# Patient Record
Sex: MALE | Race: OTHER | Employment: OTHER | URBAN - METROPOLITAN AREA
[De-identification: names, ages, dates, MRNs, and addresses within clinical notes are randomized per-mention and may not be internally consistent; named-entity substitution may affect disease eponyms.]

---

## 2017-03-17 RX ORDER — METOPROLOL SUCCINATE 50 MG/1
TABLET, EXTENDED RELEASE ORAL
Qty: 90 TABLET | Refills: 3 | Status: SHIPPED | OUTPATIENT
Start: 2017-03-17 | End: 2017-11-13 | Stop reason: SDUPTHER

## 2017-04-11 RX ORDER — CAPTOPRIL AND HYDROCHLOROTHIAZIDE 50; 25 MG/1; MG/1
TABLET ORAL
Qty: 180 TABLET | Refills: 3 | Status: SHIPPED | OUTPATIENT
Start: 2017-04-11 | End: 2017-11-13 | Stop reason: SDUPTHER

## 2017-04-11 RX ORDER — POTASSIUM CHLORIDE 1500 MG/1
TABLET, EXTENDED RELEASE ORAL
Qty: 270 TABLET | Refills: 3 | Status: SHIPPED | OUTPATIENT
Start: 2017-04-11 | End: 2017-11-13 | Stop reason: SDUPTHER

## 2017-06-07 ENCOUNTER — OFFICE VISIT (OUTPATIENT)
Dept: INTERNAL MEDICINE | Age: 67
End: 2017-06-07

## 2017-06-07 VITALS
DIASTOLIC BLOOD PRESSURE: 72 MMHG | OXYGEN SATURATION: 98 % | HEART RATE: 73 BPM | TEMPERATURE: 98.2 F | WEIGHT: 231 LBS | HEIGHT: 67 IN | BODY MASS INDEX: 36.26 KG/M2 | SYSTOLIC BLOOD PRESSURE: 108 MMHG | RESPIRATION RATE: 16 BRPM

## 2017-06-07 DIAGNOSIS — E78.5 DYSLIPIDEMIA: ICD-10-CM

## 2017-06-07 DIAGNOSIS — E11.9 TYPE 2 DIABETES MELLITUS WITHOUT COMPLICATION, WITHOUT LONG-TERM CURRENT USE OF INSULIN (HCC): Primary | ICD-10-CM

## 2017-06-07 DIAGNOSIS — C85.93 LYMPHOMA OF GASTROINTESTINAL TRACT (HCC): ICD-10-CM

## 2017-06-07 LAB
ALBUMIN SERPL-MCNC: 4.3 G/DL (ref 3.9–4.9)
ALP BLD-CCNC: 78 U/L (ref 35–104)
ALT SERPL-CCNC: 19 U/L (ref 0–41)
ANION GAP SERPL CALCULATED.3IONS-SCNC: 17 MEQ/L (ref 7–13)
AST SERPL-CCNC: 17 U/L (ref 0–40)
BILIRUB SERPL-MCNC: 0.5 MG/DL (ref 0–1.2)
BUN BLDV-MCNC: 16 MG/DL (ref 8–23)
CALCIUM SERPL-MCNC: 9 MG/DL (ref 8.6–10.2)
CHLORIDE BLD-SCNC: 101 MEQ/L (ref 98–107)
CHOLESTEROL, TOTAL: 108 MG/DL (ref 0–199)
CO2: 23 MEQ/L (ref 22–29)
CREAT SERPL-MCNC: 1.09 MG/DL (ref 0.7–1.2)
GFR AFRICAN AMERICAN: >60
GFR NON-AFRICAN AMERICAN: >60
GLOBULIN: 3.4 G/DL (ref 2.3–3.5)
GLUCOSE BLD-MCNC: 104 MG/DL (ref 74–109)
HBA1C MFR BLD: 6.3 % (ref 4.8–5.9)
HDLC SERPL-MCNC: 28 MG/DL (ref 40–59)
LDL CHOLESTEROL CALCULATED: 44 MG/DL (ref 0–129)
POTASSIUM SERPL-SCNC: 4 MEQ/L (ref 3.5–5.1)
SODIUM BLD-SCNC: 141 MEQ/L (ref 132–144)
TOTAL PROTEIN: 7.7 G/DL (ref 6.4–8.1)
TRIGL SERPL-MCNC: 180 MG/DL (ref 0–200)

## 2017-06-07 PROCEDURE — G8417 CALC BMI ABV UP PARAM F/U: HCPCS | Performed by: FAMILY MEDICINE

## 2017-06-07 PROCEDURE — 1036F TOBACCO NON-USER: CPT | Performed by: FAMILY MEDICINE

## 2017-06-07 PROCEDURE — 3017F COLORECTAL CA SCREEN DOC REV: CPT | Performed by: FAMILY MEDICINE

## 2017-06-07 PROCEDURE — 3044F HG A1C LEVEL LT 7.0%: CPT | Performed by: FAMILY MEDICINE

## 2017-06-07 PROCEDURE — 36415 COLL VENOUS BLD VENIPUNCTURE: CPT | Performed by: FAMILY MEDICINE

## 2017-06-07 PROCEDURE — G8427 DOCREV CUR MEDS BY ELIG CLIN: HCPCS | Performed by: FAMILY MEDICINE

## 2017-06-07 PROCEDURE — 99214 OFFICE O/P EST MOD 30 MIN: CPT | Performed by: FAMILY MEDICINE

## 2017-10-12 RX ORDER — ATORVASTATIN CALCIUM 40 MG/1
TABLET, FILM COATED ORAL
Qty: 90 TABLET | Refills: 0 | Status: SHIPPED | OUTPATIENT
Start: 2017-10-12 | End: 2017-12-07 | Stop reason: SDUPTHER

## 2017-11-13 ENCOUNTER — NURSE ONLY (OUTPATIENT)
Dept: INTERNAL MEDICINE | Age: 67
End: 2017-11-13

## 2017-11-13 DIAGNOSIS — Z23 NEED FOR INFLUENZA VACCINATION: Primary | ICD-10-CM

## 2017-11-13 PROCEDURE — 90662 IIV NO PRSV INCREASED AG IM: CPT | Performed by: PHYSICIAN ASSISTANT

## 2017-11-13 PROCEDURE — G0008 ADMIN INFLUENZA VIRUS VAC: HCPCS | Performed by: PHYSICIAN ASSISTANT

## 2017-11-13 RX ORDER — CAPTOPRIL AND HYDROCHLOROTHIAZIDE 50; 25 MG/1; MG/1
TABLET ORAL
Qty: 180 TABLET | Refills: 0 | Status: SHIPPED | OUTPATIENT
Start: 2017-11-13 | End: 2018-03-02 | Stop reason: SDUPTHER

## 2017-11-13 RX ORDER — METOPROLOL SUCCINATE 50 MG/1
TABLET, EXTENDED RELEASE ORAL
Qty: 90 TABLET | Refills: 0 | Status: SHIPPED | OUTPATIENT
Start: 2017-11-13 | End: 2017-12-20 | Stop reason: SDUPTHER

## 2017-11-13 RX ORDER — POTASSIUM CHLORIDE 20 MEQ/1
TABLET, EXTENDED RELEASE ORAL
Qty: 270 TABLET | Refills: 0 | Status: SHIPPED | OUTPATIENT
Start: 2017-11-13 | End: 2017-12-07 | Stop reason: SDUPTHER

## 2017-11-13 NOTE — TELEPHONE ENCOUNTER
Patient is almost out of captopril,toprol,klor-con and metformin. He does have an appt with dr Knight Danger 12/7/17 and plans on keeping that and will ask her for a years supply of meds.   Thank you

## 2017-12-05 ENCOUNTER — HOSPITAL ENCOUNTER (OUTPATIENT)
Dept: ORTHOPEDIC SURGERY | Age: 67
Discharge: HOME | End: 2017-12-05
Payer: MEDICARE

## 2017-12-05 DIAGNOSIS — M25.561 ARTHRALGIA OF RIGHT LOWER LEG: ICD-10-CM

## 2017-12-05 DIAGNOSIS — M25.562 ARTHRALGIA OF LEFT LOWER LEG: ICD-10-CM

## 2017-12-05 PROCEDURE — 73564 X-RAY EXAM KNEE 4 OR MORE: CPT

## 2017-12-07 ENCOUNTER — OFFICE VISIT (OUTPATIENT)
Dept: INTERNAL MEDICINE | Age: 67
End: 2017-12-07

## 2017-12-07 VITALS
WEIGHT: 234.6 LBS | TEMPERATURE: 98.2 F | RESPIRATION RATE: 16 BRPM | DIASTOLIC BLOOD PRESSURE: 68 MMHG | HEART RATE: 78 BPM | BODY MASS INDEX: 36.82 KG/M2 | SYSTOLIC BLOOD PRESSURE: 124 MMHG | HEIGHT: 67 IN | OXYGEN SATURATION: 98 %

## 2017-12-07 DIAGNOSIS — E78.5 DYSLIPIDEMIA: ICD-10-CM

## 2017-12-07 DIAGNOSIS — Z91.81 AT HIGH RISK FOR FALLS: ICD-10-CM

## 2017-12-07 DIAGNOSIS — Z11.59 NEED FOR HEPATITIS C SCREENING TEST: ICD-10-CM

## 2017-12-07 DIAGNOSIS — E11.9 TYPE 2 DIABETES MELLITUS WITHOUT COMPLICATION, WITHOUT LONG-TERM CURRENT USE OF INSULIN (HCC): Primary | ICD-10-CM

## 2017-12-07 DIAGNOSIS — Z11.4 ENCOUNTER FOR SCREENING FOR HIV: ICD-10-CM

## 2017-12-07 DIAGNOSIS — Z01.810 ENCOUNTER FOR PRE-OPERATIVE CARDIOVASCULAR CLEARANCE: ICD-10-CM

## 2017-12-07 LAB
ALBUMIN SERPL-MCNC: 4.4 G/DL (ref 3.9–4.9)
ALP BLD-CCNC: 80 U/L (ref 35–104)
ALT SERPL-CCNC: 21 U/L (ref 0–41)
ANION GAP SERPL CALCULATED.3IONS-SCNC: 16 MEQ/L (ref 7–13)
AST SERPL-CCNC: 17 U/L (ref 0–40)
BILIRUB SERPL-MCNC: 0.8 MG/DL (ref 0–1.2)
BUN BLDV-MCNC: 24 MG/DL (ref 8–23)
CALCIUM SERPL-MCNC: 9.1 MG/DL (ref 8.6–10.2)
CHLORIDE BLD-SCNC: 98 MEQ/L (ref 98–107)
CHOLESTEROL, TOTAL: 113 MG/DL (ref 0–199)
CO2: 26 MEQ/L (ref 22–29)
CREAT SERPL-MCNC: 1.04 MG/DL (ref 0.7–1.2)
CREATININE URINE: 19.2 MG/DL
GFR AFRICAN AMERICAN: >60
GFR NON-AFRICAN AMERICAN: >60
GLOBULIN: 3.3 G/DL (ref 2.3–3.5)
GLUCOSE BLD-MCNC: 115 MG/DL (ref 74–109)
HBA1C MFR BLD: 6.2 % (ref 4.8–5.9)
HDLC SERPL-MCNC: 27 MG/DL (ref 40–59)
HEPATITIS C ANTIBODY INTERPRETATION: NORMAL
LDL CHOLESTEROL CALCULATED: 49 MG/DL (ref 0–129)
MICROALBUMIN UR-MCNC: <1.2 MG/DL
MICROALBUMIN/CREAT UR-RTO: NORMAL MG/G (ref 0–30)
POTASSIUM SERPL-SCNC: 3.9 MEQ/L (ref 3.5–5.1)
SODIUM BLD-SCNC: 140 MEQ/L (ref 132–144)
TOTAL PROTEIN: 7.7 G/DL (ref 6.4–8.1)
TRIGL SERPL-MCNC: 184 MG/DL (ref 0–200)

## 2017-12-07 PROCEDURE — 3017F COLORECTAL CA SCREEN DOC REV: CPT | Performed by: FAMILY MEDICINE

## 2017-12-07 PROCEDURE — 99214 OFFICE O/P EST MOD 30 MIN: CPT | Performed by: FAMILY MEDICINE

## 2017-12-07 PROCEDURE — 4040F PNEUMOC VAC/ADMIN/RCVD: CPT | Performed by: FAMILY MEDICINE

## 2017-12-07 PROCEDURE — 36415 COLL VENOUS BLD VENIPUNCTURE: CPT | Performed by: FAMILY MEDICINE

## 2017-12-07 PROCEDURE — G8484 FLU IMMUNIZE NO ADMIN: HCPCS | Performed by: FAMILY MEDICINE

## 2017-12-07 PROCEDURE — 3044F HG A1C LEVEL LT 7.0%: CPT | Performed by: FAMILY MEDICINE

## 2017-12-07 PROCEDURE — 1036F TOBACCO NON-USER: CPT | Performed by: FAMILY MEDICINE

## 2017-12-07 PROCEDURE — G8427 DOCREV CUR MEDS BY ELIG CLIN: HCPCS | Performed by: FAMILY MEDICINE

## 2017-12-07 PROCEDURE — G8417 CALC BMI ABV UP PARAM F/U: HCPCS | Performed by: FAMILY MEDICINE

## 2017-12-07 PROCEDURE — 1123F ACP DISCUSS/DSCN MKR DOCD: CPT | Performed by: FAMILY MEDICINE

## 2017-12-07 RX ORDER — ATORVASTATIN CALCIUM 40 MG/1
TABLET, FILM COATED ORAL
Qty: 90 TABLET | Refills: 3 | Status: SHIPPED | OUTPATIENT
Start: 2017-12-07 | End: 2018-12-28 | Stop reason: SDUPTHER

## 2017-12-07 RX ORDER — POTASSIUM CHLORIDE 20 MEQ/1
TABLET, EXTENDED RELEASE ORAL
Qty: 270 TABLET | Refills: 3 | Status: SHIPPED | OUTPATIENT
Start: 2017-12-07 | End: 2019-01-08 | Stop reason: SDUPTHER

## 2017-12-07 ASSESSMENT — PATIENT HEALTH QUESTIONNAIRE - PHQ9
SUM OF ALL RESPONSES TO PHQ QUESTIONS 1-9: 0
1. LITTLE INTEREST OR PLEASURE IN DOING THINGS: 0
2. FEELING DOWN, DEPRESSED OR HOPELESS: 0
SUM OF ALL RESPONSES TO PHQ9 QUESTIONS 1 & 2: 0

## 2017-12-07 NOTE — PROGRESS NOTES
Patient: Aries Savage    YOB: 1950    Date: 12/7/17    Chief Complaint   Patient presents with    Diabetes     6 month follow up. He is due for labs and Diabetic foot exam. He is doing well    Medication Refill     Pending       Patient Active Problem List    Diagnosis Date Noted    Type 2 diabetes mellitus without complication, without long-term current use of insulin ([facility] 06/07/2017    Dyslipidemia 06/07/2017    CLL (chronic lymphocytic leukemia) (HCC)     Lymphoma of gastrointestinal tract (HCC)     Arthritis, lumbar spine ([facility] 04/08/2015       Allergies   Allergen Reactions    Dye [Barium-Containing Compounds] Hives    Iodinated Diagnostic Agents Hives       Vitals:    12/07/17 0855   BP: 124/68   Site: Left Arm   Position: Sitting   Cuff Size: Large Adult   Pulse: 78   Resp: 16   Temp: 98.2 °F (36.8 °C)   TempSrc: Oral   SpO2: 98%   Weight: 234 lb 9.6 oz (106.4 kg)   Height: 5' 7\" (1.702 m)     Body mass index is 36.74 kg/m². Treatment Adherence:   Medication compliance:  compliant all of the time  Diet compliance:  noncompliant: does not follow diet  Weight trend: increasing  Current exercise: no regular exercise    Diabetes Mellitus Type 2: Current symptoms/problems include none. Home blood sugar records:  fasting range: 115  Any episodes of hypoglycemia? no  Eye exam current (within one year): yes  Tobacco history: He  reports that he has never smoked. He has never used smokeless tobacco.   Daily Aspirin? Yes  Known diabetic complications: none          HPI Actually feels very well today. He is here to follow-up on his diabetes his cholesterol and his blood pressure. He has no complaints no chest pressure or pain nausea vomiting numbness tingling or weakness. He should have his right knee replaced coming up he has never seen a cardiologist ordered a cardiac consult to help with clearance on this patient.     Review of Systems    Constitutional: Negative for fatigue, fever and sweats. HEENT: Negative for eye discharge and vision loss. Negative for ear drainage, hearing loss and nasal drainage. Respiratory: Negative for cough, dyspnea and wheezing. Cardiovascular:  Negative for chest pain, claudication and irregular heartbeat/palpitations. Gastrointestinal: Negative for abdominal pain, nausea, vomiting, constipation and diarrhea. Genitourinary: No dysuria or penile discharge. Metabolic/Endocrine: Negative for cold intolerance, heat intolerance, polydipsia and polyphagia. No unintended weight loss or gain. Neuro/Psychiatric: Negative for gait disturbance. Negative for psychiatric symptoms. Dermatologic: Negative for pruritus and rash. Musculoskeletal: positive for bone/joint symptoms. No numbness or tingling. No weakness. Hematology: Negative for bleeding and easy bruising. Immunology:  Negative for environmental allergies and food allergies. Physical Exam    Patient's medication, allergies, past medical, surgical, social and family histories were reviewed and updated as appropriate. PHYSICAL EXAM   General Appearance: Alert oriented pleasant cooperative with the exam in no acute distress. HEENT: Eyes clear nonicteric hearing excellent facial muscles symmetrical.  Neck: Soft nontender no adenopathy or masses no carotid bruits  Lungs: Clear to auscultation no wheezes rhonchi or rales  Heart: Regular rate and rhythm without murmurs rubs or gallops  Extremities: Diabetic foot exam: Normal feet with no edema no callus no ulcerations no deformities intact to sensation. Assessment:  1. Type 2 diabetes mellitus without complication, without long-term current use of insulin ([facility])  Microalbumin / Creatinine Urine Ratio     DIABETES FOOT EXAM    Hemoglobin A1C    Microalbumin / Creatinine Urine Ratio    Hemoglobin A1C   2. Dyslipidemia  Lipid Panel    Comprehensive Metabolic Panel    Lipid Panel    Comprehensive Metabolic Panel   3.  Encounter for screening for HIV Hiv-1,-2 W/Reflex To Hiv-1 Western Blot    Hiv-1,-2 W/Reflex To Hiv-1 Western Blot   4. Need for hepatitis C screening test  Hepatitis C Antibody    Hepatitis C Antibody   5. At high risk for falls     6. Encounter for pre-operative cardiovascular clearance  [address]) [facility]               Plan:  Current Outpatient Prescriptions   Medication Sig Dispense Refill    potassium chloride (KLOR-CON M20) 20 MEQ extended release tablet TAKE 1 TABLET THREE TIMES A  tablet 3    metFORMIN (GLUCOPHAGE) 500 MG tablet TAKE 2 TABLETS TWICE A DAY WITH MEALS 360 tablet 3    atorvastatin (LIPITOR) 40 MG tablet TAKE 1 TABLET NIGHTLY 90 tablet 3    metoprolol succinate (TOPROL XL) 50 MG extended release tablet TAKE 1 TABLET DAILY 90 tablet 0    captopril-hydrochlorothiazide (CAPOZIDE) 50-25 MG per tablet TAKE 1 TABLET 1 TO 2 TIMES DAILY 180 tablet 0    furosemide (LASIX) 40 MG tablet TAKE 1 TABLET DAILY 90 tablet 3    Alcohol Swabs PADS Use as directed with insulin injections 300 each 3    aspirin 81 MG tablet Take 81 mg by mouth daily.  SUPER B COMPLEX/C CAPS Take 1 capsule by mouth daily.  Multiple Vitamins-Minerals (MULTIVITAMIN PO) Take 1 tablet by mouth daily. No current facility-administered medications for this visit.       Orders Placed This Encounter   Procedures    Microalbumin / Creatinine Urine Ratio     Standing Status:   Future     Number of Occurrences:   1     Standing Expiration Date:   12/7/2018    Hiv-1,-2 W/Reflex To Hiv-1 Western Blot     Standing Status:   Future     Number of Occurrences:   1     Standing Expiration Date:   12/7/2018    Hepatitis C Antibody     Standing Status:   Future     Number of Occurrences:   1     Standing Expiration Date:   12/7/2018    Hemoglobin A1C     Standing Status:   Future     Number of Occurrences:   1     Standing Expiration Date:   12/7/2018    Lipid Panel     Standing Status:   Future     Number of

## 2017-12-09 LAB — HIV-1 AND HIV-2 ANTIBODIES: NEGATIVE

## 2017-12-20 ENCOUNTER — OFFICE VISIT (OUTPATIENT)
Dept: INTERNAL MEDICINE | Age: 67
End: 2017-12-20

## 2017-12-20 VITALS
BODY MASS INDEX: 37.48 KG/M2 | TEMPERATURE: 97.6 F | DIASTOLIC BLOOD PRESSURE: 70 MMHG | HEIGHT: 67 IN | SYSTOLIC BLOOD PRESSURE: 110 MMHG | WEIGHT: 238.8 LBS | HEART RATE: 73 BPM | OXYGEN SATURATION: 98 %

## 2017-12-20 DIAGNOSIS — M17.0 PRIMARY OSTEOARTHRITIS OF BOTH KNEES: ICD-10-CM

## 2017-12-20 DIAGNOSIS — I10 ESSENTIAL HYPERTENSION: ICD-10-CM

## 2017-12-20 DIAGNOSIS — E78.2 MIXED HYPERLIPIDEMIA: ICD-10-CM

## 2017-12-20 DIAGNOSIS — Z01.818 PRE-OP EXAM: Primary | ICD-10-CM

## 2017-12-20 LAB
BASOPHILS ABSOLUTE: 0 K/UL (ref 0–0.2)
BASOPHILS RELATIVE PERCENT: 0.3 %
BILIRUBIN URINE: NEGATIVE
BLOOD, URINE: NEGATIVE
CLARITY: CLEAR
COLOR: YELLOW
EOSINOPHILS ABSOLUTE: 0.1 K/UL (ref 0–0.7)
EOSINOPHILS RELATIVE PERCENT: 1.4 %
GLUCOSE URINE: NEGATIVE MG/DL
HCT VFR BLD CALC: 38.2 % (ref 42–52)
HEMOGLOBIN: 13.3 G/DL (ref 14–18)
KETONES, URINE: NEGATIVE MG/DL
LEUKOCYTE ESTERASE, URINE: NEGATIVE
LYMPHOCYTES ABSOLUTE: 1.8 K/UL (ref 1–4.8)
LYMPHOCYTES RELATIVE PERCENT: 29.2 %
MCH RBC QN AUTO: 31.8 PG (ref 27–31.3)
MCHC RBC AUTO-ENTMCNC: 34.9 % (ref 33–37)
MCV RBC AUTO: 91.1 FL (ref 80–100)
MONOCYTES ABSOLUTE: 0.6 K/UL (ref 0.2–0.8)
MONOCYTES RELATIVE PERCENT: 9.6 %
NEUTROPHILS ABSOLUTE: 3.6 K/UL (ref 1.4–6.5)
NEUTROPHILS RELATIVE PERCENT: 59.5 %
NITRITE, URINE: NEGATIVE
PDW BLD-RTO: 13.8 % (ref 11.5–14.5)
PH UA: 5 (ref 5–9)
PLATELET # BLD: 200 K/UL (ref 130–400)
PROTEIN UA: NEGATIVE MG/DL
RBC # BLD: 4.2 M/UL (ref 4.7–6.1)
SPECIFIC GRAVITY UA: 1 (ref 1–1.03)
UROBILINOGEN, URINE: 0.2 E.U./DL
WBC # BLD: 6.1 K/UL (ref 4.8–10.8)

## 2017-12-20 PROCEDURE — 99214 OFFICE O/P EST MOD 30 MIN: CPT | Performed by: PHYSICIAN ASSISTANT

## 2017-12-20 PROCEDURE — 3017F COLORECTAL CA SCREEN DOC REV: CPT | Performed by: PHYSICIAN ASSISTANT

## 2017-12-20 PROCEDURE — 4040F PNEUMOC VAC/ADMIN/RCVD: CPT | Performed by: PHYSICIAN ASSISTANT

## 2017-12-20 PROCEDURE — G8484 FLU IMMUNIZE NO ADMIN: HCPCS | Performed by: PHYSICIAN ASSISTANT

## 2017-12-20 PROCEDURE — G8427 DOCREV CUR MEDS BY ELIG CLIN: HCPCS | Performed by: PHYSICIAN ASSISTANT

## 2017-12-20 PROCEDURE — 1123F ACP DISCUSS/DSCN MKR DOCD: CPT | Performed by: PHYSICIAN ASSISTANT

## 2017-12-20 PROCEDURE — G8417 CALC BMI ABV UP PARAM F/U: HCPCS | Performed by: PHYSICIAN ASSISTANT

## 2017-12-20 PROCEDURE — 93000 ELECTROCARDIOGRAM COMPLETE: CPT | Performed by: PHYSICIAN ASSISTANT

## 2017-12-20 PROCEDURE — 36415 COLL VENOUS BLD VENIPUNCTURE: CPT | Performed by: PHYSICIAN ASSISTANT

## 2017-12-20 PROCEDURE — 1036F TOBACCO NON-USER: CPT | Performed by: PHYSICIAN ASSISTANT

## 2017-12-20 RX ORDER — FUROSEMIDE 40 MG/1
TABLET ORAL
Qty: 90 TABLET | Refills: 3 | Status: SHIPPED | OUTPATIENT
Start: 2017-12-20 | End: 2019-04-11 | Stop reason: SDUPTHER

## 2017-12-20 RX ORDER — METOPROLOL SUCCINATE 50 MG/1
TABLET, EXTENDED RELEASE ORAL
Qty: 90 TABLET | Refills: 3 | Status: SHIPPED | OUTPATIENT
Start: 2017-12-20 | End: 2019-01-08 | Stop reason: SDUPTHER

## 2017-12-20 NOTE — PROGRESS NOTES
06/08/16    I & D ABSCESS THIGH    SHOULDER SURGERY      UPPER GASTROINTESTINAL ENDOSCOPY  4/29/15    w/bx      Social History     Social History    Marital status:      Spouse name: N/A    Number of children: N/A    Years of education: N/A     Occupational History    Not on file. Social History Main Topics    Smoking status: Never Smoker    Smokeless tobacco: Never Used    Alcohol use No    Drug use: No    Sexual activity: Not on file     Other Topics Concern    Not on file     Social History Narrative    No narrative on file     Family History   Problem Relation Age of Onset    Heart Disease Mother 67     CHF    Cancer Father 68     liver    Cancer Brother      chronic lukemia     Allergies   Allergen Reactions    Dye [Barium-Containing Compounds] Hives    Iodinated Diagnostic Agents Hives     Current Outpatient Prescriptions   Medication Sig Dispense Refill    metoprolol succinate (TOPROL XL) 50 MG extended release tablet TAKE 1 TABLET DAILY 90 tablet 3    furosemide (LASIX) 40 MG tablet TAKE 1 TABLET DAILY 90 tablet 3    potassium chloride (KLOR-CON M20) 20 MEQ extended release tablet TAKE 1 TABLET THREE TIMES A  tablet 3    metFORMIN (GLUCOPHAGE) 500 MG tablet TAKE 2 TABLETS TWICE A DAY WITH MEALS 360 tablet 3    atorvastatin (LIPITOR) 40 MG tablet TAKE 1 TABLET NIGHTLY 90 tablet 3    captopril-hydrochlorothiazide (CAPOZIDE) 50-25 MG per tablet TAKE 1 TABLET 1 TO 2 TIMES DAILY 180 tablet 0    Alcohol Swabs PADS Use as directed with insulin injections 300 each 3    aspirin 81 MG tablet Take 81 mg by mouth daily.  SUPER B COMPLEX/C CAPS Take 1 capsule by mouth daily.  Multiple Vitamins-Minerals (MULTIVITAMIN PO) Take 1 tablet by mouth daily. No current facility-administered medications for this visit.         Objective    Vitals:    12/20/17 0842   BP: 110/70   Site: Left Arm   Position: Sitting   Cuff Size: Large Adult   Pulse: 73   Temp:

## 2017-12-21 LAB
ALBUMIN SERPL-MCNC: 4.3 G/DL (ref 3.9–4.9)
ALP BLD-CCNC: 78 U/L (ref 35–104)
ALT SERPL-CCNC: 20 U/L (ref 0–41)
ANION GAP SERPL CALCULATED.3IONS-SCNC: 15 MEQ/L (ref 7–13)
AST SERPL-CCNC: 17 U/L (ref 0–40)
BILIRUB SERPL-MCNC: 0.7 MG/DL (ref 0–1.2)
BUN BLDV-MCNC: 25 MG/DL (ref 8–23)
CALCIUM SERPL-MCNC: 9 MG/DL (ref 8.6–10.2)
CHLORIDE BLD-SCNC: 98 MEQ/L (ref 98–107)
CO2: 26 MEQ/L (ref 22–29)
CREAT SERPL-MCNC: 1.03 MG/DL (ref 0.7–1.2)
GFR AFRICAN AMERICAN: >60
GFR NON-AFRICAN AMERICAN: >60
GLOBULIN: 3.3 G/DL (ref 2.3–3.5)
GLUCOSE BLD-MCNC: 107 MG/DL (ref 74–109)
POTASSIUM SERPL-SCNC: 4.6 MEQ/L (ref 3.5–5.1)
SODIUM BLD-SCNC: 139 MEQ/L (ref 132–144)
TOTAL PROTEIN: 7.6 G/DL (ref 6.4–8.1)

## 2017-12-26 ENCOUNTER — OFFICE VISIT (OUTPATIENT)
Dept: CARDIOLOGY | Age: 67
End: 2017-12-26

## 2017-12-26 VITALS
DIASTOLIC BLOOD PRESSURE: 62 MMHG | HEART RATE: 76 BPM | RESPIRATION RATE: 16 BRPM | WEIGHT: 230 LBS | SYSTOLIC BLOOD PRESSURE: 104 MMHG | TEMPERATURE: 97.1 F | BODY MASS INDEX: 36.1 KG/M2 | OXYGEN SATURATION: 98 % | HEIGHT: 67 IN

## 2017-12-26 DIAGNOSIS — Z01.810 ENCOUNTER FOR PREPROCEDURAL CARDIOVASCULAR EXAMINATION: Primary | ICD-10-CM

## 2017-12-26 DIAGNOSIS — E78.5 DYSLIPIDEMIA: ICD-10-CM

## 2017-12-26 DIAGNOSIS — I10 ESSENTIAL HYPERTENSION: ICD-10-CM

## 2017-12-26 PROCEDURE — 3017F COLORECTAL CA SCREEN DOC REV: CPT | Performed by: INTERNAL MEDICINE

## 2017-12-26 PROCEDURE — 1123F ACP DISCUSS/DSCN MKR DOCD: CPT | Performed by: INTERNAL MEDICINE

## 2017-12-26 PROCEDURE — G8484 FLU IMMUNIZE NO ADMIN: HCPCS | Performed by: INTERNAL MEDICINE

## 2017-12-26 PROCEDURE — G8427 DOCREV CUR MEDS BY ELIG CLIN: HCPCS | Performed by: INTERNAL MEDICINE

## 2017-12-26 PROCEDURE — G8417 CALC BMI ABV UP PARAM F/U: HCPCS | Performed by: INTERNAL MEDICINE

## 2017-12-26 PROCEDURE — 99203 OFFICE O/P NEW LOW 30 MIN: CPT | Performed by: INTERNAL MEDICINE

## 2017-12-26 PROCEDURE — 4040F PNEUMOC VAC/ADMIN/RCVD: CPT | Performed by: INTERNAL MEDICINE

## 2017-12-26 PROCEDURE — 1036F TOBACCO NON-USER: CPT | Performed by: INTERNAL MEDICINE

## 2017-12-26 ASSESSMENT — ENCOUNTER SYMPTOMS
EYE PAIN: 0
APNEA: 0
COLOR CHANGE: 0
WHEEZING: 0
ABDOMINAL PAIN: 0
ABDOMINAL DISTENTION: 0
COUGH: 0
NAUSEA: 0
SHORTNESS OF BREATH: 0
STRIDOR: 0
TROUBLE SWALLOWING: 0
EYE DISCHARGE: 0
CHEST TIGHTNESS: 0
CHOKING: 0

## 2017-12-26 NOTE — PROGRESS NOTES
[facility] CARDIOLOGY OFFICE CONSULT      Patient: Parish Rose  YOB: 1950  MRN: [id number]    Chief Complaint:  Chief Complaint   Patient presents with    Hypertension     Cardiac Clearance request by Dr Noble Garrison. Pt having Right total knee replacement on 1/18/17 Dr Richard Membreno. Subjective/HPI    Patient needs knee surgery. Known CV risk factors of HTN, HPL, DM and FamHx. Patient not symptomatic however. No prior cardiac problem. Had general anesthesia last year without problem. Can sustain > 6 METS without chest pain. Patient denies chest pain or tightness, jaw or arm pain, shortness of breath, dyspnea on exertion, orthopnea, nocturnal dyspnea, lower extremity edema, weight gain, syncope, palpitations, lightheadedness, dizziness or claudication.     EKG: Normal sinus rhythm normal EKG    Past Medical History:   Diagnosis Date    CLL [facility]chronic lymphocytic leukemia) ([facility]     Hyperlipidemia     Hypertension     Lymphoma of gastrointestinal tract (HCC)     Type II or unspecified type diabetes mellitus without mention of complication, not stated as uncontrolled        Past Surgical History:   Procedure Laterality Date    APPENDECTOMY      CHOLECYSTECTOMY N/A 07/07/2016    COLONOSCOPY  4/1/16    w/polypectomy     OTHER SURGICAL HISTORY Right 06/08/16    I & D ABSCESS THIGH    SHOULDER SURGERY      UPPER GASTROINTESTINAL ENDOSCOPY  4/29/15    w/bx        Family History   Problem Relation Age of Onset    Heart Disease Mother 67     CHF    Cancer Father 68     liver    Cancer Brother      chronic lukemia       Social History     Social History    Marital status:      Spouse name: N/A    Number of children: N/A    Years of education: N/A     Social History Main Topics    Smoking status: Never Smoker    Smokeless tobacco: Never Used    Alcohol use No    Drug use: No    Sexual activity: Not Asked     Other Topics Concern    None     Social History Narrative    None       Allergies   Allergen Reactions    Dye [Barium-Containing Compounds] Hives    Iodinated Diagnostic Agents Hives       Current Outpatient Prescriptions   Medication Sig Dispense Refill    metoprolol succinate (TOPROL XL) 50 MG extended release tablet TAKE 1 TABLET DAILY 90 tablet 3    furosemide (LASIX) 40 MG tablet TAKE 1 TABLET DAILY 90 tablet 3    potassium chloride (KLOR-CON M20) 20 MEQ extended release tablet TAKE 1 TABLET THREE TIMES A  tablet 3    metFORMIN (GLUCOPHAGE) 500 MG tablet TAKE 2 TABLETS TWICE A DAY WITH MEALS 360 tablet 3    atorvastatin (LIPITOR) 40 MG tablet TAKE 1 TABLET NIGHTLY 90 tablet 3    captopril-hydrochlorothiazide (CAPOZIDE) 50-25 MG per tablet TAKE 1 TABLET 1 TO 2 TIMES DAILY 180 tablet 0    Alcohol Swabs PADS Use as directed with insulin injections 300 each 3    aspirin 81 MG tablet Take 81 mg by mouth daily.  SUPER B COMPLEX/C CAPS Take 1 capsule by mouth daily.  Multiple Vitamins-Minerals (MULTIVITAMIN PO) Take 1 tablet by mouth daily. No current facility-administered medications for this visit. Review of Systems:   Review of Systems   Constitutional: Negative. HENT: Negative for congestion and trouble swallowing. Eyes: Negative for pain, discharge and visual disturbance. Respiratory: Negative for apnea, cough, choking, chest tightness, shortness of breath, wheezing and stridor. Cardiovascular: Negative for chest pain, palpitations and leg swelling. Gastrointestinal: Negative for abdominal distention, abdominal pain and nausea. Endocrine: Negative for cold intolerance and heat intolerance. Genitourinary: Negative for difficulty urinating. Musculoskeletal: Negative for arthralgias and joint swelling. Skin: Negative for color change and pallor. Allergic/Immunologic: Negative for immunocompromised state.    Neurological: Negative for dizziness, seizures, syncope, facial asymmetry, speech difficulty, by Umesh Bangura MD on 12/26/2017 at 10:20 AM

## 2018-01-09 ENCOUNTER — HOSPITAL ENCOUNTER (OUTPATIENT)
Dept: PREADMISSION TESTING | Age: 68
Discharge: HOME | End: 2018-01-09
Payer: MEDICARE

## 2018-01-09 VITALS
DIASTOLIC BLOOD PRESSURE: 70 MMHG | TEMPERATURE: 97.9 F | WEIGHT: 235.6 LBS | RESPIRATION RATE: 16 BRPM | OXYGEN SATURATION: 97 % | BODY MASS INDEX: 36.98 KG/M2 | SYSTOLIC BLOOD PRESSURE: 122 MMHG | HEIGHT: 67 IN | HEART RATE: 75 BPM

## 2018-01-09 PROBLEM — M17.11 OSTEOARTHRITIS OF RIGHT KNEE: Status: ACTIVE | Noted: 2018-01-09

## 2018-01-09 LAB
ABO/RH: NORMAL
ANTIBODY SCREEN: NORMAL

## 2018-01-09 PROCEDURE — 86901 BLOOD TYPING SEROLOGIC RH(D): CPT

## 2018-01-09 PROCEDURE — 86900 BLOOD TYPING SEROLOGIC ABO: CPT

## 2018-01-09 PROCEDURE — 86850 RBC ANTIBODY SCREEN: CPT

## 2018-01-09 RX ORDER — LIDOCAINE HYDROCHLORIDE 10 MG/ML
1 INJECTION, SOLUTION EPIDURAL; INFILTRATION; INTRACAUDAL; PERINEURAL
Status: CANCELLED | OUTPATIENT
Start: 2018-01-09 | End: 2018-01-09

## 2018-01-09 RX ORDER — SODIUM CHLORIDE, SODIUM LACTATE, POTASSIUM CHLORIDE, CALCIUM CHLORIDE 600; 310; 30; 20 MG/100ML; MG/100ML; MG/100ML; MG/100ML
INJECTION, SOLUTION INTRAVENOUS CONTINUOUS
Status: CANCELLED | OUTPATIENT
Start: 2018-01-09

## 2018-01-09 RX ORDER — SODIUM CHLORIDE 0.9 % (FLUSH) 0.9 %
10 SYRINGE (ML) INJECTION EVERY 12 HOURS SCHEDULED
Status: CANCELLED | OUTPATIENT
Start: 2018-01-09

## 2018-01-09 RX ORDER — SODIUM CHLORIDE 0.9 % (FLUSH) 0.9 %
10 SYRINGE (ML) INJECTION PRN
Status: CANCELLED | OUTPATIENT
Start: 2018-01-09

## 2018-01-18 ENCOUNTER — ANESTHESIA EVENT (OUTPATIENT)
Dept: OPERATING ROOM | Age: 68
DRG: 470 | End: 2018-01-18
Payer: MEDICARE

## 2018-01-18 ENCOUNTER — ANESTHESIA (OUTPATIENT)
Dept: OPERATING ROOM | Age: 68
DRG: 470 | End: 2018-01-18
Payer: MEDICARE

## 2018-01-18 ENCOUNTER — HOSPITAL ENCOUNTER (INPATIENT)
Age: 68
LOS: 2 days | Discharge: HOME HEALTH | DRG: 470 | End: 2018-01-20
Attending: ORTHOPAEDIC SURGERY | Admitting: ORTHOPAEDIC SURGERY
Payer: MEDICARE

## 2018-01-18 ENCOUNTER — PREP FOR PROCEDURE (OUTPATIENT)
Dept: ORTHOPEDIC SURGERY | Age: 68
End: 2018-01-18

## 2018-01-18 ENCOUNTER — APPOINTMENT (OUTPATIENT)
Dept: GENERAL RADIOLOGY | Age: 68
DRG: 470 | End: 2018-01-18
Attending: ORTHOPAEDIC SURGERY
Payer: MEDICARE

## 2018-01-18 VITALS — TEMPERATURE: 98.6 F | OXYGEN SATURATION: 98 % | DIASTOLIC BLOOD PRESSURE: 58 MMHG | SYSTOLIC BLOOD PRESSURE: 103 MMHG

## 2018-01-18 DIAGNOSIS — Z96.651 STATUS POST TOTAL RIGHT KNEE REPLACEMENT: Primary | ICD-10-CM

## 2018-01-18 DIAGNOSIS — M17.11 PRIMARY OSTEOARTHRITIS OF RIGHT KNEE: ICD-10-CM

## 2018-01-18 LAB
GLUCOSE BLD-MCNC: 105 MG/DL (ref 60–115)
GLUCOSE BLD-MCNC: 115 MG/DL (ref 60–115)
GLUCOSE BLD-MCNC: 119 MG/DL (ref 60–115)
GLUCOSE BLD-MCNC: 168 MG/DL (ref 60–115)
PERFORMED ON: ABNORMAL
PERFORMED ON: ABNORMAL
PERFORMED ON: NORMAL
PERFORMED ON: NORMAL

## 2018-01-18 PROCEDURE — 6360000002 HC RX W HCPCS: Performed by: NURSE PRACTITIONER

## 2018-01-18 PROCEDURE — 87086 URINE CULTURE/COLONY COUNT: CPT

## 2018-01-18 PROCEDURE — G8979 MOBILITY GOAL STATUS: HCPCS

## 2018-01-18 PROCEDURE — 6370000000 HC RX 637 (ALT 250 FOR IP): Performed by: ORTHOPAEDIC SURGERY

## 2018-01-18 PROCEDURE — G8978 MOBILITY CURRENT STATUS: HCPCS

## 2018-01-18 PROCEDURE — 6360000002 HC RX W HCPCS: Performed by: NURSE ANESTHETIST, CERTIFIED REGISTERED

## 2018-01-18 PROCEDURE — 0SRC0J9 REPLACEMENT OF RIGHT KNEE JOINT WITH SYNTHETIC SUBSTITUTE, CEMENTED, OPEN APPROACH: ICD-10-PCS | Performed by: ORTHOPAEDIC SURGERY

## 2018-01-18 PROCEDURE — 97162 PT EVAL MOD COMPLEX 30 MIN: CPT

## 2018-01-18 PROCEDURE — 97167 OT EVAL HIGH COMPLEX 60 MIN: CPT

## 2018-01-18 PROCEDURE — G8988 SELF CARE GOAL STATUS: HCPCS

## 2018-01-18 PROCEDURE — 97116 GAIT TRAINING THERAPY: CPT

## 2018-01-18 PROCEDURE — 6360000002 HC RX W HCPCS: Performed by: STUDENT IN AN ORGANIZED HEALTH CARE EDUCATION/TRAINING PROGRAM

## 2018-01-18 PROCEDURE — 3600000004 HC SURGERY LEVEL 4 BASE: Performed by: ORTHOPAEDIC SURGERY

## 2018-01-18 PROCEDURE — 73560 X-RAY EXAM OF KNEE 1 OR 2: CPT

## 2018-01-18 PROCEDURE — 2580000003 HC RX 258: Performed by: STUDENT IN AN ORGANIZED HEALTH CARE EDUCATION/TRAINING PROGRAM

## 2018-01-18 PROCEDURE — 2580000003 HC RX 258: Performed by: ORTHOPAEDIC SURGERY

## 2018-01-18 PROCEDURE — 3700000001 HC ADD 15 MINUTES (ANESTHESIA): Performed by: ORTHOPAEDIC SURGERY

## 2018-01-18 PROCEDURE — 7100000001 HC PACU RECOVERY - ADDTL 15 MIN: Performed by: ORTHOPAEDIC SURGERY

## 2018-01-18 PROCEDURE — 2500000003 HC RX 250 WO HCPCS: Performed by: ORTHOPAEDIC SURGERY

## 2018-01-18 PROCEDURE — 2500000003 HC RX 250 WO HCPCS: Performed by: NURSE PRACTITIONER

## 2018-01-18 PROCEDURE — 2500000003 HC RX 250 WO HCPCS: Performed by: STUDENT IN AN ORGANIZED HEALTH CARE EDUCATION/TRAINING PROGRAM

## 2018-01-18 PROCEDURE — 6360000002 HC RX W HCPCS: Performed by: ORTHOPAEDIC SURGERY

## 2018-01-18 PROCEDURE — C1776 JOINT DEVICE (IMPLANTABLE): HCPCS | Performed by: ORTHOPAEDIC SURGERY

## 2018-01-18 PROCEDURE — 3700000000 HC ANESTHESIA ATTENDED CARE: Performed by: ORTHOPAEDIC SURGERY

## 2018-01-18 PROCEDURE — 3600000014 HC SURGERY LEVEL 4 ADDTL 15MIN: Performed by: ORTHOPAEDIC SURGERY

## 2018-01-18 PROCEDURE — 7100000000 HC PACU RECOVERY - FIRST 15 MIN: Performed by: ORTHOPAEDIC SURGERY

## 2018-01-18 PROCEDURE — 3E0T3BZ INTRODUCTION OF ANESTHETIC AGENT INTO PERIPHERAL NERVES AND PLEXI, PERCUTANEOUS APPROACH: ICD-10-PCS | Performed by: ORTHOPAEDIC SURGERY

## 2018-01-18 PROCEDURE — 2580000003 HC RX 258: Performed by: NURSE PRACTITIONER

## 2018-01-18 PROCEDURE — C1713 ANCHOR/SCREW BN/BN,TIS/BN: HCPCS | Performed by: ORTHOPAEDIC SURGERY

## 2018-01-18 PROCEDURE — 2720000010 HC SURG SUPPLY STERILE: Performed by: ORTHOPAEDIC SURGERY

## 2018-01-18 PROCEDURE — 6370000000 HC RX 637 (ALT 250 FOR IP): Performed by: NURSE PRACTITIONER

## 2018-01-18 PROCEDURE — 64447 NJX AA&/STRD FEMORAL NRV IMG: CPT | Performed by: STUDENT IN AN ORGANIZED HEALTH CARE EDUCATION/TRAINING PROGRAM

## 2018-01-18 PROCEDURE — 1210000000 HC MED SURG R&B

## 2018-01-18 PROCEDURE — G8987 SELF CARE CURRENT STATUS: HCPCS

## 2018-01-18 PROCEDURE — A6199 ALGINATE DRSG WOUND FILLER: HCPCS | Performed by: ORTHOPAEDIC SURGERY

## 2018-01-18 DEVICE — COMPONENT TOT KNEE CAPPED STD STRYKNEES] STRYKER CORP]: Type: IMPLANTABLE DEVICE | Status: FUNCTIONAL

## 2018-01-18 DEVICE — BASEPLATE TIB SZ 6 KNEE TRITANIUM CEM PRI LO PROF TRIATHLON: Type: IMPLANTABLE DEVICE | Status: FUNCTIONAL

## 2018-01-18 DEVICE — COMPONENT FEM SZ 6 R KNEE POST STBL CEM TRIATHLON: Type: IMPLANTABLE DEVICE | Status: FUNCTIONAL

## 2018-01-18 DEVICE — CEMENT BNE 20ML 40GM ACRYL RESIN HI VISC RADPQ FAST SET: Type: IMPLANTABLE DEVICE | Status: FUNCTIONAL

## 2018-01-18 DEVICE — INSERT TIB SZ 6 THK9MM UNIV KNEE CONVENTIONAL POLYETH POST: Type: IMPLANTABLE DEVICE | Status: FUNCTIONAL

## 2018-01-18 DEVICE — COMPONENT PAT DIA35MM THK10MM SUP INFERIOR ASYM TRIATHLON: Type: IMPLANTABLE DEVICE | Status: FUNCTIONAL

## 2018-01-18 RX ORDER — ATORVASTATIN CALCIUM 40 MG/1
40 TABLET, FILM COATED ORAL NIGHTLY
Status: DISCONTINUED | OUTPATIENT
Start: 2018-01-18 | End: 2018-01-20 | Stop reason: HOSPADM

## 2018-01-18 RX ORDER — HYDRALAZINE HYDROCHLORIDE 20 MG/ML
10 INJECTION INTRAMUSCULAR; INTRAVENOUS EVERY 6 HOURS PRN
Status: DISCONTINUED | OUTPATIENT
Start: 2018-01-18 | End: 2018-01-20 | Stop reason: HOSPADM

## 2018-01-18 RX ORDER — ASPIRIN 81 MG/1
81 TABLET ORAL 2 TIMES DAILY
Status: DISCONTINUED | OUTPATIENT
Start: 2018-01-19 | End: 2018-01-20 | Stop reason: HOSPADM

## 2018-01-18 RX ORDER — MORPHINE SULFATE 2 MG/ML
2 INJECTION, SOLUTION INTRAMUSCULAR; INTRAVENOUS
Status: CANCELLED | OUTPATIENT
Start: 2018-01-18

## 2018-01-18 RX ORDER — BISACODYL 10 MG
10 SUPPOSITORY, RECTAL RECTAL DAILY PRN
Status: DISCONTINUED | OUTPATIENT
Start: 2018-01-18 | End: 2018-01-20 | Stop reason: HOSPADM

## 2018-01-18 RX ORDER — OXYCODONE HYDROCHLORIDE AND ACETAMINOPHEN 5; 325 MG/1; MG/1
1 TABLET ORAL EVERY 4 HOURS PRN
Status: DISCONTINUED | OUTPATIENT
Start: 2018-01-18 | End: 2018-01-20 | Stop reason: HOSPADM

## 2018-01-18 RX ORDER — SENNA AND DOCUSATE SODIUM 50; 8.6 MG/1; MG/1
1 TABLET, FILM COATED ORAL DAILY
Status: CANCELLED | OUTPATIENT
Start: 2018-01-18

## 2018-01-18 RX ORDER — SODIUM CHLORIDE 0.9 % (FLUSH) 0.9 %
10 SYRINGE (ML) INJECTION PRN
Status: DISCONTINUED | OUTPATIENT
Start: 2018-01-18 | End: 2018-01-18 | Stop reason: HOSPADM

## 2018-01-18 RX ORDER — SODIUM CHLORIDE, SODIUM LACTATE, POTASSIUM CHLORIDE, CALCIUM CHLORIDE 600; 310; 30; 20 MG/100ML; MG/100ML; MG/100ML; MG/100ML
INJECTION, SOLUTION INTRAVENOUS CONTINUOUS PRN
Status: DISCONTINUED | OUTPATIENT
Start: 2018-01-18 | End: 2018-01-18 | Stop reason: SDUPTHER

## 2018-01-18 RX ORDER — METOPROLOL SUCCINATE 50 MG/1
50 TABLET, EXTENDED RELEASE ORAL DAILY
Status: DISCONTINUED | OUTPATIENT
Start: 2018-01-19 | End: 2018-01-20 | Stop reason: HOSPADM

## 2018-01-18 RX ORDER — MORPHINE SULFATE 2 MG/ML
4 INJECTION, SOLUTION INTRAMUSCULAR; INTRAVENOUS
Status: CANCELLED | OUTPATIENT
Start: 2018-01-18

## 2018-01-18 RX ORDER — DEXTROSE MONOHYDRATE 50 MG/ML
100 INJECTION, SOLUTION INTRAVENOUS PRN
Status: DISCONTINUED | OUTPATIENT
Start: 2018-01-18 | End: 2018-01-20 | Stop reason: HOSPADM

## 2018-01-18 RX ORDER — HYDROCODONE BITARTRATE AND ACETAMINOPHEN 5; 325 MG/1; MG/1
1 TABLET ORAL PRN
Status: DISCONTINUED | OUTPATIENT
Start: 2018-01-18 | End: 2018-01-18 | Stop reason: HOSPADM

## 2018-01-18 RX ORDER — SODIUM CHLORIDE 0.9 % (FLUSH) 0.9 %
10 SYRINGE (ML) INJECTION EVERY 12 HOURS SCHEDULED
Status: DISCONTINUED | OUTPATIENT
Start: 2018-01-18 | End: 2018-01-18 | Stop reason: HOSPADM

## 2018-01-18 RX ORDER — BISACODYL 10 MG
10 SUPPOSITORY, RECTAL RECTAL DAILY PRN
Status: CANCELLED | OUTPATIENT
Start: 2018-01-18

## 2018-01-18 RX ORDER — MORPHINE SULFATE 4 MG/ML
4 INJECTION, SOLUTION INTRAMUSCULAR; INTRAVENOUS
Status: DISCONTINUED | OUTPATIENT
Start: 2018-01-18 | End: 2018-01-20

## 2018-01-18 RX ORDER — NICOTINE POLACRILEX 4 MG
15 LOZENGE BUCCAL PRN
Status: DISCONTINUED | OUTPATIENT
Start: 2018-01-18 | End: 2018-01-20 | Stop reason: HOSPADM

## 2018-01-18 RX ORDER — SODIUM CHLORIDE 0.9 % (FLUSH) 0.9 %
10 SYRINGE (ML) INJECTION EVERY 12 HOURS SCHEDULED
Status: DISCONTINUED | OUTPATIENT
Start: 2018-01-18 | End: 2018-01-20 | Stop reason: HOSPADM

## 2018-01-18 RX ORDER — ROPIVACAINE HYDROCHLORIDE 5 MG/ML
INJECTION, SOLUTION EPIDURAL; INFILTRATION; PERINEURAL PRN
Status: DISCONTINUED | OUTPATIENT
Start: 2018-01-18 | End: 2018-01-18 | Stop reason: SDUPTHER

## 2018-01-18 RX ORDER — SODIUM CHLORIDE 0.9 % (FLUSH) 0.9 %
10 SYRINGE (ML) INJECTION PRN
Status: DISCONTINUED | OUTPATIENT
Start: 2018-01-18 | End: 2018-01-20 | Stop reason: HOSPADM

## 2018-01-18 RX ORDER — DEXTROSE MONOHYDRATE 25 G/50ML
12.5 INJECTION, SOLUTION INTRAVENOUS PRN
Status: DISCONTINUED | OUTPATIENT
Start: 2018-01-18 | End: 2018-01-20 | Stop reason: HOSPADM

## 2018-01-18 RX ORDER — MORPHINE SULFATE 2 MG/ML
2 INJECTION, SOLUTION INTRAMUSCULAR; INTRAVENOUS
Status: DISCONTINUED | OUTPATIENT
Start: 2018-01-18 | End: 2018-01-20

## 2018-01-18 RX ORDER — POTASSIUM CHLORIDE 20 MEQ/1
20 TABLET, EXTENDED RELEASE ORAL
Status: DISCONTINUED | OUTPATIENT
Start: 2018-01-18 | End: 2018-01-20 | Stop reason: HOSPADM

## 2018-01-18 RX ORDER — KETOROLAC TROMETHAMINE 30 MG/ML
30 INJECTION, SOLUTION INTRAMUSCULAR; INTRAVENOUS EVERY 6 HOURS
Status: COMPLETED | OUTPATIENT
Start: 2018-01-18 | End: 2018-01-18

## 2018-01-18 RX ORDER — METOCLOPRAMIDE HYDROCHLORIDE 5 MG/ML
10 INJECTION INTRAMUSCULAR; INTRAVENOUS
Status: DISCONTINUED | OUTPATIENT
Start: 2018-01-18 | End: 2018-01-18 | Stop reason: HOSPADM

## 2018-01-18 RX ORDER — ACETAMINOPHEN 325 MG/1
650 TABLET ORAL EVERY 4 HOURS PRN
Status: CANCELLED | OUTPATIENT
Start: 2018-01-18

## 2018-01-18 RX ORDER — SENNA AND DOCUSATE SODIUM 50; 8.6 MG/1; MG/1
1 TABLET, FILM COATED ORAL DAILY
Status: DISCONTINUED | OUTPATIENT
Start: 2018-01-18 | End: 2018-01-20 | Stop reason: HOSPADM

## 2018-01-18 RX ORDER — SODIUM CHLORIDE, SODIUM LACTATE, POTASSIUM CHLORIDE, CALCIUM CHLORIDE 600; 310; 30; 20 MG/100ML; MG/100ML; MG/100ML; MG/100ML
INJECTION, SOLUTION INTRAVENOUS CONTINUOUS
Status: DISCONTINUED | OUTPATIENT
Start: 2018-01-18 | End: 2018-01-19

## 2018-01-18 RX ORDER — ONDANSETRON 2 MG/ML
4 INJECTION INTRAMUSCULAR; INTRAVENOUS
Status: DISCONTINUED | OUTPATIENT
Start: 2018-01-18 | End: 2018-01-18 | Stop reason: HOSPADM

## 2018-01-18 RX ORDER — SCOLOPAMINE TRANSDERMAL SYSTEM 1 MG/1
1 PATCH, EXTENDED RELEASE TRANSDERMAL
Status: DISCONTINUED | OUTPATIENT
Start: 2018-01-18 | End: 2018-01-18 | Stop reason: HOSPADM

## 2018-01-18 RX ORDER — ONDANSETRON 2 MG/ML
4 INJECTION INTRAMUSCULAR; INTRAVENOUS EVERY 6 HOURS PRN
Status: CANCELLED | OUTPATIENT
Start: 2018-01-18

## 2018-01-18 RX ORDER — SODIUM CHLORIDE 0.9 % (FLUSH) 0.9 %
10 SYRINGE (ML) INJECTION PRN
Status: CANCELLED | OUTPATIENT
Start: 2018-01-18

## 2018-01-18 RX ORDER — OXYCODONE HYDROCHLORIDE AND ACETAMINOPHEN 5; 325 MG/1; MG/1
2 TABLET ORAL EVERY 4 HOURS PRN
Status: CANCELLED | OUTPATIENT
Start: 2018-01-18

## 2018-01-18 RX ORDER — MEPERIDINE HYDROCHLORIDE 25 MG/ML
12.5 INJECTION INTRAMUSCULAR; INTRAVENOUS; SUBCUTANEOUS EVERY 5 MIN PRN
Status: DISCONTINUED | OUTPATIENT
Start: 2018-01-18 | End: 2018-01-18 | Stop reason: HOSPADM

## 2018-01-18 RX ORDER — DOCUSATE SODIUM 100 MG/1
100 CAPSULE, LIQUID FILLED ORAL 2 TIMES DAILY
Status: DISCONTINUED | OUTPATIENT
Start: 2018-01-18 | End: 2018-01-20 | Stop reason: HOSPADM

## 2018-01-18 RX ORDER — SODIUM CHLORIDE 9 MG/ML
INJECTION, SOLUTION INTRAVENOUS CONTINUOUS
Status: CANCELLED | OUTPATIENT
Start: 2018-01-18

## 2018-01-18 RX ORDER — OXYCODONE HYDROCHLORIDE AND ACETAMINOPHEN 5; 325 MG/1; MG/1
1 TABLET ORAL EVERY 4 HOURS PRN
Status: CANCELLED | OUTPATIENT
Start: 2018-01-18

## 2018-01-18 RX ORDER — PROPOFOL 10 MG/ML
INJECTION, EMULSION INTRAVENOUS CONTINUOUS PRN
Status: DISCONTINUED | OUTPATIENT
Start: 2018-01-18 | End: 2018-01-18 | Stop reason: SDUPTHER

## 2018-01-18 RX ORDER — ONDANSETRON 2 MG/ML
INJECTION INTRAMUSCULAR; INTRAVENOUS PRN
Status: DISCONTINUED | OUTPATIENT
Start: 2018-01-18 | End: 2018-01-18 | Stop reason: SDUPTHER

## 2018-01-18 RX ORDER — HYDROCODONE BITARTRATE AND ACETAMINOPHEN 5; 325 MG/1; MG/1
2 TABLET ORAL PRN
Status: DISCONTINUED | OUTPATIENT
Start: 2018-01-18 | End: 2018-01-18 | Stop reason: HOSPADM

## 2018-01-18 RX ORDER — DIPHENHYDRAMINE HYDROCHLORIDE 50 MG/ML
12.5 INJECTION INTRAMUSCULAR; INTRAVENOUS
Status: DISCONTINUED | OUTPATIENT
Start: 2018-01-18 | End: 2018-01-18 | Stop reason: HOSPADM

## 2018-01-18 RX ORDER — DOCUSATE SODIUM 100 MG/1
100 CAPSULE, LIQUID FILLED ORAL 2 TIMES DAILY
Status: CANCELLED | OUTPATIENT
Start: 2018-01-18

## 2018-01-18 RX ORDER — ACETAMINOPHEN 325 MG/1
650 TABLET ORAL EVERY 4 HOURS PRN
Status: DISCONTINUED | OUTPATIENT
Start: 2018-01-18 | End: 2018-01-20 | Stop reason: HOSPADM

## 2018-01-18 RX ORDER — BUPIVACAINE HYDROCHLORIDE 5 MG/ML
INJECTION, SOLUTION EPIDURAL; INTRACAUDAL PRN
Status: DISCONTINUED | OUTPATIENT
Start: 2018-01-18 | End: 2018-01-18 | Stop reason: SDUPTHER

## 2018-01-18 RX ORDER — FENTANYL CITRATE 50 UG/ML
50 INJECTION, SOLUTION INTRAMUSCULAR; INTRAVENOUS EVERY 10 MIN PRN
Status: DISCONTINUED | OUTPATIENT
Start: 2018-01-18 | End: 2018-01-18 | Stop reason: HOSPADM

## 2018-01-18 RX ORDER — KETOROLAC TROMETHAMINE 30 MG/ML
INJECTION, SOLUTION INTRAMUSCULAR; INTRAVENOUS PRN
Status: DISCONTINUED | OUTPATIENT
Start: 2018-01-18 | End: 2018-01-18 | Stop reason: SDUPTHER

## 2018-01-18 RX ORDER — KETOROLAC TROMETHAMINE 30 MG/ML
30 INJECTION, SOLUTION INTRAMUSCULAR; INTRAVENOUS EVERY 6 HOURS
Status: CANCELLED | OUTPATIENT
Start: 2018-01-18 | End: 2018-01-18

## 2018-01-18 RX ORDER — SODIUM CHLORIDE 0.9 % (FLUSH) 0.9 %
10 SYRINGE (ML) INJECTION EVERY 12 HOURS SCHEDULED
Status: CANCELLED | OUTPATIENT
Start: 2018-01-18

## 2018-01-18 RX ORDER — MIDAZOLAM HYDROCHLORIDE 1 MG/ML
INJECTION INTRAMUSCULAR; INTRAVENOUS PRN
Status: DISCONTINUED | OUTPATIENT
Start: 2018-01-18 | End: 2018-01-18 | Stop reason: SDUPTHER

## 2018-01-18 RX ORDER — SODIUM CHLORIDE 9 MG/ML
INJECTION, SOLUTION INTRAVENOUS CONTINUOUS
Status: DISCONTINUED | OUTPATIENT
Start: 2018-01-18 | End: 2018-01-20 | Stop reason: HOSPADM

## 2018-01-18 RX ORDER — LIDOCAINE HYDROCHLORIDE 10 MG/ML
1 INJECTION, SOLUTION EPIDURAL; INFILTRATION; INTRACAUDAL; PERINEURAL
Status: COMPLETED | OUTPATIENT
Start: 2018-01-18 | End: 2018-01-18

## 2018-01-18 RX ORDER — OXYCODONE HYDROCHLORIDE AND ACETAMINOPHEN 5; 325 MG/1; MG/1
2 TABLET ORAL EVERY 4 HOURS PRN
Status: DISCONTINUED | OUTPATIENT
Start: 2018-01-18 | End: 2018-01-20 | Stop reason: HOSPADM

## 2018-01-18 RX ORDER — ONDANSETRON 2 MG/ML
4 INJECTION INTRAMUSCULAR; INTRAVENOUS EVERY 6 HOURS PRN
Status: DISCONTINUED | OUTPATIENT
Start: 2018-01-18 | End: 2018-01-20 | Stop reason: HOSPADM

## 2018-01-18 RX ADMIN — BUPIVACAINE HYDROCHLORIDE 10 MG: 5 INJECTION, SOLUTION EPIDURAL; INTRACAUDAL; PERINEURAL at 08:11

## 2018-01-18 RX ADMIN — FENTANYL CITRATE 50 MCG: 50 INJECTION, SOLUTION INTRAMUSCULAR; INTRAVENOUS at 08:23

## 2018-01-18 RX ADMIN — TRANEXAMIC ACID 1000 MG: 100 INJECTION, SOLUTION INTRAVENOUS at 10:32

## 2018-01-18 RX ADMIN — SODIUM CHLORIDE, POTASSIUM CHLORIDE, SODIUM LACTATE AND CALCIUM CHLORIDE: 600; 310; 30; 20 INJECTION, SOLUTION INTRAVENOUS at 08:01

## 2018-01-18 RX ADMIN — OXYCODONE HYDROCHLORIDE AND ACETAMINOPHEN 2 TABLET: 5; 325 TABLET ORAL at 22:02

## 2018-01-18 RX ADMIN — SODIUM CHLORIDE, POTASSIUM CHLORIDE, SODIUM LACTATE AND CALCIUM CHLORIDE: 600; 310; 30; 20 INJECTION, SOLUTION INTRAVENOUS at 08:55

## 2018-01-18 RX ADMIN — SODIUM CHLORIDE, POTASSIUM CHLORIDE, SODIUM LACTATE AND CALCIUM CHLORIDE: 600; 310; 30; 20 INJECTION, SOLUTION INTRAVENOUS at 11:07

## 2018-01-18 RX ADMIN — SENNOSIDES AND DOCUSATE SODIUM 1 TABLET: 8.6; 5 TABLET ORAL at 21:09

## 2018-01-18 RX ADMIN — POTASSIUM CHLORIDE 20 MEQ: 20 TABLET, EXTENDED RELEASE ORAL at 16:35

## 2018-01-18 RX ADMIN — KETOROLAC TROMETHAMINE 30 MG: 30 INJECTION, SOLUTION INTRAMUSCULAR; INTRAVENOUS at 09:02

## 2018-01-18 RX ADMIN — CEFAZOLIN SODIUM 2 G: 2 SOLUTION INTRAVENOUS at 08:23

## 2018-01-18 RX ADMIN — MIDAZOLAM HYDROCHLORIDE 2 MG: 1 INJECTION, SOLUTION INTRAMUSCULAR; INTRAVENOUS at 08:01

## 2018-01-18 RX ADMIN — SODIUM CHLORIDE: 9 INJECTION, SOLUTION INTRAVENOUS at 22:02

## 2018-01-18 RX ADMIN — FENTANYL CITRATE 25 MCG: 50 INJECTION, SOLUTION INTRAMUSCULAR; INTRAVENOUS at 08:51

## 2018-01-18 RX ADMIN — DOCUSATE SODIUM 100 MG: 100 CAPSULE, LIQUID FILLED ORAL at 21:09

## 2018-01-18 RX ADMIN — ONDANSETRON 4 MG: 2 INJECTION INTRAMUSCULAR; INTRAVENOUS at 09:02

## 2018-01-18 RX ADMIN — OXYCODONE HYDROCHLORIDE AND ACETAMINOPHEN 2 TABLET: 5; 325 TABLET ORAL at 17:30

## 2018-01-18 RX ADMIN — KETOROLAC TROMETHAMINE 30 MG: 30 INJECTION, SOLUTION INTRAMUSCULAR at 14:32

## 2018-01-18 RX ADMIN — ROPIVACAINE HYDROCHLORIDE 15 ML: 5 INJECTION, SOLUTION EPIDURAL; INFILTRATION; PERINEURAL at 08:04

## 2018-01-18 RX ADMIN — SODIUM CHLORIDE, POTASSIUM CHLORIDE, SODIUM LACTATE AND CALCIUM CHLORIDE: 600; 310; 30; 20 INJECTION, SOLUTION INTRAVENOUS at 07:39

## 2018-01-18 RX ADMIN — METFORMIN HYDROCHLORIDE 500 MG: 500 TABLET ORAL at 16:23

## 2018-01-18 RX ADMIN — CEFAZOLIN SODIUM 2 G: 2 SOLUTION INTRAVENOUS at 16:25

## 2018-01-18 RX ADMIN — TRANEXAMIC ACID 1 G: 100 INJECTION, SOLUTION INTRAVENOUS at 08:30

## 2018-01-18 RX ADMIN — FENTANYL CITRATE 25 MCG: 50 INJECTION, SOLUTION INTRAMUSCULAR; INTRAVENOUS at 08:38

## 2018-01-18 RX ADMIN — LIDOCAINE HYDROCHLORIDE 0.1 ML: 10 INJECTION, SOLUTION EPIDURAL; INFILTRATION; INTRACAUDAL; PERINEURAL at 07:39

## 2018-01-18 RX ADMIN — KETOROLAC TROMETHAMINE 30 MG: 30 INJECTION, SOLUTION INTRAMUSCULAR at 21:09

## 2018-01-18 RX ADMIN — PROPOFOL 120 MCG/KG/MIN: 10 INJECTION, EMULSION INTRAVENOUS at 08:30

## 2018-01-18 RX ADMIN — ATORVASTATIN CALCIUM 40 MG: 40 TABLET, FILM COATED ORAL at 21:09

## 2018-01-18 ASSESSMENT — PULMONARY FUNCTION TESTS
PIF_VALUE: 1

## 2018-01-18 ASSESSMENT — PAIN DESCRIPTION - ORIENTATION
ORIENTATION: RIGHT

## 2018-01-18 ASSESSMENT — PAIN SCALES - GENERAL
PAINLEVEL_OUTOF10: 8
PAINLEVEL_OUTOF10: 1
PAINLEVEL_OUTOF10: 3
PAINLEVEL_OUTOF10: 8
PAINLEVEL_OUTOF10: 6
PAINLEVEL_OUTOF10: 0
PAINLEVEL_OUTOF10: 4
PAINLEVEL_OUTOF10: 0

## 2018-01-18 ASSESSMENT — PAIN DESCRIPTION - PAIN TYPE
TYPE: SURGICAL PAIN

## 2018-01-18 ASSESSMENT — PAIN DESCRIPTION - ONSET: ONSET: ON-GOING

## 2018-01-18 ASSESSMENT — PAIN DESCRIPTION - LOCATION
LOCATION: KNEE

## 2018-01-18 ASSESSMENT — PAIN DESCRIPTION - DESCRIPTORS
DESCRIPTORS: BURNING;ACHING;CONSTANT
DESCRIPTORS: ACHING

## 2018-01-18 ASSESSMENT — PAIN DESCRIPTION - FREQUENCY: FREQUENCY: CONTINUOUS

## 2018-01-18 ASSESSMENT — PAIN DESCRIPTION - PROGRESSION: CLINICAL_PROGRESSION: GRADUALLY WORSENING

## 2018-01-18 NOTE — ANESTHESIA POSTPROCEDURE EVALUATION
Department of Anesthesiology  Postprocedure Note    Patient: Asad Jara  MRN: [id number]  YOB: 1950  Date of evaluation: 1/18/2018  Time:  9:57 AM     Procedure Summary     Date:  01/18/18 Room / Location:  [place] [address] / [facility] OR    Anesthesia Start:  0806 Anesthesia Stop:      Procedure:  RIGHT KNEE TOTAL KNEE ARTHROPLASTY ELÍAS Liuchele (Right ) Diagnosis:  (RIGHT KNEE OSTEOARTHRITIS)    Surgeon:  Silvestre Dodson MD Responsible Provider:  [address] [place] [address]    Anesthesia Type:  spinal, regional ASA Status:  3          Anesthesia Type: spinal, regional    Sigifredo Phase I:      Sigifredo Phase II:      Last vitals: Reviewed and per EMR flowsheets.        Anesthesia Post Evaluation    Patient location during evaluation: PACU  Patient participation: complete - patient participated  Level of consciousness: awake and alert  Pain score: 0  Airway patency: patent  Nausea & Vomiting: no nausea  Complications: no  Cardiovascular status: hemodynamically stable  Respiratory status: acceptable  Hydration status: stable

## 2018-01-18 NOTE — H&P
History and physical is reviewed, patient re evaluated, no changes. Procedure, risks, benefits, and postoperative course were discussed with the patient and consent is reviewed.   Curtis Henderson MD

## 2018-01-18 NOTE — ANESTHESIA PRE PROCEDURE
Used    Alcohol use Yes      Comment: rare social                                Counseling given: Not Answered      Vital Signs (Current):   Vitals:    01/18/18 0707   BP: 138/80   Pulse: 77   Resp: 16   Temp: 98.4 °F (36.9 °C)   TempSrc: Temporal   SpO2: 95%   Weight: 235 lb (106.6 kg)   Height: 5' 6.5\" (1.689 m)                                              BP Readings from Last 3 Encounters:   01/18/18 138/80   01/09/18 122/70   12/26/17 104/62       NPO Status: Time of last liquid consumption: 2100                        Time of last solid consumption: 1700                        Date of last liquid consumption: 01/17/18                        Date of last solid food consumption: 01/17/18    BMI:   Wt Readings from Last 3 Encounters:   01/18/18 235 lb (106.6 kg)   01/09/18 235 lb 9.6 oz (106.9 kg)   12/26/17 230 lb (104.3 kg)     Body mass index is 37.36 kg/m².     CBC:   Lab Results   Component Value Date    WBC 6.1 12/20/2017    RBC 4.20 12/20/2017    HGB 13.3 12/20/2017    HCT 38.2 12/20/2017    MCV 91.1 12/20/2017    RDW 13.8 12/20/2017     12/20/2017       CMP:   Lab Results   Component Value Date     12/20/2017    K 4.6 12/20/2017    CL 98 12/20/2017    CO2 26 12/20/2017    BUN 25 12/20/2017    CREATININE 1.03 12/20/2017    GFRAA >60.0 12/20/2017    LABGLOM >60.0 12/20/2017    GLUCOSE 107 12/20/2017    PROT 7.6 12/20/2017    CALCIUM 9.0 12/20/2017    BILITOT 0.7 12/20/2017    ALKPHOS 78 12/20/2017    AST 17 12/20/2017    ALT 20 12/20/2017       POC Tests:   Recent Labs      01/18/18   0729   POCGLU  119*       Coags: No results found for: PROTIME, INR, APTT    HCG (If Applicable): No results found for: PREGTESTUR, PREGSERUM, HCG, HCGQUANT     ABGs: No results found for: PHART, PO2ART, GTY4FLF, POH4OFJ, BEART, V9OSXXKV     Type & Screen (If Applicable):  No results found for: LABABO, [address]    Anesthesia Evaluation  Patient summary reviewed and Nursing notes reviewed no history of anesthetic

## 2018-01-18 NOTE — CONSULTS
Consult Note    Reason for Consult:  Post-surgical encounter for management of medical needs    Requesting Physician:  Sofía Cohen MD    HISTORY OF PRESENT ILLNESS:    The patient is a 74 y.o. male who presents for right total knee arthroplasty d/t right knee osteoarthritis. He has a history of CKD, CLL, HDL, HTN, Dm2. He is currently seated in chair and had been seen by PT/OT. He has a right knee immobilizer in place and denies pain at this time. He denies numbness/tingling to lower extremities. He denies CP/SOB/N/V/D/fever/chills/rash. Past Medical History:   Diagnosis Date    Arthritis     both knees    Chronic kidney disease     [facility]LL [facility]chronic [facility]ocytic [facility]) ([facility]     Disease of blood and blood forming organ     Hyperlipidemia     dx since 1970's    Hypertension     meds  since 1970's    Lymphoma of gastrointestinal tract ([facility]     Movement disorder     Type II or unspecified type diabetes mellitus without mention of complication, not stated as uncontrolled     hx > 15 yrs       Past Surgical History:   Procedure Laterality Date    APPENDECTOMY      CHOLECYSTECTOMY N/A 07/07/2016    COLONOSCOPY  4/1/16    w/polypectomy     ENDOSCOPY, COLON, DIAGNOSTIC      OTHER SURGICAL HISTORY Right 06/08/16    I & D ABSCESS THIGH    SHOULDER SURGERY Right     due to displacement    UPPER GASTROINTESTINAL ENDOSCOPY  4/29/15    w/bx        Prior to Admission medications    Medication Sig Start Date End Date Taking?  Authorizing Provider   aspirin 81 MG tablet Take 81 mg by mouth daily   Yes Historical Provider, MD   metoprolol succinate (TOPROL XL) 50 MG extended release tablet TAKE 1 TABLET DAILY 12/20/17  Yes Sugar Castellanos PA-C   furosemide (LASIX) 40 MG tablet TAKE 1 TABLET DAILY 12/20/17  Yes Sugar Castellanos PA-C   potassium chloride (KLOR-CON M20) 20 MEQ extended release tablet TAKE 1 TABLET THREE TIMES A DAY 12/7/17  Yes Johana Mcneill MD   metFORMIN (GLUCOPHAGE) 500 MG tablet TAKE 2 TABLETS TWICE A DAY WITH MEALS 12/7/17  Yes Marcia Phillips MD   atorvastatin (LIPITOR) 40 MG tablet TAKE 1 TABLET NIGHTLY 12/7/17  Yes Marcia Phillips MD   captopril-hydrochlorothiazide (CAPOZIDE) 50-25 MG per tablet TAKE 1 TABLET 1 TO 2 TIMES DAILY 11/13/17  Yes Sugar Castellanos PA-C   Alcohol Swabs PADS Use as directed with insulin injections 11/12/14  Yes Marcia Phillips MD   SUPER B COMPLEX/C CAPS Take 1 capsule by mouth daily. Historical Provider, MD   Multiple Vitamins-Minerals (MULTIVITAMIN PO) Take 1 tablet by mouth daily.     Historical Provider, MD       Scheduled Meds:   [START ON 1/19/2018] aspirin  81 mg Oral BID    ceFAZolin (ANCEF) IVPB  2 g Intravenous Q8H    docusate sodium  100 mg Oral BID    ketorolac  30 mg Intravenous Q6H    sennosides-docusate sodium  1 tablet Oral Daily    sodium chloride flush  10 mL Intravenous 2 times per day     Continuous Infusions:   lactated ringers 125 mL/hr at 01/18/18 1107    sodium chloride       PRN Meds:acetaminophen, bisacodyl, magnesium hydroxide, morphine **OR** morphine, ondansetron, oxyCODONE-acetaminophen **OR** oxyCODONE-acetaminophen, sodium chloride flush, topical skin adhesive    Allergies   Allergen Reactions    Dye [Barium-Containing Compounds] Hives    Iodinated Diagnostic Agents Hives       Social History     Social History    Marital status:      Spouse name: N/A    Number of children: N/A    Years of education: N/A     Occupational History    retired      Social History Main Topics    Smoking status: Never Smoker    Smokeless tobacco: Never Used    Alcohol use Yes      Comment: rare social    Drug use: No    Sexual activity: Not on file     Other Topics Concern    Not on file     Social History Narrative    No narrative on file       Family History   Problem Relation Age of Onset    Heart Disease Mother 67     CHF    Cancer Father 68     liver    No Known Problems Sister    Lizzy Romero Cancer Brother      chronic lukemia    Arthritis Brother        Review Of Systems:   CONSTITUTIONAL:  negative  EYES:  negative  HEENT:  negative  RESPIRATORY:  negative  CARDIOVASCULAR:  negative  GASTROINTESTINAL:  negative  GENITOURINARY:  negative  INTEGUMENT/BREAST:  negative  MUSCULOSKELETAL:  negative  NEUROLOGICAL:  negative  BEHAVIOR/PSYCH:  negative    Physical Exam:  Vitals:    01/18/18 1100 01/18/18 1101 01/18/18 1103 01/18/18 1139   BP: (!) 146/77   136/78   Pulse: 57 57 56 59   Resp: 14  8 14   Temp: 98.2 °F (36.8 °C)   97.3 °F (36.3 °C)   TempSrc: Temporal   Axillary   SpO2: 95%  95% 95%   Weight:    236 lb 12.4 oz (107.4 kg)   Height:           CONSTITUTIONAL:  awake, alert, cooperative, no apparent distress, and appears stated age  EYES:  Lids and lashes normal, pupils equal, round and reactive to light, extra ocular muscles intact, sclera clear, conjunctiva normal  ENT:  Normocephalic, without obvious abnormality, atraumatic, sinuses nontender on palpation, external ears without lesions, oral pharynx with moist mucus membranes, tonsils without erythema or exudates, gums normal and good dentition. NECK:  Supple, symmetrical, trachea midline, no adenopathy, thyroid symmetric, not enlarged and no tenderness, skin normal  LUNGS:  No increased work of breathing, good air exchange, clear to auscultation bilaterally, no crackles or wheezing  CARDIOVASCULAR:  Normal apical impulse, regular rate and rhythm, normal S1 and S2, no S3 or S4, and no murmur noted  ABDOMEN:  No scars, normal bowel sounds, soft, non-distended, non-tender, no masses palpated, no hepatosplenomegally  MUSCULOSKELETAL:  Right knee immobilizer in place. NEUROLOGIC:  Awake, alert, oriented to name, place and time. Cranial nerves II-XII are grossly intact. Motor is 5 out of 5 bilaterally. Cerebellar finger to nose, heel to shin intact. Sensory is intact.   Babinski down going, Romberg negative, and gait is normal.  SKIN:  no

## 2018-01-18 NOTE — PROGRESS NOTES
[x] BSC  [] Dressing  AE  [] Other:      Previous Functional Status:   Ind with ADL's< Iadls and amb, drives shares homemaking, wife works Wed-Fri    Pain:   Start of session: 1/10  Location: R knee  Description: ache  Action: [] No Action Necessary    [x] Patient reports pain at acceptable level for treatment  [] Nursing notified    [] Other      Objective:  Observation:  Alert, cooperative    Orientation: Oriented to  [x] Person   [x] Place  [x]Time    Vision:   [x]  WFL   [] Impaired  Comments:  glasses    Hearing:  [x] WFL   [] Impaired  Comments:    Sensation:   [x] WFL   []  Impaired   Comments:      Cognition:   [x] WFL   [] Impaired  Comments:    Communication:   [x] WFL   [] Impaired  Comments:    Hand Dominance: [x] Right  [] Left    Range of Motion:  R UE AROM/PROM: [x]  WFL [] Impaired  Comments:   L UE AROM/PROM:  [x]  WFL [] Impaired  Comments:     Strength:   R UE Strength: []1    [] 2   [] 2+   [] 3   [x] 3+   [] 4   [] 4+  [] 5  Comments:   L UE Strength:  []1    [] 2   [] 2+   [] 3   [x] 3+  [] 4   [] 4+   [] 5  Comments:     Quality of Movement:  [x] Good   [] Fair   [] Poor     Coordination:  Gross motor: [x] WFL   [] Impaired   Fine motor: [x] WFL   [] Impaired     Functional Mobility:  Toilet Transfers:  CGA/Min A anticipated by functional mobility performed  Bed Transfer:SBA  Sit to stand: CGA, mod verb cues for tech with knee immobilizer  Bed to Chair:  CGA with [address]    Seated Balance:      Static: [x] Good  [] Fair   [] Poor   Dynamic: []  Good  [x] Fair+   [] Poor     Standing Balance:     Static: [] Good   [x] Fair  [] Poor   Dynamic: [] Good   [x] Fair   [] Poor     Functional Endurance: [] Good  [x] Fair  [] Poor     ADLs  Feeding:  Ind   UE Dressing:  CGA with Gown  LB Dressing:  Max A with slipper socks  Bathing:  Min A anticipated by simulation  Toileting: Pt able to reach behind in simulation of BM hygiene  Grooming: Set up seated    Goals:   Patient will:   [x]  Improve functional 30      Electronically signed by:    JIMENEZ Mendoza  1/18/2018, 3:08 PM

## 2018-01-18 NOTE — PROGRESS NOTES
assistance  Stand to sit: Contact guard assistance  Comment: knee immobilizer donned. demonstration and vc for proper sequencing with 2ww with good follow through    Ambulation  Ambulation?: Yes  WB Status: WBAT   Ambulation 1  Surface: level tile  Device: Rolling Walker  Other Apparatus:  (SpO2 100%, /57, denies signs/symptoms)  Assistance: Contact guard assistance;Stand by assistance  Quality of Gait: step to , antalgic, decreased gait speed, decreased step length on L, increased Ue support and increased double limb support time  Distance: 8 ft forward, backwards, turns. Increased time for performance and vital monitoring after amb trial.   Comments: CGA progressing to SBA, VC for sequencing with 2ww    Activity Tolerance  Activity Tolerance: Patient Tolerated treatment well    Exercises  Comments: instructed in anti embolics     ASSESSMENT:   Body structures, Functions, Activity limitations: Decreased functional mobility ; Decreased balance;Decreased endurance;Decreased strength;Decreased ROM  Decision Making: Medium Complexity    Prognosis: Good  Patient Education: Pt educated in role of acute care PT, PT POC, benefits of mobility while in house, safety techniques, use of call light for assistance, d/c planning, d/c recommendation, WBAT, knee immob, anti embolics, benefits of sitting in chair  Barriers to Learning: none    DISCHARGE RECOMMENDATIONS:  Discharge Recommendations: Continue to assess pending progress    Assessment: Pt demonstrates the above deficits and decline in functional mobility s/p R TKA. Pt would benefit from skilled physical therapyto improve safety & independence with all functional mobility, improve strength, increase ROM, improve standing balance, improve endurance to allow for decrease risk for falls, return to previous level of function, and safe return to home with/out assistance.     REQUIRES PT FOLLOW UP: Yes      PLAN OF CARE:  Plan  Times per week: 7  Times per day: Twice a

## 2018-01-19 LAB
ANION GAP SERPL CALCULATED.3IONS-SCNC: 14 MEQ/L (ref 7–13)
BUN BLDV-MCNC: 32 MG/DL (ref 8–23)
CALCIUM SERPL-MCNC: 7.6 MG/DL (ref 8.6–10.2)
CHLORIDE BLD-SCNC: 101 MEQ/L (ref 98–107)
CO2: 24 MEQ/L (ref 22–29)
CREAT SERPL-MCNC: 1.33 MG/DL (ref 0.7–1.2)
GFR AFRICAN AMERICAN: >60
GFR NON-AFRICAN AMERICAN: 53.9
GLUCOSE BLD-MCNC: 103 MG/DL (ref 60–115)
GLUCOSE BLD-MCNC: 111 MG/DL (ref 60–115)
GLUCOSE BLD-MCNC: 111 MG/DL (ref 74–109)
GLUCOSE BLD-MCNC: 124 MG/DL (ref 60–115)
GLUCOSE BLD-MCNC: 132 MG/DL (ref 60–115)
HCT VFR BLD CALC: 30 % (ref 42–52)
HEMOGLOBIN: 10 G/DL (ref 14–18)
MCH RBC QN AUTO: 30.8 PG (ref 27–31.3)
MCHC RBC AUTO-ENTMCNC: 33.2 % (ref 33–37)
MCV RBC AUTO: 92.6 FL (ref 80–100)
PDW BLD-RTO: 13.8 % (ref 11.5–14.5)
PERFORMED ON: ABNORMAL
PERFORMED ON: ABNORMAL
PERFORMED ON: NORMAL
PERFORMED ON: NORMAL
PLATELET # BLD: 150 K/UL (ref 130–400)
POTASSIUM REFLEX MAGNESIUM: 4.3 MEQ/L (ref 3.5–5.1)
RBC # BLD: 3.24 M/UL (ref 4.7–6.1)
SODIUM BLD-SCNC: 139 MEQ/L (ref 132–144)
WBC # BLD: 7.7 K/UL (ref 4.8–10.8)

## 2018-01-19 PROCEDURE — 85027 COMPLETE CBC AUTOMATED: CPT

## 2018-01-19 PROCEDURE — 6370000000 HC RX 637 (ALT 250 FOR IP): Performed by: ORTHOPAEDIC SURGERY

## 2018-01-19 PROCEDURE — 36415 COLL VENOUS BLD VENIPUNCTURE: CPT

## 2018-01-19 PROCEDURE — 2580000003 HC RX 258: Performed by: INTERNAL MEDICINE

## 2018-01-19 PROCEDURE — 97110 THERAPEUTIC EXERCISES: CPT

## 2018-01-19 PROCEDURE — 6360000002 HC RX W HCPCS: Performed by: ORTHOPAEDIC SURGERY

## 2018-01-19 PROCEDURE — 97535 SELF CARE MNGMENT TRAINING: CPT

## 2018-01-19 PROCEDURE — 1210000000 HC MED SURG R&B

## 2018-01-19 PROCEDURE — 6370000000 HC RX 637 (ALT 250 FOR IP): Performed by: NURSE PRACTITIONER

## 2018-01-19 PROCEDURE — 97116 GAIT TRAINING THERAPY: CPT

## 2018-01-19 PROCEDURE — 80048 BASIC METABOLIC PNL TOTAL CA: CPT

## 2018-01-19 RX ORDER — OXYCODONE HYDROCHLORIDE AND ACETAMINOPHEN 5; 325 MG/1; MG/1
1 TABLET ORAL EVERY 4 HOURS PRN
Qty: 50 TABLET | Refills: 0 | Status: SHIPPED | OUTPATIENT
Start: 2018-01-19 | End: 2018-01-26

## 2018-01-19 RX ADMIN — POTASSIUM CHLORIDE 20 MEQ: 20 TABLET, EXTENDED RELEASE ORAL at 09:20

## 2018-01-19 RX ADMIN — METFORMIN HYDROCHLORIDE 500 MG: 500 TABLET ORAL at 09:19

## 2018-01-19 RX ADMIN — POTASSIUM CHLORIDE 20 MEQ: 20 TABLET, EXTENDED RELEASE ORAL at 21:02

## 2018-01-19 RX ADMIN — SODIUM CHLORIDE 100 ML/HR: 9 INJECTION, SOLUTION INTRAVENOUS at 17:38

## 2018-01-19 RX ADMIN — ATORVASTATIN CALCIUM 40 MG: 40 TABLET, FILM COATED ORAL at 21:03

## 2018-01-19 RX ADMIN — CEFAZOLIN SODIUM 2 G: 2 SOLUTION INTRAVENOUS at 00:39

## 2018-01-19 RX ADMIN — METFORMIN HYDROCHLORIDE 500 MG: 500 TABLET ORAL at 17:17

## 2018-01-19 RX ADMIN — METOPROLOL SUCCINATE 50 MG: 50 TABLET, EXTENDED RELEASE ORAL at 09:19

## 2018-01-19 RX ADMIN — ASPIRIN 81 MG: 81 TABLET, COATED ORAL at 09:20

## 2018-01-19 RX ADMIN — ASPIRIN 81 MG: 81 TABLET, COATED ORAL at 21:02

## 2018-01-19 RX ADMIN — OXYCODONE HYDROCHLORIDE AND ACETAMINOPHEN 2 TABLET: 5; 325 TABLET ORAL at 14:34

## 2018-01-19 RX ADMIN — OXYCODONE HYDROCHLORIDE AND ACETAMINOPHEN 1 TABLET: 5; 325 TABLET ORAL at 10:48

## 2018-01-19 RX ADMIN — DOCUSATE SODIUM 100 MG: 100 CAPSULE, LIQUID FILLED ORAL at 10:49

## 2018-01-19 RX ADMIN — SENNOSIDES AND DOCUSATE SODIUM 1 TABLET: 8.6; 5 TABLET ORAL at 09:20

## 2018-01-19 RX ADMIN — OXYCODONE HYDROCHLORIDE AND ACETAMINOPHEN 2 TABLET: 5; 325 TABLET ORAL at 18:38

## 2018-01-19 RX ADMIN — OXYCODONE HYDROCHLORIDE AND ACETAMINOPHEN 2 TABLET: 5; 325 TABLET ORAL at 02:06

## 2018-01-19 RX ADMIN — OXYCODONE HYDROCHLORIDE AND ACETAMINOPHEN 1 TABLET: 5; 325 TABLET ORAL at 06:43

## 2018-01-19 RX ADMIN — POTASSIUM CHLORIDE 20 MEQ: 20 TABLET, EXTENDED RELEASE ORAL at 14:35

## 2018-01-19 RX ADMIN — OXYCODONE HYDROCHLORIDE AND ACETAMINOPHEN 2 TABLET: 5; 325 TABLET ORAL at 22:40

## 2018-01-19 RX ADMIN — DOCUSATE SODIUM 100 MG: 100 CAPSULE, LIQUID FILLED ORAL at 21:03

## 2018-01-19 ASSESSMENT — ENCOUNTER SYMPTOMS
DIARRHEA: 0
VOMITING: 0
COUGH: 0
NAUSEA: 0
SHORTNESS OF BREATH: 0

## 2018-01-19 ASSESSMENT — PAIN SCALES - GENERAL
PAINLEVEL_OUTOF10: 7
PAINLEVEL_OUTOF10: 6
PAINLEVEL_OUTOF10: 2
PAINLEVEL_OUTOF10: 4
PAINLEVEL_OUTOF10: 0
PAINLEVEL_OUTOF10: 6
PAINLEVEL_OUTOF10: 5
PAINLEVEL_OUTOF10: 8
PAINLEVEL_OUTOF10: 3

## 2018-01-19 ASSESSMENT — PAIN DESCRIPTION - ORIENTATION
ORIENTATION: RIGHT

## 2018-01-19 ASSESSMENT — PAIN DESCRIPTION - FREQUENCY: FREQUENCY: CONTINUOUS

## 2018-01-19 ASSESSMENT — PAIN DESCRIPTION - PAIN TYPE
TYPE: SURGICAL PAIN

## 2018-01-19 ASSESSMENT — PAIN DESCRIPTION - LOCATION
LOCATION: KNEE

## 2018-01-19 ASSESSMENT — PAIN DESCRIPTION - DESCRIPTORS: DESCRIPTORS: ACHING;BURNING;TINGLING

## 2018-01-19 NOTE — PROGRESS NOTES
Albino Koehler is a 74 y.o. male patient.   PT was seen and evaluated, no overnight events, no new complaints  Current Facility-Administered Medications   Medication Dose Route Frequency Provider Last Rate Last Dose    lactated ringers infusion   Intravenous Continuous Richmond Sanchez DO   Stopped at 01/18/18 2202    0.9 % sodium chloride infusion   Intravenous Continuous Todd Kramer MD 50 mL/hr at 01/18/18 2202      acetaminophen (TYLENOL) tablet 650 mg  650 mg Oral Q4H PRN Todd Kramer MD        aspirin EC tablet 81 mg  81 mg Oral BID Todd Kramer MD   81 mg at 01/19/18 0920    bisacodyl (DULCOLAX) suppository 10 mg  10 mg Rectal Daily PRN Todd Kramer MD        docusate sodium (COLACE) capsule 100 mg  100 mg Oral BID Todd Kramer MD   100 mg at 01/19/18 1049    magnesium hydroxide (MILK OF MAGNESIA) 400 MG/5ML suspension 30 mL  30 mL Oral Daily PRN Todd Kramer MD        morphine injection 2 mg  2 mg Intravenous Q2H PRN Todd Kramer MD        Or    morphine injection 4 mg  4 mg Intravenous Q2H PRN Todd Kramer MD        ondansetron [facility]) injection 4 mg  4 mg Intravenous Q6H PRN Todd Kramer MD        oxyCODONE-acetaminophen (PERCOCET) 5-325 MG per tablet 1 tablet  1 tablet Oral Q4H PRN Todd Kramer MD   1 tablet at 01/19/18 1048    Or    oxyCODONE-acetaminophen (PERCOCET) 5-325 MG per tablet 2 tablet  2 tablet Oral Q4H PRN Todd Kramer MD   2 tablet at 01/19/18 0206    sennosides-docusate sodium (SENOKOT-S) 8.6-50 MG tablet 1 tablet  1 tablet Oral Daily Todd Kramer MD   1 tablet at 01/19/18 0920    sodium chloride flush 0.9 % injection 10 mL  10 mL Intravenous 2 times per day Todd Kramer MD        sodium chloride flush 0.9 % injection 10 mL  10 mL Intravenous PRN Todd Kramer MD        topical skin adhesive stick    PRN Todd Kramer MD   1 Stick at 01/18/18 1000    atorvastatin (LIPITOR) tablet 40 mg  40 mg Oral Nightly [place] Crow, APRN   40 mg at 01/18/18 2109    metFORMIN (GLUCOPHAGE) tablet 500 mg  500 mg Oral BID WC Dominick Crow, APRN   500 mg at 01/19/18 0919    metoprolol succinate (TOPROL XL) extended release tablet 50 mg  50 mg Oral Daily [place] Crow, APRN   50 mg at 01/19/18 0919    potassium chloride (KLOR-CON M) extended release tablet 20 mEq  20 mEq Oral TID WC [place] Crow, APRN   20 mEq at 01/19/18 0920    hydrALAZINE (APRESOLINE) injection 10 mg  10 mg Intravenous Q6H PRN Dominick Crow, APRN        insulin lispro (HUMALOG) injection vial 0-6 Units  0-6 Units Subcutaneous TID WC [place] Crow, APRN   1 Units at 01/18/18 1626    insulin lispro (HUMALOG) injection vial 0-3 Units  0-3 Units Subcutaneous Nightly [place] Crow, APRN        glucose (GLUTOSE) 40 % oral gel 15 g  15 g Oral PRN [place] Crow, APRN        dextrose 50 % solution 12.5 g  12.5 g Intravenous PRN Dominick Crow, APRN        glucagon (rDNA) injection 1 mg  1 mg Intramuscular PRN Dominick Crow, APRN        dextrose 5 % solution  100 mL/hr Intravenous PRN [place] Crow, APRN         Allergies   Allergen Reactions    Dye [Barium-Containing Compounds] Hives    Iodinated Diagnostic Agents Hives     Active Problems:    Status post total right knee replacement    Blood pressure (!) 110/58, pulse 62, temperature 98.1 °F (36.7 °C), resp. rate 18, height 5' 6.5\" (1.689 m), weight 236 lb 12.4 oz (107.4 kg), SpO2 99 %. Subjective:  Symptoms:  No shortness of breath, malaise, cough, chest pain, weakness, headache, chest pressure, anorexia, diarrhea or anxiety. Diet:  No nausea or vomiting. Objective:  General Appearance:  Comfortable, well-appearing and in no acute distress. Vital signs: (most recent): Blood pressure (!) 110/58, pulse 62, temperature 98.1 °F (36.7 °C), resp. rate 18, height 5' 6.5\" (1.689 m), weight 236 lb 12.4 oz (107.4 kg), SpO2 99 %. HEENT: Normal HEENT exam.    Lungs:  Normal effort and normal respiratory rate. Breath sounds clear to auscultation. Heart: Normal rate. Regular rhythm. S1 normal and S2 normal.    Abdomen: Abdomen is soft. Bowel sounds are normal.     Neurological: Patient is alert and oriented to person, place and time. Pupils:  Pupils are equal, round, and reactive to light. Skin:  Warm.       Lab Results   Component Value Date    WBC 7.7 01/19/2018    HGB 10.0 (L) 01/19/2018    HCT 30.0 (L) 01/19/2018    MCV 92.6 01/19/2018     01/19/2018     Lab Results   Component Value Date     01/19/2018    K 4.6 12/20/2017     01/19/2018    CO2 24 01/19/2018    BUN 32 01/19/2018    CREATININE 1.33 01/19/2018    GLUCOSE 111 01/19/2018    CALCIUM 7.6 01/19/2018        Assessment & Plan  1) righ knee osteoarthritis   2) HTN stable  3) DM stable  4) JANY  IVF  5) Encounter for post surgical care  Pain is controlled  6) DVT ppx  7) post operative anemia monitor  Carmell Osgood, MD  1/19/2018

## 2018-01-19 NOTE — PROGRESS NOTES
[facility] MED SURG TREATMENT NOTE     Date: 2018  Patient Name: Chen Atkins        MRN: [id number]  Account: [de-identified]   : 1950  (74 y.o.)  Room: [place] [id number]    Chart Review:  Diagnosis:  There were no encounter diagnoses. Restrictions:  Knee immobilizer right knee until SLR  Activity Orders: POD 1    Subjective:  Patient states:  \"I was hoping to get a shower. \"  Pain: 1/10  Description: \"not bad\"  Location:  Right knee     Objective:  ADL:  Feeding:  IND   Grooming:  Set up standing   UE Self -Care:  Set up for bathing and dressing   LE Self-Care:  Set up dressing with AE, CGA with use of grab bars for LB bathing for pt to wash buttocks   Toileting:  Anticipated SBA   Toilet transfers:  Anticipated SBA observed by shower transfer   Shower transfer: SBA with VC's for safe technique     Treatment consisted of:   [x] ADL Training  [x] Strengthening   [x] Transfer Training    [x] DME Education  [] HEP   [] Manual Therapy   [] Patient Education  [] Other:      Assessment: Pt seated in chair. Reported unable to complete SLR. Knee immobilizer donned on Right knee for all ambulation. CGA sit to stand with w/w. SBA ambulating to bathroom without signs of fatigue or LOB. Pt educated on AE and safety. Pt verbalized understanding. ADL's completed as stated above. Pt demo good dynamic sitting balance during ADL's. Pt stood at sink without LOB or signs of fatigue with SBA to complete grooming activities ~3 mins. Pt ambulated back to bed SBA stand to sit to supine. Pt with no questions. Plan:  [x] Continue OT per POC  [] D/C OT  [] Other:     Goals/Plan:   [x] Improve balance  [x] Improve Strength   [x] Improve [place] with functional transfers   [x]  Improve [place] with ADLs    Time In: 9:40  Time Out[de-identified]  10:25     Electronically signed by:     Cassidy Ramos OT  2018, 10:34 AM

## 2018-01-19 NOTE — HOME CARE
Met with patient during Quality rounds patient was admitted to [facility] prior to admission to hospital for home assessment, exercise regime and equipment needs. Patient please with visits and is agreeable for [facility] to follow.

## 2018-01-19 NOTE — PROGRESS NOTES
through    Neuromuscular Re-Education/Balance:  Standing x2 min w/ 0-1 UE support - decreased wt sue through RLE. Plan   Plan  Times per week: 7  Times per day: Twice a day  Current Treatment Recommendations: Strengthening, Functional Mobility Training, Neuromuscular Re-education, Home Exercise Program, Equipment Evaluation, Education, & procurement, Transfer Training, Gait Training, Balance Training, Endurance Training, Stair training, Safety Education & Training, Patient/Caregiver Education & Training, Pain Management, Positioning    Goals  Short term goals  Short term goal 1: indep with bed mobility   Short term goal 2: mod I with functional transfers  Short term goal 3: amb >75ft with 2ww and supervision  Short term goal 4: indep with HEP  Short term goal 5: functional reaching >6in OOBOS without LOB  Long term goals  Long term goal 1: 1+1 stairs with AD and SBA to enter home  Long term goal 2: knee AROM >0-60 degrees     Comments: Pt returned BTB post tx session per request. Alarm activated and call light in reach. Encouraged pt to get up for dinner as able.     Therapy Time   Individual   Time In 1311   Time Out 1335   Minutes 24     Gait: 14  Transfers: 10    Electronically signed by Karine Alanis PTA on 1/19/2018 at 1:37 PM

## 2018-01-19 NOTE — PROGRESS NOTES
Progress Note   TKA    Subjective:     Post-Operative Day: 1 Status Post right Total Knee Arthroplasty  Systemic or Specific Complaints:No Complaints    Objective:     CURRENT VITALS:  /73   Pulse 60   Temp 98.4 °F (36.9 °C) (Oral)   Resp 18   Ht 5' 6.5\" (1.689 m)   Wt 236 lb 12.4 oz (107.4 kg)   SpO2 97%   BMI 37.64 kg/m²     General: alert, appears stated age and cooperative   Wound: Wound clean and dry no evidence of infection. Motion: Painful range of Motion   DVT Exam: No evidence of DVT seen on physical exam.       Knee swollen but thigh soft to palpation. Moving foot and ankle. Good distal pulses. Data Review  Recent Labs      01/19/18   0540   WBC  7.7   RBC  3.24*   HGB  10.0*   HCT  30.0*   MCV  92.6   MCH  30.8   MCHC  33.2   RDW  13.8   PLT  150         Assessment:     Status Post right Total Knee Arthroplasty. Doing well postoperatively. Pain well controlled. Pt has acute kidney injury- most likely related to volume depletion- as he has some blood loss anemia evident by HGB drop to 10.0.  Will plan to keep patein on fluids though the day and continue to monitor    Plan:      1: Continues current post-op course :continue fluids- ok to discontinue during therapy- then reconnect  2:  Continue Deep venous thrombosis prophylaxis  3:  Continue physical therapy  4:  Continue Pain Control

## 2018-01-19 NOTE — PROGRESS NOTES
Comments: Pt. declined  Bed mobility  Supine to Sit: Minimal assistance  Sit to Supine: Moderate assistance      Pt. educated regarding donning her immobilizer. Pt. declined education regarding adaptive equipment d/t \"I have plenty of people to help me. I have my , daughters, and neighbors. \"      Assessment      Activity Tolerance  Activity Tolerance: Patient limited by pain  Safety Devices  Safety Devices in place: Yes  Type of devices: All fall risk precautions in place; Left in bed;Bed alarm in place        Plan   Plan  Times per week: 1-3x    Goals          Therapy Time   Individual Concurrent Group Co-treatment   Time In 0810         Time Out 0900         Minutes SAL Perez Electronically signed by SAL To on 1/19/2018 at 9:28 AM

## 2018-01-20 VITALS
HEART RATE: 70 BPM | WEIGHT: 236.77 LBS | TEMPERATURE: 98.2 F | RESPIRATION RATE: 20 BRPM | HEIGHT: 67 IN | DIASTOLIC BLOOD PRESSURE: 66 MMHG | BODY MASS INDEX: 37.16 KG/M2 | SYSTOLIC BLOOD PRESSURE: 123 MMHG | OXYGEN SATURATION: 99 %

## 2018-01-20 LAB
ANION GAP SERPL CALCULATED.3IONS-SCNC: 12 MEQ/L (ref 7–13)
BUN BLDV-MCNC: 25 MG/DL (ref 8–23)
CALCIUM SERPL-MCNC: 7.6 MG/DL (ref 8.6–10.2)
CHLORIDE BLD-SCNC: 104 MEQ/L (ref 98–107)
CO2: 23 MEQ/L (ref 22–29)
CREAT SERPL-MCNC: 1.12 MG/DL (ref 0.7–1.2)
GFR AFRICAN AMERICAN: >60
GFR NON-AFRICAN AMERICAN: >60
GLUCOSE BLD-MCNC: 103 MG/DL (ref 60–115)
GLUCOSE BLD-MCNC: 107 MG/DL (ref 74–109)
GLUCOSE BLD-MCNC: 115 MG/DL (ref 60–115)
GLUCOSE BLD-MCNC: 98 MG/DL (ref 60–115)
HCT VFR BLD CALC: 28.4 % (ref 42–52)
HEMOGLOBIN: 9.5 G/DL (ref 14–18)
MCH RBC QN AUTO: 31.1 PG (ref 27–31.3)
MCHC RBC AUTO-ENTMCNC: 33.4 % (ref 33–37)
MCV RBC AUTO: 93.1 FL (ref 80–100)
PDW BLD-RTO: 14.5 % (ref 11.5–14.5)
PERFORMED ON: NORMAL
PLATELET # BLD: 145 K/UL (ref 130–400)
POTASSIUM SERPL-SCNC: 3.9 MEQ/L (ref 3.5–5.1)
RBC # BLD: 3.05 M/UL (ref 4.7–6.1)
SODIUM BLD-SCNC: 139 MEQ/L (ref 132–144)
URINE CULTURE, ROUTINE: NORMAL
WBC # BLD: 5.6 K/UL (ref 4.8–10.8)

## 2018-01-20 PROCEDURE — 80048 BASIC METABOLIC PNL TOTAL CA: CPT

## 2018-01-20 PROCEDURE — 6370000000 HC RX 637 (ALT 250 FOR IP): Performed by: NURSE PRACTITIONER

## 2018-01-20 PROCEDURE — 97116 GAIT TRAINING THERAPY: CPT

## 2018-01-20 PROCEDURE — 85027 COMPLETE CBC AUTOMATED: CPT

## 2018-01-20 PROCEDURE — 6360000002 HC RX W HCPCS: Performed by: NURSE PRACTITIONER

## 2018-01-20 PROCEDURE — 97535 SELF CARE MNGMENT TRAINING: CPT

## 2018-01-20 PROCEDURE — 36415 COLL VENOUS BLD VENIPUNCTURE: CPT

## 2018-01-20 PROCEDURE — 2580000003 HC RX 258: Performed by: ORTHOPAEDIC SURGERY

## 2018-01-20 PROCEDURE — 6370000000 HC RX 637 (ALT 250 FOR IP): Performed by: ORTHOPAEDIC SURGERY

## 2018-01-20 PROCEDURE — 97110 THERAPEUTIC EXERCISES: CPT

## 2018-01-20 RX ORDER — KETOROLAC TROMETHAMINE 30 MG/ML
30 INJECTION, SOLUTION INTRAMUSCULAR; INTRAVENOUS ONCE
Status: COMPLETED | OUTPATIENT
Start: 2018-01-20 | End: 2018-01-20

## 2018-01-20 RX ORDER — CYCLOBENZAPRINE HCL 5 MG
5 TABLET ORAL 3 TIMES DAILY PRN
Status: DISCONTINUED | OUTPATIENT
Start: 2018-01-20 | End: 2018-01-20 | Stop reason: HOSPADM

## 2018-01-20 RX ORDER — LIDOCAINE 50 MG/G
2 PATCH TOPICAL DAILY
Qty: 30 PATCH | Refills: 0 | Status: SHIPPED | OUTPATIENT
Start: 2018-01-20 | End: 2018-03-14

## 2018-01-20 RX ORDER — ASPIRIN 81 MG/1
81 TABLET ORAL 2 TIMES DAILY
Qty: 60 TABLET | Refills: 0 | Status: SHIPPED | OUTPATIENT
Start: 2018-01-20 | End: 2022-01-17 | Stop reason: CLARIF

## 2018-01-20 RX ORDER — LIDOCAINE 50 MG/G
2 PATCH TOPICAL DAILY
Status: DISCONTINUED | OUTPATIENT
Start: 2018-01-20 | End: 2018-01-20 | Stop reason: HOSPADM

## 2018-01-20 RX ORDER — CYCLOBENZAPRINE HCL 5 MG
5 TABLET ORAL 3 TIMES DAILY PRN
Qty: 20 TABLET | Refills: 0 | Status: SHIPPED | OUTPATIENT
Start: 2018-01-20 | End: 2018-01-30

## 2018-01-20 RX ADMIN — POTASSIUM CHLORIDE 20 MEQ: 20 TABLET, EXTENDED RELEASE ORAL at 09:24

## 2018-01-20 RX ADMIN — OXYCODONE HYDROCHLORIDE AND ACETAMINOPHEN 2 TABLET: 5; 325 TABLET ORAL at 14:37

## 2018-01-20 RX ADMIN — METOPROLOL SUCCINATE 50 MG: 50 TABLET, EXTENDED RELEASE ORAL at 09:24

## 2018-01-20 RX ADMIN — SODIUM CHLORIDE, PRESERVATIVE FREE 10 ML: 5 INJECTION INTRAVENOUS at 09:25

## 2018-01-20 RX ADMIN — CYCLOBENZAPRINE HYDROCHLORIDE 5 MG: 5 TABLET, FILM COATED ORAL at 11:32

## 2018-01-20 RX ADMIN — OXYCODONE HYDROCHLORIDE AND ACETAMINOPHEN 2 TABLET: 5; 325 TABLET ORAL at 06:42

## 2018-01-20 RX ADMIN — METFORMIN HYDROCHLORIDE 500 MG: 500 TABLET ORAL at 18:38

## 2018-01-20 RX ADMIN — ASPIRIN 81 MG: 81 TABLET, COATED ORAL at 09:25

## 2018-01-20 RX ADMIN — KETOROLAC TROMETHAMINE 30 MG: 30 INJECTION, SOLUTION INTRAMUSCULAR at 11:31

## 2018-01-20 RX ADMIN — OXYCODONE HYDROCHLORIDE AND ACETAMINOPHEN 2 TABLET: 5; 325 TABLET ORAL at 18:37

## 2018-01-20 RX ADMIN — DOCUSATE SODIUM 100 MG: 100 CAPSULE, LIQUID FILLED ORAL at 09:24

## 2018-01-20 RX ADMIN — POTASSIUM CHLORIDE 20 MEQ: 20 TABLET, EXTENDED RELEASE ORAL at 14:38

## 2018-01-20 RX ADMIN — OXYCODONE HYDROCHLORIDE AND ACETAMINOPHEN 2 TABLET: 5; 325 TABLET ORAL at 02:17

## 2018-01-20 RX ADMIN — SENNOSIDES AND DOCUSATE SODIUM 1 TABLET: 8.6; 5 TABLET ORAL at 09:24

## 2018-01-20 RX ADMIN — METFORMIN HYDROCHLORIDE 500 MG: 500 TABLET ORAL at 09:24

## 2018-01-20 RX ADMIN — OXYCODONE HYDROCHLORIDE AND ACETAMINOPHEN 2 TABLET: 5; 325 TABLET ORAL at 10:32

## 2018-01-20 ASSESSMENT — PAIN DESCRIPTION - PAIN TYPE: TYPE: SURGICAL PAIN

## 2018-01-20 ASSESSMENT — PAIN SCALES - GENERAL
PAINLEVEL_OUTOF10: 8
PAINLEVEL_OUTOF10: 1
PAINLEVEL_OUTOF10: 7
PAINLEVEL_OUTOF10: 8
PAINLEVEL_OUTOF10: 2
PAINLEVEL_OUTOF10: 6
PAINLEVEL_OUTOF10: 7

## 2018-01-20 ASSESSMENT — PAIN DESCRIPTION - ORIENTATION: ORIENTATION: RIGHT

## 2018-01-20 ASSESSMENT — PAIN DESCRIPTION - DESCRIPTORS: DESCRIPTORS: SHARP

## 2018-01-20 ASSESSMENT — PAIN DESCRIPTION - LOCATION: LOCATION: KNEE

## 2018-01-20 ASSESSMENT — PAIN DESCRIPTION - FREQUENCY: FREQUENCY: CONTINUOUS

## 2018-01-20 NOTE — FLOWSHEET NOTE
Sent perfect-serve message to Dr. Gerber Miller to make him aware that patient can be discharged and home meds need to be addressed before he leaves, awaiting response.

## 2018-01-20 NOTE — PROGRESS NOTES
Winifred Agee is a 74 y.o. male patient.   PT was seen and evaluated, no overnight events, no new complaints  Current Facility-Administered Medications   Medication Dose Route Frequency Provider Last Rate Last Dose    ketorolac (TORADOL) injection 30 mg  30 mg Intravenous Once Ab Franco CNP        lidocaine (LIDODERM) 5 % 2 patch  2 patch Transdermal Daily Ab Franco CNP        cyclobenzaprine (FLEXERIL) tablet 5 mg  5 mg Oral TID PRN Ab Franco CNP        0.9 % sodium chloride infusion   Intravenous Continuous Vandana MD Kasey   Stopped at 01/20/18 1030    acetaminophen (TYLENOL) tablet 650 mg  650 mg Oral Q4H PRN Tres Snider MD        aspirin EC tablet 81 mg  81 mg Oral BID Tres Snider MD   81 mg at 01/20/18 2172    bisacodyl (DULCOLAX) suppository 10 mg  10 mg Rectal Daily PRN Tres Snider MD        docusate sodium (COLACE) capsule 100 mg  100 mg Oral BID Tres Snider MD   100 mg at 01/20/18 0924    magnesium hydroxide (MILK OF MAGNESIA) 400 MG/5ML suspension 30 mL  30 mL Oral Daily PRN Tres Snider MD        ondansetron [facility]) injection 4 mg  4 mg Intravenous Q6H PRN Tres Snider MD        oxyCODONE-acetaminophen (PERCOCET) 5-325 MG per tablet 1 tablet  1 tablet Oral Q4H PRN Tres Snider MD   1 tablet at 01/19/18 1048    Or    oxyCODONE-acetaminophen (PERCOCET) 5-325 MG per tablet 2 tablet  2 tablet Oral Q4H PRN Tres Snider MD   2 tablet at 01/20/18 1032    sennosides-docusate sodium (SENOKOT-S) 8.6-50 MG tablet 1 tablet  1 tablet Oral Daily Tres Snider MD   1 tablet at 01/20/18 8076    sodium chloride flush 0.9 % injection 10 mL  10 mL Intravenous 2 times per day Tres Snider MD   10 mL at 01/20/18 0925    sodium chloride flush 0.9 % injection 10 mL  10 mL Intravenous PRN Tres Snider MD        topical skin adhesive stick    PRN Tres Snider MD   1 Stick at 01/18/18 1000    (107.4 kg), SpO2 99 %. HEENT: Normal HEENT exam.    Lungs:  Normal effort and normal respiratory rate. Breath sounds clear to auscultation. Heart: Normal rate. Regular rhythm. S1 normal and S2 normal.    Abdomen: Abdomen is soft. Bowel sounds are normal.     Neurological: Patient is alert and oriented to person, place and time. Pupils:  Pupils are equal, round, and reactive to light. Skin:  Warm.       Lab Results   Component Value Date    WBC 5.6 01/20/2018    HGB 9.5 (L) 01/20/2018    HCT 28.4 (L) 01/20/2018    MCV 93.1 01/20/2018     01/20/2018     Lab Results   Component Value Date     01/20/2018    K 3.9 01/20/2018     01/20/2018    CO2 23 01/20/2018    BUN 25 01/20/2018    CREATININE 1.12 01/20/2018    GLUCOSE 107 01/20/2018    CALCIUM 7.6 01/20/2018        Assessment & Plan  1) righ knee osteoarthritis   2) HTN stable  3) DM stable  4) JANY  resolved  5) Encounter for post surgical care  Pain is controlled  6) DVT ppx  7) post operative anemia monitor  stable  Irina Casey MD  1/20/2018

## 2018-01-20 NOTE — PROGRESS NOTES
(degrees)  RLE AROM:  (15-50 degrees)                 Assessment   Body structures, Functions, Activity limitations: Decreased functional mobility ; Decreased balance;Decreased endurance;Decreased strength;Decreased ROM  Assessment: Patient completed transfer and stair training this pm demonstrating understanding of technique and safety. Patient Education: Patient encouraged to have help standing by when initially entering home via stair. Activity Tolerance  Activity Tolerance: Patient Tolerated treatment well     Discharge Recommendations:  Continue to assess pending progress    Goals  Short term goals  Short term goal 1: indep with bed mobility   Short term goal 2: mod I with functional transfers  Short term goal 3: amb >75ft with 2ww and supervision  Short term goal 4: indep with HEP  Short term goal 5: functional reaching >6in OOBOS without LOB  Long term goals  Long term goal 1: 1+1 stairs with AD and SBA to enter home  Long term goal 2: knee AROM >0-60 degrees  Patient Goals   Patient goals : \"go home with [facility]. \"    Plan    Plan  Times per week: 7  Times per day: Twice a day  Current Treatment Recommendations: Strengthening, Functional Mobility Training, Neuromuscular Re-education, Home Exercise Program, Equipment Evaluation, Education, & procurement, Transfer Training, Gait Training, Balance Training, Endurance Training, Stair training, Safety Education & Training, Patient/Caregiver Education & Training, Pain Management, Positioning  Safety Devices  Type of devices:  All fall risk precautions in place     Therapy Time   Individual   Time In 1305   Time Out 1328   Minutes 23   Gait/stairs 15 min  Transfer 8 min          Colleen Landers PTA, 01/20/18 at 1:36 PM

## 2018-01-22 NOTE — PROGRESS NOTES
Physical Therapy  Facility/Department: [facility] MED SURG [id number]  Physical Therapy Discharge      NAME: Kimberli Duncan    : 1950 (74 y.o.)  MRN: [id number]    Account: [de-identified]  Gender: male      Patient has been discharged from acute care hospital. DC patient from current PT program.      Electronically signed by Geronimo Coon PT on 18 at 5:12 PM

## 2018-01-30 ENCOUNTER — HOSPITAL ENCOUNTER (OUTPATIENT)
Dept: ORTHOPEDIC SURGERY | Age: 68
Discharge: HOME | End: 2018-02-01
Payer: MEDICARE

## 2018-01-30 DIAGNOSIS — M17.11 PRIMARY LOCALIZED OSTEOARTHROSIS OF RIGHT LOWER LEG: ICD-10-CM

## 2018-01-30 PROCEDURE — 73562 X-RAY EXAM OF KNEE 3: CPT

## 2018-01-30 NOTE — OP NOTE
[address]                       [address], [address] [facility]                                 OPERATIVE REPORT    PATIENT NAME: Beryl Burleson                  :        1950  MED REC NO:   [id number]                            ROOM:       N226  ACCOUNT NO:   [de-identified]                           ADMIT DATE: 2018  PROVIDER:     Martha Prince MD            DATE OF PROCEDURE:  2018    PREOPERATIVE DIAGNOSIS:  DJD, right knee. POSTOPERATIVE DIAGNOSIS:  DJD, right knee. OPERATION PERFORMED:  Right total knee replacement using Srini Triathlon  knee system, size 6 PS femur, size 6 tibia, size 35 patella, and 9-mm PS  articular surface. ANESTHESIA:  Spinal with nerve block. BLOOD LOSS:  Less than 50 mL. PRIMARY SURGEON:  Martha Prince M.D.    ASSISTANT:  Marko Hazel PA-C was present throughout the entire case. Given  the nature of the disease process and the procedure, a skilled surgical  first assistant was necessary during the case. The assistant was necessary  to hold retractors and manipulate the extremity during the procedure. A  certified scrub tech was at the back table managing the instruments and  supplies for the surgical case. CLINICAL NOTE:  The patient is a 51-year-old gentleman with knee pain  refractory to conservative treatment, who presents for elective total knee  replacement. Procedure, its risks, benefits, treatment alternatives, and  postoperative course were discussed with him. He understood and wished to  proceed. OPERATIVE PROCEDURE:  Prior to taking the patient to the operating room,  surgical site was marked. His H and P was reviewed, updated and signed. He received appropriate preoperative antibiotics. The patient was brought  to the operating room and a surgical time-out was performed. The patient  received preoperative antibiotics.   After satisfactory anesthetic, the  appropriate knee was prepped and draped in the usual sterile fashion. A medial prepatellar incision was made and dissection carried sharply  through the skin and underlying fat to the fascial tissue. A median  capsular incision was made and the joint entered. There was a modest  amount of synovial fluid, the synovium was hypertrophic. A drill hole was made in the center of the distal femur and the  intramedullary guide inserted. The cutting jig was then inserted with 3  degrees of external rotation and the distal femoral cut was made. The jigs  and osito were removed and the appropriate sized template inserted. The  anterior and posterior condyles were cut with an oscillating saw and  chamfer cuts were made. The patella was then identified and a reamer was  used to make the appropriate cut on the patella. The proximal tibia was  sized with a heart-sizing plate and a punch used to make a hole for the  prosthesis in the tibia. A drill hole was placed in the center of the  tibia. Tibial resection was performed with a 7-degree posterior slope. The tibial  trial was applied and reaming and broaching performed. Extramedullary  alignment was checked. Trials were inserted, approximate sizes were determined and the ligament  balancing was performed. A batch of cement was mixed and after lavage and  careful bone drying the prostheses were inserted and held in full extension  until the cement hardened with a patella clamp in place. The ligaments  were checked and were stable. The wound was then closed in layers using #0 Vicryl for the fascia, #000  Monocryl for the subcutaneous tissues and _____ staples for the skin. A dry  sterile bulky dressing applied. The patient tolerated the procedure well  and returned to the PACU in satisfactory condition. A postoperative x-ray was reviewed and the findings at surgery were  discussed with the family.         Mario Snell MD    D: 01/30/2018 6:26:29       T:

## 2018-02-05 NOTE — DISCHARGE SUMMARY
Discharge summary  This patient Skylar Trujillo was admitted to the hospital on 1/18/2018  after undergoing Procedure(s) (LRB):  RIGHT KNEE TOTAL KNEE ARTHROPLASTY ELÍAS SPINAL,NERVE BLOCK (Right) without complications that morning. During the postoperative period,while in hospital, patient was medically managed by the hospitalist. Please see medial notes and H&P for patients additional diagnoses. Ortho agrees with all medical diagnoses and treatments while patient in hospital.    Hospital course  Patient tolerated surgical procedure well and there was no complications. Patient progressed adequtly through their recovery during hospital stay including PT and rehabilitation. DVT prophylaxis was implemented POD#1  Patient was then D/C on 1/20/2018 to Home  in stable condition. Patient was instructed on the use of pain medications, the signs and symptoms of infection, and was given our number to call should they have any questions or concerns following discharge.

## 2018-02-23 ENCOUNTER — HOSPITAL ENCOUNTER (OUTPATIENT)
Dept: PHYSICAL THERAPY | Age: 68
Setting detail: THERAPIES SERIES
Discharge: HOME | End: 2018-02-23
Payer: MEDICARE

## 2018-02-23 PROCEDURE — 97162 PT EVAL MOD COMPLEX 30 MIN: CPT

## 2018-02-23 PROCEDURE — G8979 MOBILITY GOAL STATUS: HCPCS

## 2018-02-23 PROCEDURE — 97110 THERAPEUTIC EXERCISES: CPT

## 2018-02-23 PROCEDURE — 97016 VASOPNEUMATIC DEVICE THERAPY: CPT

## 2018-02-23 PROCEDURE — G8978 MOBILITY CURRENT STATUS: HCPCS

## 2018-02-23 ASSESSMENT — PAIN DESCRIPTION - ORIENTATION: ORIENTATION: RIGHT

## 2018-02-23 ASSESSMENT — PAIN DESCRIPTION - FREQUENCY: FREQUENCY: INTERMITTENT

## 2018-02-23 ASSESSMENT — PAIN DESCRIPTION - PROGRESSION: CLINICAL_PROGRESSION: GRADUALLY IMPROVING

## 2018-02-23 ASSESSMENT — PAIN DESCRIPTION - PAIN TYPE: TYPE: SURGICAL PAIN

## 2018-02-23 ASSESSMENT — PAIN DESCRIPTION - LOCATION: LOCATION: KNEE

## 2018-02-23 ASSESSMENT — PAIN DESCRIPTION - DESCRIPTORS: DESCRIPTORS: ACHING

## 2018-02-23 NOTE — PROGRESS NOTES
[address]  PHYSICAL THERAPY EVALUATION    Date: 2018  Patient Name: Rut Perez       MRN: [id number]   Account: [de-identified]   : 1950  (74 y.o.)   Gender: male   Referring Practitioner: Tanya Baird                  Diagnosis: R TKA   Treatment Diagnosis: decreased R knee arom, LE strength, functional mobility, and p/o edema/pain. Additional Pertinent Hx: L knee OA plans to have surgery in May        Past Medical History:  has a past medical history of Arthritis; Chronic kidney disease; CLL (chronic lymphocytic leukemia) ([facility] [address] Disease of blood and blood forming organ; Hyperlipidemia; Hypertension; Lymphoma of gastrointestinal tract ([facility] [address] Movement disorder; Thyroid cancer ([facility] and Type II or unspecified type diabetes mellitus without mention of complication, not stated as uncontrolled. Past Surgical History:   has a past surgical history that includes Appendectomy; shoulder surgery (Right); Upper gastrointestinal endoscopy (4/29/15); Colonoscopy (16); other surgical history (Right, 16); Cholecystectomy (N/A, 2016); Endoscopy, colon, diagnostic; and total knee arthroplasty (Right, 2018).     Vital Signs  Patient Currently in Pain: Denies   Pain Screening  Patient Currently in Pain: Denies  Pain Assessment  Pain Assessment: 0-10 (pain ranges from 0-8/10 )  Pain Type: Surgical pain  Pain Location: Knee  Pain Orientation: Right  Pain Descriptors: Aching  Pain Frequency: Intermittent  Clinical Progression: Gradually improving  Effect of Pain on Daily Activities: increased pain/difficulty with sleeping, walking prolonged periods, stairs (non-reciprocal), transfers including car and sit to stand   Pain Intervention(s):  (denies taking pain medications anymore )      Lives With: Spouse  Type of Home: House  Home Layout: Multi-level (11 stairs to basement )  Home Access: Stairs to enter with rails  Entrance Stairs - Number of Steps: 2  ADL Strengthening, ROM, Neuromuscular Re-education, Balance Training, Manual Therapy - Soft Tissue Mobilization, Home Exercise Program, Safety Education & Training, Patient/Caregiver Education & Training, Equipment Evaluation, Education, & procurement, Manual Therapy - Joint Manipulation, Modalities, Gait Training, Transfer Training, Stair training, Functional Mobility Training     G-Codes  PT G-Codes  Functional Assessment Tool Used: LEFS and clinical judgement   Score: 47/80=41% impaired   Functional Limitation: Mobility: Walking and moving around  Mobility: Walking and Moving Around Current Status (): At least 40 percent but less than 60 percent impaired, limited or restricted  Mobility: Walking and Moving Around Goal Status (): At least 1 percent but less than 20 percent impaired, limited or restricted    Patient Education  New Education Provided: Pt eductaed on POC and provided written handout of HEP    POST-PAIN     Pain Rating (0-10 pain scale): 1/10  Location and pain description same as pre-treatment unless indicated. Action: [x] NA  [] Call Physician  [] Perform HEP  [] Meds as prescribed    Evaluation and patient rights have been reviewed and patient agrees with plan of care. Yes  [x]  No  []   Explain:     Jaime Fall Risk Assessment  Risk Factor Scale  Score   History of Falls [] Yes  [x] No 25  0    Secondary Diagnosis [] Yes  [x] No 15  0    Ambulatory Aid [] Furniture  [] Crutches/cane/walker  [x] None/bedrest/wheelchair/nurse 30  15  0    IV/Heparin Lock [] Yes  [x] No 20  0    Gait/Transferring [] Impaired  [x] Weak  [] Normal/bedrest/immobile 20  10  0 10   Mental Status [] Forgets limitations  [x] Oriented to own ability 15  0       Total: 10     Based on the Assessment score: check the appropriate box.   [x]  No intervention needed   Low =   Score of 0-24  []  Use standard prevention interventions Moderate =  Score of 24-44   [] Discuss fall prevention strategies   [] Indicate moderate falls risk on eval  []  Use high risk prevention interventions High = Score of 45 and higher   [] Discuss fall prevention strategies   [] Provide supervision during treatment time    Goals  Long term goals  Time Frame for Long term goals : 5 weeks   Long term goal 1: Pt will be indep/compliant with HEP in order to self manage symptoms upon D/C  Long term goal 2: The patient will ambulate unlimited distances all surfaces independently without AD in order to safely ambulate in the community   Long term goal 3: The pt will demo improved R LE strength >/= 4+/5 in order to perform 11-6\" stairs with 1 HR indep reciprocally in order to get to basement in home   Long term goal 4: The pt will demo improved R knee arom lacking 5* extension to 100* of flexion in order to increase ease with ADL's  Long term goal 5:  The pt will report decreased pain </=3-4/10 at worst  in order to increase ease with sleeping    Treatment Initiated : ther ex, modalities, hep     PT Individual Minutes  Time In: 1503  Time Out: 1547  Minutes: 44  Timed Code Treatment Minutes: 8 Minutes  Procedure Minutes: 36 minutes   Electronically signed by Dexter Boyd PT on 2/23/18 at 3:48 PM

## 2018-02-23 NOTE — PROGRESS NOTES
[address]     Ph: [phone]  Fax: [phone]    [x] Certification  [] Recertification [x]  Plan of Care  [] Progress Note [] Discharge      To:  Referring Practitioner: Navi Sanchez       From:  Stephenie Souza, PT, DPT  Patient: Jaja Knutson     : 1950  Diagnosis: R TKA      Date: 2018  Treatment Diagnosis: decreased R knee arom, LE strength, functional mobility, and p/o edema/pain. Plan of Care/Certification Expiration Date: 18  Progress Report Period from:  2018  to 2018    Total # of Visits to Date: 1   No Show: 0    Canceled Appointment: 0     OBJECTIVE:   Long Term Goals - Time Frame for Long term goals : 5 weeks   Goals Current/ Discharge status Met   Long term goal 1: Pt will be indep/compliant with HEP in order to self manage symptoms upon D/C ongoing [] yes  [] no   Long term goal 2: The patient will ambulate unlimited distances all surfaces independently without AD in order to safely ambulate in the community  Ambulates with SC mod indep with deviations [] yes  [] no   Long term goal 3: The pt will demo improved R LE strength >/= 4+/5 in order to perform 11-6\" stairs with 1 HR indep reciprocally in order to get to basement in home  Strength RLE  Comment: 4/5 hip flex, IR, ER 4-/5 knee flex (within available rom), 4/5 knee ext (within available rom), hip extension/abduction 4-/5 [] yes  [] no   Long term goal 4: The pt will demo improved R knee arom lacking 5* extension to 100* of flexion in order to increase ease with ADL's AROM RLE (degrees)  RLE General AROM: knee lacking 12* extension, 68* flexion [] yes  [] no   Long term goal 5:  The pt will report decreased pain </=3-4/10 at worst  in order to increase ease with sleeping 8/10 at worst  [] yes  [] no      Body structures, Functions, Activity limitations: Decreased functional mobility , Decreased ADL status, Decreased ROM, Decreased strength  Assessment: Pt presents s/p R TKR 1/18/18 with decreased R knee arom, LE strength, functional mobility, and p/o edema/pain. These impiarments currently limit his functional abilities by >/=41% including his abilities to ambulate prolonged distances without AD, sleep, perform stairs, household, and self care ADL's at [facility] without increased pain or limitations. Prognosis: Good  Discharge Recommendations: Continue to assess pending progress    New Education Provided: Pt eductaed on POC and provided written handout of HEP  G-Codes  PT G-Codes  Functional Assessment Tool Used: LEFS and clinical judgement   Score: 47/80=41% impaired   Functional Limitation: Mobility: Walking and moving around  Mobility: Walking and Moving Around Current Status (): At least 40 percent but less than 60 percent impaired, limited or restricted  Mobility: Walking and Moving Around Goal Status (): At least 1 percent but less than 20 percent impaired, limited or restricted    PLAN: [x] Evaluate and Treat  Frequency/Duration:  Plan  Times per week: 2-3  Plan weeks: 5  Current Treatment Recommendations: Strengthening, ROM, Neuromuscular Re-education, Balance Training, Manual Therapy - Soft Tissue Mobilization, Home Exercise Program, Safety Education & Training, Patient/Caregiver Education & Training, Equipment Evaluation, Education, & procurement, Manual Therapy - Joint Manipulation, Modalities, Gait Training, Transfer Training, Stair training, Functional Mobility Training             ? Patient Status:[x] Continue/ Initiate plan of Care    [] Discharge PT. Recommend pt continue with HEP. [] Additional visits requested, Please re-certify for additional visits:        Signature: Electronically signed by Hal Fernandez PT on 2/23/18 at 3:49 PM    If you have any questions or concerns, please don't hesitate to call.   Thank you for your referral.    I have reviewed this plan of care and

## 2018-02-27 ENCOUNTER — HOSPITAL ENCOUNTER (OUTPATIENT)
Dept: PHYSICAL THERAPY | Age: 68
Setting detail: THERAPIES SERIES
Discharge: HOME | End: 2018-02-27
Payer: MEDICARE

## 2018-02-27 PROCEDURE — 97016 VASOPNEUMATIC DEVICE THERAPY: CPT

## 2018-02-27 PROCEDURE — 97110 THERAPEUTIC EXERCISES: CPT

## 2018-02-27 PROCEDURE — 97140 MANUAL THERAPY 1/> REGIONS: CPT

## 2018-02-27 ASSESSMENT — PAIN DESCRIPTION - LOCATION: LOCATION: KNEE

## 2018-02-27 ASSESSMENT — PAIN DESCRIPTION - ORIENTATION: ORIENTATION: RIGHT

## 2018-02-27 ASSESSMENT — PAIN DESCRIPTION - DESCRIPTORS: DESCRIPTORS: BURNING

## 2018-02-27 ASSESSMENT — PAIN DESCRIPTION - PROGRESSION: CLINICAL_PROGRESSION: GRADUALLY IMPROVING

## 2018-02-27 ASSESSMENT — PAIN SCALES - GENERAL: PAINLEVEL_OUTOF10: 2

## 2018-02-27 NOTE — PROGRESS NOTES
[id number] [address]  Outpatient Physical Therapy    Treatment Note        Date: 2018  Patient: Nenita Oliver  : 1950  ACCT #: [de-identified]  Referring Practitioner: Phu Cabezas   Diagnosis: R TKA     Visit Information:  PT Visit Information  PT Insurance Information: Medicare   Total # of Visits to Date: 2  Plan of Care/Certification Expiration Date: 18  No Show: 0  Canceled Appointment: 0  Progress Note Counter: 2/10    Subjective: Pt reports pain is primarily with seated position, \" that's why I walk all the time\"Pt reports going for EGD tomorrow, not having a good appetite, losing weight, making sure CA is not back. Comments: RTD 3/13/18  HEP Compliance:  [x] Good [] Fair [] Poor [] Reports not doing due to:    Vital Signs  Patient Currently in Pain: Yes   Pain Screening  Patient Currently in Pain: Yes  Pain Assessment  Pain Level: 2  Pain Location: Knee  Pain Orientation: Right  Pain Descriptors: Burning  Clinical Progression: Gradually improving    OBJECTIVE:   Exercises  Exercise 1: supine heel slides 10\"x10 , min A to hold  Exercise 2: SF L1 x 5 min  Exercise 3: SLR with QS x 10  Exercise 4: bridges x10 with  Exercise 5: S/L hip abd x 10  Exercise 9: gastroc/soleus stretch 30s x3 with strap  Exercise 10: HS stretch 30 s x 3, longsit  Exercise 11: C/R HS stretch x 5  Exercise 20: HEP: SLR/ abd, HS/Gastroc stretch       Strength: [x] NT  [] MMT completed:     ROM: [] NT  [x] ROM measurements:     AROM RLE (degrees)  RLE General AROM: knee lacking 8* extension, 72* flexion      Manual:   Manual therapy  PROM: R knee flex/ext  Soft Tissue Mobalization: R knee posterior x 3 min, CR HS str. x 5    Modalities:  Modalities  Other: Int cold/compression to R knee x10 min to decrease edema , Pre comp=44cm , Post comp=     *Indicates exercise, modality, or manual techniques to be initiated when appropriate    Assessment:         Body structures, Functions, Activity limitations: Decreased current HEP. See objective section for any therapeutic exercise changes, additions or modifications this date.          PT Individual Minutes  Time In: 1500  Time Out: 1550  Minutes: 50  Timed Code Treatment Minutes: 40 Minutes     Procedure Minutes:10  Manual x 10  TE x 30      Signature:  Electronically signed by Xochilt Gongora PTA on 2/27/18 at 8:37 AM

## 2018-03-01 ENCOUNTER — HOSPITAL ENCOUNTER (OUTPATIENT)
Dept: PHYSICAL THERAPY | Age: 68
Setting detail: THERAPIES SERIES
Discharge: HOME | End: 2018-03-01
Payer: MEDICARE

## 2018-03-01 PROCEDURE — 97110 THERAPEUTIC EXERCISES: CPT

## 2018-03-01 PROCEDURE — 97140 MANUAL THERAPY 1/> REGIONS: CPT

## 2018-03-01 PROCEDURE — 97016 VASOPNEUMATIC DEVICE THERAPY: CPT

## 2018-03-01 ASSESSMENT — PAIN DESCRIPTION - PROGRESSION: CLINICAL_PROGRESSION: GRADUALLY IMPROVING

## 2018-03-01 NOTE — PROGRESS NOTES
[address] [address]  Outpatient Physical Therapy    Treatment Note        Date: 3/1/2018  Patient: Gera Raymundo  : 1950  ACCT #: [de-identified]  Referring Practitioner: Dodie Lima   Diagnosis: R TKA     Visit Information:  PT Visit Information  PT Insurance Information: Medicare   Total # of Visits to Date: 3  Plan of Care/Certification Expiration Date: 18  No Show: 0  Canceled Appointment: 0  Progress Note Counter: 3/10    Subjective: \"I'm sleeping a lot better the last two nights\"  Comments: RTD 3/13/18  HEP Compliance:  [x] Good [] Fair [] Poor [] Reports not doing due to:    Vital Signs  Patient Currently in Pain: No   Pain Screening  Patient Currently in Pain: No  Pain Assessment  Clinical Progression: Gradually improving    OBJECTIVE:   Exercises  Exercise 1: supine heel slides 10\"x10  with strap  Exercise 2: SF L1.2 x 5 min  Exercise 3: SLR with QS x 10  Exercise 4: bridges 5s x10   Exercise 5: S/L hip abd x 10  Exercise 8: step ups F/L 4\" x10  Exercise 9: gastroc/soleus stretch 30s x3 with strap  Exercise 10: HS stretch 30 s x 3, longsit  Exercise 12: QS 5s x10  Exercise 13: H/L Add with ball 5s x10  Exercise 14: Pball HS curls 5s x10  Exercise 15: glut dom squats x10         Strength: [x] NT  [] MMT completed:          ROM: [] NT  [x] ROM measurements:     AROM RLE (degrees)  RLE General AROM: knee -8-79 degrees             Manual:   Manual therapy  PROM: R knee flex/ext with C/R for flexion x 10 min total  Soft Tissue Mobalization: post R knee    Modalities:  Modalities  Other: Int cold/compression to R knee x10 min with LE elevated to decrease edema , Pre comp=46cm, Post=46 cm          Assessment:   Activity Tolerance  Activity Tolerance: Patient Tolerated treatment well    Body structures, Functions, Activity limitations: Decreased functional mobility , Decreased ADL status, Decreased ROM, Decreased strength  Assessment: Good tolerance to progressions and new additions.  No c/o pain during tx. Exercises added to increase mobility and strength. Good sue to PROM; tight end feel flex/ext. Conclude with int compression to decrease post ex pain and inflammation  Treatment Diagnosis: decreased R knee arom, LE strength, functional mobility, and p/o edema/pain. Goals:       Long term goals  Time Frame for Long term goals : 5 weeks   Long term goal 1: Pt will be indep/compliant with HEP in order to self manage symptoms upon D/C  Long term goal 2: The patient will ambulate unlimited distances all surfaces independently without AD in order to safely ambulate in the community   Long term goal 3: The pt will demo improved R LE strength >/= 4+/5 in order to perform 11-6\" stairs with 1 HR indep reciprocally in order to get to basement in home   Long term goal 4: The pt will demo improved R knee arom lacking 5* extension to 100* of flexion in order to increase ease with ADL's  Long term goal 5: The pt will report decreased pain </=3-4/10 at worst  in order to increase ease with sleeping  Progress toward goals: ongoing; good toward pain goal    POST-PAIN       Pain Rating (0-10 pain scale):  010   Location and pain description same as pre-treatment unless indicated. Action: [x] NA   [] Perform HEP  [] Meds as prescribed  [] Modalities as prescribed   [] Call Physician     Frequency/Duration:  Plan  Times per week: 2-3  Plan weeks: 5  Specific instructions for Next Treatment: D/C int compression due to no change in circumference. Trial CP only  Current Treatment Recommendations: Strengthening, ROM, Neuromuscular Re-education, Balance Training, Manual Therapy - Soft Tissue Mobilization, Home Exercise Program, Safety Education & Training, Patient/Caregiver Education & Training, Equipment Evaluation, Education, & procurement, Manual Therapy - Joint Manipulation, Modalities, Gait Training, Transfer Training, Stair training, Functional Mobility Training     Pt to continue current HEP.   See objective section for any therapeutic exercise changes, additions or modifications this date.          PT Individual Minutes  Time In: 7661  Time Out: 1552  Minutes: 55  Timed Code Treatment Minutes: 45 Minutes  Procedure Minutes: 10    Signature:  Electronically signed by George Kirby PTA on 3/1/18 at 3:44 PM

## 2018-03-02 ENCOUNTER — HOSPITAL ENCOUNTER (OUTPATIENT)
Dept: PHYSICAL THERAPY | Age: 68
Setting detail: THERAPIES SERIES
Discharge: HOME | End: 2018-03-02
Payer: MEDICARE

## 2018-03-02 PROCEDURE — 97110 THERAPEUTIC EXERCISES: CPT

## 2018-03-02 PROCEDURE — 97140 MANUAL THERAPY 1/> REGIONS: CPT

## 2018-03-02 RX ORDER — CAPTOPRIL AND HYDROCHLOROTHIAZIDE 50; 25 MG/1; MG/1
TABLET ORAL
Qty: 180 TABLET | Refills: 0 | Status: SHIPPED | OUTPATIENT
Start: 2018-03-02 | End: 2018-06-07 | Stop reason: SDUPTHER

## 2018-03-02 ASSESSMENT — PAIN DESCRIPTION - PROGRESSION: CLINICAL_PROGRESSION: GRADUALLY IMPROVING

## 2018-03-02 NOTE — PROGRESS NOTES
[id number] [address]  Outpatient Physical Therapy    Treatment Note        Date: 3/2/2018  Patient: Krystyna Castorena  : 1950  ACCT #: [de-identified]  Referring Practitioner: Wilfrido Petersen   Diagnosis: R TKA     Visit Information:  PT Visit Information  PT Insurance Information: Medicare   Total # of Visits to Date: 4  Plan of Care/Certification Expiration Date: 18  No Show: 0  Canceled Appointment: 0  Progress Note Counter: 4/10    Subjective: pt reports compliance with HEP. without new reports since yesterday. Comments: RTD 3/13/18  HEP Compliance:  [x] Good [] Fair [] Poor [] Reports not doing due to:    Vital Signs  Patient Currently in Pain: No   Pain Screening  Patient Currently in Pain: No  Pain Assessment  Clinical Progression: Gradually improving    OBJECTIVE:   Exercises  Exercise 1: supine heel slides 10\"x10  with strap  Exercise 2: SF L1.2 x 5 min  Exercise 3: SLR with QS x 10  Exercise 4: bridges 5s x10   Exercise 5: S/L hip abd (held due to time)   Exercise 8: step ups F/L 4\" x10  Exercise 9: gastroc/soleus stretch 30s x3 with strap  Exercise 10: HS stretch 30 s x 3, longsit  Exercise 12: QS 5s x10  Exercise 13: H/L Add with ball (held due to time)  Exercise 14: Pball HS curls 5s x10  Exercise 15: glut dom squats (held due to time)   ROM: [] NT  [x] ROM measurements:     AROM RLE (degrees)  RLE General AROM: knee 6-77 degrees supine   Manual:   Manual therapy  PROM: R knee flex/ext  x 15 min total  Soft Tissue Mobalization: post R knee    Modalities:  Modalities  Cryotherapy (Minutes\Location): to right knee 10 minutes      *Indicates exercise, modality, or manual techniques to be initiated when appropriate    Assessment: Body structures, Functions, Activity limitations: Decreased functional mobility , Decreased ADL status, Decreased ROM, Decreased strength  Assessment: continues to be mildly guarded with PROM, pt able to decrease with cuing.  improved right knee extension on this PTA on 3/2/18 at 3:24 PM

## 2018-03-02 NOTE — TELEPHONE ENCOUNTER
From: Christa Martinez  Sent: 3/2/2018 9:26 AM EST  Subject: Medication Renewal Request    Christa Martinez would like a refill of the following medications:     captopril-hydrochlorothiazide (CAPOZIDE) 50-25 MG per tablet José Luis Dickey PA-C]    Preferred pharmacy: [address] [place] [address], [address] [place] [address]    Comment:  Please order from [facility], [address], [facility].

## 2018-03-05 ENCOUNTER — HOSPITAL ENCOUNTER (OUTPATIENT)
Dept: PHYSICAL THERAPY | Age: 68
Setting detail: THERAPIES SERIES
Discharge: HOME | End: 2018-03-05
Payer: MEDICARE

## 2018-03-05 PROCEDURE — 97140 MANUAL THERAPY 1/> REGIONS: CPT

## 2018-03-05 PROCEDURE — 97110 THERAPEUTIC EXERCISES: CPT

## 2018-03-05 ASSESSMENT — PAIN DESCRIPTION - PROGRESSION: CLINICAL_PROGRESSION: GRADUALLY IMPROVING

## 2018-03-09 ENCOUNTER — HOSPITAL ENCOUNTER (OUTPATIENT)
Dept: PHYSICAL THERAPY | Age: 68
Setting detail: THERAPIES SERIES
Discharge: HOME | End: 2018-03-09
Payer: MEDICARE

## 2018-03-09 PROCEDURE — 97140 MANUAL THERAPY 1/> REGIONS: CPT

## 2018-03-09 PROCEDURE — 97110 THERAPEUTIC EXERCISES: CPT

## 2018-03-09 ASSESSMENT — PAIN DESCRIPTION - DESCRIPTORS: DESCRIPTORS: TIGHTNESS

## 2018-03-09 ASSESSMENT — PAIN DESCRIPTION - PAIN TYPE: TYPE: SURGICAL PAIN

## 2018-03-09 ASSESSMENT — PAIN SCALES - GENERAL: PAINLEVEL_OUTOF10: 1

## 2018-03-09 ASSESSMENT — PAIN DESCRIPTION - FREQUENCY: FREQUENCY: INTERMITTENT

## 2018-03-09 ASSESSMENT — PAIN DESCRIPTION - LOCATION: LOCATION: KNEE

## 2018-03-09 ASSESSMENT — PAIN DESCRIPTION - ORIENTATION: ORIENTATION: RIGHT

## 2018-03-12 ENCOUNTER — HOSPITAL ENCOUNTER (OUTPATIENT)
Dept: PHYSICAL THERAPY | Age: 68
Setting detail: THERAPIES SERIES
Discharge: HOME | End: 2018-03-12
Payer: MEDICARE

## 2018-03-12 PROCEDURE — 97110 THERAPEUTIC EXERCISES: CPT

## 2018-03-12 PROCEDURE — G8978 MOBILITY CURRENT STATUS: HCPCS

## 2018-03-12 PROCEDURE — G8979 MOBILITY GOAL STATUS: HCPCS

## 2018-03-12 ASSESSMENT — PAIN SCALES - GENERAL: PAINLEVEL_OUTOF10: 4

## 2018-03-12 ASSESSMENT — PAIN DESCRIPTION - ORIENTATION: ORIENTATION: RIGHT

## 2018-03-12 ASSESSMENT — PAIN DESCRIPTION - LOCATION: LOCATION: KNEE

## 2018-03-12 ASSESSMENT — PAIN DESCRIPTION - PAIN TYPE: TYPE: SURGICAL PAIN

## 2018-03-12 ASSESSMENT — PAIN DESCRIPTION - DESCRIPTORS: DESCRIPTORS: ACHING;TIGHTNESS

## 2018-03-12 NOTE — PROGRESS NOTES
continue with HEP. [] Additional visits requested, Please re-certify for additional visits:        Signature: Electronically signed by Lynda Michaels PT on 3/12/18 at 2:06 PM    If you have any questions or concerns, please don't hesitate to call. Thank you for your referral.    I have reviewed this plan of care and certify a need for medically necessary rehabilitation services.     Physician Signature:__________________________________________________________  Date:  Please sign and return

## 2018-03-12 NOTE — PROGRESS NOTES
Treatment Recommendations: Strengthening, ROM, Neuromuscular Re-education, Balance Training, Manual Therapy - Soft Tissue Mobilization, Home Exercise Program, Safety Education & Training, Patient/Caregiver Education & Training, Equipment Evaluation, Education, & procurement, Manual Therapy - Joint Manipulation, Modalities, Gait Training, Transfer Training, Stair training, Functional Mobility Training  Plan Comment: continue current      Pt to continue current HEP. See objective section for any therapeutic exercise changes, additions or modifications this date.     PT Individual Minutes  Time In: 1400  Time Out: 1450  Minutes: 50  Timed Code Treatment Minutes: 38 Minutes  Procedure Minutes: 10 minutes    Signature:  Electronically signed by Rodriguez Rojas PT on 3/12/18 at 2:42 PM

## 2018-03-14 ENCOUNTER — HOSPITAL ENCOUNTER (OUTPATIENT)
Dept: PREADMISSION TESTING | Age: 68
Discharge: HOME | End: 2018-03-18
Payer: MEDICARE

## 2018-03-14 ENCOUNTER — HOSPITAL ENCOUNTER (OUTPATIENT)
Dept: PHYSICAL THERAPY | Age: 68
Setting detail: THERAPIES SERIES
Discharge: HOME | End: 2018-03-14
Payer: MEDICARE

## 2018-03-14 VITALS
DIASTOLIC BLOOD PRESSURE: 89 MMHG | WEIGHT: 221.8 LBS | SYSTOLIC BLOOD PRESSURE: 158 MMHG | RESPIRATION RATE: 16 BRPM | BODY MASS INDEX: 35.65 KG/M2 | OXYGEN SATURATION: 97 % | HEART RATE: 101 BPM | TEMPERATURE: 98.2 F | HEIGHT: 66 IN

## 2018-03-14 LAB
ANION GAP SERPL CALCULATED.3IONS-SCNC: 16 MEQ/L (ref 7–13)
APTT: 24.3 SEC (ref 21.6–35.4)
BUN BLDV-MCNC: 21 MG/DL (ref 8–23)
CALCIUM SERPL-MCNC: 8.3 MG/DL (ref 8.6–10.2)
CHLORIDE BLD-SCNC: 100 MEQ/L (ref 98–107)
CO2: 24 MEQ/L (ref 22–29)
CREAT SERPL-MCNC: 1.11 MG/DL (ref 0.7–1.2)
GFR AFRICAN AMERICAN: >60
GFR NON-AFRICAN AMERICAN: >60
GLUCOSE BLD-MCNC: 81 MG/DL (ref 74–109)
HCT VFR BLD CALC: 35.1 % (ref 42–52)
HEMOGLOBIN: 11.8 G/DL (ref 14–18)
INR BLD: 1
MCH RBC QN AUTO: 30.5 PG (ref 27–31.3)
MCHC RBC AUTO-ENTMCNC: 33.7 % (ref 33–37)
MCV RBC AUTO: 90.6 FL (ref 80–100)
PDW BLD-RTO: 16.3 % (ref 11.5–14.5)
PLATELET # BLD: 211 K/UL (ref 130–400)
POTASSIUM SERPL-SCNC: 4 MEQ/L (ref 3.5–5.1)
PROTHROMBIN TIME: 10.7 SEC (ref 8.1–13.7)
RBC # BLD: 3.87 M/UL (ref 4.7–6.1)
SODIUM BLD-SCNC: 140 MEQ/L (ref 132–144)
WBC # BLD: 5.9 K/UL (ref 4.8–10.8)

## 2018-03-14 PROCEDURE — 85027 COMPLETE CBC AUTOMATED: CPT

## 2018-03-14 PROCEDURE — 85610 PROTHROMBIN TIME: CPT

## 2018-03-14 PROCEDURE — 85730 THROMBOPLASTIN TIME PARTIAL: CPT

## 2018-03-14 PROCEDURE — 97110 THERAPEUTIC EXERCISES: CPT

## 2018-03-14 PROCEDURE — 80048 BASIC METABOLIC PNL TOTAL CA: CPT

## 2018-03-14 PROCEDURE — 97140 MANUAL THERAPY 1/> REGIONS: CPT

## 2018-03-14 RX ORDER — SODIUM CHLORIDE, SODIUM LACTATE, POTASSIUM CHLORIDE, CALCIUM CHLORIDE 600; 310; 30; 20 MG/100ML; MG/100ML; MG/100ML; MG/100ML
INJECTION, SOLUTION INTRAVENOUS CONTINUOUS
Status: CANCELLED | OUTPATIENT
Start: 2018-03-14

## 2018-03-14 RX ORDER — SODIUM CHLORIDE 0.9 % (FLUSH) 0.9 %
10 SYRINGE (ML) INJECTION PRN
Status: CANCELLED | OUTPATIENT
Start: 2018-03-14

## 2018-03-14 RX ORDER — LIDOCAINE HYDROCHLORIDE 10 MG/ML
1 INJECTION, SOLUTION EPIDURAL; INFILTRATION; INTRACAUDAL; PERINEURAL
Status: CANCELLED | OUTPATIENT
Start: 2018-03-14 | End: 2018-03-14

## 2018-03-14 RX ORDER — SODIUM CHLORIDE 0.9 % (FLUSH) 0.9 %
10 SYRINGE (ML) INJECTION EVERY 12 HOURS SCHEDULED
Status: CANCELLED | OUTPATIENT
Start: 2018-03-14

## 2018-03-14 ASSESSMENT — ENCOUNTER SYMPTOMS
BLURRED VISION: 0
VOMITING: 0
SORE THROAT: 0
SHORTNESS OF BREATH: 0
EYE PAIN: 0
DIARRHEA: 0
WHEEZING: 0
BACK PAIN: 0
NAUSEA: 1
RESPIRATORY NEGATIVE: 1
DOUBLE VISION: 0
HEARTBURN: 0
COUGH: 0
ABDOMINAL PAIN: 0
CONSTIPATION: 0

## 2018-03-14 NOTE — PROGRESS NOTES
[address] [address]  Outpatient Physical Therapy    Treatment Note        Date: 3/14/2018  Patient: Ovi Galvan  : 1950  ACCT #: [de-identified]  Referring Practitioner: Ilda Salazar   Diagnosis: R TKA     Visit Information:  PT Visit Information  PT Insurance Information: Medicare   Total # of Visits to Date: 8  Plan of Care/Certification Expiration Date: 18  No Show: 0  Canceled Appointment: 0  Progress Note Counter: 8/15    Subjective: Pt reports scheduled for manipulation tomorrow and to return to [place] and have PT on friday  Comments: RTD 3/13/18  HEP Compliance:  [x] Good [] Lux Meraz [] Poor [] Reports not doing due to:    Vital Signs  Patient Currently in Pain: Denies   Pain Screening  Patient Currently in Pain: Denies    OBJECTIVE:   Exercises  Exercise 2: SF L2.0 5 min, bike NV*  Exercise 5: S/L hip abd x15  Exercise 6: prone hang w/ 1# wt 3 min   Exercise 7: Mod reilly stretch w/ strap 3x30\" Rt   Exercise 8: step ups F/L 6\"x15   Exercise 9: gastroc/soleus stretch 30s x3 1/2 foam roll  Exercise 10: HS stretch 30 s x 3 standing at step   Exercise 11: standing knee flexion stretch on step 10\"x10   Exercise 12: step downs 4\"x15  Exercise 14: Pball HS curls 10s x10    Strength: [x] NT  [] MMT completed:    ROM: [x] NT  [] ROM measurements:     Manual:   Manual therapy  PROM: R knee flex/ext (prone)  x 8 min total    Modalities:  Modalities  Cryotherapy (Minutes\Location): declined   *Indicates exercise, modality, or manual techniques to be initiated when appropriate    Assessment: Body structures, Functions, Activity limitations: Decreased functional mobility , Decreased ADL status, Decreased ROM, Decreased strength  Assessment: Pt required VC's to decrease circumduction with step ups with good carryover. Good sue to tx and progressions this date, most challenged by step downs. Treatment Diagnosis: decreased R knee arom, LE strength, functional mobility, and p/o edema/pain.   Prognosis:

## 2018-03-14 NOTE — H&P
heard.  Pulmonary/Chest: Effort normal and breath sounds normal. No respiratory distress. He has no wheezes. He has no rales. Abdominal: Soft. Bowel sounds are normal. He exhibits no distension. There is no tenderness. There is no rebound. Genitourinary:   Genitourinary Comments: deferred    Musculoskeletal: He exhibits no tenderness or deformity. Right knee: He exhibits decreased range of motion and swelling. He exhibits no ecchymosis. Legs:  Neurological: He is alert and oriented to person, place, and time. No cranial nerve deficit. Gait normal. Coordination normal. GCS score is 15. Skin: Skin is warm and dry. No rash noted. He is not diaphoretic. No erythema. No pallor.    Psychiatric: Affect and judgment normal.       Assessment:  Patient Active Problem List   Diagnosis    Arthritis, lumbar spine ([facility] [address]    CLL (chronic lymphocytic leukemia) ([facility] [address]    Lymphoma of gastrointestinal tract (HCC)    Type 2 diabetes mellitus without complication, without long-term current use of insulin (HCC)    Dyslipidemia    Osteoarthritis of right knee    Status post total right knee replacement         Plan:  Scheduled for RIGHT KNEE MANIPULATION KNEE UNDER ANESTHESIA    Raza Tyler CNP  3/14/2018  1:07 PM

## 2018-03-15 ENCOUNTER — ANESTHESIA EVENT (OUTPATIENT)
Dept: OPERATING ROOM | Age: 68
End: 2018-03-15
Payer: MEDICARE

## 2018-03-15 ENCOUNTER — HOSPITAL ENCOUNTER (OUTPATIENT)
Age: 68
Setting detail: OUTPATIENT SURGERY
Discharge: HOME | End: 2018-03-15
Attending: ORTHOPAEDIC SURGERY | Admitting: ORTHOPAEDIC SURGERY
Payer: MEDICARE

## 2018-03-15 ENCOUNTER — ANESTHESIA (OUTPATIENT)
Dept: OPERATING ROOM | Age: 68
End: 2018-03-15
Payer: MEDICARE

## 2018-03-15 VITALS
DIASTOLIC BLOOD PRESSURE: 67 MMHG | BODY MASS INDEX: 35.64 KG/M2 | WEIGHT: 221.75 LBS | HEART RATE: 69 BPM | SYSTOLIC BLOOD PRESSURE: 136 MMHG | HEIGHT: 66 IN | RESPIRATION RATE: 16 BRPM | TEMPERATURE: 98.3 F | OXYGEN SATURATION: 97 %

## 2018-03-15 VITALS — OXYGEN SATURATION: 99 % | SYSTOLIC BLOOD PRESSURE: 129 MMHG | DIASTOLIC BLOOD PRESSURE: 66 MMHG

## 2018-03-15 LAB
GLUCOSE BLD-MCNC: 109 MG/DL
GLUCOSE BLD-MCNC: 109 MG/DL (ref 60–115)
GLUCOSE BLD-MCNC: 130 MG/DL (ref 60–115)
PERFORMED ON: ABNORMAL
PERFORMED ON: NORMAL

## 2018-03-15 PROCEDURE — 7100000010 HC PHASE II RECOVERY - FIRST 15 MIN: Performed by: ORTHOPAEDIC SURGERY

## 2018-03-15 PROCEDURE — 64447 NJX AA&/STRD FEMORAL NRV IMG: CPT | Performed by: ANESTHESIOLOGY

## 2018-03-15 PROCEDURE — 2580000003 HC RX 258

## 2018-03-15 PROCEDURE — 2500000003 HC RX 250 WO HCPCS

## 2018-03-15 PROCEDURE — 7100000001 HC PACU RECOVERY - ADDTL 15 MIN: Performed by: ORTHOPAEDIC SURGERY

## 2018-03-15 PROCEDURE — 6360000002 HC RX W HCPCS: Performed by: ANESTHESIOLOGY

## 2018-03-15 PROCEDURE — 6360000002 HC RX W HCPCS: Performed by: ORTHOPAEDIC SURGERY

## 2018-03-15 PROCEDURE — 6360000002 HC RX W HCPCS: Performed by: NURSE ANESTHETIST, CERTIFIED REGISTERED

## 2018-03-15 PROCEDURE — 7100000000 HC PACU RECOVERY - FIRST 15 MIN: Performed by: ORTHOPAEDIC SURGERY

## 2018-03-15 PROCEDURE — 3700000000 HC ANESTHESIA ATTENDED CARE: Performed by: ORTHOPAEDIC SURGERY

## 2018-03-15 PROCEDURE — 7100000011 HC PHASE II RECOVERY - ADDTL 15 MIN: Performed by: ORTHOPAEDIC SURGERY

## 2018-03-15 PROCEDURE — 3600000002 HC SURGERY LEVEL 2 BASE: Performed by: ORTHOPAEDIC SURGERY

## 2018-03-15 RX ORDER — ROPIVACAINE HYDROCHLORIDE 5 MG/ML
INJECTION, SOLUTION EPIDURAL; INFILTRATION; PERINEURAL PRN
Status: DISCONTINUED | OUTPATIENT
Start: 2018-03-15 | End: 2018-03-15 | Stop reason: SDUPTHER

## 2018-03-15 RX ORDER — SODIUM CHLORIDE 0.9 % (FLUSH) 0.9 %
10 SYRINGE (ML) INJECTION PRN
Status: DISCONTINUED | OUTPATIENT
Start: 2018-03-15 | End: 2018-03-15 | Stop reason: HOSPADM

## 2018-03-15 RX ORDER — OXYCODONE HYDROCHLORIDE AND ACETAMINOPHEN 5; 325 MG/1; MG/1
2 TABLET ORAL EVERY 4 HOURS PRN
Status: DISCONTINUED | OUTPATIENT
Start: 2018-03-15 | End: 2018-03-15 | Stop reason: HOSPADM

## 2018-03-15 RX ORDER — EPINEPHRINE 1 MG/ML
INJECTION, SOLUTION, CONCENTRATE INTRAVENOUS PRN
Status: DISCONTINUED | OUTPATIENT
Start: 2018-03-15 | End: 2018-03-15 | Stop reason: SDUPTHER

## 2018-03-15 RX ORDER — SODIUM CHLORIDE 0.9 % (FLUSH) 0.9 %
10 SYRINGE (ML) INJECTION EVERY 12 HOURS SCHEDULED
Status: DISCONTINUED | OUTPATIENT
Start: 2018-03-15 | End: 2018-03-15 | Stop reason: HOSPADM

## 2018-03-15 RX ORDER — HYDROCODONE BITARTRATE AND ACETAMINOPHEN 5; 325 MG/1; MG/1
2 TABLET ORAL PRN
Status: DISCONTINUED | OUTPATIENT
Start: 2018-03-15 | End: 2018-03-15 | Stop reason: HOSPADM

## 2018-03-15 RX ORDER — HYDROCODONE BITARTRATE AND ACETAMINOPHEN 5; 325 MG/1; MG/1
1 TABLET ORAL PRN
Status: DISCONTINUED | OUTPATIENT
Start: 2018-03-15 | End: 2018-03-15 | Stop reason: HOSPADM

## 2018-03-15 RX ORDER — FENTANYL CITRATE 50 UG/ML
50 INJECTION, SOLUTION INTRAMUSCULAR; INTRAVENOUS EVERY 10 MIN PRN
Status: DISCONTINUED | OUTPATIENT
Start: 2018-03-15 | End: 2018-03-15 | Stop reason: HOSPADM

## 2018-03-15 RX ORDER — MEPERIDINE HYDROCHLORIDE 25 MG/ML
12.5 INJECTION INTRAMUSCULAR; INTRAVENOUS; SUBCUTANEOUS EVERY 5 MIN PRN
Status: DISCONTINUED | OUTPATIENT
Start: 2018-03-15 | End: 2018-03-15 | Stop reason: HOSPADM

## 2018-03-15 RX ORDER — METOCLOPRAMIDE HYDROCHLORIDE 5 MG/ML
10 INJECTION INTRAMUSCULAR; INTRAVENOUS
Status: DISCONTINUED | OUTPATIENT
Start: 2018-03-15 | End: 2018-03-15 | Stop reason: HOSPADM

## 2018-03-15 RX ORDER — PROPOFOL 10 MG/ML
INJECTION, EMULSION INTRAVENOUS PRN
Status: DISCONTINUED | OUTPATIENT
Start: 2018-03-15 | End: 2018-03-15 | Stop reason: SDUPTHER

## 2018-03-15 RX ORDER — LIDOCAINE HYDROCHLORIDE 10 MG/ML
INJECTION, SOLUTION EPIDURAL; INFILTRATION; INTRACAUDAL; PERINEURAL
Status: COMPLETED
Start: 2018-03-15 | End: 2018-03-15

## 2018-03-15 RX ORDER — DEXAMETHASONE SODIUM PHOSPHATE 10 MG/ML
INJECTION INTRAMUSCULAR; INTRAVENOUS PRN
Status: DISCONTINUED | OUTPATIENT
Start: 2018-03-15 | End: 2018-03-15 | Stop reason: SDUPTHER

## 2018-03-15 RX ORDER — LIDOCAINE HYDROCHLORIDE 10 MG/ML
1 INJECTION, SOLUTION EPIDURAL; INFILTRATION; INTRACAUDAL; PERINEURAL
Status: COMPLETED | OUTPATIENT
Start: 2018-03-15 | End: 2018-03-15

## 2018-03-15 RX ORDER — ACETAMINOPHEN 325 MG/1
650 TABLET ORAL EVERY 4 HOURS PRN
Status: DISCONTINUED | OUTPATIENT
Start: 2018-03-15 | End: 2018-03-15 | Stop reason: HOSPADM

## 2018-03-15 RX ORDER — SODIUM CHLORIDE, SODIUM LACTATE, POTASSIUM CHLORIDE, CALCIUM CHLORIDE 600; 310; 30; 20 MG/100ML; MG/100ML; MG/100ML; MG/100ML
INJECTION, SOLUTION INTRAVENOUS
Status: COMPLETED
Start: 2018-03-15 | End: 2018-03-15

## 2018-03-15 RX ORDER — MORPHINE SULFATE 4 MG/ML
4 INJECTION, SOLUTION INTRAMUSCULAR; INTRAVENOUS
Status: DISCONTINUED | OUTPATIENT
Start: 2018-03-15 | End: 2018-03-15 | Stop reason: HOSPADM

## 2018-03-15 RX ORDER — DIPHENHYDRAMINE HYDROCHLORIDE 50 MG/ML
12.5 INJECTION INTRAMUSCULAR; INTRAVENOUS
Status: DISCONTINUED | OUTPATIENT
Start: 2018-03-15 | End: 2018-03-15 | Stop reason: HOSPADM

## 2018-03-15 RX ORDER — ONDANSETRON 2 MG/ML
4 INJECTION INTRAMUSCULAR; INTRAVENOUS EVERY 6 HOURS PRN
Status: DISCONTINUED | OUTPATIENT
Start: 2018-03-15 | End: 2018-03-15 | Stop reason: HOSPADM

## 2018-03-15 RX ORDER — ONDANSETRON 2 MG/ML
4 INJECTION INTRAMUSCULAR; INTRAVENOUS
Status: DISCONTINUED | OUTPATIENT
Start: 2018-03-15 | End: 2018-03-15 | Stop reason: HOSPADM

## 2018-03-15 RX ORDER — SODIUM CHLORIDE, SODIUM LACTATE, POTASSIUM CHLORIDE, CALCIUM CHLORIDE 600; 310; 30; 20 MG/100ML; MG/100ML; MG/100ML; MG/100ML
INJECTION, SOLUTION INTRAVENOUS CONTINUOUS
Status: DISCONTINUED | OUTPATIENT
Start: 2018-03-15 | End: 2018-03-15 | Stop reason: HOSPADM

## 2018-03-15 RX ORDER — OXYCODONE HYDROCHLORIDE AND ACETAMINOPHEN 5; 325 MG/1; MG/1
1 TABLET ORAL EVERY 4 HOURS PRN
Status: DISCONTINUED | OUTPATIENT
Start: 2018-03-15 | End: 2018-03-15 | Stop reason: HOSPADM

## 2018-03-15 RX ORDER — MIDAZOLAM HYDROCHLORIDE 1 MG/ML
INJECTION INTRAMUSCULAR; INTRAVENOUS PRN
Status: DISCONTINUED | OUTPATIENT
Start: 2018-03-15 | End: 2018-03-15 | Stop reason: SDUPTHER

## 2018-03-15 RX ORDER — MORPHINE SULFATE 2 MG/ML
2 INJECTION, SOLUTION INTRAMUSCULAR; INTRAVENOUS
Status: DISCONTINUED | OUTPATIENT
Start: 2018-03-15 | End: 2018-03-15 | Stop reason: HOSPADM

## 2018-03-15 RX ADMIN — LIDOCAINE HYDROCHLORIDE 1 ML: 10 INJECTION, SOLUTION EPIDURAL; INFILTRATION; INTRACAUDAL; PERINEURAL at 06:28

## 2018-03-15 RX ADMIN — SODIUM CHLORIDE, SODIUM LACTATE, POTASSIUM CHLORIDE, CALCIUM CHLORIDE 1000 ML: 600; 310; 30; 20 INJECTION, SOLUTION INTRAVENOUS at 06:28

## 2018-03-15 RX ADMIN — PROPOFOL 100 MG: 10 INJECTION, EMULSION INTRAVENOUS at 07:35

## 2018-03-15 RX ADMIN — ROPIVACAINE HYDROCHLORIDE 30 ML: 5 INJECTION, SOLUTION EPIDURAL; INFILTRATION; PERINEURAL at 07:27

## 2018-03-15 RX ADMIN — PROPOFOL 20 MG: 10 INJECTION, EMULSION INTRAVENOUS at 07:37

## 2018-03-15 RX ADMIN — MIDAZOLAM HYDROCHLORIDE 4 MG: 1 INJECTION, SOLUTION INTRAMUSCULAR; INTRAVENOUS at 07:22

## 2018-03-15 RX ADMIN — EPINEPHRINE 0.15 MG: 1 INJECTION, SOLUTION INTRAMUSCULAR; SUBCUTANEOUS at 07:27

## 2018-03-15 RX ADMIN — SODIUM CHLORIDE, POTASSIUM CHLORIDE, SODIUM LACTATE AND CALCIUM CHLORIDE 1000 ML: 600; 310; 30; 20 INJECTION, SOLUTION INTRAVENOUS at 06:28

## 2018-03-15 RX ADMIN — DEXAMETHASONE SODIUM PHOSPHATE 10 MG: 10 INJECTION INTRAMUSCULAR; INTRAVENOUS at 07:27

## 2018-03-15 RX ADMIN — CEFAZOLIN SODIUM 2 G: 2 SOLUTION INTRAVENOUS at 07:30

## 2018-03-15 ASSESSMENT — PAIN SCALES - GENERAL: PAINLEVEL_OUTOF10: 0

## 2018-03-15 ASSESSMENT — PULMONARY FUNCTION TESTS
PIF_VALUE: 0

## 2018-03-15 ASSESSMENT — PAIN - FUNCTIONAL ASSESSMENT: PAIN_FUNCTIONAL_ASSESSMENT: 0-10

## 2018-03-15 NOTE — ANESTHESIA POSTPROCEDURE EVALUATION
Department of Anesthesiology  Postprocedure Note    Patient: Светлана Greenberg  MRN: [id number]  YOB: 1950  Date of evaluation: 3/15/2018  Time:  7:47 AM     Procedure Summary     Date:  03/15/18 [facility] / [facility]  [address] [facility]    Anesthesia Start:  0730 Anesthesia Stop:      Procedure:  RIGHT KNEE MANIPULATION (Right Knee) Diagnosis:  (RIGHT KNEE STIFFNESS)    Surgeon:  Ephraim Pham MD Responsible Provider:  Mary Best MD    Anesthesia Type:  general ASA Status:  3          Anesthesia Type: general    Sigifredo Phase I: Sigifredo Score: 10    Sigifredo Phase II:      Last vitals: Reviewed and per EMR flowsheets.        Anesthesia Post Evaluation    Patient location during evaluation: PACU  Patient participation: complete - patient participated  Level of consciousness: awake and alert  Pain score: 0  Airway patency: patent  Nausea & Vomiting: no nausea and no vomiting  Complications: no  Cardiovascular status: blood pressure returned to baseline and hemodynamically stable  Respiratory status: acceptable  Hydration status: euvolemic

## 2018-03-15 NOTE — BRIEF OP NOTE
Brief Postoperative Note  ______________________________________________________________    Patient: Kimberli Duncan  YOB: 1950  MRN: [id number]  Date of Procedure: 3/15/2018    Pre-Op Diagnosis: RIGHT KNEE STIFFNESS    Post-Op Diagnosis: Same       Procedure(s):  RIGHT KNEE MANIPULATION    Anesthesia: General    Surgeon(s):   Vianca Monreal MD    Staff:  Sc[employer] Person [employer]: April Spring Luci Monroe  Physician Assistant: NICOLAS Haynes     Estimated Blood Loss: * No values recorded between 3/15/2018  7:30 AM and 3/15/2018  2:09 AM * mL    Complications: None    Specimens:   * No specimens in log *    Implants:  * No implants in log *      Drains:      Findings: rom 5-110    Vianca Monreal MD  Date: 3/15/2018  Time: 7:41 AM

## 2018-03-15 NOTE — OP NOTE
[address]                       [address] [place], [address] [facility]                                 OPERATIVE REPORT    PATIENT NAME: Sary Olmos                  :        1950  MED REC NO:   [id number]                            ROOM:  ACCOUNT NO:   [de-identified]                           ADMIT DATE: 03/15/2018  PROVIDER:     So Vallejo MD    DATE OF PROCEDURE:  03/15/2018    PREOPERATIVE DIAGNOSIS:  Arthrofibrosis of right knee status post right  total knee replacement. POSTOPERATIVE DIAGNOSIS:  Arthrofibrosis of right knee status post right  total knee replacement. OPERATION PERFORMED:  Manipulation of right knee. ANESTHESIA:  General.    BLOOD LOSS:  Zero. CLINICAL NOTE:  The patient is a 31-year-old gentleman with stiffness in  his knee status post total knee replacement that has not been improving  with physical therapy. He is plateaued with 70 degrees of knee flexion,  presents for manipulation. Procedures, its risks, benefits, treatment  alternatives and postoperative course were discussed with him. He  understood and wished to proceed. OPERATIVE PROCEDURE:  Prior to taking the patient to the operating room,  his surgical site was marked. His H and P was reviewed, updated and  signed. He placed on the table in supine position whereupon adequate  anesthesia was then obtained. Examination under anesthesia showed his  range of motion to be 5 to 70 pre-manipulation. Post-manipulation, we were  able to get him from 0 to 110 degrees. He tolerated the procedure well and  was taken to the postanesthesia care unit in good condition without  complications.       Bubba Stanton MD    D: 03/15/2018 14:05:20       T: 03/15/2018 15:40:55     [email or web address]  Job#: [id number]     Doc#: [id number]    CC:

## 2018-03-15 NOTE — ANESTHESIA PRE PROCEDURE
Department of Anesthesiology  Preprocedure Note       Name:  Gera Raymundo   Age:  74 y.o.  :  1950                                          MRN:  [id number]         Date:  3/15/2018      Surgeon: Ronit Stubbs): So Vallejo MD    Procedure: Procedure(s):  RIGHT KNEE MANIPULATION KNEE UNDER ANESTHESIA, CPM IN PACU, NERVE BLOCK    Medications prior to admission:   Prior to Admission medications    Medication Sig Start Date End Date Taking? Authorizing Provider   captopril-hydrochlorothiazide (CAPOZIDE) 50-25 MG per tablet TAKE 1 TABLET 1 TO 2 TIMES DAILY 3/2/18   Sugar Castellanos PA-C   aspirin 81 MG EC tablet Take 1 tablet by mouth 2 times daily 18   Hal Stokes CNP   metoprolol succinate (TOPROL XL) 50 MG extended release tablet TAKE 1 TABLET DAILY 17   Sugar Castellanos PA-C   furosemide (LASIX) 40 MG tablet TAKE 1 TABLET DAILY 17   Sugar Castellanos PA-C   potassium chloride (KLOR-CON M20) 20 MEQ extended release tablet TAKE 1 TABLET THREE TIMES A DAY 17   Marcia Phillips MD   metFORMIN (GLUCOPHAGE) 500 MG tablet TAKE 2 TABLETS TWICE A DAY WITH MEALS 17   Marcia Phillips MD   atorvastatin (LIPITOR) 40 MG tablet TAKE 1 TABLET NIGHTLY 17   Marcia Phillips MD   Alcohol Swabs PADS Use as directed with insulin injections 14   Marcia Phillips MD   SUPER B COMPLEX/C CAPS Take 1 capsule by mouth daily. Historical Provider, MD   Multiple Vitamins-Minerals (MULTIVITAMIN PO) Take 1 tablet by mouth daily.     Historical Provider, MD       Current medications:    Current Facility-Administered Medications   Medication Dose Route Frequency Provider Last Rate Last Dose    lactated ringers infusion   Intravenous Continuous Concepcion Olson CNP        lidocaine PF 1 % injection 1 mL  1 mL Intradermal Once PRN Ed Newton CNP        sodium chloride flush 0.9 % injection 10 mL  10 mL Intravenous 2 times per day Ed Newton CNP        sodium chloride flush 0.9 4/29/15    w/bx        Social History:    Social History   Substance Use Topics    Smoking status: Never Smoker    Smokeless tobacco: Never Used    Alcohol use Yes      Comment: rare social                                Counseling given: Not Answered      Vital Signs (Current): There were no vitals filed for this visit. BP Readings from Last 3 Encounters:   03/14/18 (!) 158/89   01/20/18 123/66   01/18/18 (!) 103/58       NPO Status:                                                                                 BMI:   Wt Readings from Last 3 Encounters:   03/14/18 221 lb 12.8 oz (100.6 kg)   01/18/18 236 lb 12.4 oz (107.4 kg)   01/09/18 235 lb 9.6 oz (106.9 kg)     There is no height or weight on file to calculate BMI.    CBC:   Lab Results   Component Value Date    WBC 5.9 03/14/2018    RBC 3.87 03/14/2018    HGB 11.8 03/14/2018    HCT 35.1 03/14/2018    MCV 90.6 03/14/2018    RDW 16.3 03/14/2018     03/14/2018       CMP:   Lab Results   Component Value Date     03/14/2018    K 4.0 03/14/2018    K 4.3 01/19/2018     03/14/2018    CO2 24 03/14/2018    BUN 21 03/14/2018    CREATININE 1.11 03/14/2018    GFRAA >60.0 03/14/2018    LABGLOM >60.0 03/14/2018    GLUCOSE 81 03/14/2018    PROT 7.6 12/20/2017    CALCIUM 8.3 03/14/2018    BILITOT 0.7 12/20/2017    ALKPHOS 78 12/20/2017    AST 17 12/20/2017    ALT 20 12/20/2017       POC Tests: No results for input(s): POCGLU, POCNA, POCK, POCCL, POCBUN, POCHEMO, POCHCT in the last 72 hours.     Coags:   Lab Results   Component Value Date    PROTIME 10.7 03/14/2018    INR 1.0 03/14/2018    APTT 24.3 03/14/2018       HCG (If Applicable): No results found for: PREGTESTUR, PREGSERUM, HCG, HCGQUANT     ABGs: No results found for: PHART, PO2ART, ORD3QVC, OKY3MOC, BEART, O6HCOCXY     Type & Screen (If Applicable):  No results found for: LABABO, [address]    Anesthesia Evaluation  Patient summary reviewed and

## 2018-03-16 ENCOUNTER — HOSPITAL ENCOUNTER (OUTPATIENT)
Dept: PHYSICAL THERAPY | Age: 68
Setting detail: THERAPIES SERIES
Discharge: HOME | End: 2018-03-16
Payer: MEDICARE

## 2018-03-16 PROCEDURE — 97110 THERAPEUTIC EXERCISES: CPT

## 2018-03-16 PROCEDURE — 97140 MANUAL THERAPY 1/> REGIONS: CPT

## 2018-03-16 NOTE — PROGRESS NOTES
[address] [address]  Outpatient Physical Therapy    Treatment Note        Date: 3/16/2018  Patient: Mateusz Calhoun  : 1950  ACCT #: [de-identified]  Referring Practitioner: Bonifacio Macario   Diagnosis: R TKA     Visit Information:  PT Visit Information  PT Insurance Information: Medicare   Total # of Visits to Date: 5  Plan of Care/Certification Expiration Date: 18  No Show: 0  Canceled Appointment: 0  Progress Note Counter: 9/15    Subjective: no pain today, had manipulation yesterday, I fell down last night saw the PA today, who said is is probably from the block, and is ok for therapy, AAROM 110 deg flex right knee during manipulation     HEP Compliance:  [x] Good [] Fair [] Poor [] Reports not doing due to:    Vital Signs  Patient Currently in Pain: Denies   Pain Screening  Patient Currently in Pain: Denies    OBJECTIVE:   Exercises  Exercise 2: Sci-Fit, L-1, 6 min  Exercise 3: seated marching 2 x 10  Exercise 4: bridges 5s x15  Exercise 7: Mod reilly stretch w/ strap 3x30\" Rt   Exercise 9: gastroc/soleus stretch 30s   Exercise 10: HS stretch 30 s x 3 seated with step           Strength: [x] NT  [] MMT completed:   ROM: [] NT  [x] ROM measurements:  PROM RLE (degrees)  RLE General PROM: knee flexion 106 deg, seated         Manual:   Manual therapy  PROM: R knee flex/ext, 10 min total    Modalities:  Modalities  Cryotherapy (Minutes\Location): CP 10 min right knee     *Indicates exercise, modality, or manual techniques to be initiated when appropriate    Assessment: Body structures, Functions, Activity limitations: Decreased functional mobility , Decreased ADL status, Decreased ROM, Decreased strength  Assessment: focused today on stretching right knee, attained 106 deg PROM, seated, good sue to session, conclude with CP 10 min right knee  Treatment Diagnosis: decreased R knee arom, LE strength, functional mobility, and p/o edema/pain.   Prognosis: Good       Goals:       Long term

## 2018-03-19 ENCOUNTER — HOSPITAL ENCOUNTER (OUTPATIENT)
Dept: PHYSICAL THERAPY | Age: 68
Setting detail: THERAPIES SERIES
Discharge: HOME | End: 2018-03-19
Payer: MEDICARE

## 2018-03-19 PROCEDURE — 97110 THERAPEUTIC EXERCISES: CPT

## 2018-03-19 PROCEDURE — 97140 MANUAL THERAPY 1/> REGIONS: CPT

## 2018-03-19 ASSESSMENT — PAIN SCALES - GENERAL: PAINLEVEL_OUTOF10: 2

## 2018-03-19 ASSESSMENT — PAIN DESCRIPTION - DESCRIPTORS: DESCRIPTORS: SORE

## 2018-03-19 ASSESSMENT — PAIN DESCRIPTION - LOCATION: LOCATION: KNEE

## 2018-03-19 ASSESSMENT — PAIN DESCRIPTION - ORIENTATION: ORIENTATION: RIGHT

## 2018-03-19 ASSESSMENT — PAIN DESCRIPTION - PAIN TYPE: TYPE: SURGICAL PAIN

## 2018-03-21 ENCOUNTER — HOSPITAL ENCOUNTER (OUTPATIENT)
Dept: PHYSICAL THERAPY | Age: 68
Setting detail: THERAPIES SERIES
Discharge: HOME | End: 2018-03-21
Payer: MEDICARE

## 2018-03-21 PROCEDURE — 97110 THERAPEUTIC EXERCISES: CPT

## 2018-03-21 PROCEDURE — 97140 MANUAL THERAPY 1/> REGIONS: CPT

## 2018-03-21 ASSESSMENT — PAIN DESCRIPTION - ORIENTATION: ORIENTATION: RIGHT

## 2018-03-21 ASSESSMENT — PAIN SCALES - GENERAL: PAINLEVEL_OUTOF10: 3

## 2018-03-21 ASSESSMENT — PAIN DESCRIPTION - DESCRIPTORS: DESCRIPTORS: TIGHTNESS

## 2018-03-21 ASSESSMENT — PAIN DESCRIPTION - LOCATION: LOCATION: KNEE

## 2018-03-21 ASSESSMENT — PAIN DESCRIPTION - PAIN TYPE: TYPE: SURGICAL PAIN

## 2018-03-21 NOTE — PROGRESS NOTES
50  Timed Code Treatment Minutes: 38 Minutes  Procedure Minutes: 10 minutes     Signature:  Electronically signed by Concepcion Sprague PT on 3/21/18 at 1:41 PM

## 2018-03-23 ENCOUNTER — HOSPITAL ENCOUNTER (OUTPATIENT)
Dept: PHYSICAL THERAPY | Age: 68
Setting detail: THERAPIES SERIES
Discharge: HOME | End: 2018-03-23
Payer: MEDICARE

## 2018-03-23 PROCEDURE — 97140 MANUAL THERAPY 1/> REGIONS: CPT

## 2018-03-23 PROCEDURE — 97110 THERAPEUTIC EXERCISES: CPT

## 2018-03-23 NOTE — PROGRESS NOTES
functional mobility , Decreased ADL status, Decreased ROM, Decreased strength  Assessment: Pt with good sue to tx and progressions without c/o increased pain, contd firm end feel noted with prom. Contd gait deviations noted however pt able to improve with VC's. Treatment Diagnosis: decreased R knee arom, LE strength, functional mobility, and p/o edema/pain. Prognosis: Good     Goals:  Long term goals  Time Frame for Long term goals : 5 weeks   Long term goal 1: Pt will be indep/compliant with HEP in order to self manage symptoms upon D/C  Long term goal 2: The patient will ambulate unlimited distances all surfaces independently without AD in order to safely ambulate in the community   Long term goal 3: Pt will demo improved R LE strength 5/5  In order to perform 11-6\" stairs with 1 HR indep reciprocally in order to get to basement in home   (updated 3/12/18)  Long term goal 4: The pt will demo improved R knee arom lacking 5* extension to 100* of flexion in order to increase ease with ADL's  Long term goal 5: The pt will report decreased pain </=3-4/10 at worst  in order to increase ease with sleeping  Progress toward goals: good    POST-PAIN       Pain Rating (0-10 pain scale):  0 /10   Location and pain description same as pre-treatment unless indicated. Action: [x] NA   [] Perform HEP  [] Meds as prescribed  [] Modalities as prescribed   [] Call Physician     Frequency/Duration:  Plan  Times per week: 2-3  Plan weeks: 5  Current Treatment Recommendations: Strengthening, ROM, Neuromuscular Re-education, Balance Training, Manual Therapy - Soft Tissue Mobilization, Home Exercise Program, Safety Education & Training, Patient/Caregiver Education & Training, Equipment Evaluation, Education, & procurement, Manual Therapy - Joint Manipulation, Modalities, Gait Training, Transfer Training, Stair training, Functional Mobility Training     Pt to continue current HEP.   See objective section for any therapeutic exercise

## 2018-03-26 ENCOUNTER — HOSPITAL ENCOUNTER (OUTPATIENT)
Dept: PHYSICAL THERAPY | Age: 68
Setting detail: THERAPIES SERIES
Discharge: HOME | End: 2018-03-26
Payer: MEDICARE

## 2018-03-26 PROCEDURE — 97140 MANUAL THERAPY 1/> REGIONS: CPT

## 2018-03-26 PROCEDURE — 97110 THERAPEUTIC EXERCISES: CPT

## 2018-03-26 NOTE — PROGRESS NOTES
[address] [address]  Outpatient Physical Therapy    Treatment Note        Date: 3/26/2018  Patient: Skylar Trujillo  : 1950  ACCT #: [de-identified]  Referring Practitioner: Marcie Aponte   Diagnosis: R TKA     Visit Information:  PT Visit Information  PT Insurance Information: Medicare   Total # of Visits to Date: 15  No Show: 0  Canceled Appointment: 0  Progress Note Counter: 13/15    Subjective: no pain today, just stiff, car transfers are easier now, I no longer need to move seat back to get my legs in  Comments: RTD 4/10/18   HEP Compliance:  [x] Good [] Fair [] Poor [] Reports not doing due to:    Vital Signs  Patient Currently in Pain: Denies   Pain Screening  Patient Currently in Pain: Denies    OBJECTIVE:   Exercises  Exercise 2: bike 5 min, seat-4  Exercise 4: bridges 5s x15  Exercise 5: rockerboard 3 way x 20  Exercise 6: TKE with RTB 5\"x15   Exercise 8: step ups F/L 8\"x15  Exercise 15: quad stretch, 30 sec x 3, supine, with strap       Strength: [] NT  [x] MMT completed:  Strength RLE  Comment: HS curl 4+/5, seated     ROM: [x] NT  [] ROM measurements:            Manual:   Manual therapy  PROM: seated and supine, knee flex/ext with contract/rleax stretch to quads and HS x 10 min total    Modalities:  Modalities  Cryotherapy (Minutes\Location): CP 10 min right knee     *Indicates exercise, modality, or manual techniques to be initiated when appropriate    Assessment: Body structures, Functions, Activity limitations: Decreased functional mobility , Decreased ADL status, Decreased ROM, Decreased strength  Assessment: good sue to all ex, patient demo's improved gait without AD, no complaint of pain, conclude with CP, 10 min, right knee  Treatment Diagnosis: decreased R knee arom, LE strength, functional mobility, and p/o edema/pain.   Prognosis: Good       Goals:       Long term goals  Time Frame for Long term goals : 5 weeks   Long term goal 1: Pt will be indep/compliant with HEP in

## 2018-03-28 ENCOUNTER — HOSPITAL ENCOUNTER (OUTPATIENT)
Dept: PHYSICAL THERAPY | Age: 68
Setting detail: THERAPIES SERIES
Discharge: HOME | End: 2018-03-28
Payer: MEDICARE

## 2018-03-28 PROCEDURE — 97140 MANUAL THERAPY 1/> REGIONS: CPT

## 2018-03-28 PROCEDURE — 97110 THERAPEUTIC EXERCISES: CPT

## 2018-03-28 NOTE — PROGRESS NOTES
limitations: Decreased functional mobility , Decreased ADL status, Decreased ROM, Decreased strength  Assessment: Pt making good progress towards all goals, one additional visit to ensure completion of hep indep as pt conts to require minimal cues for improving knee flexion with gait as well as with step ups with good carryover. Treatment Diagnosis: decreased R knee arom, LE strength, functional mobility, and p/o edema/pain. Prognosis: Good     Goals:  Long term goals  Time Frame for Long term goals : 5 weeks   Long term goal 1: Pt will be indep/compliant with HEP in order to self manage symptoms upon D/C  Long term goal 2: The patient will ambulate unlimited distances all surfaces independently without AD in order to safely ambulate in the community   Long term goal 3: Pt will demo improved R LE strength 5/5  In order to perform 11-6\" stairs with 1 HR indep reciprocally in order to get to basement in home   (updated 3/12/18)  Long term goal 4: The pt will demo improved R knee arom lacking 5* extension to 100* of flexion in order to increase ease with ADL's  Long term goal 5: The pt will report decreased pain </=3-4/10 at worst  in order to increase ease with sleeping  Progress toward goals: good    POST-PAIN       Pain Rating (0-10 pain scale):  0 /10   Location and pain description same as pre-treatment unless indicated.    Action: [] NA   [] Perform HEP  [] Meds as prescribed  [] Modalities as prescribed   [] Call Physician     Frequency/Duration:  Plan  Times per week: 2-3  Plan weeks: 5  Specific instructions for Next Treatment: D/C NV  Current Treatment Recommendations: Strengthening, ROM, Neuromuscular Re-education, Balance Training, Manual Therapy - Soft Tissue Mobilization, Home Exercise Program, Safety Education & Training, Patient/Caregiver Education & Training, Equipment Evaluation, Education, & procurement, Manual Therapy - Joint Manipulation, Modalities, Gait Training, Transfer Training, Stair

## 2018-03-30 ENCOUNTER — HOSPITAL ENCOUNTER (OUTPATIENT)
Dept: PHYSICAL THERAPY | Age: 68
Setting detail: THERAPIES SERIES
Discharge: HOME | End: 2018-03-30
Payer: MEDICARE

## 2018-03-30 PROCEDURE — G8979 MOBILITY GOAL STATUS: HCPCS

## 2018-03-30 PROCEDURE — G8980 MOBILITY D/C STATUS: HCPCS

## 2018-03-30 PROCEDURE — 97110 THERAPEUTIC EXERCISES: CPT

## 2018-03-30 ASSESSMENT — PAIN DESCRIPTION - PROGRESSION: CLINICAL_PROGRESSION: GRADUALLY IMPROVING

## 2018-03-30 NOTE — PROGRESS NOTES
[address] [address]  Outpatient Physical Therapy    Treatment Note        Date: 3/30/2018  Patient: Светлана Greenberg  : 1950  ACCT #: [de-identified]  Referring Practitioner: Jovan George   Diagnosis: R TKA     Visit Information:  PT Visit Information  PT Insurance Information: Medicare   Total # of Visits to Date: 13  Plan of Care/Certification Expiration Date: 18  No Show: 0  Canceled Appointment: 0  Progress Note Counter: 15/15    Subjective: I feel about 85% functional, everyday, things gets better, all ADL's are easier  Comments: RTD 4/10/18   HEP Compliance:  [x] Good [] Fair [] Poor [] Reports not doing due to:        Pain Assessment  Clinical Progression: Gradually improving    OBJECTIVE:   Exercises  Exercise 1: hip series x 10, b/l  Exercise 2: bike 5 min, seat-4  Exercise 3: SLR x 10 b/l  Exercise 4: bridges 5s x15  Exercise 5: rockerboard 3 way x 30, large  Exercise 9: vectors x 10  Exercise 12: step downs 4\"x20  Exercise 18: 1 flight of stairs, up/down, reciprocally, using 1 hand rail, slight deviation going down         Strength: [] NT  [x] MMT completed:     Strength LLE  Comment: 4+ to 5/5 grossly except hip extension and IR, 4/5   ROM: [] NT  [x] ROM measurements:     AROM RLE (degrees)  RLE General AROM: lacking 6 deg ext, flexion 105 deg           Modalities:  Modalities  Cryotherapy (Minutes\Location): CP 10 min right knee     *Indicates exercise, modality, or manual techniques to be initiated when appropriate    Assessment:         Body structures, Functions, Activity limitations: Decreased functional mobility , Decreased ADL status, Decreased ROM, Decreased strength  Assessment: added hip series, vectors and SLR, to improve LE strength, good sue and technique, able to perform stairs, reciprocally, with slight deviations descending, good sue, conclude with CP right knee 10 min  Treatment Diagnosis: decreased R knee arom, LE strength, functional mobility, and p/o
strength. However pt reports feeling >/=85% functional and being able to independently self manage symptoms with HEP at this time. G-Codes  PT G-Codes  Functional Assessment Tool Used: LEFS and clinical judgement   Score: 70/80=12% imp   Functional Limitation: Mobility: Walking and moving around  Mobility: Walking and Moving Around Goal Status (): At least 1 percent but less than 20 percent impaired, limited or restricted  Mobility: Walking and Moving Around Discharge Status (): At least 1 percent but less than 20 percent impaired, limited or restricted    Plan  Plan Comment: D/C             ? Patient Status:[] Continue/ Initiate plan of Care    [x] Discharge PT. Recommend pt continue with HEP. [] Additional visits requested, Please re-certify for additional visits:       Signature: Electronically signed by Stacia Paz PT on 3/30/2018 at 2:39 PM  objective info byElectronically signed by Honorio Leon PTA on 3/30/18 at 1:36 PM    If you have any questions or concerns, please don't hesitate to call. Thank you for your referral.    I have reviewed this plan of care and certify a need for medically necessary rehabilitation services.     Physician Signature:__________________________________________________________  Date:  Please sign and return

## 2018-05-09 ENCOUNTER — OFFICE VISIT (OUTPATIENT)
Dept: INTERNAL MEDICINE CLINIC | Age: 68
End: 2018-05-09
Payer: MEDICARE

## 2018-05-09 VITALS
OXYGEN SATURATION: 98 % | BODY MASS INDEX: 35.97 KG/M2 | HEART RATE: 71 BPM | HEIGHT: 66 IN | WEIGHT: 223.8 LBS | RESPIRATION RATE: 16 BRPM | TEMPERATURE: 98.3 F | SYSTOLIC BLOOD PRESSURE: 106 MMHG | DIASTOLIC BLOOD PRESSURE: 62 MMHG

## 2018-05-09 DIAGNOSIS — M17.12 PRIMARY OSTEOARTHRITIS OF LEFT KNEE: Primary | ICD-10-CM

## 2018-05-09 PROCEDURE — G8427 DOCREV CUR MEDS BY ELIG CLIN: HCPCS | Performed by: FAMILY MEDICINE

## 2018-05-09 PROCEDURE — 99213 OFFICE O/P EST LOW 20 MIN: CPT | Performed by: FAMILY MEDICINE

## 2018-05-09 PROCEDURE — 4040F PNEUMOC VAC/ADMIN/RCVD: CPT | Performed by: FAMILY MEDICINE

## 2018-05-09 PROCEDURE — 1036F TOBACCO NON-USER: CPT | Performed by: FAMILY MEDICINE

## 2018-05-09 PROCEDURE — 3017F COLORECTAL CA SCREEN DOC REV: CPT | Performed by: FAMILY MEDICINE

## 2018-05-09 PROCEDURE — G8417 CALC BMI ABV UP PARAM F/U: HCPCS | Performed by: FAMILY MEDICINE

## 2018-05-09 PROCEDURE — 1123F ACP DISCUSS/DSCN MKR DOCD: CPT | Performed by: FAMILY MEDICINE

## 2018-05-09 ASSESSMENT — PATIENT HEALTH QUESTIONNAIRE - PHQ9
SUM OF ALL RESPONSES TO PHQ9 QUESTIONS 1 & 2: 0
SUM OF ALL RESPONSES TO PHQ QUESTIONS 1-9: 0
2. FEELING DOWN, DEPRESSED OR HOPELESS: 0
1. LITTLE INTEREST OR PLEASURE IN DOING THINGS: 0

## 2018-05-10 ENCOUNTER — HOSPITAL ENCOUNTER (OUTPATIENT)
Dept: PREADMISSION TESTING | Age: 68
Discharge: HOME | DRG: 470 | End: 2018-05-14
Payer: MEDICARE

## 2018-05-10 VITALS
OXYGEN SATURATION: 97 % | TEMPERATURE: 97.4 F | BODY MASS INDEX: 36.26 KG/M2 | SYSTOLIC BLOOD PRESSURE: 107 MMHG | DIASTOLIC BLOOD PRESSURE: 69 MMHG | WEIGHT: 225.6 LBS | HEART RATE: 65 BPM | HEIGHT: 66 IN | RESPIRATION RATE: 16 BRPM

## 2018-05-10 DIAGNOSIS — Z96.651 STATUS POST TOTAL RIGHT KNEE REPLACEMENT: ICD-10-CM

## 2018-05-10 PROBLEM — M17.12 LEFT KNEE DJD: Status: ACTIVE | Noted: 2018-05-10

## 2018-05-10 LAB
ABO/RH: NORMAL
ANION GAP SERPL CALCULATED.3IONS-SCNC: 14 MEQ/L (ref 7–13)
ANTIBODY SCREEN: NORMAL
BILIRUBIN URINE: NEGATIVE
BLOOD, URINE: NEGATIVE
BUN BLDV-MCNC: 25 MG/DL (ref 8–23)
CALCIUM SERPL-MCNC: 8.9 MG/DL (ref 8.6–10.2)
CHLORIDE BLD-SCNC: 101 MEQ/L (ref 98–107)
CLARITY: CLEAR
CO2: 24 MEQ/L (ref 22–29)
COLOR: YELLOW
CREAT SERPL-MCNC: 1.14 MG/DL (ref 0.7–1.2)
GFR AFRICAN AMERICAN: >60
GFR NON-AFRICAN AMERICAN: >60
GLUCOSE BLD-MCNC: 98 MG/DL (ref 74–109)
GLUCOSE URINE: NEGATIVE MG/DL
HCT VFR BLD CALC: 34.8 % (ref 42–52)
HEMOGLOBIN: 11.9 G/DL (ref 14–18)
INR BLD: 1
KETONES, URINE: ABNORMAL MG/DL
LEUKOCYTE ESTERASE, URINE: NEGATIVE
MCH RBC QN AUTO: 31.3 PG (ref 27–31.3)
MCHC RBC AUTO-ENTMCNC: 34.2 % (ref 33–37)
MCV RBC AUTO: 91.7 FL (ref 80–100)
NITRITE, URINE: NEGATIVE
PDW BLD-RTO: 15 % (ref 11.5–14.5)
PH UA: 5.5 (ref 5–9)
PLATELET # BLD: 214 K/UL (ref 130–400)
POTASSIUM SERPL-SCNC: 4.2 MEQ/L (ref 3.5–5.1)
PROTEIN UA: NEGATIVE MG/DL
PROTHROMBIN TIME: 10.7 SEC (ref 9.6–12.3)
RBC # BLD: 3.8 M/UL (ref 4.7–6.1)
SODIUM BLD-SCNC: 139 MEQ/L (ref 132–144)
SPECIFIC GRAVITY UA: 1.01 (ref 1–1.03)
URINE REFLEX TO CULTURE: ABNORMAL
UROBILINOGEN, URINE: 0.2 E.U./DL
WBC # BLD: 5.4 K/UL (ref 4.8–10.8)

## 2018-05-10 PROCEDURE — 85027 COMPLETE CBC AUTOMATED: CPT

## 2018-05-10 PROCEDURE — 86901 BLOOD TYPING SEROLOGIC RH(D): CPT

## 2018-05-10 PROCEDURE — 80048 BASIC METABOLIC PNL TOTAL CA: CPT

## 2018-05-10 PROCEDURE — 86850 RBC ANTIBODY SCREEN: CPT

## 2018-05-10 PROCEDURE — 86900 BLOOD TYPING SEROLOGIC ABO: CPT

## 2018-05-10 PROCEDURE — 81003 URINALYSIS AUTO W/O SCOPE: CPT

## 2018-05-10 PROCEDURE — 85610 PROTHROMBIN TIME: CPT

## 2018-05-10 RX ORDER — LIDOCAINE HYDROCHLORIDE 10 MG/ML
1 INJECTION, SOLUTION EPIDURAL; INFILTRATION; INTRACAUDAL; PERINEURAL
Status: CANCELLED | OUTPATIENT
Start: 2018-05-10 | End: 2018-05-10

## 2018-05-10 RX ORDER — SODIUM CHLORIDE 0.9 % (FLUSH) 0.9 %
10 SYRINGE (ML) INJECTION PRN
Status: CANCELLED | OUTPATIENT
Start: 2018-05-10

## 2018-05-10 RX ORDER — SODIUM CHLORIDE, SODIUM LACTATE, POTASSIUM CHLORIDE, CALCIUM CHLORIDE 600; 310; 30; 20 MG/100ML; MG/100ML; MG/100ML; MG/100ML
INJECTION, SOLUTION INTRAVENOUS CONTINUOUS
Status: CANCELLED | OUTPATIENT
Start: 2018-05-10

## 2018-05-10 RX ORDER — SODIUM CHLORIDE 0.9 % (FLUSH) 0.9 %
10 SYRINGE (ML) INJECTION EVERY 12 HOURS SCHEDULED
Status: CANCELLED | OUTPATIENT
Start: 2018-05-10

## 2018-05-14 ENCOUNTER — TELEPHONE (OUTPATIENT)
Dept: INTERNAL MEDICINE CLINIC | Age: 68
End: 2018-05-14

## 2018-05-17 ENCOUNTER — ANESTHESIA EVENT (OUTPATIENT)
Dept: OPERATING ROOM | Age: 68
DRG: 470 | End: 2018-05-17
Payer: MEDICARE

## 2018-05-17 ENCOUNTER — APPOINTMENT (OUTPATIENT)
Dept: GENERAL RADIOLOGY | Age: 68
DRG: 470 | End: 2018-05-17
Attending: ORTHOPAEDIC SURGERY
Payer: MEDICARE

## 2018-05-17 ENCOUNTER — HOSPITAL ENCOUNTER (INPATIENT)
Age: 68
LOS: 1 days | Discharge: HOME HEALTH | DRG: 470 | End: 2018-05-18
Attending: ORTHOPAEDIC SURGERY | Admitting: ORTHOPAEDIC SURGERY
Payer: MEDICARE

## 2018-05-17 ENCOUNTER — ANESTHESIA (OUTPATIENT)
Dept: OPERATING ROOM | Age: 68
DRG: 470 | End: 2018-05-17
Payer: MEDICARE

## 2018-05-17 VITALS — OXYGEN SATURATION: 99 % | TEMPERATURE: 95 F | SYSTOLIC BLOOD PRESSURE: 98 MMHG | DIASTOLIC BLOOD PRESSURE: 53 MMHG

## 2018-05-17 DIAGNOSIS — Z96.652 STATUS POST TOTAL LEFT KNEE REPLACEMENT: Primary | ICD-10-CM

## 2018-05-17 LAB
GLUCOSE BLD-MCNC: 114 MG/DL (ref 60–115)
GLUCOSE BLD-MCNC: 119 MG/DL (ref 60–115)
GLUCOSE BLD-MCNC: 143 MG/DL (ref 60–115)
GLUCOSE BLD-MCNC: 212 MG/DL (ref 60–115)
GLUCOSE BLD-MCNC: NORMAL MG/DL
PERFORMED ON: ABNORMAL
PERFORMED ON: NORMAL

## 2018-05-17 PROCEDURE — 6370000000 HC RX 637 (ALT 250 FOR IP): Performed by: NURSE PRACTITIONER

## 2018-05-17 PROCEDURE — 6360000002 HC RX W HCPCS

## 2018-05-17 PROCEDURE — 2580000003 HC RX 258: Performed by: ORTHOPAEDIC SURGERY

## 2018-05-17 PROCEDURE — 97167 OT EVAL HIGH COMPLEX 60 MIN: CPT

## 2018-05-17 PROCEDURE — 2500000003 HC RX 250 WO HCPCS: Performed by: NURSE ANESTHETIST, CERTIFIED REGISTERED

## 2018-05-17 PROCEDURE — 2580000003 HC RX 258: Performed by: NURSE PRACTITIONER

## 2018-05-17 PROCEDURE — 6360000002 HC RX W HCPCS: Performed by: ORTHOPAEDIC SURGERY

## 2018-05-17 PROCEDURE — 2500000003 HC RX 250 WO HCPCS: Performed by: ORTHOPAEDIC SURGERY

## 2018-05-17 PROCEDURE — 1210000000 HC MED SURG R&B

## 2018-05-17 PROCEDURE — 6370000000 HC RX 637 (ALT 250 FOR IP): Performed by: ORTHOPAEDIC SURGERY

## 2018-05-17 PROCEDURE — 7100000001 HC PACU RECOVERY - ADDTL 15 MIN: Performed by: ORTHOPAEDIC SURGERY

## 2018-05-17 PROCEDURE — 2500000003 HC RX 250 WO HCPCS: Performed by: NURSE PRACTITIONER

## 2018-05-17 PROCEDURE — G8988 SELF CARE GOAL STATUS: HCPCS

## 2018-05-17 PROCEDURE — 73560 X-RAY EXAM OF KNEE 1 OR 2: CPT

## 2018-05-17 PROCEDURE — 2720000010 HC SURG SUPPLY STERILE: Performed by: ORTHOPAEDIC SURGERY

## 2018-05-17 PROCEDURE — 3E0T3BZ INTRODUCTION OF ANESTHETIC AGENT INTO PERIPHERAL NERVES AND PLEXI, PERCUTANEOUS APPROACH: ICD-10-PCS | Performed by: ANESTHESIOLOGY

## 2018-05-17 PROCEDURE — G8978 MOBILITY CURRENT STATUS: HCPCS

## 2018-05-17 PROCEDURE — 0SRD0J9 REPLACEMENT OF LEFT KNEE JOINT WITH SYNTHETIC SUBSTITUTE, CEMENTED, OPEN APPROACH: ICD-10-PCS | Performed by: ORTHOPAEDIC SURGERY

## 2018-05-17 PROCEDURE — 6370000000 HC RX 637 (ALT 250 FOR IP): Performed by: PHYSICIAN ASSISTANT

## 2018-05-17 PROCEDURE — 3700000001 HC ADD 15 MINUTES (ANESTHESIA): Performed by: ORTHOPAEDIC SURGERY

## 2018-05-17 PROCEDURE — 3700000000 HC ANESTHESIA ATTENDED CARE: Performed by: ORTHOPAEDIC SURGERY

## 2018-05-17 PROCEDURE — 3600000014 HC SURGERY LEVEL 4 ADDTL 15MIN: Performed by: ORTHOPAEDIC SURGERY

## 2018-05-17 PROCEDURE — 3600000004 HC SURGERY LEVEL 4 BASE: Performed by: ORTHOPAEDIC SURGERY

## 2018-05-17 PROCEDURE — C1713 ANCHOR/SCREW BN/BN,TIS/BN: HCPCS | Performed by: ORTHOPAEDIC SURGERY

## 2018-05-17 PROCEDURE — G8979 MOBILITY GOAL STATUS: HCPCS

## 2018-05-17 PROCEDURE — 7100000000 HC PACU RECOVERY - FIRST 15 MIN: Performed by: ORTHOPAEDIC SURGERY

## 2018-05-17 PROCEDURE — C1776 JOINT DEVICE (IMPLANTABLE): HCPCS | Performed by: ORTHOPAEDIC SURGERY

## 2018-05-17 PROCEDURE — G8987 SELF CARE CURRENT STATUS: HCPCS

## 2018-05-17 PROCEDURE — 97162 PT EVAL MOD COMPLEX 30 MIN: CPT

## 2018-05-17 PROCEDURE — 6360000002 HC RX W HCPCS: Performed by: NURSE ANESTHETIST, CERTIFIED REGISTERED

## 2018-05-17 DEVICE — COMPONENT TOT KNEE CAPPED STD STRYKNEES] STRYKER CORP]: Type: IMPLANTABLE DEVICE | Site: KNEE | Status: FUNCTIONAL

## 2018-05-17 DEVICE — COMPONENT PAT DIA35MM THK10MM SUP INFERIOR ASYM TRIATHLON: Type: IMPLANTABLE DEVICE | Site: KNEE | Status: FUNCTIONAL

## 2018-05-17 DEVICE — COMPONENT FEM SZ 6 L KNEE POST STBL CEM TRIATHLON: Type: IMPLANTABLE DEVICE | Site: KNEE | Status: FUNCTIONAL

## 2018-05-17 DEVICE — INSERT TIB SZ 6 THK9MM UNIV KNEE CONVENTIONAL POLYETH POST: Type: IMPLANTABLE DEVICE | Site: KNEE | Status: FUNCTIONAL

## 2018-05-17 DEVICE — CEMENT BNE 20ML 40GM ACRYL RESIN HI VISC RADPQ FAST SET: Type: IMPLANTABLE DEVICE | Site: KNEE | Status: FUNCTIONAL

## 2018-05-17 DEVICE — BASEPLATE TIB SZ 6 KNEE TRITANIUM CEM PRI LO PROF TRIATHLON: Type: IMPLANTABLE DEVICE | Site: KNEE | Status: FUNCTIONAL

## 2018-05-17 RX ORDER — CEFAZOLIN SODIUM 1 G/50ML
INJECTION, SOLUTION INTRAVENOUS PRN
Status: DISCONTINUED | OUTPATIENT
Start: 2018-05-17 | End: 2018-05-17 | Stop reason: SDUPTHER

## 2018-05-17 RX ORDER — FENTANYL CITRATE 50 UG/ML
50 INJECTION, SOLUTION INTRAMUSCULAR; INTRAVENOUS EVERY 10 MIN PRN
Status: DISCONTINUED | OUTPATIENT
Start: 2018-05-17 | End: 2018-05-17 | Stop reason: HOSPADM

## 2018-05-17 RX ORDER — BUPIVACAINE HYDROCHLORIDE 5 MG/ML
INJECTION, SOLUTION EPIDURAL; INTRACAUDAL PRN
Status: DISCONTINUED | OUTPATIENT
Start: 2018-05-17 | End: 2018-05-17 | Stop reason: SDUPTHER

## 2018-05-17 RX ORDER — SODIUM CHLORIDE 0.9 % (FLUSH) 0.9 %
10 SYRINGE (ML) INJECTION PRN
Status: DISCONTINUED | OUTPATIENT
Start: 2018-05-17 | End: 2018-05-17 | Stop reason: HOSPADM

## 2018-05-17 RX ORDER — SODIUM CHLORIDE 0.9 % (FLUSH) 0.9 %
10 SYRINGE (ML) INJECTION EVERY 12 HOURS SCHEDULED
Status: DISCONTINUED | OUTPATIENT
Start: 2018-05-17 | End: 2018-05-17 | Stop reason: HOSPADM

## 2018-05-17 RX ORDER — ONDANSETRON 2 MG/ML
4 INJECTION INTRAMUSCULAR; INTRAVENOUS
Status: DISCONTINUED | OUTPATIENT
Start: 2018-05-17 | End: 2018-05-17 | Stop reason: HOSPADM

## 2018-05-17 RX ORDER — MAGNESIUM HYDROXIDE 1200 MG/15ML
LIQUID ORAL CONTINUOUS PRN
Status: COMPLETED | OUTPATIENT
Start: 2018-05-17 | End: 2018-05-17

## 2018-05-17 RX ORDER — BISACODYL 10 MG
10 SUPPOSITORY, RECTAL RECTAL DAILY PRN
Status: DISCONTINUED | OUTPATIENT
Start: 2018-05-17 | End: 2018-05-18 | Stop reason: HOSPADM

## 2018-05-17 RX ORDER — DIPHENHYDRAMINE HYDROCHLORIDE 50 MG/ML
12.5 INJECTION INTRAMUSCULAR; INTRAVENOUS
Status: DISCONTINUED | OUTPATIENT
Start: 2018-05-17 | End: 2018-05-17 | Stop reason: HOSPADM

## 2018-05-17 RX ORDER — HYDROCODONE BITARTRATE AND ACETAMINOPHEN 5; 325 MG/1; MG/1
2 TABLET ORAL PRN
Status: DISCONTINUED | OUTPATIENT
Start: 2018-05-17 | End: 2018-05-17 | Stop reason: HOSPADM

## 2018-05-17 RX ORDER — HYDROCODONE BITARTRATE AND ACETAMINOPHEN 5; 325 MG/1; MG/1
1 TABLET ORAL PRN
Status: DISCONTINUED | OUTPATIENT
Start: 2018-05-17 | End: 2018-05-17 | Stop reason: HOSPADM

## 2018-05-17 RX ORDER — CELECOXIB 200 MG/1
400 CAPSULE ORAL ONCE
Status: COMPLETED | OUTPATIENT
Start: 2018-05-17 | End: 2018-05-17

## 2018-05-17 RX ORDER — METOCLOPRAMIDE HYDROCHLORIDE 5 MG/ML
10 INJECTION INTRAMUSCULAR; INTRAVENOUS
Status: DISCONTINUED | OUTPATIENT
Start: 2018-05-17 | End: 2018-05-17 | Stop reason: HOSPADM

## 2018-05-17 RX ORDER — DEXTROSE MONOHYDRATE 50 MG/ML
100 INJECTION, SOLUTION INTRAVENOUS PRN
Status: DISCONTINUED | OUTPATIENT
Start: 2018-05-17 | End: 2018-05-18 | Stop reason: HOSPADM

## 2018-05-17 RX ORDER — METOPROLOL SUCCINATE 50 MG/1
50 TABLET, EXTENDED RELEASE ORAL DAILY
Status: DISCONTINUED | OUTPATIENT
Start: 2018-05-17 | End: 2018-05-18 | Stop reason: HOSPADM

## 2018-05-17 RX ORDER — ASPIRIN 81 MG/1
81 TABLET ORAL 2 TIMES DAILY
Status: DISCONTINUED | OUTPATIENT
Start: 2018-05-18 | End: 2018-05-18 | Stop reason: HOSPADM

## 2018-05-17 RX ORDER — MORPHINE SULFATE 4 MG/ML
4 INJECTION, SOLUTION INTRAMUSCULAR; INTRAVENOUS
Status: DISCONTINUED | OUTPATIENT
Start: 2018-05-17 | End: 2018-05-17 | Stop reason: CLARIF

## 2018-05-17 RX ORDER — VITAMIN B COMPLEX
1 CAPSULE ORAL DAILY
Status: DISCONTINUED | OUTPATIENT
Start: 2018-05-17 | End: 2018-05-18 | Stop reason: HOSPADM

## 2018-05-17 RX ORDER — DOCUSATE SODIUM 100 MG/1
100 CAPSULE, LIQUID FILLED ORAL 2 TIMES DAILY
Status: DISCONTINUED | OUTPATIENT
Start: 2018-05-17 | End: 2018-05-18 | Stop reason: HOSPADM

## 2018-05-17 RX ORDER — OXYCODONE HYDROCHLORIDE 5 MG/1
5 TABLET ORAL EVERY 4 HOURS PRN
Status: DISCONTINUED | OUTPATIENT
Start: 2018-05-17 | End: 2018-05-18 | Stop reason: HOSPADM

## 2018-05-17 RX ORDER — TRAMADOL HYDROCHLORIDE 50 MG/1
50 TABLET ORAL EVERY 6 HOURS PRN
Status: DISCONTINUED | OUTPATIENT
Start: 2018-05-17 | End: 2018-05-18 | Stop reason: HOSPADM

## 2018-05-17 RX ORDER — NICOTINE POLACRILEX 4 MG
15 LOZENGE BUCCAL PRN
Status: DISCONTINUED | OUTPATIENT
Start: 2018-05-17 | End: 2018-05-18 | Stop reason: HOSPADM

## 2018-05-17 RX ORDER — LIDOCAINE HYDROCHLORIDE 10 MG/ML
1 INJECTION, SOLUTION EPIDURAL; INFILTRATION; INTRACAUDAL; PERINEURAL
Status: COMPLETED | OUTPATIENT
Start: 2018-05-17 | End: 2018-05-17

## 2018-05-17 RX ORDER — SODIUM CHLORIDE 9 MG/ML
INJECTION, SOLUTION INTRAVENOUS CONTINUOUS
Status: DISCONTINUED | OUTPATIENT
Start: 2018-05-17 | End: 2018-05-18 | Stop reason: HOSPADM

## 2018-05-17 RX ORDER — MEPERIDINE HYDROCHLORIDE 25 MG/ML
12.5 INJECTION INTRAMUSCULAR; INTRAVENOUS; SUBCUTANEOUS EVERY 5 MIN PRN
Status: DISCONTINUED | OUTPATIENT
Start: 2018-05-17 | End: 2018-05-17 | Stop reason: HOSPADM

## 2018-05-17 RX ORDER — ATORVASTATIN CALCIUM 40 MG/1
40 TABLET, FILM COATED ORAL NIGHTLY
Status: DISCONTINUED | OUTPATIENT
Start: 2018-05-17 | End: 2018-05-18 | Stop reason: HOSPADM

## 2018-05-17 RX ORDER — MIDAZOLAM HYDROCHLORIDE 1 MG/ML
INJECTION INTRAMUSCULAR; INTRAVENOUS PRN
Status: DISCONTINUED | OUTPATIENT
Start: 2018-05-17 | End: 2018-05-17 | Stop reason: SDUPTHER

## 2018-05-17 RX ORDER — ONDANSETRON 2 MG/ML
INJECTION INTRAMUSCULAR; INTRAVENOUS PRN
Status: DISCONTINUED | OUTPATIENT
Start: 2018-05-17 | End: 2018-05-17 | Stop reason: SDUPTHER

## 2018-05-17 RX ORDER — ACETAMINOPHEN 500 MG
1000 TABLET ORAL ONCE
Status: COMPLETED | OUTPATIENT
Start: 2018-05-17 | End: 2018-05-17

## 2018-05-17 RX ORDER — LIDOCAINE HYDROCHLORIDE 10 MG/ML
INJECTION, SOLUTION INFILTRATION; PERINEURAL PRN
Status: DISCONTINUED | OUTPATIENT
Start: 2018-05-17 | End: 2018-05-17 | Stop reason: SDUPTHER

## 2018-05-17 RX ORDER — CAPTOPRIL 25 MG/1
50 TABLET ORAL 2 TIMES DAILY
Status: DISCONTINUED | OUTPATIENT
Start: 2018-05-17 | End: 2018-05-18 | Stop reason: HOSPADM

## 2018-05-17 RX ORDER — ONDANSETRON 2 MG/ML
4 INJECTION INTRAMUSCULAR; INTRAVENOUS EVERY 6 HOURS PRN
Status: DISCONTINUED | OUTPATIENT
Start: 2018-05-17 | End: 2018-05-18 | Stop reason: HOSPADM

## 2018-05-17 RX ORDER — MORPHINE SULFATE 2 MG/ML
2 INJECTION, SOLUTION INTRAMUSCULAR; INTRAVENOUS
Status: DISCONTINUED | OUTPATIENT
Start: 2018-05-17 | End: 2018-05-17 | Stop reason: CLARIF

## 2018-05-17 RX ORDER — SENNA AND DOCUSATE SODIUM 50; 8.6 MG/1; MG/1
1 TABLET, FILM COATED ORAL DAILY
Status: DISCONTINUED | OUTPATIENT
Start: 2018-05-17 | End: 2018-05-18 | Stop reason: HOSPADM

## 2018-05-17 RX ORDER — DEXTROSE MONOHYDRATE 25 G/50ML
12.5 INJECTION, SOLUTION INTRAVENOUS PRN
Status: DISCONTINUED | OUTPATIENT
Start: 2018-05-17 | End: 2018-05-18 | Stop reason: HOSPADM

## 2018-05-17 RX ORDER — ACETAMINOPHEN 325 MG/1
650 TABLET ORAL EVERY 6 HOURS
Status: DISCONTINUED | OUTPATIENT
Start: 2018-05-17 | End: 2018-05-18 | Stop reason: HOSPADM

## 2018-05-17 RX ORDER — KETOROLAC TROMETHAMINE 30 MG/ML
30 INJECTION, SOLUTION INTRAMUSCULAR; INTRAVENOUS EVERY 6 HOURS
Status: COMPLETED | OUTPATIENT
Start: 2018-05-17 | End: 2018-05-17

## 2018-05-17 RX ORDER — DEXAMETHASONE SODIUM PHOSPHATE 4 MG/ML
INJECTION, SOLUTION INTRA-ARTICULAR; INTRALESIONAL; INTRAMUSCULAR; INTRAVENOUS; SOFT TISSUE PRN
Status: DISCONTINUED | OUTPATIENT
Start: 2018-05-17 | End: 2018-05-17 | Stop reason: SDUPTHER

## 2018-05-17 RX ORDER — PROPOFOL 10 MG/ML
INJECTION, EMULSION INTRAVENOUS CONTINUOUS PRN
Status: DISCONTINUED | OUTPATIENT
Start: 2018-05-17 | End: 2018-05-17 | Stop reason: SDUPTHER

## 2018-05-17 RX ORDER — SODIUM CHLORIDE 0.9 % (FLUSH) 0.9 %
10 SYRINGE (ML) INJECTION PRN
Status: DISCONTINUED | OUTPATIENT
Start: 2018-05-17 | End: 2018-05-18 | Stop reason: HOSPADM

## 2018-05-17 RX ORDER — SODIUM CHLORIDE 0.9 % (FLUSH) 0.9 %
10 SYRINGE (ML) INJECTION EVERY 12 HOURS SCHEDULED
Status: DISCONTINUED | OUTPATIENT
Start: 2018-05-17 | End: 2018-05-18 | Stop reason: HOSPADM

## 2018-05-17 RX ORDER — SODIUM CHLORIDE, SODIUM LACTATE, POTASSIUM CHLORIDE, CALCIUM CHLORIDE 600; 310; 30; 20 MG/100ML; MG/100ML; MG/100ML; MG/100ML
INJECTION, SOLUTION INTRAVENOUS CONTINUOUS
Status: DISCONTINUED | OUTPATIENT
Start: 2018-05-17 | End: 2018-05-17

## 2018-05-17 RX ADMIN — CAPTOPRIL 50 MG: 25 TABLET ORAL at 23:49

## 2018-05-17 RX ADMIN — SODIUM CHLORIDE, POTASSIUM CHLORIDE, SODIUM LACTATE AND CALCIUM CHLORIDE: 600; 310; 30; 20 INJECTION, SOLUTION INTRAVENOUS at 10:00

## 2018-05-17 RX ADMIN — PROPOFOL 100 MCG/KG/MIN: 10 INJECTION, EMULSION INTRAVENOUS at 09:38

## 2018-05-17 RX ADMIN — CEFAZOLIN SODIUM 2 G: 1 INJECTION, SOLUTION INTRAVENOUS at 09:25

## 2018-05-17 RX ADMIN — CELECOXIB 400 MG: 200 CAPSULE ORAL at 08:37

## 2018-05-17 RX ADMIN — SODIUM CHLORIDE: 9 INJECTION, SOLUTION INTRAVENOUS at 14:22

## 2018-05-17 RX ADMIN — SENNOSIDES AND DOCUSATE SODIUM 1 TABLET: 8.6; 5 TABLET ORAL at 20:17

## 2018-05-17 RX ADMIN — Medication 2 G: at 19:36

## 2018-05-17 RX ADMIN — MIDAZOLAM HYDROCHLORIDE 2 MG: 1 INJECTION, SOLUTION INTRAMUSCULAR; INTRAVENOUS at 09:23

## 2018-05-17 RX ADMIN — ACETAMINOPHEN 1000 MG: 500 TABLET ORAL at 08:36

## 2018-05-17 RX ADMIN — ACETAMINOPHEN 650 MG: 325 TABLET, FILM COATED ORAL at 14:22

## 2018-05-17 RX ADMIN — DEXAMETHASONE SODIUM PHOSPHATE 4 MG: 4 INJECTION INTRA-ARTICULAR; INTRALESIONAL; INTRAMUSCULAR; INTRAVENOUS; SOFT TISSUE at 09:40

## 2018-05-17 RX ADMIN — KETOROLAC TROMETHAMINE 30 MG: 30 INJECTION, SOLUTION INTRAMUSCULAR at 20:17

## 2018-05-17 RX ADMIN — BUPIVACAINE HYDROCHLORIDE 2 ML: 5 INJECTION, SOLUTION EPIDURAL; INTRACAUDAL; PERINEURAL at 09:35

## 2018-05-17 RX ADMIN — DOCUSATE SODIUM 100 MG: 100 CAPSULE, LIQUID FILLED ORAL at 20:17

## 2018-05-17 RX ADMIN — LIDOCAINE HYDROCHLORIDE 50 MG: 10 INJECTION, SOLUTION INFILTRATION; PERINEURAL at 09:38

## 2018-05-17 RX ADMIN — TRANEXAMIC ACID 1000 MG: 100 INJECTION, SOLUTION INTRAVENOUS at 11:37

## 2018-05-17 RX ADMIN — SODIUM CHLORIDE, POTASSIUM CHLORIDE, SODIUM LACTATE AND CALCIUM CHLORIDE: 600; 310; 30; 20 INJECTION, SOLUTION INTRAVENOUS at 08:37

## 2018-05-17 RX ADMIN — ONDANSETRON 4 MG: 2 INJECTION INTRAMUSCULAR; INTRAVENOUS at 09:40

## 2018-05-17 RX ADMIN — LIDOCAINE HYDROCHLORIDE 1 ML: 10 INJECTION, SOLUTION EPIDURAL; INFILTRATION; INTRACAUDAL; PERINEURAL at 08:37

## 2018-05-17 RX ADMIN — DOCUSATE SODIUM 100 MG: 100 CAPSULE, LIQUID FILLED ORAL at 14:22

## 2018-05-17 RX ADMIN — ATORVASTATIN CALCIUM 40 MG: 40 TABLET, FILM COATED ORAL at 20:17

## 2018-05-17 RX ADMIN — TRANEXAMIC ACID 1 G: 100 INJECTION, SOLUTION INTRAVENOUS at 09:36

## 2018-05-17 RX ADMIN — ACETAMINOPHEN 650 MG: 325 TABLET, FILM COATED ORAL at 20:16

## 2018-05-17 RX ADMIN — KETOROLAC TROMETHAMINE 30 MG: 30 INJECTION, SOLUTION INTRAMUSCULAR at 14:22

## 2018-05-17 ASSESSMENT — PULMONARY FUNCTION TESTS
PIF_VALUE: 19
PIF_VALUE: 21
PIF_VALUE: 18
PIF_VALUE: 0
PIF_VALUE: 19
PIF_VALUE: 0
PIF_VALUE: 18
PIF_VALUE: 19
PIF_VALUE: 18
PIF_VALUE: 19
PIF_VALUE: 18
PIF_VALUE: 18
PIF_VALUE: 19
PIF_VALUE: 0
PIF_VALUE: 19
PIF_VALUE: 18
PIF_VALUE: 19
PIF_VALUE: 18
PIF_VALUE: 19
PIF_VALUE: 18
PIF_VALUE: 18
PIF_VALUE: 17
PIF_VALUE: 18
PIF_VALUE: 18
PIF_VALUE: 19
PIF_VALUE: 18
PIF_VALUE: 19
PIF_VALUE: 19
PIF_VALUE: 18
PIF_VALUE: 19
PIF_VALUE: 18
PIF_VALUE: 19
PIF_VALUE: 18
PIF_VALUE: 0
PIF_VALUE: 0
PIF_VALUE: 18
PIF_VALUE: 19
PIF_VALUE: 19
PIF_VALUE: 18
PIF_VALUE: 19
PIF_VALUE: 18
PIF_VALUE: 19
PIF_VALUE: 19
PIF_VALUE: 18
PIF_VALUE: 19
PIF_VALUE: 18
PIF_VALUE: 19
PIF_VALUE: 19
PIF_VALUE: 0
PIF_VALUE: 18
PIF_VALUE: 0
PIF_VALUE: 0
PIF_VALUE: 18
PIF_VALUE: 19
PIF_VALUE: 19
PIF_VALUE: 18
PIF_VALUE: 0
PIF_VALUE: 19
PIF_VALUE: 19
PIF_VALUE: 18
PIF_VALUE: 0
PIF_VALUE: 0
PIF_VALUE: 18
PIF_VALUE: 18
PIF_VALUE: 0
PIF_VALUE: 19
PIF_VALUE: 18
PIF_VALUE: 2
PIF_VALUE: 19
PIF_VALUE: 18
PIF_VALUE: 19
PIF_VALUE: 1
PIF_VALUE: 18
PIF_VALUE: 19

## 2018-05-17 ASSESSMENT — PAIN SCALES - GENERAL
PAINLEVEL_OUTOF10: 0
PAINLEVEL_OUTOF10: 1
PAINLEVEL_OUTOF10: 0
PAINLEVEL_OUTOF10: 0

## 2018-05-17 ASSESSMENT — PAIN - FUNCTIONAL ASSESSMENT: PAIN_FUNCTIONAL_ASSESSMENT: 0-10

## 2018-05-18 VITALS
RESPIRATION RATE: 18 BRPM | BODY MASS INDEX: 36.6 KG/M2 | WEIGHT: 227.74 LBS | HEART RATE: 61 BPM | SYSTOLIC BLOOD PRESSURE: 110 MMHG | TEMPERATURE: 98.4 F | HEIGHT: 66 IN | OXYGEN SATURATION: 96 % | DIASTOLIC BLOOD PRESSURE: 66 MMHG

## 2018-05-18 LAB
ANION GAP SERPL CALCULATED.3IONS-SCNC: 16 MEQ/L (ref 7–13)
BUN BLDV-MCNC: 30 MG/DL (ref 8–23)
CALCIUM SERPL-MCNC: 8 MG/DL (ref 8.6–10.2)
CHLORIDE BLD-SCNC: 101 MEQ/L (ref 98–107)
CO2: 22 MEQ/L (ref 22–29)
CREAT SERPL-MCNC: 1.28 MG/DL (ref 0.7–1.2)
GFR AFRICAN AMERICAN: >60
GFR NON-AFRICAN AMERICAN: 56.3
GLUCOSE BLD-MCNC: 109 MG/DL (ref 60–115)
GLUCOSE BLD-MCNC: 110 MG/DL (ref 74–109)
GLUCOSE BLD-MCNC: 115 MG/DL (ref 60–115)
GLUCOSE BLD-MCNC: 116 MG/DL (ref 60–115)
HCT VFR BLD CALC: 30.7 % (ref 42–52)
HEMOGLOBIN: 10.3 G/DL (ref 14–18)
MCH RBC QN AUTO: 31 PG (ref 27–31.3)
MCHC RBC AUTO-ENTMCNC: 33.7 % (ref 33–37)
MCV RBC AUTO: 92.1 FL (ref 80–100)
PDW BLD-RTO: 14.9 % (ref 11.5–14.5)
PERFORMED ON: ABNORMAL
PERFORMED ON: NORMAL
PERFORMED ON: NORMAL
PLATELET # BLD: 179 K/UL (ref 130–400)
POTASSIUM REFLEX MAGNESIUM: 4.3 MEQ/L (ref 3.5–5.1)
RBC # BLD: 3.33 M/UL (ref 4.7–6.1)
SODIUM BLD-SCNC: 139 MEQ/L (ref 132–144)
WBC # BLD: 10.8 K/UL (ref 4.8–10.8)

## 2018-05-18 PROCEDURE — 6370000000 HC RX 637 (ALT 250 FOR IP): Performed by: ORTHOPAEDIC SURGERY

## 2018-05-18 PROCEDURE — 97535 SELF CARE MNGMENT TRAINING: CPT

## 2018-05-18 PROCEDURE — 97110 THERAPEUTIC EXERCISES: CPT

## 2018-05-18 PROCEDURE — 80048 BASIC METABOLIC PNL TOTAL CA: CPT

## 2018-05-18 PROCEDURE — 6370000000 HC RX 637 (ALT 250 FOR IP): Performed by: NURSE PRACTITIONER

## 2018-05-18 PROCEDURE — 6370000000 HC RX 637 (ALT 250 FOR IP): Performed by: PHYSICIAN ASSISTANT

## 2018-05-18 PROCEDURE — 6360000002 HC RX W HCPCS: Performed by: ORTHOPAEDIC SURGERY

## 2018-05-18 PROCEDURE — 97116 GAIT TRAINING THERAPY: CPT

## 2018-05-18 PROCEDURE — 85027 COMPLETE CBC AUTOMATED: CPT

## 2018-05-18 PROCEDURE — 36415 COLL VENOUS BLD VENIPUNCTURE: CPT

## 2018-05-18 PROCEDURE — 2580000003 HC RX 258: Performed by: ORTHOPAEDIC SURGERY

## 2018-05-18 RX ORDER — PSEUDOEPHEDRINE HCL 30 MG
100 TABLET ORAL 2 TIMES DAILY
Qty: 60 CAPSULE | Refills: 0 | Status: SHIPPED | OUTPATIENT
Start: 2018-05-18 | End: 2018-06-07 | Stop reason: ALTCHOICE

## 2018-05-18 RX ORDER — ACETAMINOPHEN 325 MG/1
650 TABLET ORAL EVERY 6 HOURS
Qty: 120 TABLET | Refills: 0 | Status: SHIPPED | OUTPATIENT
Start: 2018-05-18 | End: 2019-11-06

## 2018-05-18 RX ORDER — OXYCODONE HYDROCHLORIDE 5 MG/1
5 TABLET ORAL EVERY 4 HOURS PRN
Qty: 40 TABLET | Refills: 0 | Status: SHIPPED | OUTPATIENT
Start: 2018-05-18 | End: 2018-05-25

## 2018-05-18 RX ORDER — TRAMADOL HYDROCHLORIDE 50 MG/1
50 TABLET ORAL EVERY 6 HOURS PRN
Qty: 28 TABLET | Refills: 0 | Status: SHIPPED | OUTPATIENT
Start: 2018-05-18 | End: 2018-05-25

## 2018-05-18 RX ADMIN — OXYCODONE HYDROCHLORIDE 5 MG: 5 TABLET ORAL at 18:46

## 2018-05-18 RX ADMIN — TRAMADOL HYDROCHLORIDE 50 MG: 50 TABLET, FILM COATED ORAL at 16:14

## 2018-05-18 RX ADMIN — METOPROLOL SUCCINATE 50 MG: 50 TABLET, EXTENDED RELEASE ORAL at 09:07

## 2018-05-18 RX ADMIN — Medication 10 ML: at 09:09

## 2018-05-18 RX ADMIN — Medication 2 G: at 02:56

## 2018-05-18 RX ADMIN — ASPIRIN 81 MG: 81 TABLET, COATED ORAL at 09:06

## 2018-05-18 RX ADMIN — ACETAMINOPHEN 650 MG: 325 TABLET, FILM COATED ORAL at 06:35

## 2018-05-18 RX ADMIN — OXYCODONE HYDROCHLORIDE 5 MG: 5 TABLET ORAL at 14:29

## 2018-05-18 RX ADMIN — METFORMIN HYDROCHLORIDE 500 MG: 500 TABLET, FILM COATED ORAL at 17:03

## 2018-05-18 RX ADMIN — ACETAMINOPHEN 650 MG: 325 TABLET, FILM COATED ORAL at 14:29

## 2018-05-18 RX ADMIN — METFORMIN HYDROCHLORIDE 500 MG: 500 TABLET, FILM COATED ORAL at 09:05

## 2018-05-18 RX ADMIN — Medication 1 CAPSULE: at 09:05

## 2018-05-18 RX ADMIN — ACETAMINOPHEN 650 MG: 325 TABLET, FILM COATED ORAL at 18:46

## 2018-05-18 RX ADMIN — ACETAMINOPHEN 650 MG: 325 TABLET, FILM COATED ORAL at 01:47

## 2018-05-18 RX ADMIN — DOCUSATE SODIUM 100 MG: 100 CAPSULE, LIQUID FILLED ORAL at 09:06

## 2018-05-18 RX ADMIN — TRAMADOL HYDROCHLORIDE 50 MG: 50 TABLET, FILM COATED ORAL at 02:56

## 2018-05-18 RX ADMIN — TRAMADOL HYDROCHLORIDE 50 MG: 50 TABLET, FILM COATED ORAL at 09:06

## 2018-05-18 ASSESSMENT — ENCOUNTER SYMPTOMS
NAUSEA: 0
WHEEZING: 0
CONSTIPATION: 0
SHORTNESS OF BREATH: 0
DIARRHEA: 0
VOMITING: 0

## 2018-05-18 ASSESSMENT — PAIN SCALES - GENERAL
PAINLEVEL_OUTOF10: 4
PAINLEVEL_OUTOF10: 2
PAINLEVEL_OUTOF10: 0
PAINLEVEL_OUTOF10: 3
PAINLEVEL_OUTOF10: 7
PAINLEVEL_OUTOF10: 0
PAINLEVEL_OUTOF10: 2
PAINLEVEL_OUTOF10: 1
PAINLEVEL_OUTOF10: 4
PAINLEVEL_OUTOF10: 2

## 2018-05-18 ASSESSMENT — PAIN DESCRIPTION - FREQUENCY: FREQUENCY: INTERMITTENT

## 2018-05-18 ASSESSMENT — PAIN DESCRIPTION - ONSET: ONSET: GRADUAL

## 2018-05-18 ASSESSMENT — PAIN DESCRIPTION - LOCATION
LOCATION: KNEE

## 2018-05-18 ASSESSMENT — PAIN DESCRIPTION - PAIN TYPE
TYPE: SURGICAL PAIN
TYPE: ACUTE PAIN;SURGICAL PAIN
TYPE: ACUTE PAIN;SURGICAL PAIN

## 2018-05-18 ASSESSMENT — PAIN DESCRIPTION - DESCRIPTORS
DESCRIPTORS: ACHING

## 2018-05-18 ASSESSMENT — PAIN DESCRIPTION - ORIENTATION
ORIENTATION: LEFT

## 2018-05-18 ASSESSMENT — PAIN DESCRIPTION - PROGRESSION: CLINICAL_PROGRESSION: GRADUALLY IMPROVING

## 2018-05-23 RX ORDER — DIAZEPAM 2 MG/1
2 TABLET ORAL EVERY 6 HOURS PRN
Qty: 24 TABLET | Refills: 0 | Status: SHIPPED | OUTPATIENT
Start: 2018-05-23 | End: 2018-05-30

## 2018-05-29 ENCOUNTER — HOSPITAL ENCOUNTER (OUTPATIENT)
Dept: ORTHOPEDIC SURGERY | Age: 68
Discharge: HOME | End: 2018-05-31
Payer: MEDICARE

## 2018-05-29 DIAGNOSIS — M25.562 ARTHRALGIA OF LEFT LOWER LEG: ICD-10-CM

## 2018-05-29 PROCEDURE — 73562 X-RAY EXAM OF KNEE 3: CPT

## 2018-06-07 ENCOUNTER — OFFICE VISIT (OUTPATIENT)
Dept: INTERNAL MEDICINE CLINIC | Age: 68
End: 2018-06-07
Payer: MEDICARE

## 2018-06-07 VITALS
BODY MASS INDEX: 35.68 KG/M2 | SYSTOLIC BLOOD PRESSURE: 100 MMHG | RESPIRATION RATE: 16 BRPM | HEIGHT: 66 IN | DIASTOLIC BLOOD PRESSURE: 60 MMHG | WEIGHT: 222 LBS | OXYGEN SATURATION: 98 % | HEART RATE: 80 BPM | TEMPERATURE: 98.1 F

## 2018-06-07 DIAGNOSIS — C85.93 LYMPHOMA OF GASTROINTESTINAL TRACT (HCC): ICD-10-CM

## 2018-06-07 DIAGNOSIS — M47.816 ARTHRITIS, LUMBAR SPINE: ICD-10-CM

## 2018-06-07 DIAGNOSIS — I10 ESSENTIAL HYPERTENSION: Primary | ICD-10-CM

## 2018-06-07 DIAGNOSIS — E11.9 TYPE 2 DIABETES MELLITUS WITHOUT COMPLICATION, WITHOUT LONG-TERM CURRENT USE OF INSULIN (HCC): ICD-10-CM

## 2018-06-07 DIAGNOSIS — C91.10 CLL (CHRONIC LYMPHOCYTIC LEUKEMIA) (HCC): ICD-10-CM

## 2018-06-07 DIAGNOSIS — E78.5 DYSLIPIDEMIA: ICD-10-CM

## 2018-06-07 PROBLEM — Z96.651 STATUS POST TOTAL RIGHT KNEE REPLACEMENT: Status: RESOLVED | Noted: 2018-01-18 | Resolved: 2018-06-07

## 2018-06-07 PROBLEM — M17.11 OSTEOARTHRITIS OF RIGHT KNEE: Status: RESOLVED | Noted: 2018-01-09 | Resolved: 2018-06-07

## 2018-06-07 PROBLEM — M17.12 LEFT KNEE DJD: Status: RESOLVED | Noted: 2018-05-10 | Resolved: 2018-06-07

## 2018-06-07 PROBLEM — Z96.652 STATUS POST TOTAL LEFT KNEE REPLACEMENT: Status: RESOLVED | Noted: 2018-05-17 | Resolved: 2018-06-07

## 2018-06-07 PROCEDURE — 1123F ACP DISCUSS/DSCN MKR DOCD: CPT | Performed by: FAMILY MEDICINE

## 2018-06-07 PROCEDURE — 1036F TOBACCO NON-USER: CPT | Performed by: FAMILY MEDICINE

## 2018-06-07 PROCEDURE — 3017F COLORECTAL CA SCREEN DOC REV: CPT | Performed by: FAMILY MEDICINE

## 2018-06-07 PROCEDURE — 2022F DILAT RTA XM EVC RTNOPTHY: CPT | Performed by: FAMILY MEDICINE

## 2018-06-07 PROCEDURE — G8417 CALC BMI ABV UP PARAM F/U: HCPCS | Performed by: FAMILY MEDICINE

## 2018-06-07 PROCEDURE — 99213 OFFICE O/P EST LOW 20 MIN: CPT | Performed by: FAMILY MEDICINE

## 2018-06-07 PROCEDURE — 4040F PNEUMOC VAC/ADMIN/RCVD: CPT | Performed by: FAMILY MEDICINE

## 2018-06-07 PROCEDURE — 1111F DSCHRG MED/CURRENT MED MERGE: CPT | Performed by: FAMILY MEDICINE

## 2018-06-07 PROCEDURE — G8427 DOCREV CUR MEDS BY ELIG CLIN: HCPCS | Performed by: FAMILY MEDICINE

## 2018-06-07 PROCEDURE — 3046F HEMOGLOBIN A1C LEVEL >9.0%: CPT | Performed by: FAMILY MEDICINE

## 2018-06-07 RX ORDER — CAPTOPRIL AND HYDROCHLOROTHIAZIDE 50; 25 MG/1; MG/1
TABLET ORAL
Qty: 180 TABLET | Refills: 2 | Status: SHIPPED | OUTPATIENT
Start: 2018-06-07 | End: 2019-04-11 | Stop reason: SDUPTHER

## 2018-06-13 ENCOUNTER — HOSPITAL ENCOUNTER (OUTPATIENT)
Dept: PHYSICAL THERAPY | Age: 68
Setting detail: THERAPIES SERIES
Discharge: HOME | End: 2018-06-13
Payer: MEDICARE

## 2018-06-13 PROCEDURE — G8979 MOBILITY GOAL STATUS: HCPCS

## 2018-06-13 PROCEDURE — 97016 VASOPNEUMATIC DEVICE THERAPY: CPT

## 2018-06-13 PROCEDURE — 97161 PT EVAL LOW COMPLEX 20 MIN: CPT

## 2018-06-13 PROCEDURE — G8978 MOBILITY CURRENT STATUS: HCPCS

## 2018-06-13 ASSESSMENT — PAIN DESCRIPTION - PAIN TYPE: TYPE: SURGICAL PAIN

## 2018-06-13 ASSESSMENT — PAIN DESCRIPTION - FREQUENCY: FREQUENCY: INTERMITTENT

## 2018-06-13 ASSESSMENT — PAIN DESCRIPTION - ORIENTATION: ORIENTATION: LEFT;ANTERIOR

## 2018-06-13 ASSESSMENT — PAIN DESCRIPTION - PROGRESSION: CLINICAL_PROGRESSION: GRADUALLY IMPROVING

## 2018-06-13 ASSESSMENT — PAIN DESCRIPTION - LOCATION: LOCATION: KNEE

## 2018-06-13 ASSESSMENT — PAIN DESCRIPTION - DESCRIPTORS: DESCRIPTORS: BURNING

## 2018-06-15 ENCOUNTER — HOSPITAL ENCOUNTER (OUTPATIENT)
Dept: PHYSICAL THERAPY | Age: 68
Setting detail: THERAPIES SERIES
Discharge: HOME | End: 2018-06-15
Payer: MEDICARE

## 2018-06-15 PROCEDURE — 97140 MANUAL THERAPY 1/> REGIONS: CPT

## 2018-06-15 PROCEDURE — 97110 THERAPEUTIC EXERCISES: CPT

## 2018-06-15 PROCEDURE — 97016 VASOPNEUMATIC DEVICE THERAPY: CPT

## 2018-06-15 ASSESSMENT — PAIN DESCRIPTION - PROGRESSION: CLINICAL_PROGRESSION: GRADUALLY IMPROVING

## 2018-06-19 ENCOUNTER — APPOINTMENT (OUTPATIENT)
Dept: PHYSICAL THERAPY | Age: 68
End: 2018-06-19
Payer: MEDICARE

## 2018-06-19 ENCOUNTER — HOSPITAL ENCOUNTER (OUTPATIENT)
Dept: PHYSICAL THERAPY | Age: 68
Setting detail: THERAPIES SERIES
Discharge: HOME | End: 2018-06-19
Payer: MEDICARE

## 2018-06-19 ENCOUNTER — OFFICE VISIT (OUTPATIENT)
Dept: CARDIOLOGY CLINIC | Age: 68
End: 2018-06-19
Payer: MEDICARE

## 2018-06-19 VITALS
OXYGEN SATURATION: 97 % | BODY MASS INDEX: 35.84 KG/M2 | HEART RATE: 80 BPM | HEIGHT: 66 IN | WEIGHT: 223 LBS | RESPIRATION RATE: 16 BRPM | DIASTOLIC BLOOD PRESSURE: 70 MMHG | SYSTOLIC BLOOD PRESSURE: 112 MMHG

## 2018-06-19 DIAGNOSIS — E78.5 DYSLIPIDEMIA: ICD-10-CM

## 2018-06-19 DIAGNOSIS — I10 ESSENTIAL HYPERTENSION: ICD-10-CM

## 2018-06-19 PROCEDURE — 97110 THERAPEUTIC EXERCISES: CPT

## 2018-06-19 PROCEDURE — G8427 DOCREV CUR MEDS BY ELIG CLIN: HCPCS | Performed by: INTERNAL MEDICINE

## 2018-06-19 PROCEDURE — 3017F COLORECTAL CA SCREEN DOC REV: CPT | Performed by: INTERNAL MEDICINE

## 2018-06-19 PROCEDURE — 97140 MANUAL THERAPY 1/> REGIONS: CPT

## 2018-06-19 PROCEDURE — 99212 OFFICE O/P EST SF 10 MIN: CPT | Performed by: INTERNAL MEDICINE

## 2018-06-19 PROCEDURE — G8417 CALC BMI ABV UP PARAM F/U: HCPCS | Performed by: INTERNAL MEDICINE

## 2018-06-19 PROCEDURE — 1123F ACP DISCUSS/DSCN MKR DOCD: CPT | Performed by: INTERNAL MEDICINE

## 2018-06-19 PROCEDURE — 97016 VASOPNEUMATIC DEVICE THERAPY: CPT

## 2018-06-19 PROCEDURE — 4040F PNEUMOC VAC/ADMIN/RCVD: CPT | Performed by: INTERNAL MEDICINE

## 2018-06-19 PROCEDURE — 1036F TOBACCO NON-USER: CPT | Performed by: INTERNAL MEDICINE

## 2018-06-22 ENCOUNTER — HOSPITAL ENCOUNTER (OUTPATIENT)
Dept: PHYSICAL THERAPY | Age: 68
Setting detail: THERAPIES SERIES
Discharge: HOME | End: 2018-06-22
Payer: MEDICARE

## 2018-06-22 PROCEDURE — 97140 MANUAL THERAPY 1/> REGIONS: CPT

## 2018-06-22 PROCEDURE — 97016 VASOPNEUMATIC DEVICE THERAPY: CPT

## 2018-06-22 PROCEDURE — 97110 THERAPEUTIC EXERCISES: CPT

## 2018-06-26 ENCOUNTER — HOSPITAL ENCOUNTER (OUTPATIENT)
Dept: PHYSICAL THERAPY | Age: 68
Setting detail: THERAPIES SERIES
Discharge: HOME | End: 2018-06-26
Payer: MEDICARE

## 2018-06-26 PROCEDURE — 97016 VASOPNEUMATIC DEVICE THERAPY: CPT

## 2018-06-26 PROCEDURE — 97110 THERAPEUTIC EXERCISES: CPT

## 2018-06-26 PROCEDURE — 97140 MANUAL THERAPY 1/> REGIONS: CPT

## 2018-06-29 ENCOUNTER — HOSPITAL ENCOUNTER (OUTPATIENT)
Dept: PHYSICAL THERAPY | Age: 68
Setting detail: THERAPIES SERIES
Discharge: HOME | End: 2018-06-29
Payer: MEDICARE

## 2018-06-29 PROCEDURE — 97110 THERAPEUTIC EXERCISES: CPT

## 2018-06-29 PROCEDURE — 97016 VASOPNEUMATIC DEVICE THERAPY: CPT

## 2018-06-29 PROCEDURE — 97140 MANUAL THERAPY 1/> REGIONS: CPT

## 2018-07-03 ENCOUNTER — HOSPITAL ENCOUNTER (OUTPATIENT)
Dept: PHYSICAL THERAPY | Age: 68
Setting detail: THERAPIES SERIES
Discharge: HOME | End: 2018-07-03
Payer: MEDICARE

## 2018-07-03 PROCEDURE — 97110 THERAPEUTIC EXERCISES: CPT

## 2018-07-03 PROCEDURE — 97116 GAIT TRAINING THERAPY: CPT

## 2018-07-03 NOTE — PROGRESS NOTES
this date.     PT Individual Minutes  Time In: 3571  Time Out: 3477  Minutes: 48  Timed Code Treatment Minutes: 38 Minutes  Procedure Minutes:10    Signature:  Electronically signed by Valery Gamez PTA on 7/3/18 at 1:41 PM

## 2018-07-06 ENCOUNTER — APPOINTMENT (OUTPATIENT)
Dept: PHYSICAL THERAPY | Age: 68
End: 2018-07-06
Payer: MEDICARE

## 2018-07-09 ENCOUNTER — HOSPITAL ENCOUNTER (OUTPATIENT)
Dept: PHYSICAL THERAPY | Age: 68
Setting detail: THERAPIES SERIES
Discharge: HOME | End: 2018-07-09
Payer: MEDICARE

## 2018-07-13 ENCOUNTER — HOSPITAL ENCOUNTER (OUTPATIENT)
Dept: PHYSICAL THERAPY | Age: 68
Setting detail: THERAPIES SERIES
Discharge: HOME | End: 2018-07-13
Payer: MEDICARE

## 2018-07-13 PROCEDURE — 97140 MANUAL THERAPY 1/> REGIONS: CPT

## 2018-07-13 PROCEDURE — 97110 THERAPEUTIC EXERCISES: CPT

## 2018-07-17 ENCOUNTER — HOSPITAL ENCOUNTER (OUTPATIENT)
Dept: PHYSICAL THERAPY | Age: 68
Setting detail: THERAPIES SERIES
Discharge: HOME | End: 2018-07-17
Payer: MEDICARE

## 2018-07-17 PROCEDURE — 97110 THERAPEUTIC EXERCISES: CPT

## 2018-07-17 NOTE — PROGRESS NOTES
[address] [address]  Outpatient Physical Therapy    Treatment Note        Date: 2018  Patient: Cain De La Fuente  : 1950  ACCT #: [de-identified]  Referring Practitioner: Dillon Parker   Diagnosis: Knee OA, knee pain    Visit Information:  PT Visit Information  PT Insurance Information: Medicare   Total # of Visits to Date: 9  Plan of Care/Certification Expiration Date: 18  No Show: 0  Canceled Appointment: 0  Progress Note Counter: 9/8-10    Subjective: Pt reports a little soreness occasionally though pain very minimal. Pt states will be able to manage with HEP at end of current POC. Pt states was able to go to nursing home yesterday and perform [employer] duties without inc pain with amb. Comments: RTD 8/3/18  HEP Compliance:  [x] Good [] Fair [] Poor [] Reports not doing due to:    Vital Signs  Patient Currently in Pain: Denies   Pain Screening  Patient Currently in Pain: Denies    OBJECTIVE:   Exercises  Exercise 1:  bike S4 x5 minutes  Exercise 2: step ups 8\" F/L x20  Exercise 3: bosu lunges x15 fwd/lateral b/l  Exercise 5: TG squats, HR Level 10 x25  Exercise 8: rockerboard 4 way x25 large   Exercise 9: knee flexion stretch on step 3x30\"   Exercise 10: Prostretch 30\"x3   Exercise 12: HS stretch step 30\"x3  Exercise 13: step downs 6''step x15 b/l  Exercise 14: Prone quad stretch with belt 3x30\"  Exercise 15: 4 way hip YTB x10 b/l    ROM: [] NT  [x] ROM measurements:  AROM LLE (degrees)  LLE General AROM: 2-115 supine    *Indicates exercise, modality, or manual techniques to be initiated when appropriate    Assessment: Body structures, Functions, Activity limitations: Decreased functional mobility , Decreased ROM, Decreased strength  Assessment: Pt with good tolerance to tx this date, though with fatigue. L knee AROM to 115 deg flexion, continues to lack 2 deg extension.    Treatment Diagnosis: decreased L LE strength, knee rom, and p/o pain and edema  Prognosis: Good

## 2018-07-20 ENCOUNTER — HOSPITAL ENCOUNTER (OUTPATIENT)
Dept: PHYSICAL THERAPY | Age: 68
Setting detail: THERAPIES SERIES
Discharge: HOME | End: 2018-07-20
Payer: MEDICARE

## 2018-07-20 PROCEDURE — G8980 MOBILITY D/C STATUS: HCPCS

## 2018-07-20 PROCEDURE — G8979 MOBILITY GOAL STATUS: HCPCS

## 2018-07-20 PROCEDURE — 97110 THERAPEUTIC EXERCISES: CPT

## 2018-07-20 NOTE — PROGRESS NOTES
hip ext strength and knee ext arom. However, pt is currently indep/compliant with hep and able to self manage symptoms on his own at this time. Prognosis: Good    New Education Provided: cont current hep      G-Codes  PT G-Codes  Functional Assessment Tool Used: LEFS and clinical judgement   Score: 71/80  Functional Limitation: Mobility: Walking and moving around  Mobility: Walking and Moving Around Goal Status (): At least 1 percent but less than 20 percent impaired, limited or restricted  Mobility: Walking and Moving Around Discharge Status (): At least 1 percent but less than 20 percent impaired, limited or restricted    PLAN: D/C             ? Patient Status:[] Continue/ Initiate plan of Care    [x] Discharge PT. Recommend pt continue with HEP. [] Additional visits requested, Please re-certify for additional visits:        Signature: Electronically signed by Nina Salinas PT on 7/20/18 at 9:12 AM    If you have any questions or concerns, please don't hesitate to call. Thank you for your referral.    I have reviewed this plan of care and certify a need for medically necessary rehabilitation services.     Physician Signature:__________________________________________________________  Date:  Please sign and return

## 2018-07-20 NOTE — PROGRESS NOTES
this time with minimal contd deficits in fucntional hip ext strength and knee ext arom. However, pt is currently indep/compliant with hep and able to self manage symptoms on his own at this time. Treatment Diagnosis: decreased L LE strength, knee rom, and p/o pain and edema  Prognosis: Good  Patient Education: cont current hep     Goals:  Long term goals  Time Frame for Long term goals : 4-5 weeks   Long term goal 1: Pt will be indep/compliant with hep in order to self manage symptoms upon D/C  Long term goal 2: The patient will ambulate unlimited distances all surfaces independently without AD in order to safely ambulate in the community   Long term goal 3: The pt will demo improved L LE strength >/= 4+/5 in order to perform 12-6\"stairs reciprocally with 1 HR indep to go to basement indep in home   Long term goal 4: The pt will demo improved L knee AROM >/=0-115 in order to increase ease with ADL's  Progress toward goals: see d/c summary     POST-PAIN       Pain Rating (0-10 pain scale):   0/10   Location and pain description same as pre-treatment unless indicated. Action: [x] NA   [] Perform HEP  [] Meds as prescribed  [] Modalities as prescribed   [] Call Physician     Frequency/Duration:  Plan  Plan Comment: D/C     Pt to continue current HEP. See objective section for any therapeutic exercise changes, additions or modifications this date.     PT Individual Minutes  Time In: 5506  Time Out: 1426  Minutes: 48  Timed Code Treatment Minutes: 38 Minutes  Procedure Minutes: 10     Signature:  Electronically signed by Nivia Bangura PT on 7/20/18 at 2:18 PM

## 2018-08-03 ENCOUNTER — HOSPITAL ENCOUNTER (OUTPATIENT)
Dept: ORTHOPEDIC SURGERY | Age: 68
Discharge: HOME | End: 2018-08-05
Payer: MEDICARE

## 2018-08-03 DIAGNOSIS — M25.562 ARTHRALGIA OF BOTH LOWER LEGS: ICD-10-CM

## 2018-08-03 DIAGNOSIS — M25.561 ARTHRALGIA OF BOTH LOWER LEGS: ICD-10-CM

## 2018-08-03 PROCEDURE — 73562 X-RAY EXAM OF KNEE 3: CPT

## 2018-10-17 ENCOUNTER — OFFICE VISIT (OUTPATIENT)
Dept: INTERNAL MEDICINE CLINIC | Age: 68
End: 2018-10-17
Payer: MEDICARE

## 2018-10-17 VITALS
DIASTOLIC BLOOD PRESSURE: 70 MMHG | HEART RATE: 69 BPM | TEMPERATURE: 98.4 F | RESPIRATION RATE: 16 BRPM | WEIGHT: 238.8 LBS | OXYGEN SATURATION: 98 % | HEIGHT: 66 IN | BODY MASS INDEX: 38.38 KG/M2 | SYSTOLIC BLOOD PRESSURE: 104 MMHG

## 2018-10-17 DIAGNOSIS — E78.5 DYSLIPIDEMIA: ICD-10-CM

## 2018-10-17 DIAGNOSIS — I10 ESSENTIAL HYPERTENSION: ICD-10-CM

## 2018-10-17 DIAGNOSIS — Z23 NEED FOR SHINGLES VACCINE: ICD-10-CM

## 2018-10-17 DIAGNOSIS — Z23 NEED FOR INFLUENZA VACCINATION: ICD-10-CM

## 2018-10-17 DIAGNOSIS — E11.9 TYPE 2 DIABETES MELLITUS WITHOUT COMPLICATION, WITHOUT LONG-TERM CURRENT USE OF INSULIN (HCC): Primary | ICD-10-CM

## 2018-10-17 LAB — HBA1C MFR BLD: 6.6 %

## 2018-10-17 PROCEDURE — 4040F PNEUMOC VAC/ADMIN/RCVD: CPT | Performed by: FAMILY MEDICINE

## 2018-10-17 PROCEDURE — 90662 IIV NO PRSV INCREASED AG IM: CPT | Performed by: FAMILY MEDICINE

## 2018-10-17 PROCEDURE — 2022F DILAT RTA XM EVC RTNOPTHY: CPT | Performed by: FAMILY MEDICINE

## 2018-10-17 PROCEDURE — G8427 DOCREV CUR MEDS BY ELIG CLIN: HCPCS | Performed by: FAMILY MEDICINE

## 2018-10-17 PROCEDURE — G8482 FLU IMMUNIZE ORDER/ADMIN: HCPCS | Performed by: FAMILY MEDICINE

## 2018-10-17 PROCEDURE — 99214 OFFICE O/P EST MOD 30 MIN: CPT | Performed by: FAMILY MEDICINE

## 2018-10-17 PROCEDURE — 3017F COLORECTAL CA SCREEN DOC REV: CPT | Performed by: FAMILY MEDICINE

## 2018-10-17 PROCEDURE — 3044F HG A1C LEVEL LT 7.0%: CPT | Performed by: FAMILY MEDICINE

## 2018-10-17 PROCEDURE — 1123F ACP DISCUSS/DSCN MKR DOCD: CPT | Performed by: FAMILY MEDICINE

## 2018-10-17 PROCEDURE — 1101F PT FALLS ASSESS-DOCD LE1/YR: CPT | Performed by: FAMILY MEDICINE

## 2018-10-17 PROCEDURE — G0008 ADMIN INFLUENZA VIRUS VAC: HCPCS | Performed by: FAMILY MEDICINE

## 2018-10-17 PROCEDURE — 1036F TOBACCO NON-USER: CPT | Performed by: FAMILY MEDICINE

## 2018-10-17 PROCEDURE — 83036 HEMOGLOBIN GLYCOSYLATED A1C: CPT | Performed by: FAMILY MEDICINE

## 2018-10-17 PROCEDURE — G8417 CALC BMI ABV UP PARAM F/U: HCPCS | Performed by: FAMILY MEDICINE

## 2018-10-17 NOTE — PROGRESS NOTES
Vaccine Information Sheet, \"Influenza - Inactivated\"  given to Kwesi Walker, or parent/legal guardian of  Kwesi Walker and verbalized understanding. Patient responses:    Have you ever had a reaction to a flu vaccine? No  Are you able to eat eggs without adverse effects? Yes  Do you have any current illness? No  Have you ever had Guillian Eldridge Syndrome? No    Flu vaccine given per order. Please see immunization tab.

## 2018-10-17 NOTE — PROGRESS NOTES
fatigue, fever and sweats. HEENT: Negative for eye discharge and vision loss. Negative for ear drainage, hearing loss and nasal drainage. Respiratory: Negative for cough, dyspnea and wheezing. Cardiovascular:  Negative for chest pain, claudication and irregular heartbeat/palpitations. Gastrointestinal: Negative for abdominal pain, nausea, vomiting, constipation and diarrhea. Genitourinary: No dysuria or penile discharge. Metabolic/Endocrine: Negative for cold intolerance, heat intolerance, polydipsia and polyphagia. No unintended weight loss or gain. Neuro/Psychiatric: Negative for gait disturbance. Negative for psychiatric symptoms. Dermatologic: Negative for pruritus and rash. Musculoskeletal: Negative for bone/joint symptoms. No numbness or tingling. No weakness. Hematology: Negative for bleeding and easy bruising. Immunology:  Negative for environmental allergies and food allergies. Physical Exam    Patient's medication, allergies, past medical, surgical, social and family histories were reviewed and updated as appropriate. PHYSICAL EXAM   General appearance: Alert oriented pleasant cooperative in no acute distress. HEENT: Eyes clear nonicteric facial muscles symmetrical.  Color good Tropic neck: Soft nontender no adenopathy or masses no carotid bruits  Lungs: Clear to auscultation no wheezes rhonchi or rales  Heart: Regular rate and rhythm without murmurs rubs or gallops  Extremities: No rashes or edema no tenderness to palpation of either knee, normal gait. Assessment:   Diagnosis Orders   1. Type 2 diabetes mellitus without complication, without long-term current use of insulin ([facility])  POCT glycosylated hemoglobin (Hb A1C)  6.7 in the office today showing that he's under good control    2. Essential hypertension  Comprehensive Metabolic Panel  Controlled    3. Need for influenza vaccination  INFLUENZA, HIGH DOSE, 65 YRS +, IM, PF, PREFILL SYR, 0.5ML (FLUZONE HD)   4.  Need for shingles vaccine  zoster recombinant adjuvanted vaccine (SHINGRIX) 50 MCG SUSR injection   5. Dyslipidemia  Comprehensive Metabolic Panel    Lipid, Fasting               Plan:  Current Outpatient Prescriptions   Medication Sig Dispense Refill    zoster recombinant adjuvanted vaccine (SHINGRIX) 50 MCG SUSR injection Inject 0.5 mLs into the muscle once for 1 dose 0.5 mL 1    captopril-hydrochlorothiazide (CAPOZIDE) 50-25 MG per tablet TAKE 1 TABLET 1 TO 2 TIMES DAILY (Patient taking differently: Take 1 tablet by mouth 2 times daily TAKE 1 TABLET 1 TO 2 TIMES DAILY) 180 tablet 2    acetaminophen (TYLENOL) 325 MG tablet Take 2 tablets by mouth every 6 hours 120 tablet 0    aspirin 81 MG EC tablet Take 1 tablet by mouth 2 times daily (Patient taking differently: Take 81 mg by mouth daily ) 60 tablet 0    metoprolol succinate (TOPROL XL) 50 MG extended release tablet TAKE 1 TABLET DAILY 90 tablet 3    furosemide (LASIX) 40 MG tablet TAKE 1 TABLET DAILY 90 tablet 3    potassium chloride (KLOR-CON M20) 20 MEQ extended release tablet TAKE 1 TABLET THREE TIMES A  tablet 3    metFORMIN (GLUCOPHAGE) 500 MG tablet TAKE 2 TABLETS TWICE A DAY WITH MEALS 360 tablet 3    atorvastatin (LIPITOR) 40 MG tablet TAKE 1 TABLET NIGHTLY 90 tablet 3    Alcohol Swabs PADS Use as directed with insulin injections 300 each 3    Multiple Vitamins-Minerals (MULTIVITAMIN PO) Take 1 tablet by mouth daily. No current facility-administered medications for this visit.       Orders Placed This Encounter   Procedures    INFLUENZA, HIGH DOSE, 65 YRS +, IM, PF, PREFILL SYR, 0.5ML (FLUZONE HD)    Comprehensive Metabolic Panel     Standing Status:   Future     Standing Expiration Date:   10/17/2019    Lipid, Fasting     Standing Status:   Future     Standing Expiration Date:   10/17/2019    POCT glycosylated hemoglobin (Hb A1C)       Orders Placed This Encounter   Medications    zoster recombinant adjuvanted vaccine [facility]) 50 MCG SUSR injection     Sig: Inject 0.5 mLs into the muscle once for 1 dose     Dispense:  0.5 mL     Refill:  1              Return in about 6 months (around 4/17/2019).     Dr. Raza Frias      10/17/18  11:33 AM

## 2018-10-30 DIAGNOSIS — I10 ESSENTIAL HYPERTENSION: ICD-10-CM

## 2018-10-30 DIAGNOSIS — E78.5 DYSLIPIDEMIA: ICD-10-CM

## 2018-10-30 LAB
ALBUMIN SERPL-MCNC: 4.2 G/DL (ref 3.9–4.9)
ALP BLD-CCNC: 81 U/L (ref 35–104)
ALT SERPL-CCNC: 19 U/L (ref 0–41)
ANION GAP SERPL CALCULATED.3IONS-SCNC: 15 MEQ/L (ref 7–13)
AST SERPL-CCNC: 17 U/L (ref 0–40)
BILIRUB SERPL-MCNC: 0.4 MG/DL (ref 0–1.2)
BUN BLDV-MCNC: 23 MG/DL (ref 8–23)
CALCIUM SERPL-MCNC: 8.6 MG/DL (ref 8.6–10.2)
CHLORIDE BLD-SCNC: 101 MEQ/L (ref 98–107)
CHOLESTEROL, FASTING: 103 MG/DL (ref 0–199)
CO2: 24 MEQ/L (ref 22–29)
CREAT SERPL-MCNC: 1.18 MG/DL (ref 0.7–1.2)
GFR AFRICAN AMERICAN: >60
GFR NON-AFRICAN AMERICAN: >60
GLOBULIN: 3.4 G/DL (ref 2.3–3.5)
GLUCOSE BLD-MCNC: 136 MG/DL (ref 74–109)
HDLC SERPL-MCNC: 26 MG/DL (ref 40–59)
LDL CHOLESTEROL CALCULATED: 36 MG/DL (ref 0–129)
POTASSIUM SERPL-SCNC: 4.1 MEQ/L (ref 3.5–5.1)
SODIUM BLD-SCNC: 140 MEQ/L (ref 132–144)
TOTAL PROTEIN: 7.6 G/DL (ref 6.4–8.1)
TRIGLYCERIDE, FASTING: 206 MG/DL (ref 0–200)

## 2018-10-31 ENCOUNTER — TELEPHONE (OUTPATIENT)
Dept: INTERNAL MEDICINE CLINIC | Age: 68
End: 2018-10-31

## 2018-11-02 ENCOUNTER — HOSPITAL ENCOUNTER (OUTPATIENT)
Dept: ORTHOPEDIC SURGERY | Age: 68
Discharge: HOME | End: 2018-11-04
Payer: MEDICARE

## 2018-11-02 DIAGNOSIS — M25.562 ARTHRALGIA OF LEFT LOWER LEG: ICD-10-CM

## 2018-11-02 PROCEDURE — 73562 X-RAY EXAM OF KNEE 3: CPT

## 2018-12-31 RX ORDER — ATORVASTATIN CALCIUM 40 MG/1
TABLET, FILM COATED ORAL
Qty: 90 TABLET | Refills: 3 | Status: SHIPPED | OUTPATIENT
Start: 2018-12-31 | End: 2020-01-05

## 2019-01-09 RX ORDER — METOPROLOL SUCCINATE 50 MG/1
TABLET, EXTENDED RELEASE ORAL
Qty: 90 TABLET | Refills: 3 | Status: SHIPPED | OUTPATIENT
Start: 2019-01-09 | End: 2020-01-04

## 2019-01-09 RX ORDER — POTASSIUM CHLORIDE 20 MEQ/1
TABLET, EXTENDED RELEASE ORAL
Qty: 270 TABLET | Refills: 3 | Status: SHIPPED | OUTPATIENT
Start: 2019-01-09 | End: 2020-01-03 | Stop reason: SDUPTHER

## 2019-03-25 ENCOUNTER — OFFICE VISIT (OUTPATIENT)
Dept: INTERNAL MEDICINE CLINIC | Age: 69
End: 2019-03-25
Payer: MEDICARE

## 2019-03-25 VITALS
HEART RATE: 70 BPM | HEIGHT: 66 IN | TEMPERATURE: 97.6 F | SYSTOLIC BLOOD PRESSURE: 118 MMHG | WEIGHT: 238 LBS | BODY MASS INDEX: 38.25 KG/M2 | DIASTOLIC BLOOD PRESSURE: 74 MMHG | RESPIRATION RATE: 16 BRPM | OXYGEN SATURATION: 97 %

## 2019-03-25 DIAGNOSIS — E11.9 TYPE 2 DIABETES MELLITUS WITHOUT COMPLICATION, WITHOUT LONG-TERM CURRENT USE OF INSULIN (HCC): ICD-10-CM

## 2019-03-25 DIAGNOSIS — Z23 NEED FOR ZOSTER VACCINATION: ICD-10-CM

## 2019-03-25 DIAGNOSIS — F41.9 ANXIETY: Primary | ICD-10-CM

## 2019-03-25 LAB
CREATININE URINE: 112.6 MG/DL
HBA1C MFR BLD: 6.6 %
MICROALBUMIN UR-MCNC: 1.4 MG/DL
MICROALBUMIN/CREAT UR-RTO: 12.4 MG/G (ref 0–30)

## 2019-03-25 PROCEDURE — 2022F DILAT RTA XM EVC RTNOPTHY: CPT | Performed by: PHYSICIAN ASSISTANT

## 2019-03-25 PROCEDURE — 1036F TOBACCO NON-USER: CPT | Performed by: PHYSICIAN ASSISTANT

## 2019-03-25 PROCEDURE — 3017F COLORECTAL CA SCREEN DOC REV: CPT | Performed by: PHYSICIAN ASSISTANT

## 2019-03-25 PROCEDURE — 4040F PNEUMOC VAC/ADMIN/RCVD: CPT | Performed by: PHYSICIAN ASSISTANT

## 2019-03-25 PROCEDURE — 1101F PT FALLS ASSESS-DOCD LE1/YR: CPT | Performed by: PHYSICIAN ASSISTANT

## 2019-03-25 PROCEDURE — G8482 FLU IMMUNIZE ORDER/ADMIN: HCPCS | Performed by: PHYSICIAN ASSISTANT

## 2019-03-25 PROCEDURE — 3044F HG A1C LEVEL LT 7.0%: CPT | Performed by: PHYSICIAN ASSISTANT

## 2019-03-25 PROCEDURE — 1123F ACP DISCUSS/DSCN MKR DOCD: CPT | Performed by: PHYSICIAN ASSISTANT

## 2019-03-25 PROCEDURE — G8417 CALC BMI ABV UP PARAM F/U: HCPCS | Performed by: PHYSICIAN ASSISTANT

## 2019-03-25 PROCEDURE — 83036 HEMOGLOBIN GLYCOSYLATED A1C: CPT | Performed by: PHYSICIAN ASSISTANT

## 2019-03-25 PROCEDURE — G8510 SCR DEP NEG, NO PLAN REQD: HCPCS | Performed by: PHYSICIAN ASSISTANT

## 2019-03-25 PROCEDURE — G8427 DOCREV CUR MEDS BY ELIG CLIN: HCPCS | Performed by: PHYSICIAN ASSISTANT

## 2019-03-25 PROCEDURE — 99213 OFFICE O/P EST LOW 20 MIN: CPT | Performed by: PHYSICIAN ASSISTANT

## 2019-03-25 RX ORDER — LORAZEPAM 0.5 MG/1
0.5 TABLET ORAL NIGHTLY PRN
Qty: 15 TABLET | Refills: 0 | Status: SHIPPED | OUTPATIENT
Start: 2019-03-25 | End: 2019-04-24

## 2019-03-25 ASSESSMENT — ENCOUNTER SYMPTOMS
CHOKING: 0
CHEST TIGHTNESS: 0
STRIDOR: 0
EYE DISCHARGE: 0
ABDOMINAL PAIN: 0
COLOR CHANGE: 0
WHEEZING: 0
PHOTOPHOBIA: 0
APNEA: 0
EYE PAIN: 0
DIARRHEA: 0
FACIAL SWELLING: 0
NAUSEA: 0
CONSTIPATION: 0
EYE REDNESS: 0

## 2019-03-25 ASSESSMENT — PATIENT HEALTH QUESTIONNAIRE - PHQ9
SUM OF ALL RESPONSES TO PHQ9 QUESTIONS 1 & 2: 0
SUM OF ALL RESPONSES TO PHQ QUESTIONS 1-9: 0
1. LITTLE INTEREST OR PLEASURE IN DOING THINGS: 0
SUM OF ALL RESPONSES TO PHQ QUESTIONS 1-9: 0
2. FEELING DOWN, DEPRESSED OR HOPELESS: 0

## 2019-04-11 ENCOUNTER — OFFICE VISIT (OUTPATIENT)
Dept: INTERNAL MEDICINE CLINIC | Age: 69
End: 2019-04-11
Payer: MEDICARE

## 2019-04-11 ENCOUNTER — HOSPITAL ENCOUNTER (OUTPATIENT)
Dept: GENERAL RADIOLOGY | Age: 69
Discharge: HOME | End: 2019-04-13
Payer: MEDICARE

## 2019-04-11 VITALS
DIASTOLIC BLOOD PRESSURE: 70 MMHG | WEIGHT: 232.8 LBS | TEMPERATURE: 98.3 F | BODY MASS INDEX: 37.41 KG/M2 | OXYGEN SATURATION: 98 % | HEART RATE: 70 BPM | HEIGHT: 66 IN | SYSTOLIC BLOOD PRESSURE: 116 MMHG

## 2019-04-11 DIAGNOSIS — I10 ESSENTIAL HYPERTENSION: Primary | ICD-10-CM

## 2019-04-11 DIAGNOSIS — M19.042 ARTHRITIS OF BOTH HANDS: ICD-10-CM

## 2019-04-11 DIAGNOSIS — I10 ESSENTIAL HYPERTENSION: ICD-10-CM

## 2019-04-11 DIAGNOSIS — M19.041 ARTHRITIS OF BOTH HANDS: ICD-10-CM

## 2019-04-11 DIAGNOSIS — G47.00 INSOMNIA, UNSPECIFIED TYPE: ICD-10-CM

## 2019-04-11 DIAGNOSIS — E78.2 MIXED HYPERLIPIDEMIA: ICD-10-CM

## 2019-04-11 DIAGNOSIS — Z91.81 AT HIGH RISK FOR FALLS: ICD-10-CM

## 2019-04-11 DIAGNOSIS — E11.9 TYPE 2 DIABETES MELLITUS WITHOUT COMPLICATION, WITHOUT LONG-TERM CURRENT USE OF INSULIN (HCC): ICD-10-CM

## 2019-04-11 LAB
ALBUMIN SERPL-MCNC: 4.6 G/DL (ref 3.5–4.6)
ALP BLD-CCNC: 74 U/L (ref 35–104)
ALT SERPL-CCNC: 17 U/L (ref 0–41)
ANION GAP SERPL CALCULATED.3IONS-SCNC: 18 MEQ/L (ref 9–15)
AST SERPL-CCNC: 22 U/L (ref 0–40)
BILIRUB SERPL-MCNC: 0.7 MG/DL (ref 0.2–0.7)
BUN BLDV-MCNC: 28 MG/DL (ref 8–23)
CALCIUM SERPL-MCNC: 9.4 MG/DL (ref 8.5–9.9)
CHLORIDE BLD-SCNC: 99 MEQ/L (ref 95–107)
CHOLESTEROL, FASTING: 114 MG/DL (ref 0–199)
CO2: 26 MEQ/L (ref 20–31)
CREAT SERPL-MCNC: 1.18 MG/DL (ref 0.7–1.2)
GFR AFRICAN AMERICAN: >60
GFR NON-AFRICAN AMERICAN: >60
GLOBULIN: 3.6 G/DL (ref 2.3–3.5)
GLUCOSE BLD-MCNC: 117 MG/DL (ref 70–99)
HDLC SERPL-MCNC: 26 MG/DL (ref 40–59)
LDL CHOLESTEROL CALCULATED: 46 MG/DL (ref 0–129)
POTASSIUM SERPL-SCNC: 4.7 MEQ/L (ref 3.4–4.9)
SODIUM BLD-SCNC: 143 MEQ/L (ref 135–144)
TOTAL PROTEIN: 8.2 G/DL (ref 6.3–8)
TRIGLYCERIDE, FASTING: 212 MG/DL (ref 0–150)

## 2019-04-11 PROCEDURE — 1123F ACP DISCUSS/DSCN MKR DOCD: CPT | Performed by: FAMILY MEDICINE

## 2019-04-11 PROCEDURE — 73140 X-RAY EXAM OF FINGER(S): CPT

## 2019-04-11 PROCEDURE — G8427 DOCREV CUR MEDS BY ELIG CLIN: HCPCS | Performed by: FAMILY MEDICINE

## 2019-04-11 PROCEDURE — G8417 CALC BMI ABV UP PARAM F/U: HCPCS | Performed by: FAMILY MEDICINE

## 2019-04-11 PROCEDURE — 3044F HG A1C LEVEL LT 7.0%: CPT | Performed by: FAMILY MEDICINE

## 2019-04-11 PROCEDURE — 73130 X-RAY EXAM OF HAND: CPT

## 2019-04-11 PROCEDURE — 1036F TOBACCO NON-USER: CPT | Performed by: FAMILY MEDICINE

## 2019-04-11 PROCEDURE — 4040F PNEUMOC VAC/ADMIN/RCVD: CPT | Performed by: FAMILY MEDICINE

## 2019-04-11 PROCEDURE — 3288F FALL RISK ASSESSMENT DOCD: CPT | Performed by: FAMILY MEDICINE

## 2019-04-11 PROCEDURE — 99214 OFFICE O/P EST MOD 30 MIN: CPT | Performed by: FAMILY MEDICINE

## 2019-04-11 PROCEDURE — 3017F COLORECTAL CA SCREEN DOC REV: CPT | Performed by: FAMILY MEDICINE

## 2019-04-11 PROCEDURE — 2022F DILAT RTA XM EVC RTNOPTHY: CPT | Performed by: FAMILY MEDICINE

## 2019-04-11 RX ORDER — DOXEPIN HYDROCHLORIDE 25 MG/1
25 CAPSULE ORAL NIGHTLY
Qty: 30 CAPSULE | Refills: 3 | Status: SHIPPED | OUTPATIENT
Start: 2019-04-11 | End: 2019-05-09

## 2019-04-11 NOTE — TELEPHONE ENCOUNTER
requesting medication refill.      Rx requested:  Requested Prescriptions     Pending Prescriptions Disp Refills    captopril-hydrochlorothiazide (CAPOZIDE) 50-25 MG per tablet 180 tablet 2     Sig: TAKE 1 TABLET 1 TO 2 TIMES DAILY       Last Office Visit:   4/11/2019    Next Visit Date:  Future Appointments   Date Time Provider Vincenzo Contreras   5/9/2019  9:15 AM Gissell Leon MD [id number] [facility]

## 2019-04-11 NOTE — TELEPHONE ENCOUNTER
requesting medication refill.      Rx requested:  Requested Prescriptions     Pending Prescriptions Disp Refills    furosemide (LASIX) 40 MG tablet 90 tablet 3     Sig: TAKE 1 TABLET DAILY       Last Office Visit:   3/25/2019      Next Visit Date:  Future Appointments   Date Time Provider Vincenzo Contreras   5/9/2019  9:15 AM Gloria White  [place], [place] [address]

## 2019-04-11 NOTE — PROGRESS NOTES
On the basis of positive falls risk screening, assessment and plan is as follows: [place] [employer] Intervention resources provided. On the basis of positive falls risk screening, assessment and plan is as follows: [place] Exercise Intervention resources provided.

## 2019-04-11 NOTE — PROGRESS NOTES
Patient: Joaquina Martin    YOB: 1950    Date: 4/11/19    Chief Complaint   Patient presents with    Diabetes     sugars have been good no complaints or concerns sugars have been runnning about 112     Anxiety     anxiety has been causing insomnia, not get any sleep at all.  Hypertension     has not been checking at home but at all appointment his B/P has been good. Patient Active Problem List    Diagnosis Date Noted    Essential hypertension 06/07/2018    Type 2 diabetes mellitus without complication, without long-term current use of insulin ([facility] 06/07/2017    Dyslipidemia 06/07/2017    [facility]     Lymphoma of gastrointestinal tract (HCC)     Arthritis, lumbar spine 04/08/2015    Cyst, epididymis 04/13/2011    Hydrocele 04/13/2011    Varicocele 04/13/2011    Stiffness of joint, not elsewhere classified, forearm 02/02/2009    Stiffness of joint, not elsewhere classified, hand 02/02/2009    Pain in joint, hand 02/02/2009    Follow-up examination following surgery 12/29/2008    Other closed fractures of distal end of radius (alone) 12/24/2008       Allergies   Allergen Reactions    Dye [Barium-Containing Compounds] Hives    Iodides     Iodinated Diagnostic Agents Hives       Vitals:    04/11/19 0917   BP: 116/70   Site: Left Upper Arm   Position: Sitting   Cuff Size: Large Adult   Pulse: 70   Temp: 98.3 °F (36.8 °C)   TempSrc: Tympanic   SpO2: 98%   Weight: 232 lb 12.8 oz (105.6 kg)   Height: 5' 6\" (1.676 m)     Body mass index is 37.57 kg/m². Treatment Adherence:   Medication compliance:  compliant all of the time  Diet compliance:  compliant all of the time  Weight trend: stable  Current exercise: walks 7 time(s) per week    Diabetes Mellitus Type 2: Current symptoms/problems include NONE.   Home blood sugar records:  fasting range: 112  Any episodes of hypoglycemia? no  Eye exam current (within one year): no  Tobacco history: He physical therapy pending how that does EMG might be the next step. XR FINGER LEFT (MIN 2 VIEWS)   5. Insomnia, unspecified type  Restarted on a low dose of doxepin and if that helps I let me know. He's having any trouble with it he should also call we'll see him back in 4 weeks on this. 6. At high risk for falls                 Plan:  Current Outpatient Medications   Medication Sig Dispense Refill    doxepin (SINEQUAN) 25 MG capsule Take 1 capsule by mouth nightly 30 capsule 3    LORazepam (ATIVAN) 0.5 MG tablet Take 1 tablet by mouth nightly as needed for Anxiety for up to 30 days. 15 tablet 0    metFORMIN (GLUCOPHAGE) 500 MG tablet TAKE 2 TABLETS BY MOUTH TWICE DAILY, WITH MEALS 360 tablet 3    potassium chloride (KLOR-CON M) 20 MEQ extended release tablet TAKE 1 TABLET BY MOUTH THREE TIMES DAILY 270 tablet 3    metoprolol succinate (TOPROL XL) 50 MG extended release tablet TAKE 1 TABLET BY MOUTH DAILY 90 tablet 3    atorvastatin (LIPITOR) 40 MG tablet TAKE 1 TABLET BY MOUTH EVERY NIGHT 90 tablet 3    captopril-hydrochlorothiazide (CAPOZIDE) 50-25 MG per tablet TAKE 1 TABLET 1 TO 2 TIMES DAILY (Patient taking differently: Take 1 tablet by mouth 2 times daily TAKE 1 TABLET 1 TO 2 TIMES DAILY) 180 tablet 2    acetaminophen (TYLENOL) 325 MG tablet Take 2 tablets by mouth every 6 hours 120 tablet 0    aspirin 81 MG EC tablet Take 1 tablet by mouth 2 times daily (Patient taking differently: Take 81 mg by mouth daily ) 60 tablet 0    furosemide (LASIX) 40 MG tablet TAKE 1 TABLET DAILY 90 tablet 3    Alcohol Swabs PADS Use as directed with insulin injections 300 each 3    Multiple Vitamins-Minerals (MULTIVITAMIN PO) Take 1 tablet by mouth daily. No current facility-administered medications for this visit.       Orders Placed This Encounter   Procedures    XR HAND RIGHT (2 VIEWS)     Standing Status:   Future     Standing Expiration Date:   4/10/2020    XR FINGER LEFT (MIN 2 VIEWS) Standing Status:   Future     Standing Expiration Date:   4/11/2020    Lipid, Fasting     Standing Status:   Future     Standing Expiration Date:   4/11/2020    Comprehensive Metabolic Panel     Standing Status:   Future     Standing Expiration Date:   4/11/2020     DIABETES FOOT EXAM       Orders Placed This Encounter   Medications    doxepin (SINEQUAN) 25 MG capsule     Sig: Take 1 capsule by mouth nightly     Dispense:  30 capsule     Refill:  3              Return in about 1 month (around 5/9/2019) for fu new meds.     Dr. Carson Winstonm      4/11/19  9:56 AM

## 2019-04-12 RX ORDER — CAPTOPRIL AND HYDROCHLOROTHIAZIDE 50; 25 MG/1; MG/1
TABLET ORAL
Qty: 180 TABLET | Refills: 2 | Status: SHIPPED | OUTPATIENT
Start: 2019-04-12 | End: 2019-12-18 | Stop reason: ALTCHOICE

## 2019-04-12 RX ORDER — FUROSEMIDE 40 MG/1
TABLET ORAL
Qty: 90 TABLET | Refills: 3 | Status: SHIPPED | OUTPATIENT
Start: 2019-04-12 | End: 2020-08-26 | Stop reason: SDUPTHER

## 2019-05-03 ENCOUNTER — HOSPITAL ENCOUNTER (OUTPATIENT)
Dept: ORTHOPEDIC SURGERY | Age: 69
Discharge: HOME | End: 2019-05-05
Payer: MEDICARE

## 2019-05-03 DIAGNOSIS — M25.562 ARTHRALGIA OF BOTH LOWER LEGS: ICD-10-CM

## 2019-05-03 DIAGNOSIS — M25.561 ARTHRALGIA OF BOTH LOWER LEGS: ICD-10-CM

## 2019-05-03 PROCEDURE — 73562 X-RAY EXAM OF KNEE 3: CPT

## 2019-05-09 ENCOUNTER — OFFICE VISIT (OUTPATIENT)
Dept: FAMILY MEDICINE CLINIC | Age: 69
End: 2019-05-09
Payer: MEDICARE

## 2019-05-09 VITALS
HEART RATE: 108 BPM | DIASTOLIC BLOOD PRESSURE: 64 MMHG | BODY MASS INDEX: 38.28 KG/M2 | OXYGEN SATURATION: 100 % | TEMPERATURE: 98.4 F | WEIGHT: 238.2 LBS | HEIGHT: 66 IN | RESPIRATION RATE: 16 BRPM | SYSTOLIC BLOOD PRESSURE: 118 MMHG

## 2019-05-09 DIAGNOSIS — G56.02 LEFT CARPAL TUNNEL SYNDROME: Primary | ICD-10-CM

## 2019-05-09 DIAGNOSIS — E11.9 TYPE 2 DIABETES MELLITUS WITHOUT COMPLICATION, WITHOUT LONG-TERM CURRENT USE OF INSULIN (HCC): ICD-10-CM

## 2019-05-09 DIAGNOSIS — Z86.2 HISTORY OF ANEMIA: ICD-10-CM

## 2019-05-09 DIAGNOSIS — F51.04 PSYCHOPHYSIOLOGICAL INSOMNIA: ICD-10-CM

## 2019-05-09 DIAGNOSIS — I10 ESSENTIAL HYPERTENSION: ICD-10-CM

## 2019-05-09 PROCEDURE — 1123F ACP DISCUSS/DSCN MKR DOCD: CPT | Performed by: FAMILY MEDICINE

## 2019-05-09 PROCEDURE — 3044F HG A1C LEVEL LT 7.0%: CPT | Performed by: FAMILY MEDICINE

## 2019-05-09 PROCEDURE — 4040F PNEUMOC VAC/ADMIN/RCVD: CPT | Performed by: FAMILY MEDICINE

## 2019-05-09 PROCEDURE — 99214 OFFICE O/P EST MOD 30 MIN: CPT | Performed by: FAMILY MEDICINE

## 2019-05-09 PROCEDURE — 3017F COLORECTAL CA SCREEN DOC REV: CPT | Performed by: FAMILY MEDICINE

## 2019-05-09 PROCEDURE — G8417 CALC BMI ABV UP PARAM F/U: HCPCS | Performed by: FAMILY MEDICINE

## 2019-05-09 PROCEDURE — 2022F DILAT RTA XM EVC RTNOPTHY: CPT | Performed by: FAMILY MEDICINE

## 2019-05-09 PROCEDURE — 1036F TOBACCO NON-USER: CPT | Performed by: FAMILY MEDICINE

## 2019-05-09 PROCEDURE — G8427 DOCREV CUR MEDS BY ELIG CLIN: HCPCS | Performed by: FAMILY MEDICINE

## 2019-05-09 RX ORDER — DOXEPIN HYDROCHLORIDE 10 MG/1
10 CAPSULE ORAL NIGHTLY
Qty: 30 CAPSULE | Refills: 5 | Status: SHIPPED | OUTPATIENT
Start: 2019-05-09 | End: 2019-09-01 | Stop reason: SDUPTHER

## 2019-05-09 NOTE — PROGRESS NOTES
Patient: Bo Dang    YOB: 1950    Date: 5/9/19    Chief Complaint   Patient presents with    Anxiety     1 month follow up. He states he is doing better, he is sleeping better. Patient Active Problem List    Diagnosis Date Noted    Essential hypertension 06/07/2018    Type 2 diabetes mellitus without complication, without long-term current use of insulin ([facility] 06/07/2017    Dyslipidemia 06/07/2017    CLL (chronic lymphocytic leukemia) ([facility]     Lymphoma of gastrointestinal tract (HCC)     Arthritis, lumbar spine 04/08/2015    Cyst, epididymis 04/13/2011    Hydrocele 04/13/2011    Varicocele 04/13/2011    Stiffness of joint, not elsewhere classified, forearm 02/02/2009    Stiffness of joint, not elsewhere classified, hand 02/02/2009    Pain in joint, hand 02/02/2009    Follow-up examination following surgery 12/29/2008    Other closed fractures of distal end of radius (alone) 12/24/2008       Allergies   Allergen Reactions    Dye [Barium-Containing Compounds] Hives    Iodides     Iodinated Diagnostic Agents Hives       Vitals:    05/09/19 0912   BP: 118/64   Site: Left Upper Arm   Position: Sitting   Cuff Size: Large Adult   Pulse: 108   Resp: 16   Temp: 98.4 °F (36.9 °C)   TempSrc: Oral   SpO2: 100%   Weight: 238 lb 3.2 oz (108 kg)   Height: 5' 6\" (1.676 m)     Body mass index is 38.45 kg/m². Treatment Adherence:   Medication compliance:  compliant all of the time  Diet compliance:  compliant most of the time  Weight trend: stable  Current exercise: walks 7 time(s) per week    Diabetes Mellitus Type 2: Current symptoms/problems include none. Home blood sugar records:  patient tests 2 time(s) per week  Any episodes of hypoglycemia? no  Eye exam current (within one year): yes  Tobacco history: He  reports that he has never smoked. He has never used smokeless tobacco.   Daily Aspirin?  Yes  Known diabetic complications: none          HPI    He comes in to follow-up on his sleep disorder. He says he is doing very well on the current medication but he finds that he still too sleepy in the morning and has trouble waking up. He like to try a lower dose. Also he noticed that much of his other aches and pains have improved he is followed up with his orthopedic surgeons, but he's having trouble with numbness in his left hand. 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 If he lets his hands drop and shakes them out the circulation and feeling comes back and then he can resume his duties. No swelling no redness no injury and he's otherwise without complaints. Spent 30 in a face to face discussion on medical issues, lifestyle education and medication and therapeutic review. Review of Systems    Constitutional: positive for fatigue, negative for fever and sweats. HEENT: Negative for eye discharge and vision loss. Negative for ear drainage, hearing loss and nasal drainage. Respiratory: Negative for cough, dyspnea and wheezing. Cardiovascular:  Negative for chest pain, claudication and irregular heartbeat/palpitations. Gastrointestinal: Negative for abdominal pain, nausea, vomiting, constipation and diarrhea. Genitourinary: No dysuria or penile discharge. Metabolic/Endocrine: Negative for cold intolerance, heat intolerance, polydipsia and polyphagia. No unintended weight loss or gain. Neuro/Psychiatric: Negative for gait disturbance. Negative for psychiatric symptoms. Dermatologic: Negative for pruritus and rash. Musculoskeletal: Negative for bone/joint symptoms. No numbness or tingling. No weakness. Hematology: Negative for bleeding and easy bruising. Immunology:  Negative for environmental allergies and food allergies. Physical Exam    Patient's medication, allergies, past medical, surgical, social and family histories were reviewed and updated as appropriate.     PHYSICAL EXAM   General appearance: Alert oriented pleasant cooperative in no acute distress. His color is good he's nonicteric and is not pale  Neck: Soft nontender no adenopathy no carotid bruits  Lungs: Clear to auscultation no wheezes rhonchi or rales  Heart: Regular rate and rhythm no murmurs  Extremities: No rashes or edema, left hand he has no swelling erythema or synovitis of the joints. He does have a positive Tinel's sign on the left wrist.          Assessment:   Diagnosis Orders   1. Left carpal tunnel syndrome  EMG   2. Essential hypertension  Controlled    3. Psychophysiological insomnia  doxepin (SINEQUAN) 10 MG capsule decrease the dose and see if this is effective without make him too drowsy in the morning    4. Type 2 diabetes mellitus without complication, without long-term current use of insulin (HCC)  Stable    5. History of anemia  his cancer doctors will be checking his anemia at follow-up next month so we'll hold off on that and his hemoglobin A1c was great last time but again that could be reflective of his anemia. We'll put that together more information.                  Plan:  Current Outpatient Medications   Medication Sig Dispense Refill    doxepin (SINEQUAN) 10 MG capsule Take 1 capsule by mouth nightly 30 capsule 5    captopril-hydrochlorothiazide (CAPOZIDE) 50-25 MG per tablet TAKE 1 TABLET 1 TO 2 TIMES DAILY 180 tablet 2    furosemide (LASIX) 40 MG tablet TAKE 1 TABLET DAILY 90 tablet 3    metFORMIN (GLUCOPHAGE) 500 MG tablet TAKE 2 TABLETS BY MOUTH TWICE DAILY, WITH MEALS 360 tablet 3    potassium chloride (KLOR-CON M) 20 MEQ extended release tablet TAKE 1 TABLET BY MOUTH THREE TIMES DAILY 270 tablet 3    metoprolol succinate (TOPROL XL) 50 MG extended release tablet TAKE 1 TABLET BY MOUTH DAILY 90 tablet 3    atorvastatin (LIPITOR) 40 MG tablet TAKE 1 TABLET BY MOUTH EVERY NIGHT 90 tablet 3    acetaminophen (TYLENOL) 325 MG tablet Take 2 tablets by mouth every 6 hours 120 tablet 0    aspirin 81 MG EC tablet Take 1 tablet by mouth 2 times daily (Patient taking differently: Take 81 mg by mouth daily ) 60 tablet 0    Alcohol Swabs PADS Use as directed with insulin injections 300 each 3    Multiple Vitamins-Minerals (MULTIVITAMIN PO) Take 1 tablet by mouth daily. No current facility-administered medications for this visit. Orders Placed This Encounter   Procedures    EMG     Standing Status:   Future     Standing Expiration Date:   7/8/2019     Order Specific Question:   Which body part? Answer:   arms       Orders Placed This Encounter   Medications    doxepin (SINEQUAN) 10 MG capsule     Sig: Take 1 capsule by mouth nightly     Dispense:  30 capsule     Refill:  5              Return in about 6 months (around 11/9/2019).     Dr. Shira Rangel      5/9/19  11:23 AM

## 2019-05-22 ENCOUNTER — OFFICE VISIT (OUTPATIENT)
Dept: FAMILY MEDICINE CLINIC | Age: 69
End: 2019-05-22
Payer: MEDICARE

## 2019-05-22 VITALS
HEIGHT: 66 IN | SYSTOLIC BLOOD PRESSURE: 110 MMHG | WEIGHT: 238.4 LBS | TEMPERATURE: 98.1 F | DIASTOLIC BLOOD PRESSURE: 66 MMHG | HEART RATE: 74 BPM | BODY MASS INDEX: 38.31 KG/M2 | OXYGEN SATURATION: 98 % | RESPIRATION RATE: 16 BRPM

## 2019-05-22 DIAGNOSIS — M54.50 ACUTE LEFT-SIDED LOW BACK PAIN WITHOUT SCIATICA: ICD-10-CM

## 2019-05-22 DIAGNOSIS — M54.50 ACUTE LEFT-SIDED LOW BACK PAIN WITHOUT SCIATICA: Primary | ICD-10-CM

## 2019-05-22 LAB
BILIRUBIN URINE: NEGATIVE
BLOOD, URINE: NEGATIVE
CLARITY: CLEAR
COLOR: YELLOW
GLUCOSE URINE: NEGATIVE MG/DL
KETONES, URINE: NEGATIVE MG/DL
LEUKOCYTE ESTERASE, URINE: NEGATIVE
NITRITE, URINE: NEGATIVE
PH UA: 5 (ref 5–9)
PROTEIN UA: NEGATIVE MG/DL
SPECIFIC GRAVITY UA: 1.01 (ref 1–1.03)
UROBILINOGEN, URINE: 0.2 E.U./DL

## 2019-05-22 PROCEDURE — 1036F TOBACCO NON-USER: CPT | Performed by: PHYSICIAN ASSISTANT

## 2019-05-22 PROCEDURE — G8427 DOCREV CUR MEDS BY ELIG CLIN: HCPCS | Performed by: PHYSICIAN ASSISTANT

## 2019-05-22 PROCEDURE — G8417 CALC BMI ABV UP PARAM F/U: HCPCS | Performed by: PHYSICIAN ASSISTANT

## 2019-05-22 PROCEDURE — 99213 OFFICE O/P EST LOW 20 MIN: CPT | Performed by: PHYSICIAN ASSISTANT

## 2019-05-22 PROCEDURE — 1123F ACP DISCUSS/DSCN MKR DOCD: CPT | Performed by: PHYSICIAN ASSISTANT

## 2019-05-22 PROCEDURE — 3017F COLORECTAL CA SCREEN DOC REV: CPT | Performed by: PHYSICIAN ASSISTANT

## 2019-05-22 PROCEDURE — 81002 URINALYSIS NONAUTO W/O SCOPE: CPT | Performed by: PHYSICIAN ASSISTANT

## 2019-05-22 PROCEDURE — 4040F PNEUMOC VAC/ADMIN/RCVD: CPT | Performed by: PHYSICIAN ASSISTANT

## 2019-05-22 ASSESSMENT — ENCOUNTER SYMPTOMS
DIARRHEA: 0
VOMITING: 0
EYE PAIN: 0
NAUSEA: 0
BACK PAIN: 1
CONSTIPATION: 0
COUGH: 0
SINUS PRESSURE: 0
WHEEZING: 0
SHORTNESS OF BREATH: 0

## 2019-05-22 NOTE — PROGRESS NOTES
Subjective  Colin Collins, 79 y.o. male presents today with:  Chief Complaint   Patient presents with    Back Pain     Patient c/o low back pain x 1 week. Patient states he is thinking a kidney infection. Patient states its a shooting pain. Treatment Adherence:   Medication compliance:  compliant all of the time  Diet compliance:  compliant most of the time  Weight trend: stable  Current exercise: walks 7 time(s) per week      Diabetes Mellitus Type 2: Current symptoms/problems include none. Home blood sugar records:  patient tests 2 time(s) per week  Any episodes of hypoglycemia? no  Eye exam current (within one year): yes  Tobacco history: He  reports that he has never smoked. He has never used smokeless tobacco.   Daily Aspirin? Yes  Known diabetic complications: none        Lab Results   Component Value Date    LABA1C 6.6 03/25/2019    LABA1C 6.6 10/17/2018    LABA1C 6.2 (H) 12/07/2017     Lab Results   Component Value Date    LABMICR 1.40 03/25/2019    CREATININE 1.18 04/11/2019     Lab Results   Component Value Date    ALT 17 04/11/2019    AST 22 04/11/2019     Lab Results   Component Value Date    CHOL 113 12/07/2017    TRIG 184 12/07/2017    HDL 26 (L) 04/11/2019    LDLCALC 46 04/11/2019          HPI  Pt of samir MURRAY is here complaining of pain in his left lower back, worsens with movement, particularly with lying down and turning to his side. No radiation to his legs, dysuria gross hematuria, urgency, frequency  - has concern for kidney stone  Denies injury although admits to doing some gardening  Review of Systems   Constitutional: Negative for fatigue. HENT: Negative for congestion and sinus pressure. Eyes: Negative for pain. Respiratory: Negative for cough, shortness of breath and wheezing. Gastrointestinal: Negative for constipation, diarrhea, nausea and vomiting. Genitourinary: Negative for dysuria. Musculoskeletal: Positive for back pain. Negative for neck pain. Financial resource strain: Not on file    Food insecurity:     Worry: Not on file     Inability: Not on file    Transportation needs:     Medical: Not on file     Non-medical: Not on file   Tobacco Use    Smoking status: Never Smoker    Smokeless tobacco: Never Used   Substance and Sexual Activity    Alcohol use: Yes     Comment: rare social    Drug use: No    Sexual activity: Not on file   Lifestyle    Physical activity:     Days per week: Not on file     Minutes per session: Not on file    Stress: Not on file   Relationships    Social connections:     Talks on phone: Not on file     Gets together: Not on file     Attends Mandaen service: Not on file     Active member of club or organization: Not on file     Attends meetings of clubs or organizations: Not on file     Relationship status: Not on file    Intimate partner violence:     Fear of current or ex partner: Not on file     Emotionally abused: Not on file     Physically abused: Not on file     Forced sexual activity: Not on file   Other Topics Concern    Not on file   Social History Narrative    Lives w wife     Family History   Problem Relation Age of Onset    Heart Disease Mother 67        CHF    Cancer Father 68        liver/ pancreatic    No Known Problems Sister     Cancer Brother         chronic lukemia    Arthritis Brother     No Known Problems Daughter      Allergies   Allergen Reactions    Dye [Barium-Containing Compounds] Hives    Iodides     Iodinated Diagnostic Agents Hives        Current Outpatient Medications   Medication Sig Dispense Refill    diclofenac sodium 1 % GEL Apply 4 g topically 4 times daily 1 Tube 3    doxepin (SINEQUAN) 10 MG capsule Take 1 capsule by mouth nightly 30 capsule 5    captopril-hydrochlorothiazide (CAPOZIDE) 50-25 MG per tablet TAKE 1 TABLET 1 TO 2 TIMES DAILY 180 tablet 2    furosemide (LASIX) 40 MG tablet TAKE 1 TABLET DAILY 90 tablet 3    metFORMIN (GLUCOPHAGE) 500 MG tablet TAKE 2 TABLETS BY MOUTH TWICE DAILY, WITH MEALS 360 tablet 3    potassium chloride (KLOR-CON M) 20 MEQ extended release tablet TAKE 1 TABLET BY MOUTH THREE TIMES DAILY 270 tablet 3    metoprolol succinate (TOPROL XL) 50 MG extended release tablet TAKE 1 TABLET BY MOUTH DAILY 90 tablet 3    atorvastatin (LIPITOR) 40 MG tablet TAKE 1 TABLET BY MOUTH EVERY NIGHT 90 tablet 3    acetaminophen (TYLENOL) 325 MG tablet Take 2 tablets by mouth every 6 hours 120 tablet 0    aspirin 81 MG EC tablet Take 1 tablet by mouth 2 times daily (Patient taking differently: Take 81 mg by mouth daily ) 60 tablet 0    Alcohol Swabs PADS Use as directed with insulin injections 300 each 3    Multiple Vitamins-Minerals (MULTIVITAMIN PO) Take 1 tablet by mouth daily. No current facility-administered medications for this visit. Objective    Vitals:    05/22/19 1033   BP: 110/66   Site: Right Upper Arm   Position: Sitting   Cuff Size: Large Adult   Pulse: 74   Resp: 16   Temp: 98.1 °F (36.7 °C)   TempSrc: Oral   SpO2: 98%   Weight: 238 lb 6.4 oz (108.1 kg)   Height: 5' 6\" (1.676 m)     Physical Exam   Constitutional: He is oriented to person, place, and time. He appears well-developed and well-nourished. HENT:   Head: Normocephalic and atraumatic. Eyes: Pupils are equal, round, and reactive to light. Conjunctivae and EOM are normal.   Abdominal: Soft. Bowel sounds are normal. He exhibits no mass. There is no tenderness. There is no guarding and no CVA tenderness. No hernia. Musculoskeletal: He exhibits tenderness. Thoracic back: He exhibits no bony tenderness, no deformity and no spasm. Lumbar back: He exhibits spasm. He exhibits no tenderness, no bony tenderness and no deformity. Left para lumbar region   Neurological: He is alert and oriented to person, place, and time. He exhibits normal muscle tone. Coordination normal.   Skin: Skin is warm and dry. Assessment & Plan    Diagnosis Orders   1.  Acute left-sided low back pain without sciatica           Orders Placed This Encounter   Procedures    XR LUMBAR SPINE (MIN 4 VIEWS)     Standing Status:   Future     Number of Occurrences:   1     Standing Expiration Date:   5/22/2020     Order Specific Question:   Reason for exam:     Answer:   pain    Urinalysis     Standing Status:   Future     Standing Expiration Date:   5/22/2020    POCT Urinalysis no Micro     Orders Placed This Encounter   Medications    diclofenac sodium 1 % GEL     Sig: Apply 4 g topically 4 times daily     Dispense:  1 Tube     Refill:  3     There are no discontinued medications. Return if symptoms worsen or fail to improve.   Would like to avoid sedating med such as narcotic pain meds or muscle relaxer, concerned about renal function though levels were good 4/2019 so will avoid oral  nsaid  Sugar Castellanos PA-C

## 2019-07-17 ENCOUNTER — HOSPITAL ENCOUNTER (OUTPATIENT)
Dept: NEUROLOGY | Age: 69
Discharge: HOME | End: 2019-07-17
Payer: MEDICARE

## 2019-07-17 PROCEDURE — 95886 MUSC TEST DONE W/N TEST COMP: CPT

## 2019-07-17 PROCEDURE — 95910 NRV CNDJ TEST 7-8 STUDIES: CPT

## 2019-07-17 NOTE — PROCEDURES
requested.       Claudia Alfonso MD    D: 07/17/2019 13:36:59       T: 07/17/2019 14:56:16     [email or web address]  Job#: [id number]     Doc#: [id number]    CC:

## 2019-08-30 ENCOUNTER — HOSPITAL ENCOUNTER (OUTPATIENT)
Dept: ORTHOPEDIC SURGERY | Age: 69
Discharge: HOME | End: 2019-09-01
Payer: MEDICARE

## 2019-08-30 DIAGNOSIS — M25.561 RIGHT KNEE PAIN, UNSPECIFIED CHRONICITY: ICD-10-CM

## 2019-08-30 PROCEDURE — 73562 X-RAY EXAM OF KNEE 3: CPT

## 2019-09-01 DIAGNOSIS — F51.04 PSYCHOPHYSIOLOGICAL INSOMNIA: ICD-10-CM

## 2019-09-03 RX ORDER — DOXEPIN HYDROCHLORIDE 10 MG/1
CAPSULE ORAL
Qty: 90 CAPSULE | Refills: 3 | Status: SHIPPED | OUTPATIENT
Start: 2019-09-03 | End: 2020-09-17 | Stop reason: SDUPTHER

## 2019-11-06 ENCOUNTER — OFFICE VISIT (OUTPATIENT)
Dept: FAMILY MEDICINE CLINIC | Age: 69
End: 2019-11-06
Payer: MEDICARE

## 2019-11-06 VITALS
SYSTOLIC BLOOD PRESSURE: 120 MMHG | DIASTOLIC BLOOD PRESSURE: 62 MMHG | RESPIRATION RATE: 12 BRPM | HEIGHT: 66 IN | TEMPERATURE: 98.1 F | HEART RATE: 75 BPM | OXYGEN SATURATION: 95 % | WEIGHT: 236 LBS | BODY MASS INDEX: 37.93 KG/M2

## 2019-11-06 VITALS
HEIGHT: 66 IN | SYSTOLIC BLOOD PRESSURE: 120 MMHG | WEIGHT: 236 LBS | BODY MASS INDEX: 37.93 KG/M2 | HEART RATE: 75 BPM | DIASTOLIC BLOOD PRESSURE: 62 MMHG | TEMPERATURE: 98.1 F | OXYGEN SATURATION: 95 % | RESPIRATION RATE: 12 BRPM

## 2019-11-06 DIAGNOSIS — C91.10 CLL (CHRONIC LYMPHOCYTIC LEUKEMIA) (HCC): ICD-10-CM

## 2019-11-06 DIAGNOSIS — M25.511 CHRONIC RIGHT SHOULDER PAIN: ICD-10-CM

## 2019-11-06 DIAGNOSIS — M79.672 LEFT FOOT PAIN: ICD-10-CM

## 2019-11-06 DIAGNOSIS — I10 ESSENTIAL HYPERTENSION: ICD-10-CM

## 2019-11-06 DIAGNOSIS — Z00.00 ROUTINE GENERAL MEDICAL EXAMINATION AT A HEALTH CARE FACILITY: Primary | ICD-10-CM

## 2019-11-06 DIAGNOSIS — G89.29 CHRONIC RIGHT SHOULDER PAIN: ICD-10-CM

## 2019-11-06 DIAGNOSIS — Z23 NEED FOR INFLUENZA VACCINATION: ICD-10-CM

## 2019-11-06 DIAGNOSIS — E78.5 DYSLIPIDEMIA: ICD-10-CM

## 2019-11-06 DIAGNOSIS — C85.93 LYMPHOMA OF GASTROINTESTINAL TRACT (HCC): ICD-10-CM

## 2019-11-06 DIAGNOSIS — Z12.5 SCREENING FOR PROSTATE CANCER: ICD-10-CM

## 2019-11-06 DIAGNOSIS — E11.9 TYPE 2 DIABETES MELLITUS WITHOUT COMPLICATION, WITHOUT LONG-TERM CURRENT USE OF INSULIN (HCC): Primary | ICD-10-CM

## 2019-11-06 LAB
ALBUMIN SERPL-MCNC: 4.5 G/DL (ref 3.5–4.6)
ALP BLD-CCNC: 80 U/L (ref 35–104)
ALT SERPL-CCNC: 26 U/L (ref 0–41)
ANION GAP SERPL CALCULATED.3IONS-SCNC: 13 MEQ/L (ref 9–15)
AST SERPL-CCNC: 26 U/L (ref 0–40)
BILIRUB SERPL-MCNC: 0.7 MG/DL (ref 0.2–0.7)
BUN BLDV-MCNC: 20 MG/DL (ref 8–23)
CALCIUM SERPL-MCNC: 9.2 MG/DL (ref 8.5–9.9)
CHLORIDE BLD-SCNC: 102 MEQ/L (ref 95–107)
CHOLESTEROL, FASTING: 113 MG/DL (ref 0–199)
CO2: 28 MEQ/L (ref 20–31)
CREAT SERPL-MCNC: 1.24 MG/DL (ref 0.7–1.2)
GFR AFRICAN AMERICAN: >60
GFR NON-AFRICAN AMERICAN: 58.1
GLOBULIN: 3.8 G/DL (ref 2.3–3.5)
GLUCOSE BLD-MCNC: 106 MG/DL (ref 70–99)
HBA1C MFR BLD: 6.8 %
HDLC SERPL-MCNC: 27 MG/DL (ref 40–59)
LDL CHOLESTEROL CALCULATED: 50 MG/DL (ref 0–129)
POTASSIUM SERPL-SCNC: 4.4 MEQ/L (ref 3.4–4.9)
PROSTATE SPECIFIC ANTIGEN: 3.59 NG/ML (ref 0–5.4)
SODIUM BLD-SCNC: 143 MEQ/L (ref 135–144)
TOTAL PROTEIN: 8.3 G/DL (ref 6.3–8)
TRIGLYCERIDE, FASTING: 181 MG/DL (ref 0–150)

## 2019-11-06 PROCEDURE — 3044F HG A1C LEVEL LT 7.0%: CPT | Performed by: FAMILY MEDICINE

## 2019-11-06 PROCEDURE — 99214 OFFICE O/P EST MOD 30 MIN: CPT | Performed by: FAMILY MEDICINE

## 2019-11-06 PROCEDURE — 3017F COLORECTAL CA SCREEN DOC REV: CPT | Performed by: FAMILY MEDICINE

## 2019-11-06 PROCEDURE — G0438 PPPS, INITIAL VISIT: HCPCS | Performed by: FAMILY MEDICINE

## 2019-11-06 PROCEDURE — G8427 DOCREV CUR MEDS BY ELIG CLIN: HCPCS | Performed by: FAMILY MEDICINE

## 2019-11-06 PROCEDURE — G8482 FLU IMMUNIZE ORDER/ADMIN: HCPCS | Performed by: FAMILY MEDICINE

## 2019-11-06 PROCEDURE — 1036F TOBACCO NON-USER: CPT | Performed by: FAMILY MEDICINE

## 2019-11-06 PROCEDURE — 1123F ACP DISCUSS/DSCN MKR DOCD: CPT | Performed by: FAMILY MEDICINE

## 2019-11-06 PROCEDURE — 4040F PNEUMOC VAC/ADMIN/RCVD: CPT | Performed by: FAMILY MEDICINE

## 2019-11-06 PROCEDURE — 90653 IIV ADJUVANT VACCINE IM: CPT | Performed by: FAMILY MEDICINE

## 2019-11-06 PROCEDURE — 83036 HEMOGLOBIN GLYCOSYLATED A1C: CPT | Performed by: FAMILY MEDICINE

## 2019-11-06 PROCEDURE — 2022F DILAT RTA XM EVC RTNOPTHY: CPT | Performed by: FAMILY MEDICINE

## 2019-11-06 PROCEDURE — G8417 CALC BMI ABV UP PARAM F/U: HCPCS | Performed by: FAMILY MEDICINE

## 2019-11-06 PROCEDURE — G0008 ADMIN INFLUENZA VIRUS VAC: HCPCS | Performed by: FAMILY MEDICINE

## 2019-11-06 ASSESSMENT — LIFESTYLE VARIABLES
HOW OFTEN DURING THE LAST YEAR HAVE YOU FOUND THAT YOU WERE NOT ABLE TO STOP DRINKING ONCE YOU HAD STARTED: 0
HOW OFTEN DURING THE LAST YEAR HAVE YOU HAD A FEELING OF GUILT OR REMORSE AFTER DRINKING: 0
HOW MANY STANDARD DRINKS CONTAINING ALCOHOL DO YOU HAVE ON A TYPICAL DAY: 0
HAVE YOU OR SOMEONE ELSE BEEN INJURED AS A RESULT OF YOUR DRINKING: 0
HOW OFTEN DO YOU HAVE SIX OR MORE DRINKS ON ONE OCCASION: 0
AUDIT-C TOTAL SCORE: 1
AUDIT TOTAL SCORE: 1
HOW OFTEN DURING THE LAST YEAR HAVE YOU NEEDED AN ALCOHOLIC DRINK FIRST THING IN THE MORNING TO GET YOURSELF GOING AFTER A NIGHT OF HEAVY DRINKING: 0
HOW OFTEN DO YOU HAVE A DRINK CONTAINING ALCOHOL: 1
HOW OFTEN DURING THE LAST YEAR HAVE YOU FAILED TO DO WHAT WAS NORMALLY EXPECTED FROM YOU BECAUSE OF DRINKING: 0
HOW OFTEN DURING THE LAST YEAR HAVE YOU BEEN UNABLE TO REMEMBER WHAT HAPPENED THE NIGHT BEFORE BECAUSE YOU HAD BEEN DRINKING: 0
HAS A RELATIVE, FRIEND, DOCTOR, OR ANOTHER HEALTH PROFESSIONAL EXPRESSED CONCERN ABOUT YOUR DRINKING OR SUGGESTED YOU CUT DOWN: 0

## 2019-11-06 ASSESSMENT — PATIENT HEALTH QUESTIONNAIRE - PHQ9
SUM OF ALL RESPONSES TO PHQ QUESTIONS 1-9: 0
SUM OF ALL RESPONSES TO PHQ QUESTIONS 1-9: 0

## 2019-12-18 RX ORDER — BENAZEPRIL HYDROCHLORIDE 20 MG/1
20 TABLET ORAL DAILY
Qty: 90 TABLET | Refills: 0 | Status: SHIPPED | OUTPATIENT
Start: 2019-12-18 | End: 2020-03-11 | Stop reason: SDUPTHER

## 2020-01-03 RX ORDER — POTASSIUM CHLORIDE 20 MEQ/1
10 TABLET, EXTENDED RELEASE ORAL 3 TIMES DAILY
Qty: 270 TABLET | Refills: 3 | Status: SHIPPED | OUTPATIENT
Start: 2020-01-03 | End: 2020-02-17 | Stop reason: SDUPTHER

## 2020-01-05 RX ORDER — METOPROLOL SUCCINATE 50 MG/1
TABLET, EXTENDED RELEASE ORAL
Qty: 90 TABLET | Refills: 3 | Status: SHIPPED | OUTPATIENT
Start: 2020-01-05 | End: 2021-04-07 | Stop reason: SDUPTHER

## 2020-01-05 RX ORDER — ATORVASTATIN CALCIUM 40 MG/1
TABLET, FILM COATED ORAL
Qty: 90 TABLET | Refills: 3 | Status: SHIPPED | OUTPATIENT
Start: 2020-01-05 | End: 2020-10-02 | Stop reason: SDUPTHER

## 2020-02-17 RX ORDER — POTASSIUM CHLORIDE 20 MEQ/1
20 TABLET, EXTENDED RELEASE ORAL 3 TIMES DAILY
Qty: 90 TABLET | Refills: 0 | Status: SHIPPED | OUTPATIENT
Start: 2020-02-17 | End: 2020-03-15

## 2020-02-20 ENCOUNTER — OFFICE VISIT (OUTPATIENT)
Dept: FAMILY MEDICINE CLINIC | Age: 70
End: 2020-02-20
Payer: MEDICARE

## 2020-02-20 VITALS
SYSTOLIC BLOOD PRESSURE: 112 MMHG | OXYGEN SATURATION: 98 % | TEMPERATURE: 97.8 F | WEIGHT: 239 LBS | HEART RATE: 72 BPM | RESPIRATION RATE: 16 BRPM | BODY MASS INDEX: 38.41 KG/M2 | DIASTOLIC BLOOD PRESSURE: 60 MMHG | HEIGHT: 66 IN

## 2020-02-20 DIAGNOSIS — I10 ESSENTIAL HYPERTENSION: ICD-10-CM

## 2020-02-20 PROBLEM — E66.01 MORBIDLY OBESE (HCC): Status: ACTIVE | Noted: 2020-02-20

## 2020-02-20 PROBLEM — M46.96 UNSPECIFIED INFLAMMATORY SPONDYLOPATHY, LUMBAR REGION (HCC): Status: ACTIVE | Noted: 2020-02-20

## 2020-02-20 LAB
ANION GAP SERPL CALCULATED.3IONS-SCNC: 18 MEQ/L (ref 9–15)
BUN BLDV-MCNC: 33 MG/DL (ref 8–23)
CALCIUM SERPL-MCNC: 9.6 MG/DL (ref 8.5–9.9)
CHLORIDE BLD-SCNC: 98 MEQ/L (ref 95–107)
CO2: 24 MEQ/L (ref 20–31)
CREAT SERPL-MCNC: 1.43 MG/DL (ref 0.7–1.2)
GFR AFRICAN AMERICAN: 59.6
GFR NON-AFRICAN AMERICAN: 49.3
GLUCOSE BLD-MCNC: 122 MG/DL (ref 70–99)
HBA1C MFR BLD: 6.6 %
POTASSIUM SERPL-SCNC: 5 MEQ/L (ref 3.4–4.9)
SODIUM BLD-SCNC: 140 MEQ/L (ref 135–144)

## 2020-02-20 PROCEDURE — 83036 HEMOGLOBIN GLYCOSYLATED A1C: CPT | Performed by: FAMILY MEDICINE

## 2020-02-20 PROCEDURE — G8417 CALC BMI ABV UP PARAM F/U: HCPCS | Performed by: FAMILY MEDICINE

## 2020-02-20 PROCEDURE — G8482 FLU IMMUNIZE ORDER/ADMIN: HCPCS | Performed by: FAMILY MEDICINE

## 2020-02-20 PROCEDURE — 3017F COLORECTAL CA SCREEN DOC REV: CPT | Performed by: FAMILY MEDICINE

## 2020-02-20 PROCEDURE — 1123F ACP DISCUSS/DSCN MKR DOCD: CPT | Performed by: FAMILY MEDICINE

## 2020-02-20 PROCEDURE — G8427 DOCREV CUR MEDS BY ELIG CLIN: HCPCS | Performed by: FAMILY MEDICINE

## 2020-02-20 PROCEDURE — 4040F PNEUMOC VAC/ADMIN/RCVD: CPT | Performed by: FAMILY MEDICINE

## 2020-02-20 PROCEDURE — 99214 OFFICE O/P EST MOD 30 MIN: CPT | Performed by: FAMILY MEDICINE

## 2020-02-20 PROCEDURE — 1036F TOBACCO NON-USER: CPT | Performed by: FAMILY MEDICINE

## 2020-02-20 PROCEDURE — 3044F HG A1C LEVEL LT 7.0%: CPT | Performed by: FAMILY MEDICINE

## 2020-02-20 PROCEDURE — 2022F DILAT RTA XM EVC RTNOPTHY: CPT | Performed by: FAMILY MEDICINE

## 2020-02-20 ASSESSMENT — PATIENT HEALTH QUESTIONNAIRE - PHQ9
SUM OF ALL RESPONSES TO PHQ QUESTIONS 1-9: 0
1. LITTLE INTEREST OR PLEASURE IN DOING THINGS: 0
SUM OF ALL RESPONSES TO PHQ QUESTIONS 1-9: 0
2. FEELING DOWN, DEPRESSED OR HOPELESS: 0
SUM OF ALL RESPONSES TO PHQ9 QUESTIONS 1 & 2: 0

## 2020-02-20 NOTE — PROGRESS NOTES
environmental allergies and food allergies. Physical Exam    Patient's medication, allergies, past medical, surgical, social and family histories were reviewed and updated as appropriate. PHYSICAL EXAM   General appearance: Alert oriented pleasant cooperative no acute distress. HEENT: Eyes clear nonicteric facial muscles symmetrical.  On the left side of his forehead he has a large minimally hyperpigmented spot that is flat but there are no borders no edges no ulcerations. Neck: Soft nontender no adenopathy, no bruits  Lungs: Clear to auscultation without wheezes  Heart: Regular rate and rhythm without murmur  Chest wall: Between his breasts on his sternum he has a slight minimally hyperpigmented region with virtually no scale noted otherwise no borders no excoriations and no ulcerations  Extremities: Right arm: On the dorsum of his right hand he has a 1 cm perfectly round papule/macule that has crusting it appears to have some hemorrhage in it do not believe is particularly pigmented and is not excoriated it is dry          Assessment:   Diagnosis Orders   1. Type 2 diabetes mellitus without complication, without long-term current use of insulin ([facility])  POCT glycosylated hemoglobin (Hb A1C) under 7 today under good control   2. Essential hypertension  Basic Metabolic Panel stable reviewed medications with patient today   3. CLL (chronic lymphocytic leukemia) ([facility]     4. Unspecified inflammatory spondylopathy, lumbar region ([facility]     5. Morbidly obese ([facility]     6. Dyslipidemia     7. Skin neoplasm  LENORA - Selina Jimenez MD, Dermatoloty, [facility]   8. Tinea versicolor     9.  Lentigines                 Plan:  Current Outpatient Medications   Medication Sig Dispense Refill    potassium chloride (KLOR-CON M) 20 MEQ extended release tablet Take 1 tablet by mouth 3 times daily 90 tablet 0    metFORMIN (GLUCOPHAGE) 500 MG tablet TAKE 2 TABLETS BY MOUTH TWICE A DAY WITH MEALAS 360 tablet 3    metoprolol succinate (TOPROL XL) 50 MG extended release tablet TAKE 1 TABLET BY MOUTH EVERY DAY 90 tablet 3    atorvastatin (LIPITOR) 40 MG tablet TAKE 1 TABLET BY MOUTH EVERY EVENING 90 tablet 3    benazepril (LOTENSIN) 20 MG tablet Take 1 tablet by mouth daily 90 tablet 0    doxepin (SINEQUAN) 10 MG capsule TAKE 1 CAPSULE BY MOUTH EVERY DAY AT NIGHT 90 capsule 3    diclofenac sodium 1 % GEL Apply 4 g topically 4 times daily 1 Tube 3    furosemide (LASIX) 40 MG tablet TAKE 1 TABLET DAILY 90 tablet 3    aspirin 81 MG EC tablet Take 1 tablet by mouth 2 times daily (Patient taking differently: Take 81 mg by mouth daily ) 60 tablet 0    Alcohol Swabs PADS Use as directed with insulin injections 300 each 3    Multiple Vitamins-Minerals (MULTIVITAMIN PO) Take 1 tablet by mouth daily. No current facility-administered medications for this visit. Orders Placed This Encounter   Procedures    Basic Metabolic Panel     Standing Status:   Future     Standing Expiration Date:   2/20/2021   [facility] Payne [facility]     Referral Priority:   Routine     Referral Type:   Eval and Treat     Referral Reason:   Specialty Services Required     Referred to Provider:   Sesar Villarreal MD     Requested Specialty:   Dermatology     Number of Visits Requested:   1    POCT glycosylated hemoglobin (Hb A1C)       No orders of the defined types were placed in this encounter. Return in about 6 months (around 8/20/2020).     Dr. Macario Aabrca      2/20/20  8:51 AM

## 2020-03-16 RX ORDER — POTASSIUM CHLORIDE 1500 MG/1
TABLET, EXTENDED RELEASE ORAL
Qty: 90 TABLET | Refills: 3 | Status: SHIPPED | OUTPATIENT
Start: 2020-03-16 | End: 2020-06-20 | Stop reason: SDUPTHER

## 2020-06-20 RX ORDER — POTASSIUM CHLORIDE 1500 MG/1
TABLET, EXTENDED RELEASE ORAL
Qty: 270 TABLET | Refills: 1 | Status: SHIPPED | OUTPATIENT
Start: 2020-06-20 | Stop reason: SDUPTHER

## 2020-08-24 ENCOUNTER — TELEPHONE (OUTPATIENT)
Dept: FAMILY MEDICINE CLINIC | Age: 70
End: 2020-08-24

## 2020-08-24 ENCOUNTER — VIRTUAL VISIT (OUTPATIENT)
Dept: FAMILY MEDICINE CLINIC | Age: 70
End: 2020-08-24
Payer: MEDICARE

## 2020-08-24 PROCEDURE — G8427 DOCREV CUR MEDS BY ELIG CLIN: HCPCS | Performed by: FAMILY MEDICINE

## 2020-08-24 PROCEDURE — 99214 OFFICE O/P EST MOD 30 MIN: CPT | Performed by: FAMILY MEDICINE

## 2020-08-24 PROCEDURE — 4040F PNEUMOC VAC/ADMIN/RCVD: CPT | Performed by: FAMILY MEDICINE

## 2020-08-24 PROCEDURE — 1123F ACP DISCUSS/DSCN MKR DOCD: CPT | Performed by: FAMILY MEDICINE

## 2020-08-24 PROCEDURE — 2022F DILAT RTA XM EVC RTNOPTHY: CPT | Performed by: FAMILY MEDICINE

## 2020-08-24 PROCEDURE — 3017F COLORECTAL CA SCREEN DOC REV: CPT | Performed by: FAMILY MEDICINE

## 2020-08-24 PROCEDURE — 3044F HG A1C LEVEL LT 7.0%: CPT | Performed by: FAMILY MEDICINE

## 2020-08-24 ASSESSMENT — PATIENT HEALTH QUESTIONNAIRE - PHQ9
SUM OF ALL RESPONSES TO PHQ9 QUESTIONS 1 & 2: 0
SUM OF ALL RESPONSES TO PHQ QUESTIONS 1-9: 0
2. FEELING DOWN, DEPRESSED OR HOPELESS: 0
SUM OF ALL RESPONSES TO PHQ QUESTIONS 1-9: 0
1. LITTLE INTEREST OR PLEASURE IN DOING THINGS: 0

## 2020-08-24 NOTE — PROGRESS NOTES
Patient: Max Fitch    YOB: 1950    Date: 8/24/20    Chief Complaint   Patient presents with    6 Month Follow-Up       Patient Active Problem List    Diagnosis Date Noted    Unspecified inflammatory spondylopathy, lumbar region ([facility] 02/20/2020    Morbidly obese ([facility] 02/20/2020    Essential hypertension 06/07/2018    Type 2 diabetes mellitus without complication, without long-term current use of insulin ([facility] 06/07/2017    Dyslipidemia 06/07/2017    CLL (chronic lymphocytic leukemia) ([facility]     Lymphoma of gastrointestinal tract (HCC)     Arthritis, lumbar spine 04/08/2015    Cyst, epididymis 04/13/2011    Hydrocele 04/13/2011    Varicocele 04/13/2011    Stiffness of joint, not elsewhere classified, forearm 02/02/2009    Stiffness of joint, not elsewhere classified, hand 02/02/2009    Pain in joint, hand 02/02/2009    Other closed fractures of distal end of radius (alone) 12/24/2008       Allergies   Allergen Reactions    Dye [Barium-Containing Compounds] Hives    Iodides     Iodinated Diagnostic Agents Hives       There were no vitals filed for this visit. There is no height or weight on file to calculate BMI. HPI    TELEHEALTH EVALUATION -- Audio/Visual (During [facility] public health emergency        Due to COVID 19 outbreak, patient's office visit was converted to a virtual visit. The patient was identified at the start of the visit. Patient was contacted and agreed to proceed with a virtual visit via Doxy. me Time spent in visit with management in direct patient care 26 minutes. The risks and benefits of converting to a virtual visit were discussed in light of the current infectious disease epidemic. Patient also understood that insurance coverage and co-pays are up to their individual insurance plans and this is a billable visit.     Pursuant to the emergency declaration under the [facility] waiver authority and the Coronavirus Preparedness and Response Supplemental Appropriations Act, this Virtual  Visit was conducted, with patient's consent, to reduce the patient's risk of exposure to COVID-19 and provide continuity of care for an established patient. Services were provided through a video discussion virtually to substitute for in-person clinic visit. The patient was located home and myself, the provider is located home. Patient isauro Rich (:  [birth year] ) has requested an audio/video evaluation for the following concern(s):     HPI:    He is feeling well. Seeing all of his doctors and cancers are in abeyance. Due for labs and psa. Reports sugars good at home and see vitials. Review of Systems    Constitutional: Negative for fatigue, fever and sweats. HEENT: Negative for eye discharge and vision loss. Negative for ear drainage, hearing loss and nasal drainage. Respiratory: Negative for cough, dyspnea and wheezing. Cardiovascular:  Negative for chest pain, claudication and irregular heartbeat/palpitations. Gastrointestinal: Negative for abdominal pain, nausea, vomiting, constipation and diarrhea. Genitourinary: No dysuria or penile discharge. Metabolic/Endocrine: Negative for cold intolerance, heat intolerance, polydipsia and polyphagia. No unintended weight loss or gain. Neuro/Psychiatric: Negative for gait disturbance. Negative for psychiatric symptoms. Dermatologic: Negative for pruritus and rash. Musculoskeletal: Negative for bone/joint symptoms. No numbness or tingling. No weakness. Hematology: Negative for bleeding and easy bruising. Immunology:  Negative for environmental allergies and food allergies. Physical Exam    Patient seen on video and is alert, comfortable in no acute distress. The patient is comfortable and appropriate and oriented. Able to speak without difficulty. Assessment:   Diagnosis Orders   1.  Type 2 diabetes mellitus without complication, without long-term current use of insulin (HCC)  Hemoglobin A1C   2. CLL (chronic lymphocytic leukemia) ([facility]  controlled   3. Lymphoma of gastrointestinal tract ([facility]  stable   4. Unspecified inflammatory spondylopathy, lumbar region ([facility]  stable   5. Essential hypertension  Comprehensive Metabolic Panel   6. Screening for prostate cancer  PSA Screening   7. Dyslipidemia  Lipid, Fasting       Right shoulder pain per dr. Kay Fraction:  Current Outpatient Medications   Medication Sig Dispense Refill    KLOR-CON M20 20 MEQ extended release tablet TAKE 1 TABLET BY MOUTH THREE TIMES A  tablet 1    benazepril (LOTENSIN) 20 MG tablet Take 1 tablet by mouth daily 90 tablet 3    metFORMIN (GLUCOPHAGE) 500 MG tablet TAKE 2 TABLETS BY MOUTH TWICE A DAY WITH MEALAS 360 tablet 3    metoprolol succinate (TOPROL XL) 50 MG extended release tablet TAKE 1 TABLET BY MOUTH EVERY DAY 90 tablet 3    atorvastatin (LIPITOR) 40 MG tablet TAKE 1 TABLET BY MOUTH EVERY EVENING 90 tablet 3    doxepin (SINEQUAN) 10 MG capsule TAKE 1 CAPSULE BY MOUTH EVERY DAY AT NIGHT 90 capsule 3    diclofenac sodium 1 % GEL Apply 4 g topically 4 times daily 1 Tube 3    furosemide (LASIX) 40 MG tablet TAKE 1 TABLET DAILY 90 tablet 3    aspirin 81 MG EC tablet Take 1 tablet by mouth 2 times daily (Patient taking differently: Take 81 mg by mouth daily ) 60 tablet 0    Alcohol Swabs PADS Use as directed with insulin injections 300 each 3    Multiple Vitamins-Minerals (MULTIVITAMIN PO) Take 1 tablet by mouth daily. No current facility-administered medications for this visit.       Orders Placed This Encounter   Procedures    Hemoglobin A1C     Standing Status:   Future     Standing Expiration Date:   8/24/2021    PSA Screening     Standing Status:   Future     Standing Expiration Date:   8/24/2021    Lipid, Fasting     Standing Status:   Future     Standing Expiration Date:   8/24/2021    Comprehensive Metabolic Panel     Standing Status: Future     Standing Expiration Date:   8/24/2021       No orders of the defined types were placed in this encounter. No follow-ups on file.     Dr. Darby Clemens      8/24/20  9:55 AM

## 2020-08-26 RX ORDER — FUROSEMIDE 40 MG/1
TABLET ORAL
Qty: 90 TABLET | Refills: 3 | Status: SHIPPED | OUTPATIENT
Start: 2020-08-26 | End: 2021-08-17 | Stop reason: SDUPTHER

## 2020-09-02 DIAGNOSIS — I10 ESSENTIAL HYPERTENSION: ICD-10-CM

## 2020-09-02 DIAGNOSIS — Z12.5 SCREENING FOR PROSTATE CANCER: ICD-10-CM

## 2020-09-02 DIAGNOSIS — E11.9 TYPE 2 DIABETES MELLITUS WITHOUT COMPLICATION, WITHOUT LONG-TERM CURRENT USE OF INSULIN (HCC): ICD-10-CM

## 2020-09-02 DIAGNOSIS — E78.5 DYSLIPIDEMIA: ICD-10-CM

## 2020-09-02 LAB
ALBUMIN SERPL-MCNC: 4.2 G/DL (ref 3.5–4.6)
ALP BLD-CCNC: 89 U/L (ref 35–104)
ALT SERPL-CCNC: 20 U/L (ref 0–41)
ANION GAP SERPL CALCULATED.3IONS-SCNC: 16 MEQ/L (ref 9–15)
AST SERPL-CCNC: 20 U/L (ref 0–40)
BILIRUB SERPL-MCNC: 0.6 MG/DL (ref 0.2–0.7)
BUN BLDV-MCNC: 29 MG/DL (ref 8–23)
CALCIUM SERPL-MCNC: 9.2 MG/DL (ref 8.5–9.9)
CHLORIDE BLD-SCNC: 104 MEQ/L (ref 95–107)
CHOLESTEROL, FASTING: 107 MG/DL (ref 0–199)
CO2: 20 MEQ/L (ref 20–31)
CREAT SERPL-MCNC: 1.39 MG/DL (ref 0.7–1.2)
GFR AFRICAN AMERICAN: >60
GFR NON-AFRICAN AMERICAN: 50.8
GLOBULIN: 3.7 G/DL (ref 2.3–3.5)
GLUCOSE BLD-MCNC: 116 MG/DL (ref 70–99)
HBA1C MFR BLD: 6.3 % (ref 4.8–5.9)
HDLC SERPL-MCNC: 24 MG/DL (ref 40–59)
LDL CHOLESTEROL CALCULATED: 36 MG/DL (ref 0–129)
POTASSIUM SERPL-SCNC: 4.6 MEQ/L (ref 3.4–4.9)
PROSTATE SPECIFIC ANTIGEN: 1.79 NG/ML (ref 0–5.4)
SODIUM BLD-SCNC: 140 MEQ/L (ref 135–144)
TOTAL PROTEIN: 7.9 G/DL (ref 6.3–8)
TRIGLYCERIDE, FASTING: 234 MG/DL (ref 0–150)

## 2020-09-17 RX ORDER — DOXEPIN HYDROCHLORIDE 10 MG/1
10 CAPSULE ORAL NIGHTLY
Qty: 90 CAPSULE | Refills: 3 | Status: SHIPPED | OUTPATIENT
Start: 2020-09-17 | End: 2021-09-09 | Stop reason: SDUPTHER

## 2020-10-02 RX ORDER — ATORVASTATIN CALCIUM 40 MG/1
TABLET, FILM COATED ORAL
Qty: 90 TABLET | Refills: 3 | Status: SHIPPED | OUTPATIENT
Start: 2020-10-02 | End: 2021-12-06 | Stop reason: SDUPTHER

## 2020-10-20 ENCOUNTER — OFFICE VISIT (OUTPATIENT)
Dept: FAMILY MEDICINE CLINIC | Age: 70
End: 2020-10-20
Payer: MEDICARE

## 2020-10-20 VITALS
BODY MASS INDEX: 37.28 KG/M2 | WEIGHT: 232 LBS | HEIGHT: 66 IN | HEART RATE: 76 BPM | TEMPERATURE: 98.6 F | RESPIRATION RATE: 16 BRPM | DIASTOLIC BLOOD PRESSURE: 73 MMHG | OXYGEN SATURATION: 97 % | SYSTOLIC BLOOD PRESSURE: 127 MMHG

## 2020-10-20 PROCEDURE — G8417 CALC BMI ABV UP PARAM F/U: HCPCS | Performed by: FAMILY MEDICINE

## 2020-10-20 PROCEDURE — 90694 VACC AIIV4 NO PRSRV 0.5ML IM: CPT | Performed by: FAMILY MEDICINE

## 2020-10-20 PROCEDURE — 4040F PNEUMOC VAC/ADMIN/RCVD: CPT | Performed by: FAMILY MEDICINE

## 2020-10-20 PROCEDURE — 1123F ACP DISCUSS/DSCN MKR DOCD: CPT | Performed by: FAMILY MEDICINE

## 2020-10-20 PROCEDURE — G0008 ADMIN INFLUENZA VIRUS VAC: HCPCS | Performed by: FAMILY MEDICINE

## 2020-10-20 PROCEDURE — 2022F DILAT RTA XM EVC RTNOPTHY: CPT | Performed by: FAMILY MEDICINE

## 2020-10-20 PROCEDURE — 99214 OFFICE O/P EST MOD 30 MIN: CPT | Performed by: FAMILY MEDICINE

## 2020-10-20 PROCEDURE — G8427 DOCREV CUR MEDS BY ELIG CLIN: HCPCS | Performed by: FAMILY MEDICINE

## 2020-10-20 PROCEDURE — G8484 FLU IMMUNIZE NO ADMIN: HCPCS | Performed by: FAMILY MEDICINE

## 2020-10-20 PROCEDURE — 3017F COLORECTAL CA SCREEN DOC REV: CPT | Performed by: FAMILY MEDICINE

## 2020-10-20 PROCEDURE — 1036F TOBACCO NON-USER: CPT | Performed by: FAMILY MEDICINE

## 2020-10-20 PROCEDURE — 3044F HG A1C LEVEL LT 7.0%: CPT | Performed by: FAMILY MEDICINE

## 2020-10-20 RX ORDER — LORATADINE 10 MG/1
10 TABLET ORAL DAILY
Qty: 30 TABLET | Refills: 0 | Status: SHIPPED | OUTPATIENT
Start: 2020-10-20 | End: 2020-11-15

## 2020-10-20 NOTE — PROGRESS NOTES
Patient: Ab Plaza    YOB: 1950    Date: 10/20/20    Chief Complaint   Patient presents with    Foot Pain     He complains of left heel pain.  Dysphagia     He complains of trouble swallowing, feels like something stuck in throat. Patient Active Problem List    Diagnosis Date Noted    [facility] 02/20/2020    Morbidly obese ([facility] 02/20/2020    Essential hypertension 06/07/2018    Type 2 diabetes mellitus without complication, without long-term current use of insulin ([facility] 06/07/2017    Dyslipidemia 06/07/2017    CLL (chronic lymphocytic leukemia) ([facility]     Lymphoma of gastrointestinal tract (HCC)     Arthritis, lumbar spine 04/08/2015    Cyst, epididymis 04/13/2011    Hydrocele 04/13/2011    Varicocele 04/13/2011    Stiffness of joint, not elsewhere classified, forearm 02/02/2009    Stiffness of joint, not elsewhere classified, hand 02/02/2009    Pain in joint, hand 02/02/2009    Other closed fractures of distal end of radius (alone) 12/24/2008       Allergies   Allergen Reactions    Dye [Barium-Containing Compounds] Hives    Iodides     Iodinated Diagnostic Agents Hives       Vitals:    10/20/20 0912   BP: 127/73   Site: Left Upper Arm   Position: Sitting   Cuff Size: Large Adult   Pulse: 76   Resp: 16   Temp: 98.6 °F (37 °C)   TempSrc: Oral   SpO2: 97%   Weight: 232 lb (105.2 kg)   Height: 5' 6\" (1.676 m)     Body mass index is 37.45 kg/m².   PHQ Scores 8/24/2020 2/20/2020 11/6/2019 3/25/2019 5/9/2018 12/7/2017 11/29/2016   PHQ2 Score 0 0 0 0 0 0 0   PHQ9 Score 0 0 0 0 0 0 0     Interpretation of Total Score Depression Severity: 1-4 = Minimal depression, 5-9 = Mild depression, 10-14 = Moderate depression, 15-19 = Moderately severe depression, 20-27 = Severe depression    Treatment Adherence:   Medication compliance:  compliant all of the time  Diet compliance:  compliant most of the time  Weight trend: decreasing  Current exercise: walks 3 time(s) per week    Diabetes Mellitus Type 2: Current symptoms/problems include none. Home blood sugar records:  patient does not test  Any episodes of hypoglycemia? no  Eye exam current (within one year): no  Tobacco history: He  reports that he has never smoked. He has never used smokeless tobacco.   Daily Aspirin? Yes  Known diabetic complications: none          HPI    He is following up on 2 main issues today. First he has noticed that he feels like there is something stuck in his throat or his kind of scratchy over the last 2 months. He has a history of allergies made worse by his dogs and the lawn in the leaves. No sneezing no itchy eyes no itchy ears and is not taking medication. No fever chills cough wheezing or swelling or rash. He noticed that he has had some left foot pain that is hurting more on his heel occasionally feels like he is walking on something he noticed that he is a big lump on his heel but he does not have on the other side. No history of injuries. Review of Systems    Constitutional: Negative for fatigue, fever and sweats. HEENT: Negative for eye discharge and vision loss. Negative for ear drainage, hearing loss and nasal drainage. Respiratory: Negative for cough, dyspnea and wheezing. Cardiovascular:  Negative for chest pain, claudication and irregular heartbeat/palpitations. Gastrointestinal: Negative for abdominal pain, nausea, vomiting, constipation and diarrhea. Genitourinary: No dysuria or penile discharge. Metabolic/Endocrine: Negative for cold intolerance, heat intolerance, polydipsia and polyphagia. No unintended weight loss or gain. Neuro/Psychiatric: Negative for gait disturbance. Negative for psychiatric symptoms. Dermatologic: Negative for pruritus and rash. Musculoskeletal:positive for bone/joint symptoms. No numbness or tingling. No weakness. Hematology: Negative for bleeding and easy bruising.   Immunology: Negative for environmental allergies and food allergies. Vaccine Information Sheet, \"Influenza - Inactivated\"  given to Yoselin Grant, or parent/legal guardian of  Yoselin Grant and verbalized understanding. Patient responses:    Have you ever had a reaction to a flu vaccine? No  Are you able to eat eggs without adverse effects? Yes  Do you have any current illness? No  Have you ever had Guillian Fenton Syndrome? No    Flu vaccine given per order. Please see immunization tab. Physical Exam    Patient's medication, allergies, past medical, surgical, social and family histories were reviewed and updated as appropriate. PHYSICAL EXAM   General appearance: Alert oriented pleasant cooperative no acute distress. HEENT: Eyes clear nonicteric oropharynx hyperemic bilaterally tonsils not enlarged no exudates induration or ulceration, ears TMs intact no erythema canals normal  Neck: Soft nontender no adenopathy  Lungs: Clear to auscultation without wheeze  Heart: Regular rate and rhythm  Extremities: Diabetic foot exam: Right foot unremarkable intact to sensation no deformity ulceration erythema or edema nails normal left foot patient has hammertoe deformity occurring he has some pronation occurring and he has a thick what almost feels like a lobular fat pad in the medial aspect of that heel as it is under the foot. No erythema edema and no tenderness. Assessment:   Diagnosis Orders   1. Neoplasm of uncertain behavior of foot  MRI FOOT LEFT WO CONTRAST   2. Type 2 diabetes mellitus without complication, without long-term current use of insulin ([facility])   DIABETES FOOT EXAM    Microalbumin / Creatinine Urine Ratio   3. Pharyngeal dysphagia  LENORA - Prashant Fischer MD, Otolaryngology, [facility]   4. Non-seasonal allergic rhinitis, unspecified trigger  loratadine (CLARITIN) 10 MG tablet   5.  Need for influenza vaccination  INFLUENZA, QUADV, ADJUVANTED, 65 YRS =, IM, PF, PREFILL SYR, 0.5ML (FLUAD)               Plan:  Current Outpatient Medications   Medication Sig Dispense Refill    loratadine (CLARITIN) 10 MG tablet Take 1 tablet by mouth daily 30 tablet 0    atorvastatin (LIPITOR) 40 MG tablet TAKE 1 TABLET BY MOUTH EVERY EVENING 90 tablet 3    doxepin (SINEQUAN) 10 MG capsule Take 1 capsule by mouth nightly 90 capsule 3    furosemide (LASIX) 40 MG tablet TAKE 1 TABLET DAILY 90 tablet 3    KLOR-CON M20 20 MEQ extended release tablet TAKE 1 TABLET BY MOUTH THREE TIMES A  tablet 1    benazepril (LOTENSIN) 20 MG tablet Take 1 tablet by mouth daily 90 tablet 3    metFORMIN (GLUCOPHAGE) 500 MG tablet TAKE 2 TABLETS BY MOUTH TWICE A DAY WITH MEALAS 360 tablet 3    metoprolol succinate (TOPROL XL) 50 MG extended release tablet TAKE 1 TABLET BY MOUTH EVERY DAY 90 tablet 3    diclofenac sodium 1 % GEL Apply 4 g topically 4 times daily 1 Tube 3    aspirin 81 MG EC tablet Take 1 tablet by mouth 2 times daily (Patient taking differently: Take 81 mg by mouth daily ) 60 tablet 0    Alcohol Swabs PADS Use as directed with insulin injections 300 each 3    Multiple Vitamins-Minerals (MULTIVITAMIN PO) Take 1 tablet by mouth daily. No current facility-administered medications for this visit.       Orders Placed This Encounter   Procedures    MRI FOOT LEFT WO CONTRAST     Standing Status:   Future     Standing Expiration Date:   10/20/2021    INFLUENZA, QUADV, ADJUVANTED, 72 YRS =, IM, PF, PREFILL SYR, 0.5ML (FLUAD)    Microalbumin / Creatinine Urine Ratio     Standing Status:   Future     Standing Expiration Date:   10/20/2021    AFL - Harley Vásquez MD, Otolaryngology, [facility]     Referral Priority:   Routine     Referral Type:   Eval and Treat     Referral Reason:   Specialty Services Required     Referred to Provider:   Blayne Naranjo MD     Requested Specialty:   Otolaryngology     Number of Visits Requested:   1     DIABETES FOOT EXAM       Orders Placed This Encounter Medications    loratadine (CLARITIN) 10 MG tablet     Sig: Take 1 tablet by mouth daily     Dispense:  30 tablet     Refill:  0              Return in about 6 weeks (around 12/1/2020).     Dr. Rachel Domingo      10/20/20  10:09 AM

## 2020-10-30 ENCOUNTER — HOSPITAL ENCOUNTER (OUTPATIENT)
Dept: MRI IMAGING | Age: 70
Discharge: HOME | End: 2020-11-01
Payer: MEDICARE

## 2020-10-30 PROCEDURE — 73718 MRI LOWER EXTREMITY W/O DYE: CPT

## 2020-12-03 ENCOUNTER — OFFICE VISIT (OUTPATIENT)
Dept: FAMILY MEDICINE CLINIC | Age: 70
End: 2020-12-03
Payer: MEDICARE

## 2020-12-03 VITALS
RESPIRATION RATE: 16 BRPM | DIASTOLIC BLOOD PRESSURE: 60 MMHG | OXYGEN SATURATION: 96 % | HEART RATE: 71 BPM | WEIGHT: 230 LBS | TEMPERATURE: 98.3 F | HEIGHT: 66 IN | BODY MASS INDEX: 36.96 KG/M2 | SYSTOLIC BLOOD PRESSURE: 100 MMHG

## 2020-12-03 DIAGNOSIS — E11.9 TYPE 2 DIABETES MELLITUS WITHOUT COMPLICATION, WITHOUT LONG-TERM CURRENT USE OF INSULIN (HCC): ICD-10-CM

## 2020-12-03 LAB
CREATININE URINE: 31.6 MG/DL
HBA1C MFR BLD: 5.9 %
MICROALBUMIN UR-MCNC: <1.2 MG/DL
MICROALBUMIN/CREAT UR-RTO: NORMAL MG/G (ref 0–30)

## 2020-12-03 PROCEDURE — 83036 HEMOGLOBIN GLYCOSYLATED A1C: CPT | Performed by: FAMILY MEDICINE

## 2020-12-03 PROCEDURE — 3288F FALL RISK ASSESSMENT DOCD: CPT | Performed by: FAMILY MEDICINE

## 2020-12-03 PROCEDURE — G0009 ADMIN PNEUMOCOCCAL VACCINE: HCPCS | Performed by: FAMILY MEDICINE

## 2020-12-03 PROCEDURE — 90732 PPSV23 VACC 2 YRS+ SUBQ/IM: CPT | Performed by: FAMILY MEDICINE

## 2020-12-03 PROCEDURE — 3017F COLORECTAL CA SCREEN DOC REV: CPT | Performed by: FAMILY MEDICINE

## 2020-12-03 PROCEDURE — 4040F PNEUMOC VAC/ADMIN/RCVD: CPT | Performed by: FAMILY MEDICINE

## 2020-12-03 PROCEDURE — 1123F ACP DISCUSS/DSCN MKR DOCD: CPT | Performed by: FAMILY MEDICINE

## 2020-12-03 PROCEDURE — 1036F TOBACCO NON-USER: CPT | Performed by: FAMILY MEDICINE

## 2020-12-03 PROCEDURE — 3044F HG A1C LEVEL LT 7.0%: CPT | Performed by: FAMILY MEDICINE

## 2020-12-03 PROCEDURE — 2022F DILAT RTA XM EVC RTNOPTHY: CPT | Performed by: FAMILY MEDICINE

## 2020-12-03 PROCEDURE — G8484 FLU IMMUNIZE NO ADMIN: HCPCS | Performed by: FAMILY MEDICINE

## 2020-12-03 PROCEDURE — G8417 CALC BMI ABV UP PARAM F/U: HCPCS | Performed by: FAMILY MEDICINE

## 2020-12-03 PROCEDURE — 99213 OFFICE O/P EST LOW 20 MIN: CPT | Performed by: FAMILY MEDICINE

## 2020-12-03 PROCEDURE — G8427 DOCREV CUR MEDS BY ELIG CLIN: HCPCS | Performed by: FAMILY MEDICINE

## 2020-12-03 NOTE — PROGRESS NOTES
Patient: Maurice Reeves    YOB: 1950    Date: 12/3/20    Chief Complaint   Patient presents with    Foot Pain     6 week follow up left foot pain. pain has resolved.  Dysphagia     6 week follow up. He had an appointment with Dr. Can Barton on 10/29/2020 , he has follow up appointment on 12/14/2020 with Dr. Can Barton       Patient Active Problem List    Diagnosis Date Noted    Unspecified inflammatory spondylopathy, lumbar region [facility]) 02/20/2020    Morbidly obese ([facility] 02/20/2020    Essential hypertension 06/07/2018    Type 2 diabetes mellitus without complication, without long-term current use of insulin ([facility] 06/07/2017    Dyslipidemia 06/07/2017    CLL (chronic lymphocytic leukemia) ([facility]     Lymphoma of gastrointestinal tract (HCC)     Arthritis, lumbar spine 04/08/2015    Cyst, epididymis 04/13/2011    Hydrocele 04/13/2011    Varicocele 04/13/2011    Stiffness of joint, not elsewhere classified, forearm 02/02/2009    Stiffness of joint, not elsewhere classified, hand 02/02/2009    Pain in joint, hand 02/02/2009    Other closed fractures of distal end of radius (alone) 12/24/2008       Allergies   Allergen Reactions    Dye [Barium-Containing Compounds] Hives    Iodides     Iodinated Diagnostic Agents Hives       Vitals:    12/03/20 0837   BP: 100/60   Site: Right Upper Arm   Position: Sitting   Cuff Size: Large Adult   Pulse: 71   Resp: 16   Temp: 98.3 °F (36.8 °C)   TempSrc: Oral   SpO2: 96%   Weight: 230 lb (104.3 kg)   Height: 5' 6\" (1.676 m)     Body mass index is 37.12 kg/m².   PHQ Scores 8/24/2020 2/20/2020 11/6/2019 3/25/2019 5/9/2018 12/7/2017 11/29/2016   PHQ2 Score 0 0 0 0 0 0 0   PHQ9 Score 0 0 0 0 0 0 0     Interpretation of Total Score Depression Severity: 1-4 = Minimal depression, 5-9 = Mild depression, 10-14 = Moderate depression, 15-19 = Moderately severe depression, 20-27 = Severe depression    Treatment Adherence:   Medication compliance:  compliant all of the time  Diet compliance:  compliant most of the time  Weight trend: decreasing  Current exercise: walks 3 time(s) per week    Diabetes Mellitus Type 2: Current symptoms/problems include none. Home blood sugar records:  patient does not test  Any episodes of hypoglycemia? no  Eye exam current (within one year): yes  Tobacco history: He  reports that he has never smoked. He has never used smokeless tobacco.   Daily Aspirin? Yes  Known diabetic complications: none          HPI    He is following up today on his foot pain his diabetes and his dysphagia. His illnesses have all resolved and his sugars are under excellent control. Review of Systems    Constitutional: Negative for fatigue, fever and sweats. HEENT: Negative for eye discharge and vision loss. Negative for ear drainage, hearing loss and nasal drainage. Respiratory: Negative for cough, dyspnea and wheezing. Cardiovascular:  Negative for chest pain, claudication and irregular heartbeat/palpitations. Gastrointestinal: Negative for abdominal pain, nausea, vomiting, constipation and diarrhea. Genitourinary: No dysuria or penile discharge. Metabolic/Endocrine: Negative for cold intolerance, heat intolerance, polydipsia and polyphagia. No unintended weight loss or gain. Neuro/Psychiatric: Negative for gait disturbance. Negative for psychiatric symptoms. Dermatologic: Negative for pruritus and rash. Musculoskeletal: positive for bone/joint symptoms. No numbness or tingling. No weakness. Hematology: Negative for bleeding and easy bruising. Immunology:  Negative for environmental allergies and food allergies. Physical Exam    Patient's medication, allergies, past medical, surgical, social and family histories were reviewed and updated as appropriate. PHYSICAL EXAM   General appearance: Alert oriented pleasant cooperative no acute distress.   Neck: Soft nontender no adenopathy no masses no bruits  Lungs: Clear to auscultation without wheezes rhonchi or rales  Heart: Regular rate and rhythm without murmurs rubs or gallops          Assessment:   Diagnosis Orders   1. Type 2 diabetes mellitus without complication, without long-term current use of insulin (HCC)  POCT glycosylated hemoglobin (Hb A1C) 5.9    Microalbumin / Creatinine Urine Ratio   2. CLL (chronic lymphocytic leukemia) (HCC)                 Plan:  Current Outpatient Medications   Medication Sig Dispense Refill    Pseudoephedrine-guaiFENesin (MUCINEX D PO) Take 1 tablet by mouth 2 times daily      loratadine (CLARITIN) 10 MG tablet Take 1 tablet by mouth daily as needed (allergies) 30 tablet 0    atorvastatin (LIPITOR) 40 MG tablet TAKE 1 TABLET BY MOUTH EVERY EVENING 90 tablet 3    doxepin (SINEQUAN) 10 MG capsule Take 1 capsule by mouth nightly 90 capsule 3    furosemide (LASIX) 40 MG tablet TAKE 1 TABLET DAILY 90 tablet 3    KLOR-CON M20 20 MEQ extended release tablet TAKE 1 TABLET BY MOUTH THREE TIMES A  tablet 1    benazepril (LOTENSIN) 20 MG tablet Take 1 tablet by mouth daily 90 tablet 3    metFORMIN (GLUCOPHAGE) 500 MG tablet TAKE 2 TABLETS BY MOUTH TWICE A DAY WITH MEALAS 360 tablet 3    metoprolol succinate (TOPROL XL) 50 MG extended release tablet TAKE 1 TABLET BY MOUTH EVERY DAY 90 tablet 3    diclofenac sodium 1 % GEL Apply 4 g topically 4 times daily 1 Tube 3    aspirin 81 MG EC tablet Take 1 tablet by mouth 2 times daily (Patient taking differently: Take 81 mg by mouth daily ) 60 tablet 0    Alcohol Swabs PADS Use as directed with insulin injections 300 each 3    Multiple Vitamins-Minerals (MULTIVITAMIN PO) Take 1 tablet by mouth daily. No current facility-administered medications for this visit.       Orders Placed This Encounter   Procedures    Microalbumin / Creatinine Urine Ratio     Standing Status:   Future     Standing Expiration Date:   12/3/2021    POCT glycosylated hemoglobin (Hb A1C)       No orders of the defined types were placed in this

## 2021-02-06 DIAGNOSIS — I10 ESSENTIAL HYPERTENSION: ICD-10-CM

## 2021-02-07 RX ORDER — POTASSIUM CHLORIDE 1500 MG/1
TABLET, EXTENDED RELEASE ORAL
Qty: 270 TABLET | Refills: 1 | Status: SHIPPED | OUTPATIENT
Start: 2021-02-07 | End: 2021-08-02

## 2021-03-11 RX ORDER — BENAZEPRIL HYDROCHLORIDE 20 MG/1
TABLET ORAL
Qty: 90 TABLET | Refills: 3 | Status: SHIPPED | OUTPATIENT
Start: 2021-03-11 | End: 2022-02-28 | Stop reason: SDUPTHER

## 2021-04-03 RX ORDER — METOPROLOL SUCCINATE 50 MG/1
TABLET, EXTENDED RELEASE ORAL
Qty: 90 TABLET | Refills: 3 | OUTPATIENT
Start: 2021-04-03

## 2021-04-07 RX ORDER — METOPROLOL SUCCINATE 50 MG/1
50 TABLET, EXTENDED RELEASE ORAL DAILY
Qty: 90 TABLET | Refills: 3 | Status: SHIPPED | OUTPATIENT
Start: 2021-04-07 | End: 2022-03-24 | Stop reason: SDUPTHER

## 2021-04-07 NOTE — TELEPHONE ENCOUNTER
Patient is calling in asking about the refill on his medication. I saw there were duplicate requests but a script was never sent to the pharmacy. Could you please fill his medication.

## 2021-05-10 ENCOUNTER — OFFICE VISIT (OUTPATIENT)
Dept: FAMILY MEDICINE CLINIC | Age: 71
End: 2021-05-10
Payer: MEDICARE

## 2021-05-10 VITALS
SYSTOLIC BLOOD PRESSURE: 112 MMHG | HEART RATE: 76 BPM | OXYGEN SATURATION: 97 % | TEMPERATURE: 97.8 F | HEIGHT: 66 IN | RESPIRATION RATE: 12 BRPM | BODY MASS INDEX: 37.12 KG/M2 | DIASTOLIC BLOOD PRESSURE: 66 MMHG

## 2021-05-10 DIAGNOSIS — M79.671 RIGHT FOOT PAIN: ICD-10-CM

## 2021-05-10 DIAGNOSIS — C91.10 CLL (CHRONIC LYMPHOCYTIC LEUKEMIA) (HCC): ICD-10-CM

## 2021-05-10 DIAGNOSIS — M46.96 UNSPECIFIED INFLAMMATORY SPONDYLOPATHY, LUMBAR REGION (HCC): ICD-10-CM

## 2021-05-10 DIAGNOSIS — E66.01 MORBIDLY OBESE (HCC): ICD-10-CM

## 2021-05-10 DIAGNOSIS — E11.9 TYPE 2 DIABETES MELLITUS WITHOUT COMPLICATION, WITHOUT LONG-TERM CURRENT USE OF INSULIN (HCC): ICD-10-CM

## 2021-05-10 DIAGNOSIS — M79.671 RIGHT FOOT PAIN: Primary | ICD-10-CM

## 2021-05-10 LAB
BASOPHILS ABSOLUTE: 0 K/UL (ref 0–0.2)
BASOPHILS RELATIVE PERCENT: 0.4 %
EOSINOPHILS ABSOLUTE: 0.1 K/UL (ref 0–0.7)
EOSINOPHILS RELATIVE PERCENT: 1.9 %
HCT VFR BLD CALC: 37.1 % (ref 42–52)
HEMOGLOBIN: 12.4 G/DL (ref 14–18)
LYMPHOCYTES ABSOLUTE: 2.4 K/UL (ref 1–4.8)
LYMPHOCYTES RELATIVE PERCENT: 39.3 %
MCH RBC QN AUTO: 30.4 PG (ref 27–31.3)
MCHC RBC AUTO-ENTMCNC: 33.4 % (ref 33–37)
MCV RBC AUTO: 91.1 FL (ref 80–100)
MONOCYTES ABSOLUTE: 0.6 K/UL (ref 0.2–0.8)
MONOCYTES RELATIVE PERCENT: 9.5 %
NEUTROPHILS ABSOLUTE: 2.9 K/UL (ref 1.4–6.5)
NEUTROPHILS RELATIVE PERCENT: 48.9 %
PDW BLD-RTO: 13.9 % (ref 11.5–14.5)
PLATELET # BLD: 290 K/UL (ref 130–400)
RBC # BLD: 4.07 M/UL (ref 4.7–6.1)
SEDIMENTATION RATE, ERYTHROCYTE: 84 MM (ref 0–20)
URIC ACID, SERUM: 7.9 MG/DL (ref 3.4–7)
WBC # BLD: 6 K/UL (ref 4.8–10.8)

## 2021-05-10 PROCEDURE — 3017F COLORECTAL CA SCREEN DOC REV: CPT | Performed by: FAMILY MEDICINE

## 2021-05-10 PROCEDURE — 99213 OFFICE O/P EST LOW 20 MIN: CPT | Performed by: FAMILY MEDICINE

## 2021-05-10 PROCEDURE — 1036F TOBACCO NON-USER: CPT | Performed by: FAMILY MEDICINE

## 2021-05-10 PROCEDURE — 3046F HEMOGLOBIN A1C LEVEL >9.0%: CPT | Performed by: FAMILY MEDICINE

## 2021-05-10 PROCEDURE — G8417 CALC BMI ABV UP PARAM F/U: HCPCS | Performed by: FAMILY MEDICINE

## 2021-05-10 PROCEDURE — G8427 DOCREV CUR MEDS BY ELIG CLIN: HCPCS | Performed by: FAMILY MEDICINE

## 2021-05-10 PROCEDURE — 2022F DILAT RTA XM EVC RTNOPTHY: CPT | Performed by: FAMILY MEDICINE

## 2021-05-10 PROCEDURE — 4040F PNEUMOC VAC/ADMIN/RCVD: CPT | Performed by: FAMILY MEDICINE

## 2021-05-10 PROCEDURE — 1123F ACP DISCUSS/DSCN MKR DOCD: CPT | Performed by: FAMILY MEDICINE

## 2021-05-10 RX ORDER — PREDNISONE 20 MG/1
20 TABLET ORAL 2 TIMES DAILY
Qty: 10 TABLET | Refills: 0 | Status: SHIPPED | OUTPATIENT
Start: 2021-05-10 | End: 2021-05-15

## 2021-05-10 ASSESSMENT — PATIENT HEALTH QUESTIONNAIRE - PHQ9
SUM OF ALL RESPONSES TO PHQ QUESTIONS 1-9: 0

## 2021-05-10 NOTE — PROGRESS NOTES
Patient: Jimbo Walter (: 1950) is a 78 y.o. male,Established patient, here for evaluation of the following chief complaint(s):  Chief Complaint   Patient presents with    Foot Pain     right been going on for 9 days does not knoww what is going on but is getting worse       Date: 5/10/21    Allergies   Allergen Reactions    Dye [Barium-Containing Compounds] Hives    Iodides     Iodinated Diagnostic Agents Hives       Vitals:    05/10/21 1129   BP: 112/66   Pulse: 76   Resp: 12   Temp: 97.8 °F (36.6 °C)   SpO2: 97%   Height: 5' 6\" (1.676 m)     Body mass index is 37.12 kg/m². PHQ Scores 5/10/2021 2020 2020 2019 3/25/2019 2018 2017   PHQ2 Score 0 0 0 0 0 0 0   PHQ9 Score 0 0 0 0 0 0 0     Interpretation of Total Score Depression Severity: 1-4 = Minimal depression, 5-9 = Mild depression, 10-14 = Moderate depression, 15-19 = Moderately severe depression, 20-27 = Severe depression    Treatment Adherence:   Medication compliance:  compliant most of the time  Diet compliance:  compliant most of the time  Weight trend: stable  Current exercise: He is active in his life and does a lot of walking    Diabetes Mellitus Type 2: Current symptoms/problems include none. Home blood sugar records:  None available today  Any episodes of hypoglycemia? yes -   Eye exam current (within one year): yes  Tobacco history: He  reports that he has never smoked. He has never used smokeless tobacco.   Daily Aspirin? Yes  Known diabetic complications: cardiovascular disease          HPI    He comes in with acute onset of right foot pain over a week. No injury slight redness increased swelling to a point where he had put his shoe on. It started on the side of his foot branch over the top but notes involving the large toe as well. The ankle seems to be fine. Is worse with walking is worse with elevation is better if it is dangling. He is tried everything over-the-counter and nothing is helping.   We checked him for gout with past and it was negative. Review of Systems    Constitutional: Negative for fatigue, fever and sweats. HEENT: Negative for eye discharge and vision loss. Negative for ear drainage, hearing loss and nasal drainage. Respiratory: Negative for cough, dyspnea and wheezing. Cardiovascular:  Negative for chest pain, claudication and irregular heartbeat/palpitations. Gastrointestinal: Negative for abdominal pain, nausea, vomiting, constipation and diarrhea. Genitourinary: No dysuria or penile discharge. Metabolic/Endocrine: Negative for cold intolerance, heat intolerance, polydipsia and polyphagia. No unintended weight loss or gain. Neuro/Psychiatric: Negative for gait disturbance. Negative for psychiatric symptoms. Dermatologic: Negative for pruritus and rash. Musculoskeletal: Negative for bone/joint symptoms. No numbness or tingling. No weakness. Hematology: Negative for bleeding and easy bruising. Immunology:  Negative for environmental allergies and food allergies. Physical Exam    Patient's medication, allergies, past medical, surgical, social and family histories were reviewed and updated as appropriate. PHYSICAL EXAM     General: Alert oriented pleasant cooperative in no acute distress  Extremities: He has no edema of the ankles no calf tenderness minimal erythema of the foot more hyperemia he has exquisite tenderness to palpation of the dorsum of the foot to the ankle is normal the toes are normal the large joint on the large toe is tender. The skin is intact              Assessment:   Diagnosis Orders   1. Right foot pain  XR FOOT RIGHT (MIN 3 VIEWS)    Uric Acid    CBC Auto Differential    Sedimentation Rate    predniSONE (DELTASONE) 20 MG tablet   2. CLL (chronic lymphocytic leukemia) ([facility] [address]   stable per oncology   3. Unspecified inflammatory spondylopathy, lumbar region ([address]   under good control   4.  Morbidly obese ([address]   continue to work on diet and exercise 5. Type 2 diabetes mellitus without complication, without long-term current use of insulin ([facility] [address]   has been under good control         On this date 05/10/21 I have spent 25 minutes reviewing previous notes, test results and face to face with the patient discussing the diagnosis and importance of compliance with the treatment plan. Plan:  Current Outpatient Medications   Medication Sig Dispense Refill    predniSONE (DELTASONE) 20 MG tablet Take 1 tablet by mouth 2 times daily for 5 days 10 tablet 0    metoprolol succinate (TOPROL XL) 50 MG extended release tablet Take 1 tablet by mouth daily 90 tablet 3    benazepril (LOTENSIN) 20 MG tablet TAKE 1 TABLET BY MOUTH EVERY DAY 90 tablet 3    metFORMIN (GLUCOPHAGE) 500 MG tablet TAKE 2 TABLETS BY MOUTH TWICE A DAY WITH MEALAS 360 tablet 3    KLOR-CON M20 20 MEQ extended release tablet TAKE 1 TABLET BY MOUTH THREE TIMES A  tablet 1    loratadine (CLARITIN) 10 MG tablet TAKE 1 TABLET BY MOUTH DAILY AS NEEDED (ALLERGIES). 30 tablet 5    Pseudoephedrine-guaiFENesin (MUCINEX D PO) Take 1 tablet by mouth 2 times daily      atorvastatin (LIPITOR) 40 MG tablet TAKE 1 TABLET BY MOUTH EVERY EVENING 90 tablet 3    doxepin (SINEQUAN) 10 MG capsule Take 1 capsule by mouth nightly 90 capsule 3    furosemide (LASIX) 40 MG tablet TAKE 1 TABLET DAILY 90 tablet 3    diclofenac sodium 1 % GEL Apply 4 g topically 4 times daily 1 Tube 3    aspirin 81 MG EC tablet Take 1 tablet by mouth 2 times daily (Patient taking differently: Take 81 mg by mouth daily ) 60 tablet 0    Alcohol Swabs PADS Use as directed with insulin injections 300 each 3    Multiple Vitamins-Minerals (MULTIVITAMIN PO) Take 1 tablet by mouth daily. No current facility-administered medications for this visit.       Orders Placed This Encounter   Procedures    XR FOOT RIGHT (MIN 3 VIEWS)     Standing Status:   Future     Number of Occurrences:   1     Standing Expiration Date:   5/10/2022   Tiffany Sousa Uric Acid     Standing Status:   Future     Standing Expiration Date:   5/10/2022    CBC Auto Differential     Standing Status:   Future     Standing Expiration Date:   5/10/2022    Sedimentation Rate     Standing Status:   Future     Standing Expiration Date:   5/10/2022       Orders Placed This Encounter   Medications    predniSONE (DELTASONE) 20 MG tablet     Sig: Take 1 tablet by mouth 2 times daily for 5 days     Dispense:  10 tablet     Refill:  0              Return in about 2 weeks (around 5/24/2021). An electronic signature was used to authenticate this note.   Dr. Julius Manzano      5/10/21  12:12 PM

## 2021-05-17 ENCOUNTER — TELEPHONE (OUTPATIENT)
Dept: FAMILY MEDICINE CLINIC | Age: 71
End: 2021-05-17

## 2021-05-17 DIAGNOSIS — M79.671 RIGHT FOOT PAIN: Primary | ICD-10-CM

## 2021-05-17 RX ORDER — PREDNISONE 10 MG/1
TABLET ORAL
Qty: 30 TABLET | Refills: 0 | Status: SHIPPED | OUTPATIENT
Start: 2021-05-17 | End: 2021-06-17 | Stop reason: SDUPTHER

## 2021-05-17 NOTE — TELEPHONE ENCOUNTER
I called him in more prednisone and I put in a referral for him to see a podiatrist, Dr. Bhavya Henderson.

## 2021-05-17 NOTE — TELEPHONE ENCOUNTER
Patient is calling in stating that he is still having symptoms of gout in his foot. Asking if he should take another round of antibiotics. .. Please advise.  TY

## 2021-05-24 ENCOUNTER — OFFICE VISIT (OUTPATIENT)
Dept: FAMILY MEDICINE CLINIC | Age: 71
End: 2021-05-24
Payer: MEDICARE

## 2021-05-24 VITALS
HEIGHT: 66 IN | RESPIRATION RATE: 16 BRPM | TEMPERATURE: 98.4 F | BODY MASS INDEX: 37.28 KG/M2 | OXYGEN SATURATION: 98 % | DIASTOLIC BLOOD PRESSURE: 70 MMHG | HEART RATE: 72 BPM | SYSTOLIC BLOOD PRESSURE: 100 MMHG | WEIGHT: 232 LBS

## 2021-05-24 DIAGNOSIS — E78.5 DYSLIPIDEMIA: ICD-10-CM

## 2021-05-24 DIAGNOSIS — I10 ESSENTIAL HYPERTENSION: ICD-10-CM

## 2021-05-24 DIAGNOSIS — E11.9 TYPE 2 DIABETES MELLITUS WITHOUT COMPLICATION, WITHOUT LONG-TERM CURRENT USE OF INSULIN (HCC): Primary | ICD-10-CM

## 2021-05-24 DIAGNOSIS — M10.071 ACUTE IDIOPATHIC GOUT OF RIGHT FOOT: ICD-10-CM

## 2021-05-24 DIAGNOSIS — N18.32 CHRONIC RENAL FAILURE, STAGE 3B (HCC): Primary | ICD-10-CM

## 2021-05-24 DIAGNOSIS — Z12.11 SCREEN FOR COLON CANCER: ICD-10-CM

## 2021-05-24 LAB
ALBUMIN SERPL-MCNC: 4 G/DL (ref 3.5–4.6)
ALP BLD-CCNC: 78 U/L (ref 35–104)
ALT SERPL-CCNC: 29 U/L (ref 0–41)
ANION GAP SERPL CALCULATED.3IONS-SCNC: 12 MEQ/L (ref 9–15)
AST SERPL-CCNC: 29 U/L (ref 0–40)
BILIRUB SERPL-MCNC: 0.4 MG/DL (ref 0.2–0.7)
BUN BLDV-MCNC: 40 MG/DL (ref 8–23)
CALCIUM SERPL-MCNC: 9.4 MG/DL (ref 8.5–9.9)
CHLORIDE BLD-SCNC: 102 MEQ/L (ref 95–107)
CHOLESTEROL, FASTING: 104 MG/DL (ref 0–199)
CO2: 25 MEQ/L (ref 20–31)
CREAT SERPL-MCNC: 1.57 MG/DL (ref 0.7–1.2)
GFR AFRICAN AMERICAN: 53.3
GFR NON-AFRICAN AMERICAN: 44.1
GLOBULIN: 3.4 G/DL (ref 2.3–3.5)
GLUCOSE BLD-MCNC: 127 MG/DL (ref 70–99)
HBA1C MFR BLD: 6.2 %
HDLC SERPL-MCNC: 37 MG/DL (ref 40–59)
LDL CHOLESTEROL CALCULATED: 47 MG/DL (ref 0–129)
POTASSIUM SERPL-SCNC: 5.5 MEQ/L (ref 3.4–4.9)
SODIUM BLD-SCNC: 139 MEQ/L (ref 135–144)
TOTAL PROTEIN: 7.4 G/DL (ref 6.3–8)
TRIGLYCERIDE, FASTING: 101 MG/DL (ref 0–150)

## 2021-05-24 PROCEDURE — 3017F COLORECTAL CA SCREEN DOC REV: CPT | Performed by: FAMILY MEDICINE

## 2021-05-24 PROCEDURE — 83036 HEMOGLOBIN GLYCOSYLATED A1C: CPT | Performed by: FAMILY MEDICINE

## 2021-05-24 PROCEDURE — 2022F DILAT RTA XM EVC RTNOPTHY: CPT | Performed by: FAMILY MEDICINE

## 2021-05-24 PROCEDURE — 1036F TOBACCO NON-USER: CPT | Performed by: FAMILY MEDICINE

## 2021-05-24 PROCEDURE — 4040F PNEUMOC VAC/ADMIN/RCVD: CPT | Performed by: FAMILY MEDICINE

## 2021-05-24 PROCEDURE — 3044F HG A1C LEVEL LT 7.0%: CPT | Performed by: FAMILY MEDICINE

## 2021-05-24 PROCEDURE — G8417 CALC BMI ABV UP PARAM F/U: HCPCS | Performed by: FAMILY MEDICINE

## 2021-05-24 PROCEDURE — G8427 DOCREV CUR MEDS BY ELIG CLIN: HCPCS | Performed by: FAMILY MEDICINE

## 2021-05-24 PROCEDURE — 99214 OFFICE O/P EST MOD 30 MIN: CPT | Performed by: FAMILY MEDICINE

## 2021-05-24 PROCEDURE — 1123F ACP DISCUSS/DSCN MKR DOCD: CPT | Performed by: FAMILY MEDICINE

## 2021-05-24 RX ORDER — ALLOPURINOL 100 MG/1
100 TABLET ORAL DAILY
Qty: 90 TABLET | Refills: 2 | Status: SHIPPED | OUTPATIENT
Start: 2021-05-24 | End: 2022-01-17 | Stop reason: CLARIF

## 2021-05-24 SDOH — ECONOMIC STABILITY: TRANSPORTATION INSECURITY
IN THE PAST 12 MONTHS, HAS THE LACK OF TRANSPORTATION KEPT YOU FROM MEDICAL APPOINTMENTS OR FROM GETTING MEDICATIONS?: NO

## 2021-05-24 SDOH — ECONOMIC STABILITY: FOOD INSECURITY: WITHIN THE PAST 12 MONTHS, THE FOOD YOU BOUGHT JUST DIDN'T LAST AND YOU DIDN'T HAVE MONEY TO GET MORE.: NEVER TRUE

## 2021-05-24 ASSESSMENT — SOCIAL DETERMINANTS OF HEALTH (SDOH): HOW HARD IS IT FOR YOU TO PAY FOR THE VERY BASICS LIKE FOOD, HOUSING, MEDICAL CARE, AND HEATING?: NOT HARD AT ALL

## 2021-05-24 NOTE — PROGRESS NOTES
Patient: Pearl Ramirez (: 1950) is a 78 y.o. male,Established patient, here for evaluation of the following chief complaint(s):  Chief Complaint   Patient presents with    Diabetes     follow up    Hypertension     follow up    Hyperlipidemia     follow up    Gout     2 week follow up. Date: 21    Allergies   Allergen Reactions    Dye [Barium-Containing Compounds] Hives    Iodides     Iodinated Diagnostic Agents Hives       Vitals:    21 0947   BP: 100/70   Site: Right Upper Arm   Position: Sitting   Cuff Size: Large Adult   Pulse: 72   Resp: 16   Temp: 98.4 °F (36.9 °C)   TempSrc: Oral   SpO2: 98%   Weight: 232 lb (105.2 kg)   Height: 5' 6\" (1.676 m)     Body mass index is 37.45 kg/m². PHQ Scores 5/10/2021 2020 2020 2019 3/25/2019 2018 2017   PHQ2 Score 0 0 0 0 0 0 0   PHQ9 Score 0 0 0 0 0 0 0     Interpretation of Total Score Depression Severity: 1-4 = Minimal depression, 5-9 = Mild depression, 10-14 = Moderate depression, 15-19 = Moderately severe depression, 20-27 = Severe depression    Treatment Adherence:   Medication compliance:  compliant all of the time  Diet compliance:  compliant most of the time  Weight trend: increasing  Current exercise: no regular exercise    Diabetes Mellitus Type 2: Current symptoms/problems include none. Home blood sugar records:  patient does not test  Any episodes of hypoglycemia? no  Eye exam current (within one year): yes  Tobacco history: He  reports that he has never smoked. He has never used smokeless tobacco.   Daily Aspirin? Yes  Known diabetic complications: none          HPI    He is following up on his gout his diabetes his blood pressure and his chronic issues and is doing well. He is due for colonoscopy and we will set that up as well. He has no focal complaints and his foot is much better    Review of Systems    Constitutional: Negative for fatigue, fever and sweats.    HEENT: Negative for eye discharge have spent 35 minutes reviewing previous notes, test results and face to face with the patient discussing the diagnosis and importance of compliance with the treatment plan. Plan:  Current Outpatient Medications   Medication Sig Dispense Refill    allopurinol (ZYLOPRIM) 100 MG tablet Take 1 tablet by mouth daily 90 tablet 2    predniSONE (DELTASONE) 10 MG tablet 4 PO each day for three days, 3 PO each day for three days, 2 PO each day for three days ,1 PO each day until gone 30 tablet 0    metoprolol succinate (TOPROL XL) 50 MG extended release tablet Take 1 tablet by mouth daily 90 tablet 3    benazepril (LOTENSIN) 20 MG tablet TAKE 1 TABLET BY MOUTH EVERY DAY 90 tablet 3    metFORMIN (GLUCOPHAGE) 500 MG tablet TAKE 2 TABLETS BY MOUTH TWICE A DAY WITH MEALAS 360 tablet 3    KLOR-CON M20 20 MEQ extended release tablet TAKE 1 TABLET BY MOUTH THREE TIMES A  tablet 1    loratadine (CLARITIN) 10 MG tablet TAKE 1 TABLET BY MOUTH DAILY AS NEEDED (ALLERGIES). 30 tablet 5    Pseudoephedrine-guaiFENesin (MUCINEX D PO) Take 1 tablet by mouth 2 times daily      atorvastatin (LIPITOR) 40 MG tablet TAKE 1 TABLET BY MOUTH EVERY EVENING 90 tablet 3    doxepin (SINEQUAN) 10 MG capsule Take 1 capsule by mouth nightly 90 capsule 3    furosemide (LASIX) 40 MG tablet TAKE 1 TABLET DAILY 90 tablet 3    aspirin 81 MG EC tablet Take 1 tablet by mouth 2 times daily (Patient taking differently: Take 81 mg by mouth daily ) 60 tablet 0    Alcohol Swabs PADS Use as directed with insulin injections 300 each 3    Multiple Vitamins-Minerals (MULTIVITAMIN PO) Take 1 tablet by mouth daily. No current facility-administered medications for this visit.      Orders Placed This Encounter   Procedures    Lipid, Fasting     Standing Status:   Future     Number of Occurrences:   1     Standing Expiration Date:   5/24/2022    Comprehensive Metabolic Panel     Standing Status:   Future     Number of Occurrences:   1 Standing Expiration Date:   5/24/2022   [facility] [place]     Referral Priority:   Routine     Referral Type:   Eval and Treat     Referral Reason:   Specialty Services Required     Requested Specialty:   Gastroenterology     Number of Visits Requested:   1    POCT glycosylated hemoglobin (Hb A1C)       Orders Placed This Encounter   Medications    allopurinol (ZYLOPRIM) 100 MG tablet     Sig: Take 1 tablet by mouth daily     Dispense:  90 tablet     Refill:  2              Return in about 6 months (around 11/24/2021), or Dr. Jami Randhawa. An electronic signature was used to authenticate this note.   Dr. Deanna Gilmore MD      5/24/21  10:41 AM

## 2021-05-27 ENCOUNTER — VIRTUAL VISIT (OUTPATIENT)
Dept: FAMILY MEDICINE CLINIC | Age: 71
End: 2021-05-27
Payer: MEDICARE

## 2021-05-27 DIAGNOSIS — Z00.00 ROUTINE GENERAL MEDICAL EXAMINATION AT A HEALTH CARE FACILITY: Primary | ICD-10-CM

## 2021-05-27 PROCEDURE — 3017F COLORECTAL CA SCREEN DOC REV: CPT | Performed by: FAMILY MEDICINE

## 2021-05-27 PROCEDURE — 1123F ACP DISCUSS/DSCN MKR DOCD: CPT | Performed by: FAMILY MEDICINE

## 2021-05-27 PROCEDURE — G0439 PPPS, SUBSEQ VISIT: HCPCS | Performed by: FAMILY MEDICINE

## 2021-05-27 PROCEDURE — 4040F PNEUMOC VAC/ADMIN/RCVD: CPT | Performed by: FAMILY MEDICINE

## 2021-05-27 ASSESSMENT — PATIENT HEALTH QUESTIONNAIRE - PHQ9
SUM OF ALL RESPONSES TO PHQ9 QUESTIONS 1 & 2: 0
SUM OF ALL RESPONSES TO PHQ QUESTIONS 1-9: 0
1. LITTLE INTEREST OR PLEASURE IN DOING THINGS: 0

## 2021-05-27 NOTE — PROGRESS NOTES
Medicare Annual Wellness Visit  Name: Sergio Vaz Date: 2021   MRN: [id number] Sex: Male   Age: 78 y.o. Ethnicity: /   : 1950 Race: Other      Ekaterina Henderson is here for Medicare AWV (VV Phone Medicare AWV)    Screenings for behavioral, psychosocial and functional/safety risks, and cognitive dysfunction are all negative except as indicated below. These results, as well as other patient data from the [address] form, are documented in Flowsheets linked to this Encounter. Allergies   Allergen Reactions    Dye [Barium-Containing Compounds] Hives    Iodides     Iodinated Diagnostic Agents Hives         Prior to Visit Medications    Medication Sig Taking? Authorizing Provider   allopurinol (ZYLOPRIM) 100 MG tablet Take 1 tablet by mouth daily Yes Lawrence Blancas MD   predniSONE (DELTASONE) 10 MG tablet 4 PO each day for three days, 3 PO each day for three days, 2 PO each day for three days ,1 PO each day until gone Yes Lawrence Blancas MD   metoprolol succinate (TOPROL XL) 50 MG extended release tablet Take 1 tablet by mouth daily Yes Lawrence Blancas MD   benazepril (LOTENSIN) 20 MG tablet TAKE 1 TABLET BY MOUTH EVERY DAY Yes Lawrence Blancas MD   metFORMIN (GLUCOPHAGE) 500 MG tablet TAKE 2 TABLETS BY MOUTH TWICE A DAY WITH MEALAS Yes Lawrence Blancas MD   KLOR-CON M20 20 MEQ extended release tablet TAKE 1 TABLET BY MOUTH THREE TIMES A DAY Yes Lawrence Blancas MD   loratadine (CLARITIN) 10 MG tablet TAKE 1 TABLET BY MOUTH DAILY AS NEEDED (ALLERGIES).  Yes Lawrence Blancas MD   Pseudoephedrine-guaiFENesin [facility] D PO) Take 1 tablet by mouth 2 times daily Yes Historical Provider, MD   atorvastatin (LIPITOR) 40 MG tablet TAKE 1 TABLET BY MOUTH EVERY EVENING Yes Lawrence Blancas MD   doxepin (SINEQUAN) 10 MG capsule Take 1 capsule by mouth nightly Yes Lawrence Blancas MD   furosemide (LASIX) 40 MG tablet TAKE 1 TABLET DAILY Yes Lawrence Blancas MD   aspirin 81 MG EC tablet Take 1 Problem Relation Age of Onset    Heart Disease Mother 67        CHF    Cancer Father 68        liver/ pancreatic    No Known Problems Sister     Cancer Brother         chronic lukemia    Arthritis Brother     No Known Problems Daughter        CareTeam (Including outside providers/suppliers regularly involved in providing care):   Patient Care Team:  Abdirahman Aguilar MD as PCP - General (Family Medicine)  Abdirahman Aguilar MD as PCP - [facility] Empaneled Provider  Star Landers MD (Hematology and Oncology)    Wt Readings from Last 3 Encounters:   05/24/21 232 lb (105.2 kg)   12/03/20 230 lb (104.3 kg)   10/20/20 232 lb (105.2 kg)     There were no vitals filed for this visit. There is no height or weight on file to calculate BMI. Based upon direct observation of the patient, evaluation of cognition reveals recent and remote memory intact. Patient's complete Health Risk Assessment and screening values have been reviewed and are found in Flowsheets. The following problems were reviewed today and where indicated follow up appointments were made and/or referrals ordered. Positive Risk Factor Screenings with Interventions:            General Health and ACP:  General  In general, how would you say your health is?: Very Good  In the past 7 days, have you experienced any of the following?  New or Increased Pain, New or Increased Fatigue, Loneliness, Social Isolation, Stress or Anger?: None of These  Do you get the social and emotional support that you need?: Yes  Do you have a Living Will?: (!) No  Advance Directives     Power of [facility] Will ACP-Advance Directive ACP-Power of     Not on File Not on File Not on File Not on File      General Health Risk Interventions:  · No Living Will: Advance Care Planning addressed with patient today    Health Habits/Nutrition:  Health Habits/Nutrition  Do you exercise for at least 20 minutes 2-3 times per week?: Yes  Have you lost any weight without trying in the past 3 months?: No  Do you eat only one meal per day?: No  Have you seen the dentist within the past year?: Yes     Health Habits/Nutrition Interventions:  · Nutritional issues:  educational materials for healthy, well-balanced diet provided       Personalized Preventive Plan   Current Health Maintenance Status  Immunization History   Administered Date(s) Administered    COVID-19, Moderna, PF, 100mcg/0.5mL 03/09/2021, 04/06/2021    Influenza Vaccine, unspecified formulation 10/14/2015    Influenza Virus Vaccine 11/14/2014, 10/01/2015    Influenza Whole 10/01/2015    Influenza, High Dose (Fluzone 65 yrs and older) 11/13/2017, 10/17/2018    Influenza, Quadv, IM, (6 mo and older Fluzone, Flulaval, Fluarix and 3 yrs and older Afluria) 11/29/2016    Influenza, Quadv, adjuvanted, 65 yrs +, IM, PF (Fluad) 10/20/2020    Influenza, Triv, inactivated, subunit, adjuvanted, IM (Fluad 65 yrs and older) 11/06/2019    Pneumococcal Conjugate 13-valent ([id number]) 11/29/2016    Pneumococcal Polysaccharide ([id number]) 11/25/2015, 12/03/2020    Tdap (Boostrix, Adacel) 06/02/2016    Zoster Live (Zostavax) 04/24/2015        Health Maintenance   Topic Date Due    Shingles Vaccine (2 of 3) 06/19/2015    Diabetic retinal exam  11/25/2016    Annual Wellness Visit (AWV)  Never done    Colon cancer screen colonoscopy  04/01/2021    Diabetic foot exam  10/20/2021    A1C test (Diabetic or Prediabetic)  05/24/2022    Lipid screen  05/24/2022    Potassium monitoring  05/24/2022    Creatinine monitoring  05/24/2022    DTaP/Tdap/Td vaccine (2 - Td) 06/02/2026    Flu vaccine  Completed    Pneumococcal 65+ yrs at Risk Vaccine  Completed    COVID-19 Vaccine  Completed    Hepatitis C screen  Completed    Hepatitis A vaccine  Aged Out    Hib vaccine  Aged Out    Meningococcal (ACWY) vaccine  Aged Out   Has colonoscopy scheduled 6/15/21.   Eye exam done 10/21  Recommendations for Preventive Services Due: see orders and patient instructions/AVS.  . Recommended screening schedule for the next 5-10 years is provided to the patient in written form: see Patient Instructions/AVS.    IMayra LPN, [id number], performed the documented evaluation under the direct supervision of the attending physician. This encounter was performed under Manuel arriola MDs, direct supervision, 5/27/2021.

## 2021-05-27 NOTE — PATIENT INSTRUCTIONS
good cholesterol, this type of cholesterol actually carries cholesterol away from your arteries and may, therefore, help lower your risk of having a heart attack. You want this level to be high (ideally greater than 60). It is a risk to have a level less than 40. You can raise this good cholesterol by eating olive oil, canola oil, avocados, or nuts. Exercise raises this level, too. Fat    Fat is calorie dense and packs a lot of calories into a small amount of food. Even though fats should be limited due to their high calorie content, not all fats are bad. In fact, some fats are quite healthful. Fat can be broken down into four main types. The good-for-you fats are:   Monounsaturated fat  found in oils such as olive and canola, avocados, and nuts and natural nut butters; can decrease cholesterol levels, while keeping levels of HDL cholesterol high   Polyunsaturated fat  found in oils such as safflower, sunflower, soybean, corn, and sesame; can decrease total cholesterol and LDL cholesterol   Omega-3 fatty acids  particularly those found in fatty fish (such as salmon, trout, tuna, mackerel, herring, and sardines); can decrease risk of arrhythmias, decrease triglyceride levels, and slightly lower blood pressure   The fats that you want to limit are:   Saturated fat  found in animal products, many fast foods, and a few vegetables; increases total blood cholesterol, including LDL levels   Animal fats that are saturated include: butter, lard, whole-milk dairy products, meat fat, and poultry skin   Vegetable fats that are saturated include: hydrogenated shortening, palm oil, coconut oil, cocoa butter   Hydrogenated or trans fat  found in margarine and vegetable shortening, most shelf stable snack foods, and fried foods; increases LDL and decreases HDL     It is generally recommended that you limit your total fat for the day to less than 30% of your total calories.  If you follow an 1800-calorie heart healthy diet, for example, this would mean 60 grams of fat or less per day. Saturated fat and trans fat in your diet raises your blood cholesterol the most, much more than dietary cholesterol does. For this reason, on a heart-healthy diet, less than 7% of your calories should come from saturated fat and ideally 0% from trans fat. On an 1800-calorie diet, this translates into less than 14 grams of saturated fat per day, leaving 46 grams of fat to come from mono- and polyunsaturated fats.    Food Choices on a Heart Healthy Diet   Food Category   Foods Recommended   Foods to Avoid   Grains   Breads and rolls without salted tops Most dry and cooked cereals Unsalted crackers and breadsticks Low-sodium or homemade breadcrumbs or stuffing All rice and pastas   Breads, rolls, and crackers with salted tops High-fat baked goods (eg, muffins, donuts, pastries) Quick breads, self-rising flour, and biscuit mixes Regular bread crumbs Instant hot cereals Commercially prepared rice, pasta, or stuffing mixes   Vegetables   Most fresh, frozen, and low-sodium canned vegetables Low-sodium and salt-free vegetable juices Canned vegetables if unsalted or rinsed   Regular canned vegetables and juices, including sauerkraut and pickled vegetables Frozen vegetables with sauces Commercially prepared potato and vegetable mixes   Fruits   Most fresh, frozen, and canned fruits All fruit juices   Fruits processed with salt or sodium   Milk   Nonfat or low-fat (1%) milk Nonfat or low-fat yogurt Cottage cheese, low-fat ricotta, cheeses labeled as low-fat and low-sodium   Whole milk Reduced-fat (2%) milk Malted and chocolate milk Full fat yogurt Most cheeses (unless low-fat and low salt) Buttermilk (no more than 1 cup per week)   Meats and Beans   Lean cuts of fresh or frozen beef, veal, lamb, or pork (look for the word loin) Fresh or frozen poultry without the skin Fresh or frozen fish and some shellfish Egg whites and egg substitutes (Limit whole eggs to three per week) Tofu Nuts or seeds (unsalted, dry-roasted), low-sodium peanut butter Dried peas, beans, and lentils   Any smoked, cured, salted, or canned meat, fish, or poultry (including reyes, chipped beef, cold cuts, hot dogs, sausages, sardines, and anchovies) Poultry skins Breaded and/or fried fish or meats Canned peas, beans, and lentils Salted nuts   Fats and Oils   Olive oil and canola oil Low-sodium, low-fat salad dressings and mayonnaise   Butter, margarine, coconut and palm oils, reyes fat   Snacks, Sweets, and Condiments   Low-sodium or unsalted versions of broths, soups, soy sauce, and condiments Pepper, herbs, and spices; vinegar, lemon, or lime juice Low-fat frozen desserts (yogurt, sherbet, fruit bars) Sugar, cocoa powder, honey, syrup, jam, and preserves Low-fat, trans-fat free cookies, cakes, and pies Lamberto and animal crackers, fig bars, marya snaps   High-fat desserts Broth, soups, gravies, and sauces, made from instant mixes or other high-sodium ingredients Salted snack foods Canned olives Meat tenderizers, seasoning salt, and most flavored vinegars   Beverages   Low-sodium carbonated beverages Tea and coffee in moderation Soy milk   Commercially softened water   Suggestions   Make whole grains, fruits, and vegetables the base of your diet. Choose heart-healthy fats such as canola, olive, and flaxseed oil, and foods high in heart-healthy fats, such as nuts, seeds, soybeans, tofu, and fish. Eat fish at least twice per week; the fish highest in omega-3 fatty acids and lowest in mercury include salmon, herring, mackerel, sardines, and canned chunk light tuna. If you eat fish less than twice per week or have high triglycerides, talk to your doctor about taking fish oil supplements. Read food labels.    For products low in fat and cholesterol, look for fat free, low-fat, cholesterol free, saturated fat free, and trans fat freeAlso scan the Nutrition Facts Label, which lists saturated fat, trans fat, and cholesterol amounts. For products low in sodium, look for sodium free, very low sodium, low sodium, no added salt, and unsalted   Skip the salt when cooking or at the table; if food needs more flavor, get creative and try out different herbs and spices. Garlic and onion also add substantial flavor to foods. Trim any visible fat off meat and poultry before cooking, and drain the fat off after day. Use cooking methods that require little or no added fat, such as grilling, boiling, baking, poaching, broiling, roasting, steaming, stir-frying, and sauting. Avoid fast food and convenience food. They tend to be high in saturated and trans fat and have a lot of added salt. Talk to a registered dietitian for individualized diet advice. Last Reviewed: March 2011 Alexy Hernandes MS, MPH, RD   Updated: 3/29/2011   ·     Keep Your Memory [place]       Many factors can affect your ability to remembera hectic lifestyle, aging, stress, chronic disease, and certain medicines. But, there are steps you can take to sharpen your mind and help preserve your memory. Challenge Your Brain   Regularly challenging your mind may help keeps it in top shape. Good mental exercises include:   Crossword puzzlesUse a dictionary if you need it; you will learn more that way. Brainteasers Try some! Crafts, such as wood working and sewing   Hobbies, such as gardening and building model airplanes   SocializingVisit old friends or join groups to meet new ones. Reading   Learning a new language   Taking a class, whether it be art history or harish chi   TravelingExperience the food, history, and culture of your destination   Learning to use a computer   Going to museums, the theater, or thought-provoking movies   Changing things in your daily life, such as reversing your pattern in the grocery store or brushing your teeth using your nondominant hand   Use Memory Aids   There is no need to remember every detail on your own.  These relaxation. Choose activities that calm you down, and make it routine. Manage Chronic Conditions    Side effects of high blood pressure , diabetes, and heart disease can interfere with mental function. Many of the lifestyle steps discussed here can help manage these conditions. Strive to eat a healthy diet, exercise regularly, get stress under control, and follow your doctor's advice for your condition. Minimize Medications    Talk to your doctor about the medicines that you take. Some may be unnecessary. Also, healthy lifestyle habits may lower the need for certain drugs. Last Reviewed: April 2010 Laura Solis MD   Updated: 4/13/2010   ·        [address]  What is a living will? A living will, also called a declaration, is a legal form. It tells your family and your doctor your wishes when you can't speak for yourself. It's used by the health professionals who will treat you as you near the end of your life or if you get seriously hurt or ill. If you put your wishes in writing, your loved ones and others will know what kind of care you want. They won't need to guess. This can ease your mind and be helpful to others. And you can change or cancel your living will at any time. A living will is not the same as an estate or property will. An estate will explains what you want to happen with your money and property after you die. How do you use it? A living will is used to describe the kinds of treatment or life support you want as you near the end of your life or if you get seriously hurt or ill. Keep these facts in mind about living armstrong. Your living will is used only if you can't speak or make decisions for yourself. Most often, one or more doctors must certify that you can't speak or decide for yourself before your living will takes effect. If you get better and can speak for yourself again, you can accept or refuse any treatment.  It doesn't matter what you said in your living will.  Some states may limit your right to refuse treatment in certain cases. For example, you may need to clearly state in your living will that you don't want artificial hydration and nutrition, such as being fed through a tube. Is a living will a legal document? A living will is a legal document. Each state has its own laws about living armstrong. And a living will may be called something else in your state. Here are some things to know about living armstrong. You don't need an  to complete a living will. But legal advice can be helpful if your state's laws are unclear. It can also help if your health history is complicated or your family can't agree on what should be in your living will. You can change your living will at any time. Some people find that their wishes about end-of-life care change as their health changes. If you make big changes to your living will, complete a new form. If you move to another state, make sure that your living will is legal in the state where you now live. In most cases, doctors will respect your wishes even if you have a form from a different state. You might use a universal form that has been approved by many states. This kind of form can sometimes be filled out and stored online. Your digital copy will then be available wherever you have a connection to the internet. The doctors and nurses who need to treat you can find it right away. Your state may offer an online registry. This is another place where you can store your living will online. It's a good idea to get your living will notarized. This means using a person called a  to watch two people sign, or witness, your living will. What should you know when you create a living will? Here are some questions to ask yourself as you make your living will:  Do you know enough about life support methods that might be used?  If not, talk to your doctor so you know what might be done if you can't breathe on your own, your heart stops, or you can't swallow. What things would you still want to be able to do after you receive life-support methods? Would you want to be able to walk? To speak? To eat on your own? To live without the help of machines? Do you want certain Muslim practices performed if you become very ill? If you have a choice, where do you want to be cared for? In your home? At a hospital or nursing home? If you have a choice at the end of your life, where would you prefer to die? At home? In a hospital or nursing home? Somewhere else? Would you prefer to be buried or cremated? Do you want your organs to be donated after you die? What should you do with your living will? Make sure that your family members and your health care agent have copies of your living will (also called a declaration). Give your doctor a copy of your living will. Ask him or her to keep it as part of your medical record. If you have more than one doctor, make sure that each one has a copy. Put a copy of your living will where it can be easily found. For example, some people may put a copy on their refrigerator door. If you are using a digital copy, be sure your doctor, family members, and health care agent know how to find and access it. Where can you learn more? Go to [email or web address] and sign in to your Netz[employer]iker account. Enter C024 in the RedDrummer box to learn more about \"Learning About Living [place]. \"     If you do not have an account, please click on the \"Sign Up Now\" link. Current as of: July 17, 2020               Content Version: 12.8  © 0258-8373 Healthwise, Incorporated. Care instructions adapted under license by [facility] ([facility]). If you have questions about a medical condition or this instruction, always ask your healthcare professional. Emerita Berry any warranty or liability for your use of this information.     ·   Personalized Preventive Plan for [facility] Madyson Dawson - 5/27/2021  Medicare offers a range of preventive health benefits. Some of the tests and screenings are paid in full while other may be subject to a deductible, co-insurance, and/or copay. Some of these benefits include a comprehensive review of your medical history including lifestyle, illnesses that may run in your family, and various assessments and screenings as appropriate. After reviewing your medical record and screening and assessments performed today your provider may have ordered immunizations, labs, imaging, and/or referrals for you. A list of these orders (if applicable) as well as your Preventive Care list are included within your After Visit Summary for your review. Other Preventive Recommendations:    A preventive eye exam performed by an eye specialist is recommended every 1-2 years to screen for glaucoma; cataracts, macular degeneration, and other eye disorders. A preventive dental visit is recommended every 6 months. Try to get at least 150 minutes of exercise per week or 10,000 steps per day on a pedometer . Order or download the FREE \"Exercise & Physical Activity: Your Everyday Guide\" from [employer]. Call 7-178.112.1654 or search [employer] online. You need 9447-2304 mg of calcium and 7389-2753 IU of vitamin D per day. It is possible to meet your calcium requirement with diet alone, but a vitamin D supplement is usually necessary to meet this goal.  When exposed to the sun, use a sunscreen that protects against both UVA and UVB radiation with an SPF of 30 or greater. Reapply every 2 to 3 hours or after sweating, drying off with a towel, or swimming. Always wear a seat belt when traveling in a car. Always wear a helmet when riding a bicycle or motorcycle.

## 2021-06-15 ENCOUNTER — ANCILLARY PROCEDURE (OUTPATIENT)
Dept: ENDOSCOPY | Age: 71
End: 2021-06-15
Attending: INTERNAL MEDICINE
Payer: MEDICARE

## 2021-06-15 ENCOUNTER — ANESTHESIA EVENT (OUTPATIENT)
Dept: ENDOSCOPY | Age: 71
End: 2021-06-15
Payer: MEDICARE

## 2021-06-15 ENCOUNTER — ANESTHESIA (OUTPATIENT)
Dept: ENDOSCOPY | Age: 71
End: 2021-06-15
Payer: MEDICARE

## 2021-06-15 ENCOUNTER — HOSPITAL ENCOUNTER (OUTPATIENT)
Age: 71
Setting detail: OUTPATIENT SURGERY
Discharge: HOME | End: 2021-06-15
Attending: INTERNAL MEDICINE | Admitting: INTERNAL MEDICINE
Payer: MEDICARE

## 2021-06-15 VITALS
SYSTOLIC BLOOD PRESSURE: 99 MMHG | TEMPERATURE: 98.5 F | OXYGEN SATURATION: 95 % | WEIGHT: 230 LBS | BODY MASS INDEX: 36.96 KG/M2 | HEIGHT: 66 IN | RESPIRATION RATE: 16 BRPM | HEART RATE: 68 BPM | DIASTOLIC BLOOD PRESSURE: 60 MMHG

## 2021-06-15 VITALS — SYSTOLIC BLOOD PRESSURE: 92 MMHG | DIASTOLIC BLOOD PRESSURE: 58 MMHG | OXYGEN SATURATION: 98 %

## 2021-06-15 LAB
GLUCOSE BLD-MCNC: 122 MG/DL (ref 60–115)
PERFORMED ON: ABNORMAL

## 2021-06-15 PROCEDURE — 2580000003 HC RX 258

## 2021-06-15 PROCEDURE — 7100000010 HC PHASE II RECOVERY - FIRST 15 MIN: Performed by: INTERNAL MEDICINE

## 2021-06-15 PROCEDURE — 6370000000 HC RX 637 (ALT 250 FOR IP): Performed by: INTERNAL MEDICINE

## 2021-06-15 PROCEDURE — 3609027000 HC COLONOSCOPY: Performed by: INTERNAL MEDICINE

## 2021-06-15 PROCEDURE — 2580000003 HC RX 258: Performed by: INTERNAL MEDICINE

## 2021-06-15 PROCEDURE — 7100000011 HC PHASE II RECOVERY - ADDTL 15 MIN: Performed by: INTERNAL MEDICINE

## 2021-06-15 PROCEDURE — 3700000000 HC ANESTHESIA ATTENDED CARE: Performed by: INTERNAL MEDICINE

## 2021-06-15 PROCEDURE — 3700000001 HC ADD 15 MINUTES (ANESTHESIA): Performed by: INTERNAL MEDICINE

## 2021-06-15 PROCEDURE — 2709999900 HC NON-CHARGEABLE SUPPLY: Performed by: INTERNAL MEDICINE

## 2021-06-15 PROCEDURE — G0105 COLORECTAL SCRN; HI RISK IND: HCPCS | Performed by: INTERNAL MEDICINE

## 2021-06-15 PROCEDURE — 6360000002 HC RX W HCPCS

## 2021-06-15 PROCEDURE — 2500000003 HC RX 250 WO HCPCS

## 2021-06-15 RX ORDER — SODIUM CHLORIDE 0.9 % (FLUSH) 0.9 %
5-40 SYRINGE (ML) INJECTION EVERY 12 HOURS SCHEDULED
Status: CANCELLED | OUTPATIENT
Start: 2021-06-15

## 2021-06-15 RX ORDER — 0.9 % SODIUM CHLORIDE 0.9 %
500 INTRAVENOUS SOLUTION INTRAVENOUS ONCE
Status: DISCONTINUED | OUTPATIENT
Start: 2021-06-15 | End: 2021-06-15 | Stop reason: HOSPADM

## 2021-06-15 RX ORDER — SODIUM CHLORIDE 0.9 % (FLUSH) 0.9 %
5-40 SYRINGE (ML) INJECTION PRN
Status: CANCELLED | OUTPATIENT
Start: 2021-06-15

## 2021-06-15 RX ORDER — PROPOFOL 10 MG/ML
INJECTION, EMULSION INTRAVENOUS PRN
Status: DISCONTINUED | OUTPATIENT
Start: 2021-06-15 | End: 2021-06-15 | Stop reason: SDUPTHER

## 2021-06-15 RX ORDER — SODIUM CHLORIDE 9 MG/ML
25 INJECTION, SOLUTION INTRAVENOUS PRN
Status: CANCELLED | OUTPATIENT
Start: 2021-06-15

## 2021-06-15 RX ORDER — SODIUM CHLORIDE 9 MG/ML
INJECTION, SOLUTION INTRAVENOUS CONTINUOUS
Status: CANCELLED | OUTPATIENT
Start: 2021-06-15

## 2021-06-15 RX ORDER — SODIUM CHLORIDE 9 MG/ML
INJECTION, SOLUTION INTRAVENOUS CONTINUOUS PRN
Status: DISCONTINUED | OUTPATIENT
Start: 2021-06-15 | End: 2021-06-15 | Stop reason: SDUPTHER

## 2021-06-15 RX ORDER — MAGNESIUM HYDROXIDE 1200 MG/15ML
LIQUID ORAL PRN
Status: DISCONTINUED | OUTPATIENT
Start: 2021-06-15 | End: 2021-06-15 | Stop reason: ALTCHOICE

## 2021-06-15 RX ORDER — SODIUM CHLORIDE 9 MG/ML
INJECTION, SOLUTION INTRAVENOUS
Status: DISCONTINUED
Start: 2021-06-15 | End: 2021-06-15 | Stop reason: HOSPADM

## 2021-06-15 RX ORDER — LIDOCAINE HYDROCHLORIDE 10 MG/ML
1 INJECTION, SOLUTION EPIDURAL; INFILTRATION; INTRACAUDAL; PERINEURAL
Status: CANCELLED | OUTPATIENT
Start: 2021-06-15 | End: 2021-06-15

## 2021-06-15 RX ORDER — ONDANSETRON 2 MG/ML
4 INJECTION INTRAMUSCULAR; INTRAVENOUS
Status: DISCONTINUED | OUTPATIENT
Start: 2021-06-15 | End: 2021-06-15 | Stop reason: HOSPADM

## 2021-06-15 RX ORDER — LIDOCAINE HYDROCHLORIDE 20 MG/ML
INJECTION, SOLUTION INFILTRATION; PERINEURAL PRN
Status: DISCONTINUED | OUTPATIENT
Start: 2021-06-15 | End: 2021-06-15 | Stop reason: SDUPTHER

## 2021-06-15 RX ADMIN — SODIUM CHLORIDE 500 ML: 9 INJECTION, SOLUTION INTRAVENOUS at 07:29

## 2021-06-15 RX ADMIN — LIDOCAINE HYDROCHLORIDE 60 MG: 20 INJECTION, SOLUTION INFILTRATION; PERINEURAL at 07:34

## 2021-06-15 RX ADMIN — SODIUM CHLORIDE: 9 INJECTION, SOLUTION INTRAVENOUS at 07:32

## 2021-06-15 RX ADMIN — PROPOFOL 300 MG: 10 INJECTION, EMULSION INTRAVENOUS at 07:34

## 2021-06-15 RX ADMIN — Medication 500 ML: at 07:29

## 2021-06-15 ASSESSMENT — PULMONARY FUNCTION TESTS
PIF_VALUE: 0

## 2021-06-15 ASSESSMENT — PAIN SCALES - GENERAL: PAINLEVEL_OUTOF10: 0

## 2021-06-15 ASSESSMENT — PAIN - FUNCTIONAL ASSESSMENT: PAIN_FUNCTIONAL_ASSESSMENT: 0-10

## 2021-06-15 ASSESSMENT — PAIN DESCRIPTION - DESCRIPTORS: DESCRIPTORS: ACHING

## 2021-06-15 NOTE — ANESTHESIA PRE PROCEDURE
Department of Anesthesiology  Preprocedure Note       Name:  Barrera Barkley   Age:  78 y.o.  :  1950                                          MRN:  [id number]         Date:  6/15/2021      Surgeon: Vero Tinoco):  Henry Alex MD    Procedure: Procedure(s):  COLORECTAL CANCER SCREENING, HIGH RISK    Medications prior to admission:   Prior to Admission medications    Medication Sig Start Date End Date Taking? Authorizing Provider   loratadine (CLARITIN) 10 MG tablet TAKE 1 TABLET BY MOUTH DAILY AS NEEDED (ALLERGIES). 21  Yes Maricruz Pinto MD   allopurinol (ZYLOPRIM) 100 MG tablet Take 1 tablet by mouth daily 21  Yes Maricruz Pinto MD   metoprolol succinate (TOPROL XL) 50 MG extended release tablet Take 1 tablet by mouth daily 21  Yes Maricruz Pinto MD   benazepril (LOTENSIN) 20 MG tablet TAKE 1 TABLET BY MOUTH EVERY DAY 3/11/21  Yes Maricruz Pinto MD   KLOR-CON M20 20 MEQ extended release tablet TAKE 1 TABLET BY MOUTH THREE TIMES A DAY 21  Yes Maricruz Pinto MD   atorvastatin (LIPITOR) 40 MG tablet TAKE 1 TABLET BY MOUTH EVERY EVENING 10/2/20  Yes Maricruz Pinto MD   doxepin (SINEQUAN) 10 MG capsule Take 1 capsule by mouth nightly 20  Yes Maricruz Pinto MD   furosemide (LASIX) 40 MG tablet TAKE 1 TABLET DAILY 20  Yes Maricruz Pinto MD   aspirin 81 MG EC tablet Take 1 tablet by mouth 2 times daily  Patient taking differently: Take 81 mg by mouth daily  18  Yes Tien Due, APRN - CNP   Multiple Vitamins-Minerals (MULTIVITAMIN PO) Take 1 tablet by mouth daily.    Yes Historical Provider, MD   predniSONE (DELTASONE) 10 MG tablet 4 PO each day for three days, 3 PO each day for three days, 2 PO each day for three days ,1 PO each day until gone 21   Maricruz Pinto MD   metFORMIN (GLUCOPHAGE) 500 MG tablet TAKE 2 TABLETS BY MOUTH TWICE A DAY WITH MEALAS 21   Maricruz Pinto MD   Pseudoephedrine-guaiFENesin [facility] D PO) Take 1 tablet by mouth 2 times daily Historical Provider, MD   KLOR-CON M20 20 MEQ extended release tablet TAKE 1 TABLET BY MOUTH THREE TIMES A DAY 6/20/20   Christelle Russ MD   Alcohol Swabs PADS Use as directed with insulin injections 11/12/14   Christelle Russ MD       Current medications:    Current Facility-Administered Medications   Medication Dose Route Frequency Provider Last Rate Last Admin    sodium chloride 0.9 % infusion             sterile water for irrigation    PRN Herlinda Batres MD   1,000 mL at 06/15/21 0714    simethicone (MYLICON) 40 YJ/2.2SO drops    PRN Herlinda Batres MD   60 mg at 06/15/21 0716       Allergies:     Allergies   Allergen Reactions    Dye [Barium-Containing Compounds] Hives    Iodides     Iodinated Diagnostic Agents Hives       Problem List:    Patient Active Problem List   Diagnosis Code    Arthritis, lumbar spine M47.816    CLL [facility]chronic lymphocytic leukemia) 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) E11.9    Dyslipidemia E78.5    Essential hypertension I10    Cyst, epididymis N50.3    Hydrocele N43.3    Other closed fractures of distal end of radius (alone) S52.599A    Stiffness of joint, not elsewhere classified, forearm M25.639    Stiffness of joint, not elsewhere classified, hand M25.649    Varicocele I86.1    Pain in joint, hand M25.549    Unspecified inflammatory spondylopathy, lumbar region ([facility] [address] M46.96    Morbidly obese ([facility]) E66.01    Acute idiopathic gout of right foot M10.071    Chronic kidney disease N18.9       Past Medical History:        Diagnosis Date    Acute idiopathic gout of right foot     Anemia 03/01/2018    iron transfusions x2    Arthritis     both knees    Chronic kidney disease     CLL (chronic lymphocytic leukemia) ([facility] [address]     Colon polyps     Disease of blood and blood forming organ     Hyperlipidemia     dx since 1970's    Hypertension meds  since 1970's    Lymphoma of [facility] tract [facility] [address]     Movement disorder     Thyroid cancer ([facility] [address] 02/2018    [facility]- denies states it was CLL    Type II or unspecified type diabetes mellitus without mention of complication, not stated as uncontrolled     hx > 15 yrs       Past Surgical History:        Procedure Laterality Date    APPENDECTOMY      CHOLECYSTECTOMY N/A 07/07/2016    COLONOSCOPY  4/1/16    w/polypectomy     ENDOSCOPY, COLON, DIAGNOSTIC      JOINT REPLACEMENT Bilateral     knees    OTHER SURGICAL HISTORY Right 06/08/16    I & D ABSCESS THIGH    VA MANIPULATN KNEE JT+ANESTHESIA Right 3/15/2018    RIGHT KNEE MANIPULATION performed by Getachew Pemberton MD at [address] [facility]. Right 1/18/2018    RIGHT KNEE TOTAL KNEE ARTHROPLASTY ELÍAS [place] performed by Getachew Pemberton MD at [facility]. Left 5/17/2018    LEFT KNEE TOTAL KNEE ARTHROPLASTY, performed by Getachew Pemberton MD at [facility] Right     due to displacement    UPPER GASTROINTESTINAL ENDOSCOPY  4/29/15    w/bx     UPPER GASTROINTESTINAL ENDOSCOPY  02/28/2018    Dr. Calixto Schmitz       Social History:    Social History     Tobacco Use    Smoking status: Never Smoker    Smokeless tobacco: Never Used   Substance Use Topics    Alcohol use: Yes     Comment: rare social                                Counseling given: Not Answered      Vital Signs (Current):   Vitals:    06/15/21 0654   BP: (!) 154/74   Pulse: 81   Resp: 18   Temp: 36.9 °C (98.5 °F)   TempSrc: Temporal   SpO2: 96%   Weight: 230 lb (104.3 kg)   Height: 5' 6\" (1.676 m)                                              BP Readings from Last 3 Encounters:   06/15/21 (!) 154/74   05/24/21 100/70   05/10/21 112/66       NPO Status: Time of last liquid consumption: 2215                        Time of last solid consumption: 1830                        Date of last liquid consumption: 06/14/21                        Date of last solid food consumption: 06/13/21    BMI:   Wt Readings from Last 3 Encounters:   06/15/21 230 lb (104.3 kg)   05/24/21 232 lb (105.2 kg)   12/03/20 230 lb (104.3 kg)     Body mass index is 37.12 kg/m².     CBC:   Lab Results   Component Value Date    WBC 4.5 06/10/2021    WBC 6.0 05/10/2021    RBC 3.69 06/10/2021    HGB 11.3 06/10/2021    HCT 35.1 06/10/2021    MCV 95 06/10/2021    RDW 13.9 05/10/2021     06/10/2021       CMP:   Lab Results   Component Value Date     06/10/2021    K 4.0 06/10/2021    K 4.3 05/18/2018     06/10/2021    CO2 25 05/24/2021    BUN 40 05/24/2021    CREATININE 1.60 06/10/2021    GFRAA 52 06/10/2021    LABGLOM 43 06/10/2021    GLUCOSE 167 06/10/2021    PROT 7.1 06/10/2021    CALCIUM 9.0 06/10/2021    BILITOT 0.6 06/10/2021    ALKPHOS 76 06/10/2021    AST 15 06/10/2021    ALT 18 06/10/2021       POC Tests:   Recent Labs     06/15/21  0703   POCGLU 122*       Coags:   Lab Results   Component Value Date    PROTIME 10.7 05/10/2018    INR 1.0 05/10/2018    APTT 24.3 03/14/2018       HCG (If Applicable): No results found for: PREGTESTUR, PREGSERUM, HCG, HCGQUANT     ABGs: No results found for: PHART, PO2ART, [id number], [id number], BEART, [id number]     Type & Screen (If Applicable):  No results found for: LABABO, LABRH    Drug/Infectious Status (If Applicable):  No results found for: HIV, HEPCAB    COVID-19 Screening (If Applicable): No results found for: COVID19        Anesthesia Evaluation  Patient summary reviewed and Nursing notes reviewed no history of anesthetic complications:   Airway: Mallampati: II  TM distance: >3 FB   Neck ROM: full  Mouth opening: > = 3 FB Dental: normal exam         Pulmonary:normal exam                               Cardiovascular:    (+) hypertension:, hyperlipidemia      ECG reviewed                        Neuro/Psych:               GI/Hepatic/Renal:   (+) renal disease: CRI, bowel prep, Endo/Other:    (+) Diabetes, : arthritis:., malignancy/cancer. Abdominal:           Vascular:                                      Anesthesia Plan      MAC     ASA 4       Induction: intravenous. Anesthetic plan and risks discussed with patient. Plan discussed with attending.                   SHERMAN Cobos - GABRIEL   6/15/2021

## 2021-06-15 NOTE — ANESTHESIA POSTPROCEDURE EVALUATION
Department of Anesthesiology  Postprocedure Note    Patient: Brian Lemon  MRN: [id number]  YOB: 1950  Date of evaluation: 6/15/2021  Time:  7:57 AM     Procedure Summary     Date: 06/15/21 Room / Location: [address] [place] [address]    Anesthesia Start: 0732 Anesthesia Stop: 8491    Procedure: COLORECTAL CANCER SCREENING, HIGH RISK (N/A ) Diagnosis: (History of colon polyps Z86.010)    Surgeons: Mary Gates MD Responsible Provider: SHERMAN Rice CRNA    Anesthesia Type: MAC ASA Status: 4          Anesthesia Type: MAC    Sigifredo Phase I: Sigifredo Score: 10    Sigifredo Phase II:      Last vitals: Reviewed and per EMR flowsheets.        Anesthesia Post Evaluation    Patient location during evaluation: bedside  Patient participation: waiting for patient participation  Level of consciousness: sleepy but conscious  Pain score: 0  Airway patency: patent  Nausea & Vomiting: no nausea and no vomiting  Complications: no  Cardiovascular status: blood pressure returned to baseline and hemodynamically stable  Respiratory status: acceptable and nasal cannula  Hydration status: euvolemic

## 2021-06-17 ENCOUNTER — TELEPHONE (OUTPATIENT)
Dept: FAMILY MEDICINE CLINIC | Age: 71
End: 2021-06-17

## 2021-06-17 RX ORDER — PREDNISONE 10 MG/1
TABLET ORAL
Qty: 30 TABLET | Refills: 0 | Status: SHIPPED | OUTPATIENT
Start: 2021-06-17 | End: 2021-07-08 | Stop reason: ALTCHOICE

## 2021-06-17 NOTE — TELEPHONE ENCOUNTER
Incoming Call    Caller:  Bob Grijalva      Communication (HIPPA):  HIPPA not applicable      Call Reason/Info:  Patient had gout in his right foot. This has fully cleared up with the prednisone. Patient is stating that his left foot now seems to have gout. Please Advise:  Florencia Laird would like to know if he could get a refill on prednisone to see if it will help clear the gout in his left foot.

## 2021-06-17 NOTE — TELEPHONE ENCOUNTER
Is very possible that it is not gout but regular arthritis that is causing his pain. I did call in some more prednisone but he may benefit from seeing a podiatrist to help him deal with his pain with orthotics or other things to relieve the stress on his feet.

## 2021-07-02 ENCOUNTER — HOSPITAL ENCOUNTER (OUTPATIENT)
Dept: ULTRASOUND IMAGING | Age: 71
Discharge: HOME | End: 2021-07-04
Payer: MEDICARE

## 2021-07-02 DIAGNOSIS — N18.32 CHRONIC KIDNEY DISEASE (CKD) STAGE G3B/A1, MODERATELY DECREASED GLOMERULAR FILTRATION RATE (GFR) BETWEEN 30-44 ML/MIN/1.73 SQUARE METER AND ALBUMINURIA CREATININE RATIO LESS THAN 30 MG/G (HCC): ICD-10-CM

## 2021-07-02 PROCEDURE — 76775 US EXAM ABDO BACK WALL LIM: CPT

## 2021-07-08 ENCOUNTER — OFFICE VISIT (OUTPATIENT)
Dept: FAMILY MEDICINE CLINIC | Age: 71
End: 2021-07-08
Payer: MEDICARE

## 2021-07-08 VITALS
HEIGHT: 66 IN | DIASTOLIC BLOOD PRESSURE: 60 MMHG | HEART RATE: 79 BPM | BODY MASS INDEX: 36.8 KG/M2 | TEMPERATURE: 97.9 F | RESPIRATION RATE: 16 BRPM | WEIGHT: 229 LBS | OXYGEN SATURATION: 98 % | SYSTOLIC BLOOD PRESSURE: 100 MMHG

## 2021-07-08 DIAGNOSIS — M54.50 LUMBAR PAIN WITH RADIATION DOWN LEG: Primary | ICD-10-CM

## 2021-07-08 DIAGNOSIS — M79.606 LUMBAR PAIN WITH RADIATION DOWN LEG: Primary | ICD-10-CM

## 2021-07-08 PROCEDURE — 1036F TOBACCO NON-USER: CPT | Performed by: FAMILY MEDICINE

## 2021-07-08 PROCEDURE — 1123F ACP DISCUSS/DSCN MKR DOCD: CPT | Performed by: FAMILY MEDICINE

## 2021-07-08 PROCEDURE — 3017F COLORECTAL CA SCREEN DOC REV: CPT | Performed by: FAMILY MEDICINE

## 2021-07-08 PROCEDURE — 99213 OFFICE O/P EST LOW 20 MIN: CPT | Performed by: FAMILY MEDICINE

## 2021-07-08 PROCEDURE — G8427 DOCREV CUR MEDS BY ELIG CLIN: HCPCS | Performed by: FAMILY MEDICINE

## 2021-07-08 PROCEDURE — 4040F PNEUMOC VAC/ADMIN/RCVD: CPT | Performed by: FAMILY MEDICINE

## 2021-07-08 PROCEDURE — G8417 CALC BMI ABV UP PARAM F/U: HCPCS | Performed by: FAMILY MEDICINE

## 2021-07-08 RX ORDER — MELOXICAM 7.5 MG/1
7.5 TABLET ORAL DAILY
Qty: 10 TABLET | Refills: 0 | Status: SHIPPED | OUTPATIENT
Start: 2021-07-08 | End: 2021-11-17 | Stop reason: CLARIF

## 2021-07-08 RX ORDER — TIZANIDINE 2 MG/1
2 TABLET ORAL EVERY 8 HOURS PRN
Qty: 30 TABLET | Refills: 0 | Status: SHIPPED | OUTPATIENT
Start: 2021-07-08 | End: 2022-01-17 | Stop reason: CLARIF

## 2021-07-08 NOTE — PROGRESS NOTES
back he feels like his legs are going to give way sometimes when it does spasm. He has no fever chills cough nausea or vomiting and he has not fallen no numbness and tingling in his back no problems with his bowels or bladder. He does have a history of cancer    Review of Systems    Constitutional: Negative for fatigue, fever and sweats. HEENT: Negative for eye discharge and vision loss. Negative for ear drainage, hearing loss and nasal drainage. Respiratory: Negative for cough, dyspnea and wheezing. Cardiovascular:  Negative for chest pain, claudication and irregular heartbeat/palpitations. Gastrointestinal: Negative for abdominal pain, nausea, vomiting, constipation and diarrhea. Genitourinary: No dysuria or penile discharge. Metabolic/Endocrine: Negative for cold intolerance, heat intolerance, polydipsia and polyphagia. No unintended weight loss or gain. Neuro/Psychiatric: Negative for gait disturbance. Negative for psychiatric symptoms. Dermatologic: Negative for pruritus and rash. Musculoskeletal: positive for bone/joint symptoms. No numbness or tingling. No weakness. Hematology: Negative for bleeding and easy bruising. Immunology:  Negative for environmental allergies and food allergies. Physical Exam    Patient's medication, allergies, past medical, surgical, social and family histories were reviewed and updated as appropriate. PHYSICAL EXAM     General: Alert oriented pleasant cooperative no acute distress  Lungs: Clear to auscultation without wheezes rhonchi or rales  Heart: Regular rate and rhythm without murmurs rubs or gallops  Back: Slightly lordotic no pain to palpation or percussion of back no tenderness negative straight leg raises but very tight hamstrings I really had trouble getting any kind of deep tendon reflex on this patient as he could not relax and he could be distracted.   No edema or calf tenderness able to ambulate without assistance              Assessment: Standing Status:   Future     Number of Occurrences:   1     Standing Expiration Date:   7/8/2022   Magaly Oliveros MD, Pain Management, [place]     Referral Priority:   Routine     Referral Type:   Eval and Treat     Referral Reason:   Specialty Services Required     Referred to Provider:   Cynthia Good MD     Requested Specialty:   Pain Management     Number of Visits Requested:   1       Orders Placed This Encounter   Medications    tiZANidine (ZANAFLEX) 2 MG tablet     Sig: Take 1 tablet by mouth every 8 hours as needed (muscle spasms)     Dispense:  30 tablet     Refill:  0    meloxicam (MOBIC) 7.5 MG tablet     Sig: Take 1 tablet by mouth daily     Dispense:  10 tablet     Refill:  0              Return if symptoms worsen or fail to improve. An electronic signature was used to authenticate this note.   Dr. Mahalia Kanner, MD      7/8/21  3:44 PM

## 2021-08-03 ENCOUNTER — INITIAL CONSULT (OUTPATIENT)
Dept: PAIN MANAGEMENT | Age: 71
End: 2021-08-03
Payer: MEDICARE

## 2021-08-03 VITALS
RESPIRATION RATE: 13 BRPM | BODY MASS INDEX: 36.8 KG/M2 | SYSTOLIC BLOOD PRESSURE: 103 MMHG | HEIGHT: 66 IN | WEIGHT: 229 LBS | HEART RATE: 72 BPM | DIASTOLIC BLOOD PRESSURE: 55 MMHG | TEMPERATURE: 98 F | OXYGEN SATURATION: 98 %

## 2021-08-03 DIAGNOSIS — M47.817 LUMBOSACRAL SPONDYLOSIS WITHOUT MYELOPATHY: Primary | ICD-10-CM

## 2021-08-03 DIAGNOSIS — M47.816 LUMBAR FACET JOINT SYNDROME: ICD-10-CM

## 2021-08-03 DIAGNOSIS — M51.36 DDD (DEGENERATIVE DISC DISEASE), LUMBAR: ICD-10-CM

## 2021-08-03 PROCEDURE — 99204 OFFICE O/P NEW MOD 45 MIN: CPT | Performed by: PAIN MEDICINE

## 2021-08-03 PROCEDURE — G8428 CUR MEDS NOT DOCUMENT: HCPCS | Performed by: PAIN MEDICINE

## 2021-08-03 PROCEDURE — 3017F COLORECTAL CA SCREEN DOC REV: CPT | Performed by: PAIN MEDICINE

## 2021-08-03 PROCEDURE — 1036F TOBACCO NON-USER: CPT | Performed by: PAIN MEDICINE

## 2021-08-03 PROCEDURE — G8417 CALC BMI ABV UP PARAM F/U: HCPCS | Performed by: PAIN MEDICINE

## 2021-08-03 PROCEDURE — 1123F ACP DISCUSS/DSCN MKR DOCD: CPT | Performed by: PAIN MEDICINE

## 2021-08-03 PROCEDURE — 4040F PNEUMOC VAC/ADMIN/RCVD: CPT | Performed by: PAIN MEDICINE

## 2021-08-03 ASSESSMENT — ENCOUNTER SYMPTOMS
SHORTNESS OF BREATH: 0
CONSTIPATION: 0
DIARRHEA: 0
NAUSEA: 0
BACK PAIN: 1

## 2021-08-03 NOTE — PROGRESS NOTES
[facility]) Physicians  Neurosurgery and Pain [facility]  [address], [address] [facility] , [address] [facility], [place]: [phone]  F: [phone]        Alta Barba  (1950)    8/3/2021    Subjective:     Alta Carrier is 78 y.o. male who complains today of:    Chief Complaint   Patient presents with    Back Pain       HPI  Patient complains of low back pain and muscle spasms that started on June 28th. He has had lower back pain on and off for several years, but this episode is worse. The patient denies a traumatic or inciting incident. Pain has been gradual and insidious in onset. Pain is located in the lumbar spine. Pain is not radiating. Pain is described as deep and aching. Pain level today is rated 4 on a 10 point scale. It is moderate in nature. Overall, it has gotten much better than when it originally stared. Pain is affecting the patients ability to perform activities of daily living especially with regards to personal hygiene, household chores and self care. Pain in part is secondary to osteoarthritis of the spine. Pain is aggravated by bending, lifting, twisting, walking and standing  Pain is alleviated by rest and medication. Prior PT: none recently. Has done it in the past.   Prior Injections: yes, several years ago. Prior medications: NSAIDs and analgesics  Prior spine surgeries:  none  Pain is not is associated with fevers/chills/night sweats. Bowel and bladder are working appropriately. Previous studies include Xray lumbar spine.       Allergies:  Dye [barium-containing compounds], Iodides, and Iodinated diagnostic agents    Past Medical History:   Diagnosis Date    Acute idiopathic gout of right foot     Anemia 03/01/2018    iron transfusions x2    Arthritis     both knees    Chronic kidney disease     CLL (chronic lymphocytic leukemia) (HCC)     Colon polyps     Disease of blood and blood forming organ     Hyperlipidemia     dx since 1970's    Hypertension     meds  since 1970's    Lymphoma of gastrointestinal tract ([facility]     Movement disorder     Thyroid cancer ([facility] 02/2018    [facility]- denies states it was CLL    Type II or unspecified type diabetes mellitus without mention of complication, not stated as uncontrolled     hx > 15 yrs     Past Surgical History:   Procedure Laterality Date    APPENDECTOMY      CHOLECYSTECTOMY N/A 07/07/2016    COLONOSCOPY  4/1/16    w/polypectomy     COLONOSCOPY N/A 6/15/2021    COLORECTAL CANCER SCREENING, HIGH RISK performed by Bert Valle MD at [address] [place] [address], COLON, DIAGNOSTIC      JOINT REPLACEMENT Bilateral     knees    OTHER SURGICAL HISTORY Right 06/08/16    I & D ABSCESS THIGH    KY MANIPULATN KNEE JT+ANESTHESIA Right 3/15/2018    RIGHT KNEE MANIPULATION performed by Lore Tobar MD at [address] [place] [place] [address]. Right 1/18/2018    RIGHT KNEE TOTAL KNEE ARTHROPLASTY [place]AS [place] performed by Lore Tobar MD at [address] [place] [place] [address]. Left 5/17/2018    LEFT KNEE TOTAL KNEE ARTHROPLASTY, performed by Lore [facility]ar MD at [facility]     due to displacement    UPPER GASTROINTESTINAL ENDOSCOPY  4/29/15    w/bx     UPPER GASTROINTESTINAL ENDOSCOPY  02/28/2018    Dr. Erasmo Ramesh     Family History   Problem Relation Age of Onset    Heart Disease Mother 67        CHF    Cancer Father 68        liver/ pancreatic    No Known Problems Sister     Cancer Brother         chronic lukemia    Arthritis Brother     No Known Problems Daughter     Colon Cancer Neg Hx      Social History     Socioeconomic History    Marital status:      Spouse name: Not on file    Number of children: Not on file    Years of education: Not on file    Highest education level: Not on file   Occupational History    Occupation: retired   Tobacco Use    Smoking status: Never Smoker  Smokeless tobacco: Never Used   Vaping Use    Vaping Use: Never used   Substance and Sexual Activity    Alcohol use: Yes     Comment: rare social    Drug use: No    Sexual activity: Yes     Partners: Female   Other Topics Concern    Not on file   Social History Narrative    Lives w wife     Social Determinants of Health     Financial Resource Strain: Low Risk     Difficulty of Paying Living Expenses: Not hard at all   Food Insecurity: No Food Insecurity    Worried About Running Out of Food in the Last Year: Never true    Joycelyn of Food in the Last Year: Never true   Transportation Needs: No Transportation Needs    Lack of Transportation (Medical): No    Lack of Transportation (Non-Medical): No   Physical Activity:     Days of Exercise per Week:     Minutes of Exercise per Session:    Stress:     Feeling of Stress :    Social Connections:     Frequency of Communication with Friends and Family:     Frequency of Social Gatherings with Friends and Family:     Attends Yazdanism Services:     Active Member of Clubs or Organizations:     Attends Club or Organization Meetings:     Marital Status:    Intimate Partner Violence:     Fear of Current or Ex-Partner:     Emotionally Abused:     Physically Abused:     Sexually Abused:        Current Outpatient Medications on File Prior to Visit   Medication Sig Dispense Refill    KLOR-CON M20 20 MEQ extended release tablet TAKE 1 TABLET BY MOUTH THREE TIMES A  tablet 0    tiZANidine (ZANAFLEX) 2 MG tablet Take 1 tablet by mouth every 8 hours as needed (muscle spasms) 30 tablet 0    meloxicam (MOBIC) 7.5 MG tablet Take 1 tablet by mouth daily 10 tablet 0    loratadine (CLARITIN) 10 MG tablet TAKE 1 TABLET BY MOUTH DAILY AS NEEDED (ALLERGIES).  90 tablet 1    allopurinol (ZYLOPRIM) 100 MG tablet Take 1 tablet by mouth daily 90 tablet 2    metoprolol succinate (TOPROL XL) 50 MG extended release tablet Take 1 tablet by mouth daily 90 tablet 3    benazepril (LOTENSIN) 20 MG tablet TAKE 1 TABLET BY MOUTH EVERY DAY 90 tablet 3    metFORMIN (GLUCOPHAGE) 500 MG tablet TAKE 2 TABLETS BY MOUTH TWICE A DAY WITH MEALAS 360 tablet 3    atorvastatin (LIPITOR) 40 MG tablet TAKE 1 TABLET BY MOUTH EVERY EVENING 90 tablet 3    doxepin (SINEQUAN) 10 MG capsule Take 1 capsule by mouth nightly 90 capsule 3    furosemide (LASIX) 40 MG tablet TAKE 1 TABLET DAILY 90 tablet 3    [DISCONTINUED] KLOR-CON M20 20 MEQ extended release tablet TAKE 1 TABLET BY MOUTH THREE TIMES A  tablet 1    aspirin 81 MG EC tablet Take 1 tablet by mouth 2 times daily (Patient taking differently: Take 81 mg by mouth daily ) 60 tablet 0    Alcohol Swabs PADS Use as directed with insulin injections 300 each 3    Multiple Vitamins-Minerals (MULTIVITAMIN PO) Take 1 tablet by mouth daily. No current facility-administered medications on file prior to visit. Review of Systems   Constitutional: Negative for fever. HENT: Negative for hearing loss. Respiratory: Negative for shortness of breath. Gastrointestinal: Negative for constipation, diarrhea and nausea. Genitourinary: Negative for difficulty urinating. Musculoskeletal: Positive for back pain. Skin: Negative for rash. Neurological: Negative for headaches. Hematological: Does not bruise/bleed easily. Psychiatric/Behavioral: Negative for sleep disturbance. Objective:     Vitals:  BP (!) 103/55 (Site: Right Upper Arm, Position: Sitting, Cuff Size: Large Adult)   Pulse 72   Temp 98 °F (36.7 °C) (Temporal)   Resp 13   Ht 5' 6\" (1.676 m)   Wt 229 lb (103.9 kg)   SpO2 98%   BMI 36.96 kg/m² Pain Score:   4      Physical Exam    General Appearance: NAD and Conversant. Eyes: EOM intact. HENT: Atraumatic. Neck: Neck is supple and Trachea midline. Lymph: No supraclavicular lymphadenopathy. Lungs: Normal respiratory effort and No significant respiratory distress.   Cardiovascular: No lower extremity ulcerations or cyanosis and Capillary refill is less than 2 seconds. Abdomen: Soft and Non tender to palpation. Extremeties: No significant lower exremity edema. Skin: Visualized skin is intact and he has normal skin turgor. Psych: Mood and affect within normal limits, Insight and judgement within normal limits and Alert and oriented. Inspection of the spine reveals no gross abnormalities in terms of curvature. ROM of the spine is abnormal.  There is pain inhibition with end ranges. Facet loading reproduces his symptoms. Palpation: he hasTTP over the lumbar paraspinal musculature. Muscle Tone is within normal limits in all four extremities. Strength: 5 in right upper extremity extremity. 5 in left upper extremity extremity. 5 in right lower extremity extremity. 5 in left lower extremity extremity. Neurologic:  Coordination is within normal limits. DTR's are intact and symmetric throughout. Sensation is within normal limits throughout in all four extremities to deep pressure and light touch. Gait is within normal limits. No difficulty with heel walking, toe walking and tandem walking. Long Tract Signs: SLR: Negative. Spurling's Maneuver: Negative. Plantar Response: Downgoing bilaterally. Archibald sign is negative bilaterally    DATA REVIEW:   Lumbar Xray 7/8/21:   FINDINGS: Vertebral bodies normal height and alignment. Diffuse disc space narrowing T12-L1 through L2-L3. Posterior disc space narrowing L3-4 through L5-S1. Anterior osteophytes T12-L5. 1 cm retrolisthesis L4 on L5. Increased facet sclerosis L1-L5. No    bone lesion. No acute fracture.           Impression   MARKED DEGENERATIVE CHANGE, LUMBAR SPINE         Assessment:      Diagnosis Orders   1. Lumbosacral spondylosis without myelopathy  KY INJ DX/THER AGNT PARAVERT FACET JOINT, LUMBAR/SAC, 1ST LEVEL    KY INJ DX/THER AGNT PARAVERT FACET JOINT, LUMBAR/SAC, 2ND LEVEL   2.  Lumbar facet joint syndrome  KY INJ DX/THER AGNT PARAVERT FACET JOINT, LUMBAR/SAC, 1ST LEVEL    SC INJ DX/THER AGNT PARAVERT FACET JOINT, LUMBAR/SAC, 2ND LEVEL   3. DDD (degenerative disc disease), lumbar  SC INJ DX/THER AGNT PARAVERT FACET JOINT, LUMBAR/SAC, 1ST LEVEL    SC INJ DX/THER AGNT PARAVERT FACET JOINT, LUMBAR/SAC, 2ND LEVEL       Plan:          No orders of the defined types were placed in this encounter. Orders Placed This Encounter   Procedures    SC INJ DX/THER AGNT PARAVERT FACET JOINT, LUMBAR/SAC, 1ST LEVEL     Bilateral L3, L4, L5 MBB. Standing Status:   Future     Standing Expiration Date:   11/1/2021    SC INJ DX/THER AGNT PARAVERT FACET JOINT, LUMBAR/SAC, 2ND LEVEL     Bilateral L3, L4, L5 MBB. Standing Status:   Future     Standing Expiration Date:   11/1/2021     1. Trial of Medial Branch Blocks as ordered above. Pertinent imaging reviewed. He   has failed conservative treatment. Axial symptoms are predominant. Discussed the risks including but not limited to bleeding, infection, worsened pain, damage to surrounding structures, side effects, toxicity, allergic reactions to medications used, need for surgery, premature damage or degeneration of the joint, as well as catastrophic injury such as vision loss, paralysis, stroke, bowel or bladder incontinence, ventilator dependence, loss of use of the joint and/or extremity, and death. Discussed the risks, benefits, and alternatives to the procedure including no procedure at all. Discussed that we cannot undo any permanent neurologic or orthopaedic damage or change the course of any underlying disease. After thorough discussion, patient expressed understanding and willingness to proceed. 2. Consider RFA if does well with above. Follow up:  Return for Medial Banch Blocks. The patient will follow up for reevaluation of his pain. The patient is aware of the treatment plan and in agreement. All of his questions were answered.      Chauncey Hassan MD

## 2021-08-17 RX ORDER — FUROSEMIDE 40 MG/1
TABLET ORAL
Qty: 90 TABLET | Refills: 1 | Status: SHIPPED | OUTPATIENT
Start: 2021-08-17 | End: 2022-02-10

## 2021-08-25 ENCOUNTER — PROCEDURE VISIT (OUTPATIENT)
Dept: PAIN MANAGEMENT | Age: 71
End: 2021-08-25

## 2021-08-25 DIAGNOSIS — M47.817 LUMBOSACRAL SPONDYLOSIS WITHOUT MYELOPATHY: ICD-10-CM

## 2021-08-25 DIAGNOSIS — M51.36 DDD (DEGENERATIVE DISC DISEASE), LUMBAR: ICD-10-CM

## 2021-08-25 DIAGNOSIS — M47.817 LUMBOSACRAL SPONDYLOSIS WITHOUT MYELOPATHY: Primary | ICD-10-CM

## 2021-08-25 DIAGNOSIS — M47.816 LUMBAR FACET JOINT SYNDROME: ICD-10-CM

## 2021-08-25 PROCEDURE — 64494 INJ PARAVERT F JNT L/S 2 LEV: CPT | Performed by: PAIN MEDICINE

## 2021-08-25 PROCEDURE — 64493 INJ PARAVERT F JNT L/S 1 LEV: CPT | Performed by: PAIN MEDICINE

## 2021-08-25 NOTE — PROGRESS NOTES
[facility]) Physicians  Neurosurgery and Pain [facility]  [address] [facility] , [address] [facility], [place]: [phone]  F: [phone]          LUMBAR/SACRAL MEDIAL BRANCH BLOCK      Provider: Christina Radford MD          Patient Name: Claudette Paez : 1950        Date: 2021       Claudette Paez is here today for interventional pain management. Discussed the risks including but not limited to bleeding, infection, worsened pain, damage to surrounding structures, side effects, toxicity, allergic reactions to medications used, need for surgery, pneumothorax, abdominal and visceral injury, as well as catastrophic injury such as vision loss, paralysis, spinal cord or plexus injury, stroke, bowel or bladder incontinence, chest tube insertion, ventilator dependence, and death. Discussed the risks, benefits, and alternatives to the procedure including no procedure at all. Discussed that we cannot undo any permanent neurologic or orthopaedic damage or change the course of any underlying disease. After thorough discussion, patient expressed understanding and willingness to proceed. Written consent was obtained and is in the chart. Verbal consent to proceed was obtained. Standard ASIPP guidelines were followed and sterile technique used. Area was cleaned with Betadine x3. Informed consent was obtained. Fluoroscopic guidance was used for this procedure. Junction of superior articular process and transverse process was identified. For L5 dorsal primary ramus groove of sacral ala and SAP of S1 was identified. Appropriate length spinal needle was used and advanced to correct anatomic location. Negative aspiration was achieved. At each site approximately 1/2cc of 1% preservative free Lidocaine was injected without difficulty. Patient tolerated the procedure well, no obvious complications occurred during the procedure.   Patient was appropriately monitored and discharged home in stable condition with their usual motor strength. The patient will keep close track of pain over the next several hours and call our office tomorrow and let us know what percentage of pain relief is experienced. Post op Instructions were given to patient. [x] Bilateral [] T12 [] S1      [] L1 [] S2     [] Right [] L2 [] S3      [x] L3      [] Left [x] L4         [x] L5 [] S. I. Patient has >80% pain relief following procedure with positive facet joint provocative maneuvers.  Schedule second MBB trial.      Cindi Bellamy MD

## 2021-09-08 ENCOUNTER — PROCEDURE VISIT (OUTPATIENT)
Dept: PAIN MANAGEMENT | Age: 71
End: 2021-09-08
Payer: MEDICARE

## 2021-09-08 DIAGNOSIS — M47.817 LUMBOSACRAL SPONDYLOSIS WITHOUT MYELOPATHY: ICD-10-CM

## 2021-09-08 PROCEDURE — 64494 INJ PARAVERT F JNT L/S 2 LEV: CPT | Performed by: PAIN MEDICINE

## 2021-09-08 PROCEDURE — 64493 INJ PARAVERT F JNT L/S 1 LEV: CPT | Performed by: PAIN MEDICINE

## 2021-09-08 RX ORDER — LIDOCAINE HYDROCHLORIDE 10 MG/ML
2 INJECTION, SOLUTION EPIDURAL; INFILTRATION; INTRACAUDAL; PERINEURAL ONCE
Status: COMPLETED | OUTPATIENT
Start: 2021-09-08 | End: 2021-09-08

## 2021-09-08 RX ORDER — BETAMETHASONE SODIUM PHOSPHATE AND BETAMETHASONE ACETATE 3; 3 MG/ML; MG/ML
6 INJECTION, SUSPENSION INTRA-ARTICULAR; INTRALESIONAL; INTRAMUSCULAR; SOFT TISSUE ONCE
Status: COMPLETED | OUTPATIENT
Start: 2021-09-08 | End: 2021-09-08

## 2021-09-08 RX ADMIN — BETAMETHASONE SODIUM PHOSPHATE AND BETAMETHASONE ACETATE 6 MG: 3; 3 INJECTION, SUSPENSION INTRA-ARTICULAR; INTRALESIONAL; INTRAMUSCULAR; SOFT TISSUE at 10:43

## 2021-09-08 RX ADMIN — LIDOCAINE HYDROCHLORIDE 2 ML: 10 INJECTION, SOLUTION EPIDURAL; INFILTRATION; INTRACAUDAL; PERINEURAL at 10:43

## 2021-09-08 NOTE — PROGRESS NOTES
discharged home in stable condition with their usual motor strength. The patient will keep close track of pain over the next several hours and call our office tomorrow and let us know what percentage of pain relief is experienced. Post op Instructions were given to patient. [x] Bilateral [] T12 [] S1      [] L1 [] S2     [] Right [] L2 [] S3      [x] L3      [] Left [x] L4         [x] L5 [] S. I. Patient has >80% pain relief following procedure with positive facet joint provocative maneuvers. Schedule RF ablation.      Veronica Stapleton MD

## 2021-09-09 DIAGNOSIS — F51.04 PSYCHOPHYSIOLOGICAL INSOMNIA: ICD-10-CM

## 2021-09-09 RX ORDER — DOXEPIN HYDROCHLORIDE 10 MG/1
10 CAPSULE ORAL NIGHTLY
Qty: 90 CAPSULE | Refills: 0 | Status: SHIPPED | OUTPATIENT
Start: 2021-09-09 | End: 2021-12-06

## 2021-09-09 NOTE — TELEPHONE ENCOUNTER
requesting medication refill.      Rx requested:  Requested Prescriptions      No prescriptions requested or ordered in this encounter       Last Office Visit:   Visit date not found    Last Tox screen:        Last Medication contract:        Next Visit Date:  Future Appointments   Date Time Provider Vincenzo Gomezi   9/15/2021  9:30 AM Ion Plascencia MD [facility] [place]   11/17/2021  9:00 AM Raeksh Estrella MD [address] [place] [address]

## 2021-09-15 ENCOUNTER — OFFICE VISIT (OUTPATIENT)
Dept: PAIN MANAGEMENT | Age: 71
End: 2021-09-15
Payer: MEDICARE

## 2021-09-15 DIAGNOSIS — M47.817 LUMBOSACRAL SPONDYLOSIS WITHOUT MYELOPATHY: ICD-10-CM

## 2021-09-15 PROCEDURE — 64636 DESTROY L/S FACET JNT ADDL: CPT | Performed by: PAIN MEDICINE

## 2021-09-15 PROCEDURE — 64635 DESTROY LUMB/SAC FACET JNT: CPT | Performed by: PAIN MEDICINE

## 2021-09-15 RX ORDER — BETAMETHASONE SODIUM PHOSPHATE AND BETAMETHASONE ACETATE 3; 3 MG/ML; MG/ML
3 INJECTION, SUSPENSION INTRA-ARTICULAR; INTRALESIONAL; INTRAMUSCULAR; SOFT TISSUE ONCE
Status: COMPLETED | OUTPATIENT
Start: 2021-09-15 | End: 2021-09-15

## 2021-09-15 RX ORDER — LIDOCAINE HYDROCHLORIDE 10 MG/ML
3 INJECTION, SOLUTION EPIDURAL; INFILTRATION; INTRACAUDAL; PERINEURAL ONCE
Status: COMPLETED | OUTPATIENT
Start: 2021-09-15 | End: 2021-09-15

## 2021-09-15 RX ADMIN — LIDOCAINE HYDROCHLORIDE 3 ML: 10 INJECTION, SOLUTION EPIDURAL; INFILTRATION; INTRACAUDAL; PERINEURAL at 10:08

## 2021-09-15 RX ADMIN — BETAMETHASONE SODIUM PHOSPHATE AND BETAMETHASONE ACETATE 3 MG: 3; 3 INJECTION, SUSPENSION INTRA-ARTICULAR; INTRALESIONAL; INTRAMUSCULAR; SOFT TISSUE at 10:09

## 2021-09-15 NOTE — PROGRESS NOTES
[facility]) Physicians  Neurosurgery and Pain [facility]  [address] [facility] [address] [facility] [address] [place] [address]: [phone]  F: [phone]             Provider: Kamille Peng MD          Patient Name: Yoselin Grant : 1950        Date: 9/15/2021         PROCEDURE:  Bilateral L3, L4, L5 medial branch ablation by radiofrequency. Discussed the risks including but not limited to bleeding, infection, worsened pain, damage to surrounding structures, side effects, toxicity, allergic reactions to medications used, need for surgery, pneumothorax, abdominal and visceral injury, as well as catastrophic injury such as vision loss, paralysis, spinal cord or plexus injury, stroke, bowel or bladder incontinence, chest tube insertion, ventilator dependence, and death. Discussed the risks, benefits, and alternatives to the procedure including no procedure at all. Discussed that we cannot undo any permanent neurologic or orthopaedic damage or change the course of any underlying disease. After thorough discussion, patient expressed understanding and willingness to proceed. Written consent was obtained and is in the chart. Verbal consent to proceed was obtained. Description of Procedure: The procedure and potential complications were explained to the patient and a voluntary informed, signed consent was obtained. The patient was placed prone on the x-ray table and the mid back was prepped with Betadine solution. Under fluoroscopic guidance the skin was infiltrated with 1% preservative free lidocaine. A 20-gauge, 3.5 inch long curved cannula with a 10 mm active curved tip was inserted to place the cannula tip on the junction of the superior articular process and transverse process where the medial branches are located. After motor tests were done 0.5 mL of 1% preservative free lidocaine was injected.   Ablation was carried out at 80 degrees Celsius for 95 seconds and it was repeated one more time by repositioning the cannula tip.  3 mg of dexamethasone was divided and injected at each level before the removal of the cannula. The patient tolerated the procedure very well. Summary and Recommendations: The patient will follow up in the office.     Antony Alves MD.

## 2021-10-13 ENCOUNTER — OFFICE VISIT (OUTPATIENT)
Dept: PAIN MANAGEMENT | Age: 71
End: 2021-10-13
Payer: MEDICARE

## 2021-10-13 VITALS
BODY MASS INDEX: 36.8 KG/M2 | TEMPERATURE: 97.1 F | DIASTOLIC BLOOD PRESSURE: 60 MMHG | HEIGHT: 66 IN | SYSTOLIC BLOOD PRESSURE: 102 MMHG | WEIGHT: 229 LBS

## 2021-10-13 DIAGNOSIS — M47.817 LUMBOSACRAL SPONDYLOSIS WITHOUT MYELOPATHY: Primary | ICD-10-CM

## 2021-10-13 PROCEDURE — 4040F PNEUMOC VAC/ADMIN/RCVD: CPT | Performed by: PAIN MEDICINE

## 2021-10-13 PROCEDURE — 1123F ACP DISCUSS/DSCN MKR DOCD: CPT | Performed by: PAIN MEDICINE

## 2021-10-13 PROCEDURE — G8417 CALC BMI ABV UP PARAM F/U: HCPCS | Performed by: PAIN MEDICINE

## 2021-10-13 PROCEDURE — G8427 DOCREV CUR MEDS BY ELIG CLIN: HCPCS | Performed by: PAIN MEDICINE

## 2021-10-13 PROCEDURE — 99213 OFFICE O/P EST LOW 20 MIN: CPT | Performed by: PAIN MEDICINE

## 2021-10-13 PROCEDURE — 3017F COLORECTAL CA SCREEN DOC REV: CPT | Performed by: PAIN MEDICINE

## 2021-10-13 PROCEDURE — 1036F TOBACCO NON-USER: CPT | Performed by: PAIN MEDICINE

## 2021-10-13 PROCEDURE — G8484 FLU IMMUNIZE NO ADMIN: HCPCS | Performed by: PAIN MEDICINE

## 2021-10-13 ASSESSMENT — ENCOUNTER SYMPTOMS
CONSTIPATION: 0
BACK PAIN: 0
NAUSEA: 0
SHORTNESS OF BREATH: 0
DIARRHEA: 0

## 2021-10-13 NOTE — PROGRESS NOTES
[facility]) Physicians  Neurosurgery and Pain [facility]  [address] [facility] [address] [facility] , [address] [facility], [facility]: [phone]  F: [phone]        Catherine Nageotte  (1950)    10/13/2021    Subjective:     Catherine Nageotte is 73 y.o. male who complains today of:    Chief Complaint   Patient presents with    Back Pain       HPI    Patient with h/o advanced lumbar spondylosis. He is s/p lumbar RFA 9/15/21. Pain is located in the lumbar spine. It is axial. Again, he is s/p recent RFA - he states he's achieved 80% releif. He is pleased. Pain is chronic  Pain is described as aching. Pain level today is rated 1 on a 10 point scale. It is mild in nature. He is not taking anything for pain at the moment. Pain in part is secondary to osteoarthritis of the spine. Pain is aggravated by bending, lifting, twisting, walking and standing  Pain is not associated with fevers/chills/night sweats. Bowel and bladder are working appropriately. Balance remains unchanged from previous encounter. No new paraesthesias noted.       Allergies:  Dye [barium-containing compounds], Iodides, and Iodinated diagnostic agents    Past Medical History:   Diagnosis Date    Acute idiopathic gout of right foot     Anemia 03/01/2018    iron transfusions x2    Arthritis     both knees    Chronic kidney disease     CLL (chronic lymphocytic leukemia) (HCC)     Colon polyps     Disease of blood and blood forming organ     Hyperlipidemia     dx since 1970's    Hypertension     meds  since 1970's    [facility]oma of [facility] tract ([facility]     Movement disorder     Thyroid cancer ([facility] 02/2018    - denies states it was CLL    Type II or unspecified type diabetes mellitus without mention of complication, not stated as uncontrolled     hx > 15 yrs     Past Surgical History:   Procedure Laterality Date    APPENDECTOMY      CHOLECYSTECTOMY N/A 07/07/2016    COLONOSCOPY  4/1/16    w/polypectomy     COLONOSCOPY N/A 6/15/2021    COLORECTAL CANCER SCREENING, HIGH RISK performed by Jairo Marcus MD at [address] [facility], COLON, DIAGNOSTIC      JOINT REPLACEMENT Bilateral     knees    OTHER SURGICAL HISTORY Right 06/08/16    I & D ABSCESS THIGH    VT MANIPULATN KNEE JT+ANESTHESIA Right 3/15/2018    RIGHT KNEE MANIPULATION performed by Verónica Guzman MD at [facility]. Right 1/18/2018    RIGHT KNEE TOTAL KNEE ARTHROPLASTY ELÍAS SPINAL,NERVE BLOCK performed by Verónica Guzman MD at [facility]. Left 5/17/2018    LEFT KNEE TOTAL KNEE ARTHROPLASTY, performed by [facility] at [facility]     due to displacement    UPPER GASTROINTESTINAL ENDOSCOPY  4/29/15    w/bx     UPPER GASTROINTESTINAL ENDOSCOPY  02/28/2018    Dr. Paula Varela     Family History   Problem Relation Age of Onset    Heart Disease Mother 67        CHF    Cancer Father 68        liver/ pancreatic    No Known Problems Sister     Cancer Brother         chronic lukemia    Arthritis Brother     No Known Problems Daughter     Colon Cancer Neg Hx      Social History     Socioeconomic History    Marital status:      Spouse name: Not on file    Number of children: Not on file    Years of education: Not on file    Highest education level: Not on file   Occupational History    Occupation: retired   Tobacco Use    Smoking status: Never Smoker    Smokeless tobacco: Never Used   Vaping Use    Vaping Use: Never used   Substance and Sexual Activity    Alcohol use: Yes     Comment: rare social    Drug use: No    Sexual activity: Yes     Partners: Female   Other Topics Concern    Not on file   Social History Narrative    Lives w wife     Social Determinants of Health     Financial Resource Strain: Low Risk     Difficulty of Paying Living Expenses: Not hard at all   Food Insecurity: No Food Insecurity    Worried About Running Out of Food in the Last Year: Never true    Joycelyn of Food in the Last Year: Never true   Transportation Needs: No Transportation Needs    Lack of Transportation (Medical): No    Lack of Transportation (Non-Medical): No   Physical Activity:     Days of Exercise per Week:     Minutes of Exercise per Session:    Stress:     Feeling of Stress :    Social Connections:     Frequency of Communication with Friends and Family:     Frequency of Social Gatherings with Friends and Family:     Attends Hindu Services:     Active Member of Clubs or Organizations:     Attends Club or Organization Meetings:     Marital Status:    Intimate Partner Violence:     Fear of Current or Ex-Partner:     Emotionally Abused:     Physically Abused:     Sexually Abused:        Current Outpatient Medications on File Prior to Visit   Medication Sig Dispense Refill    doxepin (SINEQUAN) 10 MG capsule Take 1 capsule by mouth nightly 90 capsule 0    furosemide (LASIX) 40 MG tablet TAKE 1 TABLET DAILY 90 tablet 1    KLOR-CON M20 20 MEQ extended release tablet TAKE 1 TABLET BY MOUTH THREE TIMES A  tablet 0    tiZANidine (ZANAFLEX) 2 MG tablet Take 1 tablet by mouth every 8 hours as needed (muscle spasms) 30 tablet 0    meloxicam (MOBIC) 7.5 MG tablet Take 1 tablet by mouth daily 10 tablet 0    loratadine (CLARITIN) 10 MG tablet TAKE 1 TABLET BY MOUTH DAILY AS NEEDED (ALLERGIES).  90 tablet 1    allopurinol (ZYLOPRIM) 100 MG tablet Take 1 tablet by mouth daily 90 tablet 2    metoprolol succinate (TOPROL XL) 50 MG extended release tablet Take 1 tablet by mouth daily 90 tablet 3    benazepril (LOTENSIN) 20 MG tablet TAKE 1 TABLET BY MOUTH EVERY DAY 90 tablet 3    metFORMIN (GLUCOPHAGE) 500 MG tablet TAKE 2 TABLETS BY MOUTH TWICE A DAY WITH MEALAS 360 tablet 3    atorvastatin (LIPITOR) 40 MG tablet TAKE 1 TABLET BY MOUTH EVERY EVENING 90 tablet 3    [DISCONTINUED] KLOR-CON M20 20 MEQ extended release tablet TAKE 1 TABLET BY MOUTH THREE TIMES A  tablet 1    aspirin 81 MG EC tablet Take 1 tablet by mouth 2 times daily (Patient taking differently: Take 81 mg by mouth daily ) 60 tablet 0    Alcohol Swabs PADS Use as directed with insulin injections 300 each 3    Multiple Vitamins-Minerals (MULTIVITAMIN PO) Take 1 tablet by mouth daily. No current facility-administered medications on file prior to visit. Review of Systems   Constitutional: Negative for fever. HENT: Negative for hearing loss. Respiratory: Negative for shortness of breath. Gastrointestinal: Negative for constipation, diarrhea and nausea. Genitourinary: Negative for difficulty urinating. Musculoskeletal: Negative for back pain and neck pain. Skin: Negative for rash. Neurological: Negative for headaches. Hematological: Does not bruise/bleed easily. Psychiatric/Behavioral: Negative for sleep disturbance. Objective:     Vitals:  /60   Temp 97.1 °F (36.2 °C)   Ht 5' 6\" (1.676 m)   Wt 229 lb (103.9 kg)   BMI 36.96 kg/m² Pain Score:   1      Physical Exam  General Appearance: NAD and Conversant. Eyes: EOM intact. HENT: Atraumatic. Neck: Neck is supple and Trachea midline. Lymph: No supraclavicular lymphadenopathy. Lungs: Normal respiratory effort and No significant respiratory distress. Cardiovascular: Capillary refill is less than 2 seconds. Skin: Visualized skin is intact. Psych: Mood and affect within normal limits, Insight and judgement within normal limits and Alert and oriented. Inspection of the spine reveals no gross abnormalities in terms of curvature. Muscle Tone is within normal limits in all four extremities. Strength: Functional strength noted throughout. Neurologic:  Coordination is within normal limits. Gait is within normal limits. No difficulty with heel walking, toe walking and tandem walking.     Minimal TTP over the bilateral lumbar paraspinal musculature. Assessment:      Diagnosis Orders   1. Lumbosacral spondylosis without myelopathy         Plan:          No orders of the defined types were placed in this encounter. No orders of the defined types were placed in this encounter. 1. At this point we will continue with our conservative approach. He has done great post RFA. 2. Remain active. 3. The patient will follow up for reevaluation of his pain. The patient is aware of the treatment plan and in agreement. All of his questions were answered. Follow up:  Return if symptoms worsen or fail to improve.     Terrence Sanchez MD

## 2021-11-01 DIAGNOSIS — I10 ESSENTIAL HYPERTENSION: ICD-10-CM

## 2021-11-01 RX ORDER — POTASSIUM CHLORIDE 1500 MG/1
TABLET, EXTENDED RELEASE ORAL
Qty: 270 TABLET | Refills: 0 | Status: SHIPPED | OUTPATIENT
Start: 2021-11-01 | End: 2022-01-30

## 2021-11-17 ENCOUNTER — OFFICE VISIT (OUTPATIENT)
Dept: FAMILY MEDICINE CLINIC | Age: 71
End: 2021-11-17
Payer: MEDICARE

## 2021-11-17 VITALS
WEIGHT: 230 LBS | OXYGEN SATURATION: 98 % | RESPIRATION RATE: 15 BRPM | HEART RATE: 70 BPM | BODY MASS INDEX: 36.96 KG/M2 | SYSTOLIC BLOOD PRESSURE: 110 MMHG | TEMPERATURE: 97.5 F | HEIGHT: 66 IN | DIASTOLIC BLOOD PRESSURE: 62 MMHG

## 2021-11-17 DIAGNOSIS — I10 PRIMARY HYPERTENSION: ICD-10-CM

## 2021-11-17 DIAGNOSIS — E11.9 CONTROLLED TYPE 2 DIABETES MELLITUS WITHOUT COMPLICATION, WITHOUT LONG-TERM CURRENT USE OF INSULIN (HCC): ICD-10-CM

## 2021-11-17 DIAGNOSIS — M79.641 BILATERAL HAND PAIN: Primary | ICD-10-CM

## 2021-11-17 DIAGNOSIS — M79.641 BILATERAL HAND PAIN: ICD-10-CM

## 2021-11-17 DIAGNOSIS — E78.5 HYPERLIPIDEMIA, UNSPECIFIED HYPERLIPIDEMIA TYPE: ICD-10-CM

## 2021-11-17 DIAGNOSIS — Z87.39 HISTORY OF GOUT: ICD-10-CM

## 2021-11-17 DIAGNOSIS — M79.642 BILATERAL HAND PAIN: Primary | ICD-10-CM

## 2021-11-17 DIAGNOSIS — Z12.5 SCREENING FOR PROSTATE CANCER: ICD-10-CM

## 2021-11-17 DIAGNOSIS — M79.642 BILATERAL HAND PAIN: ICD-10-CM

## 2021-11-17 LAB
BASOPHILS ABSOLUTE: 0 K/UL (ref 0–0.2)
BASOPHILS RELATIVE PERCENT: 0.6 %
EOSINOPHILS ABSOLUTE: 0.1 K/UL (ref 0–0.7)
EOSINOPHILS RELATIVE PERCENT: 1.7 %
HCT VFR BLD CALC: 36.3 % (ref 42–52)
HEMOGLOBIN: 11.8 G/DL (ref 14–18)
LYMPHOCYTES ABSOLUTE: 2.1 K/UL (ref 1–4.8)
LYMPHOCYTES RELATIVE PERCENT: 35 %
MCH RBC QN AUTO: 30 PG (ref 27–31.3)
MCHC RBC AUTO-ENTMCNC: 32.5 % (ref 33–37)
MCV RBC AUTO: 92.6 FL (ref 80–100)
MONOCYTES ABSOLUTE: 0.6 K/UL (ref 0.2–0.8)
MONOCYTES RELATIVE PERCENT: 9.5 %
NEUTROPHILS ABSOLUTE: 3.3 K/UL (ref 1.4–6.5)
NEUTROPHILS RELATIVE PERCENT: 53.2 %
PDW BLD-RTO: 15.4 % (ref 11.5–14.5)
PLATELET # BLD: 235 K/UL (ref 130–400)
RBC # BLD: 3.92 M/UL (ref 4.7–6.1)
SEDIMENTATION RATE, ERYTHROCYTE: 42 MM (ref 0–20)
WBC # BLD: 6.1 K/UL (ref 4.8–10.8)

## 2021-11-17 PROCEDURE — G8417 CALC BMI ABV UP PARAM F/U: HCPCS | Performed by: INTERNAL MEDICINE

## 2021-11-17 PROCEDURE — G8484 FLU IMMUNIZE NO ADMIN: HCPCS | Performed by: INTERNAL MEDICINE

## 2021-11-17 PROCEDURE — 1123F ACP DISCUSS/DSCN MKR DOCD: CPT | Performed by: INTERNAL MEDICINE

## 2021-11-17 PROCEDURE — G8427 DOCREV CUR MEDS BY ELIG CLIN: HCPCS | Performed by: INTERNAL MEDICINE

## 2021-11-17 PROCEDURE — 3017F COLORECTAL CA SCREEN DOC REV: CPT | Performed by: INTERNAL MEDICINE

## 2021-11-17 PROCEDURE — 2022F DILAT RTA XM EVC RTNOPTHY: CPT | Performed by: INTERNAL MEDICINE

## 2021-11-17 PROCEDURE — 1036F TOBACCO NON-USER: CPT | Performed by: INTERNAL MEDICINE

## 2021-11-17 PROCEDURE — 4040F PNEUMOC VAC/ADMIN/RCVD: CPT | Performed by: INTERNAL MEDICINE

## 2021-11-17 PROCEDURE — 99214 OFFICE O/P EST MOD 30 MIN: CPT | Performed by: INTERNAL MEDICINE

## 2021-11-17 PROCEDURE — 3044F HG A1C LEVEL LT 7.0%: CPT | Performed by: INTERNAL MEDICINE

## 2021-11-17 ASSESSMENT — ENCOUNTER SYMPTOMS
EYE PAIN: 0
SHORTNESS OF BREATH: 0
ABDOMINAL PAIN: 0
BACK PAIN: 0

## 2021-11-17 NOTE — PROGRESS NOTES
Subjective:      Patient ID: Agustín Matthew is a 73 y.o. male who presents today with:  Chief Complaint   Patient presents with    Established New Doctor    Diabetes    Hypertension    Hand Pain     BILATERIAL SX BEEN FOR YRS BUT THE LEFT HAND IS STARTING TO KEEP HIM UP        HPI    Here for new to doctor.    No trauma to the hand    Past Medical History:   Diagnosis Date    Acute idiopathic gout of right foot     Anemia 03/01/2018    iron transfusions x2    Arthritis     both knees    Chronic kidney disease     CLL (chronic lymphocytic leukemia) (HCC)     Colon polyps     Disease of blood and blood forming organ     Hyperlipidemia     dx since 1970's    Hypertension     meds  since 1970's    [facility]oma [facility]     Movement disorder     Thyroid cancer ([facility] 02/2018    [facility]- denies states it was CLL    Type II or unspecified type diabetes mellitus without mention of complication, not stated as uncontrolled     hx > 15 yrs     Past Surgical History:   Procedure Laterality Date    APPENDECTOMY      CHOLECYSTECTOMY N/A 07/07/2016    COLONOSCOPY  4/1/16    w/polypectomy     COLONOSCOPY N/A 6/15/2021    COLORECTAL CANCER SCREENING, HIGH RISK performed by Florinda Cushing, MD at [address] [place] [address], DIAGNOSTIC      JOINT REPLACEMENT Bilateral     knees    OTHER SURGICAL HISTORY Right 06/08/16    I & D ABSCESS THIGH    OR MANIPULATN KNEE JT+ANESTHESIA Right 3/15/2018    RIGHT KNEE MANIPULATION performed by Max Swanson MD at [facility]. Right 1/18/2018    RIGHT KNEE TOTAL KNEE ARTHROPLASTY ELÍASCHAS [place] performed by Max Swanson MD at [facility]. Left 5/17/2018    LEFT KNEE TOTAL KNEE ARTHROPLASTY, performed by Max [facility]anson MD at [facility]     due to displacement    UPPER GASTROINTESTINAL ENDOSCOPY  4/29/15    w/bx     UPPER GASTROINTESTINAL ENDOSCOPY  02/28/2018    Dr. Edwards Dear History     Socioeconomic History    Marital status:      Spouse name: Not on file    Number of children: Not on file    Years of education: Not on file    Highest education level: Not on file   Occupational History    Occupation: retired   Tobacco Use    Smoking status: Never Smoker    Smokeless tobacco: Never Used   Vaping Use    Vaping Use: Never used   Substance and Sexual Activity    Alcohol use: Yes     Comment: rare social    Drug use: No    Sexual activity: Yes     Partners: Female   Other Topics Concern    Not on file   Social History Narrative    Lives w wife     Social Determinants of Health     Financial Resource Strain: Low Risk     Difficulty of Paying Living Expenses: Not hard at all   Food Insecurity: No Food Insecurity    Worried About Running Out of Food in the Last Year: Never true    Joycelyn of Food in the Last Year: Never true   Transportation Needs: No Transportation Needs    Lack of Transportation (Medical): No    Lack of Transportation (Non-Medical):  No   Physical Activity:     Days of Exercise per Week: Not on file    Minutes of Exercise per Session: Not on file   Stress:     Feeling of Stress : Not on file   Social Connections:     Frequency of Communication with Friends and Family: Not on file    Frequency of Social Gatherings with Friends and Family: Not on file    Attends Hinduism Services: Not on file    Active Member of Clubs or Organizations: Not on file    Attends Club or Organization Meetings: Not on file    Marital Status: Not on file   Intimate Partner Violence:     Fear of Current or Ex-Partner: Not on file    Emotionally Abused: Not on file    Physically Abused: Not on file    Sexually Abused: Not on file   Housing Stability:     Unable to Pay for Housing in the Last Year: Not on file    Number of [place] in the Last Year: Not on file    Unstable Housing in the Last Year: Not on file     Allergies   Allergen Reactions    Dye [Barium-Containing Compounds] Hives    Iodides     Iodinated Diagnostic Agents Hives     Current Outpatient Medications on File Prior to Visit   Medication Sig Dispense Refill    KLOR-CON M20 20 MEQ extended release tablet TAKE 1 TABLET BY MOUTH THREE TIMES A DAY (Patient taking differently: 20 mEq 2 times daily ) 270 tablet 0    doxepin (SINEQUAN) 10 MG capsule Take 1 capsule by mouth nightly 90 capsule 0    furosemide (LASIX) 40 MG tablet TAKE 1 TABLET DAILY 90 tablet 1    tiZANidine (ZANAFLEX) 2 MG tablet Take 1 tablet by mouth every 8 hours as needed (muscle spasms) 30 tablet 0    loratadine (CLARITIN) 10 MG tablet TAKE 1 TABLET BY MOUTH DAILY AS NEEDED (ALLERGIES). 90 tablet 1    allopurinol (ZYLOPRIM) 100 MG tablet Take 1 tablet by mouth daily 90 tablet 2    metoprolol succinate (TOPROL XL) 50 MG extended release tablet Take 1 tablet by mouth daily 90 tablet 3    benazepril (LOTENSIN) 20 MG tablet TAKE 1 TABLET BY MOUTH EVERY DAY 90 tablet 3    metFORMIN (GLUCOPHAGE) 500 MG tablet TAKE 2 TABLETS BY MOUTH TWICE A DAY WITH MEALAS 360 tablet 3    atorvastatin (LIPITOR) 40 MG tablet TAKE 1 TABLET BY MOUTH EVERY EVENING 90 tablet 3    aspirin 81 MG EC tablet Take 1 tablet by mouth 2 times daily (Patient taking differently: Take 81 mg by mouth daily ) 60 tablet 0    Alcohol Swabs PADS Use as directed with insulin injections 300 each 3    Multiple Vitamins-Minerals (MULTIVITAMIN PO) Take 1 tablet by mouth daily.  [DISCONTINUED] KLOR-CON M20 20 MEQ extended release tablet TAKE 1 TABLET BY MOUTH THREE TIMES A  tablet 1     No current facility-administered medications on file prior to visit. I have personally reviewed the ROS, PMH, PFH, and social history     Review of Systems   Constitutional: Negative for chills. HENT: Negative for congestion. Eyes: Negative for pain.    Respiratory: Negative for shortness of breath. Cardiovascular: Negative for chest pain. Gastrointestinal: Negative for abdominal pain. Genitourinary: Negative for hematuria. Musculoskeletal: Negative for back pain. Hand pain    Allergic/Immunologic: Negative for immunocompromised state. Neurological: Negative for headaches. Psychiatric/Behavioral: Negative for hallucinations. Objective:   /62   Pulse 70   Temp 97.5 °F (36.4 °C) (Tympanic)   Resp 15   Ht 5' 6\" (1.676 m)   Wt 230 lb (104.3 kg)   SpO2 98%   BMI 37.12 kg/m²     Physical Exam  Constitutional:       General: He is not in acute distress. Appearance: Normal appearance. He is not ill-appearing, toxic-appearing or diaphoretic. HENT:      Head: Normocephalic. Neck:      Vascular: No carotid bruit. Cardiovascular:      Rate and Rhythm: Normal rate and regular rhythm. Pulses: Normal pulses. Heart sounds: Normal heart sounds. No murmur heard. No friction rub. No gallop. Pulmonary:      Effort: Pulmonary effort is normal. No respiratory distress. Breath sounds: Normal breath sounds. No wheezing, rhonchi or rales. Abdominal:      General: Abdomen is flat. There is no distension. Palpations: Abdomen is soft. Tenderness: There is no abdominal tenderness. There is no right CVA tenderness, left CVA tenderness, guarding or rebound. Musculoskeletal:      Cervical back: Neck supple. Right lower leg: No edema. Left lower leg: No edema. Skin:     General: Skin is warm. Findings: No erythema or rash. Neurological:      Mental Status: He is alert. Psychiatric:         Mood and Affect: Mood normal.       Feet without ulcers. Assessment:       Diagnosis Orders   1.  Bilateral hand pain  EMG    LENORA Luna MD, Orthopaedic Surgery    Sedimentation Rate    Uric Acid    PSA Screening    TSH with Reflex    CBC Auto Differential    Comprehensive Metabolic Panel    Hemoglobin A1C    Lipid Panel Magnesium   2. Controlled type 2 diabetes mellitus without complication, without long-term current use of insulin (HCC)  Sedimentation Rate    Uric Acid    PSA Screening    TSH with Reflex    CBC Auto Differential    Comprehensive Metabolic Panel    Hemoglobin A1C    Lipid Panel    Magnesium   3. Hyperlipidemia, unspecified hyperlipidemia type  Sedimentation Rate    Uric Acid    PSA Screening    TSH with Reflex    CBC Auto Differential    Comprehensive Metabolic Panel    Hemoglobin A1C    Lipid Panel    Magnesium   4. Screening for prostate cancer  Sedimentation Rate    Uric Acid    PSA Screening    TSH with Reflex    CBC Auto Differential    Comprehensive Metabolic Panel    Hemoglobin A1C    Lipid Panel    Magnesium   5. Primary hypertension  Sedimentation Rate    Uric Acid    PSA Screening    TSH with Reflex    CBC Auto Differential    Comprehensive Metabolic Panel    Hemoglobin A1C    Lipid Panel    Magnesium   6. History of gout  Sedimentation Rate    Uric Acid    PSA Screening    TSH with Reflex    CBC Auto Differential    Comprehensive Metabolic Panel    Hemoglobin A1C    Lipid Panel    Magnesium         Plan:     vc  Cts? Sees heme 12/2021 and GI through [address], hx of duodenal CA.    Consider stopping asa                   Orders Placed This Encounter   Procedures    Sedimentation Rate     Standing Status:   Future     Standing Expiration Date:   11/17/2022    Uric Acid     Standing Status:   Future     Standing Expiration Date:   11/17/2022    PSA Screening     Standing Status:   Future     Standing Expiration Date:   11/17/2022    TSH with Reflex     Standing Status:   Future     Standing Expiration Date:   11/17/2022    CBC Auto Differential     Standing Status:   Future     Standing Expiration Date:   11/17/2022    Comprehensive Metabolic Panel     Standing Status:   Future     Standing Expiration Date:   11/17/2022    Hemoglobin A1C     Standing Status:   Future     Standing Expiration Date: 11/17/2022    Lipid Panel     Standing Status:   Future     Standing Expiration Date:   11/17/2022     Order Specific Question:   Is Patient Fasting?/# of Hours     Answer:   10    Magnesium     Standing Status:   Future     Standing Expiration Date:   11/17/2022    LENORA Hays MD, Orthopaedic Surgery     Referral Priority:   Routine     Referral Type:   Eval and Treat     Referral Reason:   Specialty Services Required     Referred to Provider:   Shlomo Saucedo MD     Requested Specialty:   Orthopedic Surgery     Number of Visits Requested:   1    EMG     Standing Status:   Future     Standing Expiration Date:   1/16/2022     Order Specific Question:   Which body part? Answer:   BL UPPER EXTREMITIES     No orders of the defined types were placed in this encounter. risk of permanent damage explained. If anything should change or worsen call ASAP, don't wait for next scheduled appointment. Return in about 2 months (around 1/17/2022) for Chronic condition management/appointment, worsening symptoms, call ASAP for appointment.       Yane Timmons MD

## 2021-11-18 LAB
ALBUMIN SERPL-MCNC: 4.3 G/DL (ref 3.5–4.6)
ALP BLD-CCNC: 100 U/L (ref 35–104)
ALT SERPL-CCNC: 16 U/L (ref 0–41)
ANION GAP SERPL CALCULATED.3IONS-SCNC: 15 MEQ/L (ref 9–15)
AST SERPL-CCNC: 17 U/L (ref 0–40)
BILIRUB SERPL-MCNC: 0.4 MG/DL (ref 0.2–0.7)
BUN BLDV-MCNC: 30 MG/DL (ref 8–23)
CALCIUM SERPL-MCNC: 9.1 MG/DL (ref 8.5–9.9)
CHLORIDE BLD-SCNC: 102 MEQ/L (ref 95–107)
CHOLESTEROL, TOTAL: 108 MG/DL (ref 0–199)
CO2: 22 MEQ/L (ref 20–31)
CREAT SERPL-MCNC: 1.62 MG/DL (ref 0.7–1.2)
GFR AFRICAN AMERICAN: 51.4
GFR NON-AFRICAN AMERICAN: 42.4
GLOBULIN: 3.8 G/DL (ref 2.3–3.5)
GLUCOSE BLD-MCNC: 95 MG/DL (ref 70–99)
HDLC SERPL-MCNC: 26 MG/DL (ref 40–59)
LDL CHOLESTEROL CALCULATED: 39 MG/DL (ref 0–129)
MAGNESIUM: 1.5 MG/DL (ref 1.7–2.4)
POTASSIUM SERPL-SCNC: 4.6 MEQ/L (ref 3.4–4.9)
PROSTATE SPECIFIC ANTIGEN: 1.71 NG/ML (ref 0–4)
SODIUM BLD-SCNC: 139 MEQ/L (ref 135–144)
TOTAL PROTEIN: 8.1 G/DL (ref 6.3–8)
TRIGL SERPL-MCNC: 214 MG/DL (ref 0–150)
TSH REFLEX: 1.74 UIU/ML (ref 0.44–3.86)
URIC ACID, SERUM: 6.6 MG/DL (ref 3.4–7)

## 2021-11-22 DIAGNOSIS — J30.89 NON-SEASONAL ALLERGIC RHINITIS, UNSPECIFIED TRIGGER: ICD-10-CM

## 2021-11-22 RX ORDER — LORATADINE 10 MG/1
10 TABLET ORAL DAILY PRN
Qty: 90 TABLET | Refills: 3 | Status: SHIPPED | OUTPATIENT
Start: 2021-11-22 | End: 2022-10-19 | Stop reason: SDUPTHER

## 2021-11-22 NOTE — TELEPHONE ENCOUNTER
requesting medication refill.      Rx requested:  Requested Prescriptions      No prescriptions requested or ordered in this encounter       Last Office Visit:   Visit date not found    Last Tox screen:        Last Medication contract:        Next Visit Date:  Future Appointments   Date Time Provider Vincenzo Diane   1/17/2022  9:00 AM Kalli Gray  [place] [address]

## 2021-11-24 LAB — HBA1C MFR BLD: 6.4 % (ref 4.8–5.9)

## 2021-12-04 DIAGNOSIS — F51.04 PSYCHOPHYSIOLOGICAL INSOMNIA: ICD-10-CM

## 2021-12-06 RX ORDER — ATORVASTATIN CALCIUM 40 MG/1
TABLET, FILM COATED ORAL
Qty: 90 TABLET | Refills: 3 | Status: SHIPPED | OUTPATIENT
Start: 2021-12-06

## 2021-12-06 RX ORDER — DOXEPIN HYDROCHLORIDE 10 MG/1
CAPSULE ORAL
Qty: 90 CAPSULE | Refills: 0 | Status: SHIPPED | OUTPATIENT
Start: 2021-12-06 | End: 2022-02-25

## 2021-12-06 NOTE — TELEPHONE ENCOUNTER
requesting medication refill.      Rx requested:  Requested Prescriptions      No prescriptions requested or ordered in this encounter       Last Office Visit:   11/17/2021    Last Tox screen:        Last Medication contract:        Next Visit Date:  Future Appointments   Date Time Provider Vincenzo Contreras   1/5/2022  1:00 PM Ana Funk MD [address]   1/12/2022  2:45 PM Rangel Randhawa MD [facility]   1/17/2022  9:00 AM Amos Welch MD [address] [place] [address] 7

## 2021-12-17 ENCOUNTER — TELEPHONE (OUTPATIENT)
Dept: FAMILY MEDICINE CLINIC | Age: 71
End: 2021-12-17

## 2021-12-17 RX ORDER — PREDNISONE 10 MG/1
TABLET ORAL
Qty: 21 TABLET | Refills: 0 | Status: SHIPPED | OUTPATIENT
Start: 2021-12-17 | End: 2022-01-05

## 2021-12-17 NOTE — TELEPHONE ENCOUNTER
PT called in regards to gout, having problems with his right foot. He can't put a shoe on. Would like a prescription predisone. Please call to advise PT if he should come in, or if he can get the steroid.   [phone]

## 2022-01-03 ENCOUNTER — PATIENT MESSAGE (OUTPATIENT)
Dept: FAMILY MEDICINE CLINIC | Age: 72
End: 2022-01-03

## 2022-01-03 RX ORDER — COLCHICINE 0.6 MG/1
0.6 TABLET ORAL DAILY PRN
Qty: 5 TABLET | Refills: 0 | Status: SHIPPED | OUTPATIENT
Start: 2022-01-03 | End: 2022-01-05 | Stop reason: SDUPTHER

## 2022-01-04 NOTE — TELEPHONE ENCOUNTER
Get him in this week please  I sent in some colchicine as well   Contact him   Thanks,    Call immediately should something change.      Diamond No

## 2022-01-04 NOTE — TELEPHONE ENCOUNTER
Do you want to open the schedule up to 20 patients? You have no openings with how  the schedule is maintained now.

## 2022-01-05 ENCOUNTER — HOSPITAL ENCOUNTER (OUTPATIENT)
Dept: NEUROLOGY | Age: 72
Discharge: HOME | End: 2022-01-05
Payer: MEDICARE

## 2022-01-05 ENCOUNTER — OFFICE VISIT (OUTPATIENT)
Dept: FAMILY MEDICINE CLINIC | Age: 72
End: 2022-01-05
Payer: MEDICARE

## 2022-01-05 VITALS
DIASTOLIC BLOOD PRESSURE: 66 MMHG | SYSTOLIC BLOOD PRESSURE: 118 MMHG | OXYGEN SATURATION: 97 % | HEIGHT: 66 IN | WEIGHT: 228 LBS | BODY MASS INDEX: 36.64 KG/M2 | TEMPERATURE: 97.3 F | HEART RATE: 84 BPM

## 2022-01-05 DIAGNOSIS — M79.641 BILATERAL HAND PAIN: ICD-10-CM

## 2022-01-05 DIAGNOSIS — M79.642 BILATERAL HAND PAIN: ICD-10-CM

## 2022-01-05 DIAGNOSIS — M10.9 ACUTE GOUT INVOLVING TOE OF RIGHT FOOT, UNSPECIFIED CAUSE: Primary | ICD-10-CM

## 2022-01-05 DIAGNOSIS — C91.10 CLL (CHRONIC LYMPHOCYTIC LEUKEMIA) (HCC): ICD-10-CM

## 2022-01-05 PROCEDURE — G8484 FLU IMMUNIZE NO ADMIN: HCPCS | Performed by: INTERNAL MEDICINE

## 2022-01-05 PROCEDURE — 95910 NRV CNDJ TEST 7-8 STUDIES: CPT

## 2022-01-05 PROCEDURE — 1123F ACP DISCUSS/DSCN MKR DOCD: CPT | Performed by: INTERNAL MEDICINE

## 2022-01-05 PROCEDURE — 99213 OFFICE O/P EST LOW 20 MIN: CPT | Performed by: INTERNAL MEDICINE

## 2022-01-05 PROCEDURE — 4040F PNEUMOC VAC/ADMIN/RCVD: CPT | Performed by: INTERNAL MEDICINE

## 2022-01-05 PROCEDURE — G8417 CALC BMI ABV UP PARAM F/U: HCPCS | Performed by: INTERNAL MEDICINE

## 2022-01-05 PROCEDURE — 1036F TOBACCO NON-USER: CPT | Performed by: INTERNAL MEDICINE

## 2022-01-05 PROCEDURE — 3017F COLORECTAL CA SCREEN DOC REV: CPT | Performed by: INTERNAL MEDICINE

## 2022-01-05 PROCEDURE — 95886 MUSC TEST DONE W/N TEST COMP: CPT

## 2022-01-05 PROCEDURE — G8427 DOCREV CUR MEDS BY ELIG CLIN: HCPCS | Performed by: INTERNAL MEDICINE

## 2022-01-05 RX ORDER — COLCHICINE 0.6 MG/1
0.6 TABLET ORAL DAILY PRN
Qty: 14 TABLET | Refills: 0 | Status: SHIPPED | OUTPATIENT
Start: 2022-01-05 | End: 2022-01-17 | Stop reason: CLARIF

## 2022-01-05 ASSESSMENT — ENCOUNTER SYMPTOMS
ABDOMINAL PAIN: 0
EYE PAIN: 0
BACK PAIN: 0
SHORTNESS OF BREATH: 0

## 2022-01-05 NOTE — PROGRESS NOTES
Subjective:      Patient ID: Lise Otoole is a 73 y.o. male who presents today with:  Chief Complaint   Patient presents with    Gout     Pt c/o flare up on 12-29-21       HPI     Here to discuss his foot  Possible gout flare  12/29/2021  This time  Actually twice  He did take the prednisone I gave him  A week before bre it actually started  Last pill was 12/26/2021  IT Came back  He felt good on prednisone  Colchicine is helping  Pain better.    (he was able to sleep)   Past Medical History:   Diagnosis Date    Acute idiopathic gout of right foot     Anemia 03/01/2018    iron transfusions x2    Arthritis     both knees    Chronic kidney disease     CLL (chronic lymphocytic leukemia) (HCC)     Colon polyps     Disease of blood and blood forming organ     Hyperlipidemia     dx since 1970's    Hypertension     meds  since 1970's    [facility]oma [facility] gastrointestinal tract ([facility] [address]     Movement disorder     Thyroid cancer ([facility] [address] 02/2018    [facility]- denies states it was CLL    Type II or unspecified type diabetes mellitus without mention of complication, not stated as uncontrolled     hx > 15 yrs     Past Surgical History:   Procedure Laterality Date    APPENDECTOMY      CHOLECYSTECTOMY N/A 07/07/2016    COLONOSCOPY  4/1/16    w/polypectomy     COLONOSCOPY N/A 6/15/2021    COLORECTAL CANCER SCREENING, HIGH RISK performed by Raudel Arango MD at [facility], DIAGNOSTIC      JOINT REPLACEMENT Bilateral     knees    OTHER SURGICAL HISTORY Right 06/08/16    I & D ABSCESS THIGH    MD MANIPULATN KNEE JT+ANESTHESIA Right 3/15/2018    RIGHT KNEE MANIPULATION performed by Jody Padron MD at [facility] Right 1/18/2018    RIGHT KNEE TOTAL KNEE ARTHROPLASTY ELÍAS Mitesh performed by Jody Padron MD at [address] [place] Left 5/17/2018    LEFT KNEE TOTAL KNEE ARTHROPLASTY, performed by Charles Godwin Carole Jean Baptiste MD at [facility]     due to displacement    UPPER GASTROINTESTINAL ENDOSCOPY  4/29/15    w/rufino nj    UPPER GASTROINTESTINAL ENDOSCOPY  02/28/2018    Dr. Gupta Course History     Socioeconomic History    Marital status:      Spouse name: Not on file    Number of children: Not on file    Years of education: Not on file    Highest education level: Not on file   Occupational History    Occupation: retired   Tobacco Use    Smoking status: Never Smoker    Smokeless tobacco: Never Used   Vaping Use    Vaping Use: Never used   Substance and Sexual Activity    Alcohol use: Yes     Comment: rare social    Drug use: No    Sexual activity: Yes     Partners: Female   Other Topics Concern    Not on file   Social History Narrative    Lives w wife     Social Determinants of Health     Financial Resource Strain: Low Risk     Difficulty of Paying Living Expenses: Not hard at all   Food Insecurity: No Food Insecurity    Worried About Running Out of Food in the Last Year: Never true    Joycelyn of Food in the Last Year: Never true   Transportation Needs: No Transportation Needs    Lack of Transportation (Medical): No    Lack of Transportation (Non-Medical):  No   Physical Activity:     Days of Exercise per Week: Not on file    Minutes of Exercise per Session: Not on file   Stress:     Feeling of Stress : Not on file   Social Connections:     Frequency of Communication with Friends and Family: Not on file    Frequency of Social Gatherings with Friends and Family: Not on file    Attends Jehovah's witness Services: Not on file    Active Member of Clubs or Organizations: Not on file    Attends Club or Organization Meetings: Not on file    Marital Status: Not on file   Intimate Partner Violence:     Fear of Current or Ex-Partner: Not on file    Emotionally Abused: Not on file    Physically Abused: Not on file    Sexually Abused: Not on file   Housing Stability:     Unable to Pay for Housing in the Last Year: Not on file    Number of Places Lived in the Last Year: Not on file    Unstable Housing in the Last Year: Not on file     Allergies   Allergen Reactions    Dye [Barium-Containing Compounds] Hives    Iodides     Iodinated Diagnostic Agents Hives     Current Outpatient Medications on File Prior to Visit   Medication Sig Dispense Refill    doxepin (SINEQUAN) 10 MG capsule TAKE 1 CAPSULE BY MOUTH EVERY DAY AT NIGHT 90 capsule 0    atorvastatin (LIPITOR) 40 MG tablet TAKE 1 TABLET BY MOUTH EVERY EVENING 90 tablet 3    loratadine (CLARITIN) 10 MG tablet Take 1 tablet by mouth daily as needed (allergies) 90 tablet 3    KLOR-CON M20 20 MEQ extended release tablet TAKE 1 TABLET BY MOUTH THREE TIMES A DAY (Patient taking differently: 20 mEq 2 times daily ) 270 tablet 0    furosemide (LASIX) 40 MG tablet TAKE 1 TABLET DAILY 90 tablet 1    allopurinol (ZYLOPRIM) 100 MG tablet Take 1 tablet by mouth daily 90 tablet 2    metoprolol succinate (TOPROL XL) 50 MG extended release tablet Take 1 tablet by mouth daily 90 tablet 3    benazepril (LOTENSIN) 20 MG tablet TAKE 1 TABLET BY MOUTH EVERY DAY 90 tablet 3    metFORMIN (GLUCOPHAGE) 500 MG tablet TAKE 2 TABLETS BY MOUTH TWICE A DAY WITH MEALAS 360 tablet 3    aspirin 81 MG EC tablet Take 1 tablet by mouth 2 times daily (Patient taking differently: Take 81 mg by mouth daily ) 60 tablet 0    Alcohol Swabs PADS Use as directed with insulin injections 300 each 3    Multiple Vitamins-Minerals (MULTIVITAMIN PO) Take 1 tablet by mouth daily.  tiZANidine (ZANAFLEX) 2 MG tablet Take 1 tablet by mouth every 8 hours as needed (muscle spasms) (Patient not taking: Reported on 1/5/2022) 30 tablet 0    [DISCONTINUED] KLOR-CON M20 20 MEQ extended release tablet TAKE 1 TABLET BY MOUTH THREE TIMES A  tablet 1     No current facility-administered medications on file prior to visit.        I have personally reviewed the ROS, PMH, PFH, and social history     Review of Systems   Constitutional: Negative for chills. HENT: Negative for congestion. Eyes: Negative for pain. Respiratory: Negative for shortness of breath. Cardiovascular: Negative for chest pain. Gastrointestinal: Negative for abdominal pain. Genitourinary: Negative for hematuria. Musculoskeletal: Negative for back pain. Foot pain (R)    Allergic/Immunologic: Negative for immunocompromised state. Neurological: Negative for headaches. Psychiatric/Behavioral: Negative for hallucinations. Objective:   /66   Pulse 84   Temp 97.3 °F (36.3 °C) (Infrared)   Ht 5' 6\" (1.676 m)   Wt 228 lb (103.4 kg)   SpO2 97%   BMI 36.80 kg/m²     Physical Exam  Constitutional:       Appearance: He is well-developed. HENT:      Head: Normocephalic. Eyes:      Pupils: Pupils are equal, round, and reactive to light. Neck:      Trachea: No tracheal deviation. Cardiovascular:      Rate and Rhythm: Normal rate and regular rhythm. Heart sounds: Normal heart sounds. No murmur heard. No friction rub. No gallop. Pulmonary:      Effort: No respiratory distress. Abdominal:      General: Bowel sounds are normal. There is no distension. Palpations: Abdomen is soft. Tenderness: There is no rebound. Musculoskeletal:      Comments: Right 1st MTP joint, warm and swollen. Some progression to distal/midfoot    Skin:     General: Skin is warm and dry. Neurological:      Mental Status: He is oriented to person, place, and time. Assessment:       Diagnosis Orders   1. Acute gout involving toe of right foot, unspecified cause  colchicine (COLCRYS) 0.6 MG tablet    XR FOOT RIGHT (MIN 3 VIEWS)   2. CLL (chronic lymphocytic leukemia) (HCC)           Plan:     vc  See orders. He is clinically improving.        Orders Placed This Encounter   Procedures    XR FOOT RIGHT (MIN 3 VIEWS)     Standing Status:   Future Standing Expiration Date:   1/5/2023     Orders Placed This Encounter   Medications    colchicine (COLCRYS) 0.6 MG tablet     Sig: Take 1 tablet by mouth daily as needed for Pain ((foot pain))     Dispense:  14 tablet     Refill:  0   He seems heme for CLL  RISK OF INFECTION explained  Unlikely given his improvement with colchicine. Keep your ccm visit or schedule if you don't have one. If anything should change or worsen call ASAP, don't wait for next scheduled appointment. No follow-ups on file.       Quyen Nieves MD

## 2022-01-06 NOTE — PROCEDURES
[address]                      [address], [address] [facility]                             ELECTROMYOGRAM REPORT    PATIENT NAME: Kenya Bernard                  :        1950  MED REC NO:   [id number]                            ROOM:  ACCOUNT NO:   [de-identified]                           ADMIT DATE: 2022  PROVIDER:     Morena Goldstein MD    DATE OF EM2022    REFERRING PROVIDER:  Juan Samuel MD.    REASON FOR STUDY:  The patient was having numbness in the hands, being  worse on the left side. Previous study was available for comparison  from 2019. FINDINGS:  Motor nerve conduction velocities are borderline in the left  ulnar nerve over the elbow segment, could not be measured in the left  median nerve, and normal in other nerves tested. F-wave latency could not be obtained in the left median nerve, rather  delayed in the left ulnar nerve and also right median nerve, borderline  in the right ulnar nerve. Distal motor and sensory latencies are normal in the ulnar nerves, but  significantly delayed in the median nerves. On concentric needle electrode examination, significant denervation  changes are present in the abductor pollicis brevis muscles bilaterally,  being much worse on the left side. 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 Due to continued symptoms, the patient shall need decompression of the  median nerves to start with on the left side. Due to the severity of the compression neuropathy, resolution of the  symptoms may not be complete after the surgical intervention. Thank you Dr. Karina Lugo for allowing me to see this patient.   Please feel  free to call me if I can be of any further assistance regarding this  patient's evaluation.         Jeovanny Whelan MD    D: 01/05/2022 13:42:24       T: 01/05/2022 13:46:46     [id number]  Job#: [id number]     Doc#: [id number]    CC:

## 2022-01-07 NOTE — PATIENT INSTRUCTIONS
Patient Education        Gout: Care Instructions  Overview     Gout is a form of arthritis caused by a buildup of uric acid crystals in a joint. It causes sudden attacks of pain, swelling, redness, and stiffness, usually in one joint, especially the big toe. Gout usually comes on without a cause. But it can be brought on by drinking alcohol (especially beer), eating or drinking things made with high-fructose corn syrup, or eating seafood or red meat. Taking certain medicines, such as diuretics, can also trigger an attack of gout. Taking your medicines as prescribed and following up with your doctor regularly can help you avoid gout attacks in the future. Follow-up care is a key part of your treatment and safety. Be sure to make and go to all appointments, and call your doctor if you are having problems. It's also a good idea to know your test results and keep a list of the medicines you take. How can you care for yourself at home? · If the joint is swollen, put ice or a cold pack on the area for 10 to 20 minutes at a time. Put a thin cloth between the ice and your skin. · Prop up the sore limb on a pillow when you ice it or anytime you sit or lie down during the next 3 days. Try to keep it above the level of your heart. This can help reduce swelling. · Rest sore joints. Avoid activities that put weight or strain on the joints for a few days. Take short rest breaks from your regular activities during the day. · Take your medicines exactly as prescribed. Call your doctor if you think you are having a problem with your medicine. · Take pain medicines exactly as directed. ? If the doctor gave you a prescription medicine for pain, take it as prescribed. ? If you are not taking a prescription pain medicine, ask your doctor if you can take an over-the-counter medicine. · Eat less seafood and red meat. · Avoid foods or drinks that are made with high-fructose corn syrup.   · Check with your doctor before drinking alcohol. · Losing weight, if you are overweight, may help reduce attacks of gout. But do not go on a diet that causes rapid weight loss. Losing a lot of weight in a short amount of time can cause a gout attack. When should you call for help? Call your doctor now or seek immediate medical care if:    · You have a fever.     · The joint is so painful you cannot use it.     · You have sudden, unexplained swelling, redness, warmth, or severe pain in one or more joints. Watch closely for changes in your health, and be sure to contact your doctor if:    · You have joint pain.     · Your symptoms get worse or are not improving after 2 or 3 days. Where can you learn more? Go to https://Tappx.[employer]OND. org and sign in to your [employer] account. Enter S869 in the GOkey box to learn more about \"Gout: Care Instructions. \"     If you do not have an account, please click on the \"Sign Up Now\" link. Current as of: April 30, 2021               Content Version: 13.1  © 2006-2021 Healthwise, Incorporated. Care instructions adapted under license by [facility] ([facility]). If you have questions about a medical condition or this instruction, always ask your healthcare professional. [place] [address] any warranty or liability for your use of this information.

## 2022-01-12 ENCOUNTER — OFFICE VISIT (OUTPATIENT)
Dept: ORTHOPEDIC SURGERY | Age: 72
End: 2022-01-12
Payer: MEDICARE

## 2022-01-12 VITALS
WEIGHT: 228 LBS | OXYGEN SATURATION: 97 % | HEIGHT: 66 IN | BODY MASS INDEX: 36.64 KG/M2 | HEART RATE: 89 BPM | TEMPERATURE: 97 F

## 2022-01-12 DIAGNOSIS — G56.03 BILATERAL CARPAL TUNNEL SYNDROME: Primary | ICD-10-CM

## 2022-01-12 PROCEDURE — 3017F COLORECTAL CA SCREEN DOC REV: CPT | Performed by: ORTHOPAEDIC SURGERY

## 2022-01-12 PROCEDURE — 4040F PNEUMOC VAC/ADMIN/RCVD: CPT | Performed by: ORTHOPAEDIC SURGERY

## 2022-01-12 PROCEDURE — G8417 CALC BMI ABV UP PARAM F/U: HCPCS | Performed by: ORTHOPAEDIC SURGERY

## 2022-01-12 PROCEDURE — G8427 DOCREV CUR MEDS BY ELIG CLIN: HCPCS | Performed by: ORTHOPAEDIC SURGERY

## 2022-01-12 PROCEDURE — 99205 OFFICE O/P NEW HI 60 MIN: CPT | Performed by: ORTHOPAEDIC SURGERY

## 2022-01-12 PROCEDURE — G8484 FLU IMMUNIZE NO ADMIN: HCPCS | Performed by: ORTHOPAEDIC SURGERY

## 2022-01-12 PROCEDURE — 1036F TOBACCO NON-USER: CPT | Performed by: ORTHOPAEDIC SURGERY

## 2022-01-12 PROCEDURE — 1123F ACP DISCUSS/DSCN MKR DOCD: CPT | Performed by: ORTHOPAEDIC SURGERY

## 2022-01-17 ENCOUNTER — OFFICE VISIT (OUTPATIENT)
Dept: FAMILY MEDICINE CLINIC | Age: 72
End: 2022-01-17
Payer: MEDICARE

## 2022-01-17 VITALS
DIASTOLIC BLOOD PRESSURE: 66 MMHG | OXYGEN SATURATION: 97 % | HEIGHT: 66 IN | SYSTOLIC BLOOD PRESSURE: 107 MMHG | TEMPERATURE: 97.2 F | RESPIRATION RATE: 16 BRPM | BODY MASS INDEX: 36.64 KG/M2 | HEART RATE: 74 BPM | WEIGHT: 228 LBS

## 2022-01-17 DIAGNOSIS — N18.30 STAGE 3 CHRONIC KIDNEY DISEASE, UNSPECIFIED WHETHER STAGE 3A OR 3B CKD (HCC): ICD-10-CM

## 2022-01-17 DIAGNOSIS — M10.9 GOUT INVOLVING TOE OF RIGHT FOOT, UNSPECIFIED CAUSE, UNSPECIFIED CHRONICITY: ICD-10-CM

## 2022-01-17 DIAGNOSIS — R77.8 ELEVATED TOTAL PROTEIN: ICD-10-CM

## 2022-01-17 DIAGNOSIS — E11.9 CONTROLLED TYPE 2 DIABETES MELLITUS WITHOUT COMPLICATION, WITHOUT LONG-TERM CURRENT USE OF INSULIN (HCC): ICD-10-CM

## 2022-01-17 DIAGNOSIS — E78.1 HYPERTRIGLYCERIDEMIA: ICD-10-CM

## 2022-01-17 DIAGNOSIS — E11.9 CONTROLLED TYPE 2 DIABETES MELLITUS WITHOUT COMPLICATION, WITHOUT LONG-TERM CURRENT USE OF INSULIN (HCC): Primary | ICD-10-CM

## 2022-01-17 PROBLEM — M46.96 UNSPECIFIED INFLAMMATORY SPONDYLOPATHY, LUMBAR REGION (HCC): Status: RESOLVED | Noted: 2020-02-20 | Resolved: 2022-01-17

## 2022-01-17 LAB
ALBUMIN SERPL-MCNC: 4.3 G/DL (ref 3.5–4.6)
ALP BLD-CCNC: 96 U/L (ref 35–104)
ALT SERPL-CCNC: 12 U/L (ref 0–41)
ANION GAP SERPL CALCULATED.3IONS-SCNC: 15 MEQ/L (ref 9–15)
AST SERPL-CCNC: 16 U/L (ref 0–40)
BILIRUB SERPL-MCNC: 0.3 MG/DL (ref 0.2–0.7)
BUN BLDV-MCNC: 30 MG/DL (ref 8–23)
CALCIUM SERPL-MCNC: 9.2 MG/DL (ref 8.5–9.9)
CHLORIDE BLD-SCNC: 101 MEQ/L (ref 95–107)
CO2: 25 MEQ/L (ref 20–31)
CREAT SERPL-MCNC: 1.56 MG/DL (ref 0.7–1.2)
GFR AFRICAN AMERICAN: 53.6
GFR NON-AFRICAN AMERICAN: 44.3
GLOBULIN: 4.1 G/DL (ref 2.3–3.5)
GLUCOSE BLD-MCNC: 119 MG/DL (ref 70–99)
MAGNESIUM: 1.4 MG/DL (ref 1.7–2.4)
POTASSIUM SERPL-SCNC: 4.3 MEQ/L (ref 3.4–4.9)
SEDIMENTATION RATE, ERYTHROCYTE: 62 MM (ref 0–20)
SODIUM BLD-SCNC: 141 MEQ/L (ref 135–144)
TOTAL PROTEIN: 8.4 G/DL (ref 6.3–8)
URIC ACID, SERUM: 6.5 MG/DL (ref 3.4–7)

## 2022-01-17 PROCEDURE — 3046F HEMOGLOBIN A1C LEVEL >9.0%: CPT | Performed by: INTERNAL MEDICINE

## 2022-01-17 PROCEDURE — 1036F TOBACCO NON-USER: CPT | Performed by: INTERNAL MEDICINE

## 2022-01-17 PROCEDURE — G8427 DOCREV CUR MEDS BY ELIG CLIN: HCPCS | Performed by: INTERNAL MEDICINE

## 2022-01-17 PROCEDURE — 99214 OFFICE O/P EST MOD 30 MIN: CPT | Performed by: INTERNAL MEDICINE

## 2022-01-17 PROCEDURE — 3017F COLORECTAL CA SCREEN DOC REV: CPT | Performed by: INTERNAL MEDICINE

## 2022-01-17 PROCEDURE — 1123F ACP DISCUSS/DSCN MKR DOCD: CPT | Performed by: INTERNAL MEDICINE

## 2022-01-17 PROCEDURE — 4040F PNEUMOC VAC/ADMIN/RCVD: CPT | Performed by: INTERNAL MEDICINE

## 2022-01-17 PROCEDURE — G8484 FLU IMMUNIZE NO ADMIN: HCPCS | Performed by: INTERNAL MEDICINE

## 2022-01-17 PROCEDURE — 2022F DILAT RTA XM EVC RTNOPTHY: CPT | Performed by: INTERNAL MEDICINE

## 2022-01-17 PROCEDURE — G8417 CALC BMI ABV UP PARAM F/U: HCPCS | Performed by: INTERNAL MEDICINE

## 2022-01-17 RX ORDER — ICOSAPENT ETHYL 1000 MG/1
2 CAPSULE ORAL 2 TIMES DAILY
Qty: 360 CAPSULE | Refills: 3 | Status: SHIPPED | OUTPATIENT
Start: 2022-01-17 | End: 2022-02-09

## 2022-01-17 RX ORDER — ALLOPURINOL 100 MG/1
200 TABLET ORAL DAILY
Qty: 180 TABLET | Refills: 0 | Status: SHIPPED | OUTPATIENT
Start: 2022-01-17 | End: 2022-04-28

## 2022-01-17 RX ORDER — COLCHICINE 0.6 MG/1
0.6 TABLET ORAL EVERY OTHER DAY
Qty: 45 TABLET | Refills: 0 | Status: SHIPPED | OUTPATIENT
Start: 2022-01-17 | End: 2022-04-27

## 2022-01-17 RX ORDER — ADHESIVE TAPE 3"X 2.3 YD
TAPE, NON-MEDICATED TOPICAL
Qty: 90 TABLET | Refills: 1 | Status: SHIPPED | OUTPATIENT
Start: 2022-01-17 | End: 2022-07-29

## 2022-01-17 ASSESSMENT — ENCOUNTER SYMPTOMS
ABDOMINAL PAIN: 0
SHORTNESS OF BREATH: 0
EYE PAIN: 0
BACK PAIN: 0

## 2022-01-17 NOTE — PROGRESS NOTES
Mag  moncoSubjective:      Patient ID: Macho Vasquez is a 73 y.o. male who presents today with:  Chief Complaint   Patient presents with    Follow-up    Discuss Labs       HPI     Here for follow up   He mentions he was doing better with gout  But this morning woke up with more pain (not as bad as it was)     Past Medical History:   Diagnosis Date    Acute idiopathic gout of right foot     Anemia 03/01/2018    iron transfusions x2    Arthritis     both knees    Bilateral carpal tunnel syndrome 1/12/2022    Chronic kidney disease     CLL (chronic lymphocytic leukemia) ([facility] [address]     dx 5/2015    Colon polyps     Disease of blood and blood forming organ     Hyperlipidemia     dx since 1970's    Hypertension     meds  since 1970's    Lymphoma of gastrointestinal tract ([facility] [address]     Movement disorder     Type II or unspecified type diabetes mellitus without mention of complication, not stated as uncontrolled     hx > 20 yrs     Past Surgical History:   Procedure Laterality Date    APPENDECTOMY      age 27s   Tomie Grater CHOLECYSTECTOMY N/A 07/07/2016    COLONOSCOPY  4/1/16    w/polypectomy     COLONOSCOPY N/A 6/15/2021    COLORECTAL CANCER SCREENING, HIGH RISK performed by River Miller MD at [address] [place] [address], COLON, DIAGNOSTIC      JOINT REPLACEMENT Bilateral 2018    TKR    OTHER SURGICAL HISTORY Right 06/08/16    I & D ABSCESS THIGH    ND MANIPULATN KNEE JT+ANESTHESIA Right 3/15/2018    RIGHT KNEE MANIPULATION performed by Shima Henriquez MD at [address] [place] [address]. Right 1/18/2018    RIGHT KNEE TOTAL KNEE ARTHROPLASTY ELÍAS Mitesh performed by Shima Henriquez MD at [address] [place] [address]. Left 5/17/2018    LEFT KNEE TOTAL KNEE ARTHROPLASTY, performed by Shima [facility]quez MD at [facility] Right 1979    due to displacement    UPPER GASTROINTESTINAL ENDOSCOPY  4/29/15    w/bx [email or web address] Rosie VALDEZ GASTROINTESTINAL ENDOSCOPY  02/28/2018    Dr. Elijah Alejo History     Socioeconomic History    Marital status:      Spouse name: Not on file    Number of children: Not on file    Years of education: Not on file    Highest education level: Not on file   Occupational History    Occupation: retired   Tobacco Use    Smoking status: Never Smoker    Smokeless tobacco: Never Used   Vaping Use    Vaping Use: Never used   Substance and Sexual Activity    Alcohol use: Yes     Comment: rare social    Drug use: No    Sexual activity: Yes     Partners: Female   Other Topics Concern    Not on file   Social History Narrative    Lives w wife     Social Determinants of Health     Financial Resource Strain: Low Risk     Difficulty of Paying Living Expenses: Not hard at all   Food Insecurity: No Food Insecurity    Worried About Running Out of Food in the Last Year: Never true    Joycelyn of Food in the Last Year: Never true   Transportation Needs: No Transportation Needs    Lack of Transportation (Medical): No    Lack of Transportation (Non-Medical):  No   Physical Activity:     Days of Exercise per Week: Not on file    Minutes of Exercise per Session: Not on file   Stress:     Feeling of Stress : Not on file   Social Connections:     Frequency of Communication with Friends and Family: Not on file    Frequency of Social Gatherings with Friends and Family: Not on file    Attends Buddhism Services: Not on file    Active Member of Clubs or Organizations: Not on file    Attends Club or Organization Meetings: Not on file    Marital Status: Not on file   Intimate Partner Violence:     Fear of Current or Ex-Partner: Not on file    Emotionally Abused: Not on file    Physically Abused: Not on file    Sexually Abused: Not on file   Housing Stability:     Unable to Pay for Housing in the Last Year: Not on file    Number of [place] in the Last Year: Not on file    Unstable Housing in the Last Year: Not on file     Allergies   Allergen Reactions    Dye [Barium-Containing Compounds] Hives    Iodides Hives    Iodinated Diagnostic Agents Hives     Current Outpatient Medications on File Prior to Visit   Medication Sig Dispense Refill    doxepin (SINEQUAN) 10 MG capsule TAKE 1 CAPSULE BY MOUTH EVERY DAY AT NIGHT 90 capsule 0    atorvastatin (LIPITOR) 40 MG tablet TAKE 1 TABLET BY MOUTH EVERY EVENING (Patient taking differently: 20 mg TAKE 1 TABLET BY MOUTH EVERY EVENING (Take 1/2 tablet po once daily while on colchicine)) 90 tablet 3    loratadine (CLARITIN) 10 MG tablet Take 1 tablet by mouth daily as needed (allergies) 90 tablet 3    KLOR-CON M20 20 MEQ extended release tablet TAKE 1 TABLET BY MOUTH THREE TIMES A DAY (Patient taking differently: 20 mEq 2 times daily ) 270 tablet 0    furosemide (LASIX) 40 MG tablet TAKE 1 TABLET DAILY 90 tablet 1    metoprolol succinate (TOPROL XL) 50 MG extended release tablet Take 1 tablet by mouth daily 90 tablet 3    benazepril (LOTENSIN) 20 MG tablet TAKE 1 TABLET BY MOUTH EVERY DAY 90 tablet 3    metFORMIN (GLUCOPHAGE) 500 MG tablet TAKE 2 TABLETS BY MOUTH TWICE A DAY WITH MEALAS 360 tablet 3    Multiple Vitamins-Minerals (MULTIVITAMIN PO) Take 1 tablet by mouth daily.  [DISCONTINUED] KLOR-CON M20 20 MEQ extended release tablet TAKE 1 TABLET BY MOUTH THREE TIMES A  tablet 1     No current facility-administered medications on file prior to visit. I have personally reviewed the ROS, PMH, PFH, and social history     Review of Systems   Constitutional: Negative for chills. HENT: Negative for congestion. Eyes: Negative for pain. Respiratory: Negative for shortness of breath. Cardiovascular: Negative for chest pain. Gastrointestinal: Negative for abdominal pain. Genitourinary: Negative for hematuria. Musculoskeletal: Negative for back pain. Allergic/Immunologic: Negative for immunocompromised state.    Neurological: Negative for headaches. Psychiatric/Behavioral: Negative for hallucinations. Objective:   /66 (Site: Left Upper Arm, Position: Sitting, Cuff Size: Large Adult)   Pulse 74   Temp 97.2 °F (36.2 °C) (Temporal)   Resp 16   Ht 5' 6\" (1.676 m)   Wt 228 lb (103.4 kg)   SpO2 97%   BMI 36.80 kg/m²     Physical Exam  Constitutional:       General: He is not in acute distress. Appearance: Normal appearance. He is not ill-appearing, toxic-appearing or diaphoretic. HENT:      Head: Normocephalic. Neck:      Vascular: No carotid bruit. Cardiovascular:      Rate and Rhythm: Normal rate and regular rhythm. Pulses: Normal pulses. Heart sounds: Normal heart sounds. No murmur heard. No friction rub. No gallop. Pulmonary:      Effort: Pulmonary effort is normal. No respiratory distress. Breath sounds: Normal breath sounds. No wheezing, rhonchi or rales. Abdominal:      General: Abdomen is flat. There is no distension. Palpations: Abdomen is soft. Tenderness: There is no abdominal tenderness. There is no right CVA tenderness, left CVA tenderness, guarding or rebound. Musculoskeletal:      Cervical back: Neck supple. Right lower leg: No edema. Left lower leg: No edema. Skin:     General: Skin is warm. Findings: No erythema or rash. Neurological:      Mental Status: He is alert. Psychiatric:         Mood and Affect: Mood normal.       Prostate without nodules     Assessment:       Diagnosis Orders   1. Controlled type 2 diabetes mellitus without complication, without long-term current use of insulin ([facility])  Sedimentation Rate    Magnesium    Comprehensive Metabolic Panel    Icosapent Ethyl (VASCEPA) 1 g CAPS capsule   2. Gout involving toe of right foot, unspecified cause, unspecified chronicity  Uric Acid    allopurinol (ZYLOPRIM) 100 MG tablet    colchicine (COLCRYS) 0.6 MG tablet    Sedimentation Rate    Magnesium    Comprehensive Metabolic Panel   3. Hypertriglyceridemia  Sedimentation Rate    Magnesium    Comprehensive Metabolic Panel    Icosapent Ethyl (VASCEPA) 1 g CAPS capsule   4. Stage 3 chronic kidney disease, unspecified whether stage 3a or 3b CKD (HCC)  Sedimentation Rate    Magnesium    Comprehensive Metabolic Panel   5. Elevated total protein  Protein Electrophoresis, Serum         Plan:     vc  Increase allopurinol, colchicine qod   Oncology through [facility] (dr Derrick Hummel) (He follows for CLL and anemia)   Ccm labs 05/2022   vascepa (lowers risk of MI) risk of afib   GI through , hx of duodenal CA.    If not better call me for podiatry  Last opto was 12/11/2021   Saw ortho hand   Has cris lennon         Orders Placed This Encounter   Procedures    Uric Acid     Standing Status:   Future     Number of Occurrences:   1     Standing Expiration Date:   1/17/2023    Sedimentation Rate     Standing Status:   Future     Number of Occurrences:   1     Standing Expiration Date:   1/17/2023    Magnesium     Standing Status:   Future     Number of Occurrences:   1     Standing Expiration Date:   1/17/2023    Comprehensive Metabolic Panel     Standing Status:   Future     Number of Occurrences:   1     Standing Expiration Date:   1/17/2023    Protein Electrophoresis, Serum     Standing Status:   Future     Standing Expiration Date:   1/17/2023     Orders Placed This Encounter   Medications    allopurinol (ZYLOPRIM) 100 MG tablet     Sig: Take 2 tablets by mouth daily     Dispense:  180 tablet     Refill:  0    colchicine (COLCRYS) 0.6 MG tablet     Sig: Take 1 tablet by mouth every other day     Dispense:  45 tablet     Refill:  0    Icosapent Ethyl (VASCEPA) 1 g CAPS capsule     Sig: Take 2 capsules by mouth 2 times daily     Dispense:  360 capsule     Refill:  3    Magnesium Oxide 200 MG TABS     Sig: Take one tablet po once daily     Dispense:  90 tablet     Refill:  1   Back pain better (no longer complaining of this) Since 09/2021   Refused chaperone   If anything should change or worsen call ASAP, don't wait for next scheduled appointment. Return in about 4 months (around 5/17/2022) for Chronic condition management/appointment, worsening symptoms, call ASAP for appointment.       Tyson Moreno MD

## 2022-01-19 ENCOUNTER — TELEPHONE (OUTPATIENT)
Dept: ORTHOPEDIC SURGERY | Age: 72
End: 2022-01-19

## 2022-01-19 NOTE — TELEPHONE ENCOUNTER
Surgery Scheduling and Authorization Information    Pre-Op Date 01/21/22 @ 1300 @ [facility]  Surgery Date 01/26/22  Post-Op Date 02/09/22 @ 3629 @ [facility]      CPT Code(s) 84969  Procedure(s) Carpal Tunnel Release (Left)      Approved []  Not Required [x]  Reference #   Auth #        Provider  [] Lourdes Calhoun  [x] Letty Wilder  [] Tomás Henderson  [] Anand Dowell  [] Lamonte Huizar  [] Ayana Gomes  [] Mikey Duarte  [] Tracey Nath  [] Edel Quach  [] Dangelo Comer      Surgery Sheet Faxed to Scheduling [x]  Surgery and Prior Authorization Information Sheet Scanned [x]

## 2022-01-21 ENCOUNTER — HOSPITAL ENCOUNTER (OUTPATIENT)
Dept: PREADMISSION TESTING | Age: 72
Discharge: HOME | End: 2022-01-25
Payer: MEDICARE

## 2022-01-21 VITALS
SYSTOLIC BLOOD PRESSURE: 129 MMHG | TEMPERATURE: 97.9 F | DIASTOLIC BLOOD PRESSURE: 67 MMHG | RESPIRATION RATE: 16 BRPM | OXYGEN SATURATION: 98 % | BODY MASS INDEX: 36.32 KG/M2 | HEART RATE: 77 BPM | HEIGHT: 66 IN | WEIGHT: 226 LBS

## 2022-01-21 DIAGNOSIS — G56.02 LEFT CARPAL TUNNEL SYNDROME: ICD-10-CM

## 2022-01-21 PROCEDURE — 93005 ELECTROCARDIOGRAM TRACING: CPT | Performed by: NURSE PRACTITIONER

## 2022-01-21 PROCEDURE — 87635 SARS-COV-2 COVID-19 AMP PRB: CPT

## 2022-01-21 RX ORDER — SODIUM CHLORIDE 0.9 % (FLUSH) 0.9 %
10 SYRINGE (ML) INJECTION EVERY 12 HOURS SCHEDULED
Status: CANCELLED | OUTPATIENT
Start: 2022-01-26

## 2022-01-21 RX ORDER — ASPIRIN 81 MG/1
81 TABLET ORAL DAILY
Status: ON HOLD | COMMUNITY
End: 2022-03-09

## 2022-01-21 RX ORDER — SODIUM CHLORIDE, SODIUM LACTATE, POTASSIUM CHLORIDE, CALCIUM CHLORIDE 600; 310; 30; 20 MG/100ML; MG/100ML; MG/100ML; MG/100ML
INJECTION, SOLUTION INTRAVENOUS CONTINUOUS
Status: CANCELLED | OUTPATIENT
Start: 2022-01-26

## 2022-01-21 RX ORDER — SODIUM CHLORIDE 0.9 % (FLUSH) 0.9 %
10 SYRINGE (ML) INJECTION PRN
Status: CANCELLED | OUTPATIENT
Start: 2022-01-26

## 2022-01-21 RX ORDER — SODIUM CHLORIDE 9 MG/ML
25 INJECTION, SOLUTION INTRAVENOUS PRN
Status: CANCELLED | OUTPATIENT
Start: 2022-01-26

## 2022-01-21 RX ORDER — LIDOCAINE HYDROCHLORIDE 10 MG/ML
1 INJECTION, SOLUTION EPIDURAL; INFILTRATION; INTRACAUDAL; PERINEURAL
Status: CANCELLED | OUTPATIENT
Start: 2022-01-26 | End: 2022-01-26

## 2022-01-21 ASSESSMENT — ENCOUNTER SYMPTOMS
SORE THROAT: 0
BACK PAIN: 1
STRIDOR: 0
ALLERGIC/IMMUNOLOGIC NEGATIVE: 1
COUGH: 0
WHEEZING: 0
EYES NEGATIVE: 1
DIARRHEA: 0
NAUSEA: 0
CHEST TIGHTNESS: 0
SHORTNESS OF BREATH: 0
CONSTIPATION: 0

## 2022-01-21 NOTE — PROGRESS NOTES
Covid vaccine with booster completed -- documentation on chart. A1C ( 6.4 ) dated 11/17/2021 & on Epic. CBC & CMP date 1/17/2022 & on Epic.

## 2022-01-21 NOTE — H&P
Nurse Practitioner History and Physical      CHIEF COMPLAINT:  Left carpal tunnel surgery    HISTORY OF PRESENT ILLNESS:      The patient is a 73 y.o. male with significant past medical history of left carpal tunnel syndrome who presents for left carpal tunnel release. States similar sx of both hands & plans OR for right carpal tunnel release. C/o cramping of fingers, stiffness of digits, numbness & tingling, loss of grasp of hand. Unable to sleep at night due to sx. Has tried night wrist splints to alleviates sx -- no relief. EMG done. Scheduled for OR.           Past Medical History:        Diagnosis Date    Acute idiopathic gout of right foot     Anemia 03/01/2018    iron transfusions x2    Arthritis     both knees    Bilateral carpal tunnel syndrome 1/12/2022    Chronic kidney disease     CLL (chronic lymphocytic leukemia) (HCC)     Colon polyps     Disease of blood and blood forming organ     Hyperlipidemia     dx since 1970's    Hypertension     meds  since 1970's    [facility]oma [facility] tract [facility]     Movement disorder     Thyroid cancer ([facility] [address] 02/2018    [facility]- denies states it was CLL    Type II or unspecified type diabetes mellitus without mention of complication, not stated as uncontrolled     hx > 15 yrs     Past Surgical History:    Past Surgical History:   Procedure Laterality Date    APPENDECTOMY      CHOLECYSTECTOMY N/A 07/07/2016    COLONOSCOPY  4/1/16    w/polypectomy     COLONOSCOPY N/A 6/15/2021    COLORECTAL CANCER SCREENING, HIGH RISK performed by Nadira Scott MD at [facility], DIAGNOSTIC      JOINT REPLACEMENT Bilateral     knees    OTHER SURGICAL HISTORY Right 06/08/16    I & D ABSCESS THIGH    [place] MANIPULATN KNEE JT+ANESTHESIA Right 3/15/2018    RIGHT KNEE MANIPULATION performed by Jessica Oliveira MD at [facility]. Right 1/18/2018    RIGHT KNEE TOTAL KNEE ARTHROPLASTY ELÍAS Floridalma Ying BLOCK performed by Derrick Guerrero MD at [facility]. Left 5/17/2018    LEFT KNEE TOTAL KNEE ARTHROPLASTY, performed by Derrick Guerrero MD at [facility] Right     due to displacement    UPPER GASTROINTESTINAL ENDOSCOPY  4/29/15    w/bx     UPPER GASTROINTESTINAL ENDOSCOPY  02/28/2018    Dr. Toney Alcocer         Medications Prior to Admission:    Current Outpatient Medications   Medication Sig Dispense Refill    allopurinol (ZYLOPRIM) 100 MG tablet Take 2 tablets by mouth daily 180 tablet 0    colchicine (COLCRYS) 0.6 MG tablet Take 1 tablet by mouth every other day 45 tablet 0    Icosapent Ethyl (VASCEPA) 1 g CAPS capsule Take 2 capsules by mouth 2 times daily 360 capsule 3    Magnesium Oxide 200 MG TABS Take one tablet po once daily 90 tablet 1    doxepin (SINEQUAN) 10 MG capsule TAKE 1 CAPSULE BY MOUTH EVERY DAY AT NIGHT 90 capsule 0    atorvastatin (LIPITOR) 40 MG tablet TAKE 1 TABLET BY MOUTH EVERY EVENING (Patient taking differently: 20 mg TAKE 1 TABLET BY MOUTH EVERY EVENING (Take 1/2 tablet po once daily while on colchicine)) 90 tablet 3    loratadine (CLARITIN) 10 MG tablet Take 1 tablet by mouth daily as needed (allergies) 90 tablet 3    KLOR-CON M20 20 MEQ extended release tablet TAKE 1 TABLET BY MOUTH THREE TIMES A DAY (Patient taking differently: 20 mEq 2 times daily ) 270 tablet 0    furosemide (LASIX) 40 MG tablet TAKE 1 TABLET DAILY 90 tablet 1    metoprolol succinate (TOPROL XL) 50 MG extended release tablet Take 1 tablet by mouth daily 90 tablet 3    benazepril (LOTENSIN) 20 MG tablet TAKE 1 TABLET BY MOUTH EVERY DAY 90 tablet 3    metFORMIN (GLUCOPHAGE) 500 MG tablet TAKE 2 TABLETS BY MOUTH TWICE A DAY WITH MEALAS 360 tablet 3    Multiple Vitamins-Minerals (MULTIVITAMIN PO) Take 1 tablet by mouth daily. No current facility-administered medications for this encounter.        Allergies:  Dye [barium-containing compounds], Iodides, and Iodinated diagnostic agents    Social History:   Social History     Socioeconomic History    Marital status:      Spouse name: Not on file    Number of children: Not on file    Years of education: Not on file    Highest education level: Not on file   Occupational History    Occupation: retired   Tobacco Use    Smoking status: Never Smoker    Smokeless tobacco: Never Used   Vaping Use    Vaping Use: Never used   Substance and Sexual Activity    Alcohol use: Yes     Comment: rare social    Drug use: No    Sexual activity: Yes     Partners: Female   Other Topics Concern    Not on file   Social History Narrative    Lives w wife     Social Determinants of Health     Financial Resource Strain: Low Risk     Difficulty of Paying Living Expenses: Not hard at all   Food Insecurity: No Food Insecurity    Worried About [address] in the Last Year: Never true    [address] [employer] in the Last Year: Never true   Transportation Needs: No Transportation Needs    Lack of Transportation (Medical): No    Lack of Transportation (Non-Medical):  No   Physical Activity:     Days of Exercise per Week: Not on file    Minutes of Exercise per Session: Not on file   Stress:     Feeling of Stress : Not on file   Social Connections:     Frequency of Communication with Friends and Family: Not on file    Frequency of Social Gatherings with Friends and Family: Not on file    Attends Jew Services: Not on file    Active Member of Clubs or Organizations: Not on file    Attends Club or Organization Meetings: Not on file    Marital Status: Not on file   Intimate Partner Violence:     Fear of Current or Ex-Partner: Not on file    Emotionally Abused: Not on file    Physically Abused: Not on file    Sexually Abused: Not on file   Housing Stability:     Unable to Pay for Housing in the Last Year: Not on file    Number of [place] in the Last Year: Not on file    Unstable Housing in the Last Year: Not on file       Family History:       Problem Relation Age of Onset    Heart Disease Mother 67        CHF    Cancer Father 68        liver/ pancreatic    No Known Problems Sister     Cancer Brother         chronic lukemia    Arthritis Brother     No Known Problems Daughter     Colon Cancer Neg Hx        Review of Systems   Constitutional: Negative. Negative for chills and fever. HENT: Positive for congestion (sinus). Negative for sore throat and tinnitus. Eyes: Negative. Negative for visual disturbance. Respiratory: Negative for cough, chest tightness, shortness of breath, wheezing and stridor. Cardiovascular: Negative for chest pain and palpitations. Gastrointestinal: Negative for constipation, diarrhea and nausea. Endocrine:        Diabetes. Genitourinary: Negative for dysuria and frequency. Musculoskeletal: Positive for back pain (chronic). Negative for myalgias and neck pain. Bilateral foot gout since 5/2021. Skin: Negative. Allergic/Immunologic: Negative. Neurological: Negative. Negative for seizures and headaches. Hematological: Negative. Psychiatric/Behavioral: Negative. Vitals: There were no vitals taken for this visit. Physical Exam  Constitutional:       Appearance: He is well-developed. HENT:      Head: Normocephalic and atraumatic. Right Ear: Tympanic membrane, ear canal and external ear normal.      Left Ear: Tympanic membrane, ear canal and external ear normal.      Nose: No congestion. Mouth/Throat:      Mouth: Mucous membranes are moist.   Eyes:      General: No scleral icterus. Extraocular Movements: Extraocular movements intact. Conjunctiva/sclera: Conjunctivae normal.      Pupils: Pupils are equal, round, and reactive to light. Neck:      Thyroid: No thyromegaly. Trachea: No tracheal deviation. Cardiovascular:      Rate and Rhythm: Normal rate and regular rhythm.       Heart sounds: Normal heart sounds. Pulmonary:      Effort: Pulmonary effort is normal. No respiratory distress. Breath sounds: Normal breath sounds. No wheezing or rales. Abdominal:      General: Bowel sounds are normal. There is no distension. Palpations: Abdomen is soft. There is no mass. Tenderness: There is no abdominal tenderness. Genitourinary:     Comments: Deferred. Musculoskeletal:         General: No tenderness. Normal range of motion. Cervical back: Normal range of motion. Right lower leg: No edema. Left lower leg: No edema. Lymphadenopathy:      Cervical: No cervical adenopathy. Skin:     General: Skin is warm. Findings: No erythema or rash. Neurological:      Mental Status: He is alert and oriented to person, place, and time. Motor: Weakness (bilateral hand grasps.) present. Gait: Gait abnormal (ambulates with cane. ). Psychiatric:         Mood and Affect: Mood normal.         Behavior: Behavior normal.         Thought Content: Thought content normal.         Judgment: Judgment normal.         [unfilled]    Assessment:  Patient Active Problem List   Diagnosis    Arthritis, lumbar spine    [facility]) [facility] [address]    Lymphoma of gastrointestinal tract (HCC)    Type 2 diabetes mellitus without complication, without long-term current use of insulin (HCC)    Dyslipidemia    Essential hypertension    Cyst, epididymis    Hydrocele    Other closed fractures of distal end of radius (alone)    Stiffness of joint, not elsewhere classified, forearm    Stiffness of joint, not elsewhere classified, hand    Varicocele    Pain in joint, hand    Morbidly obese ([address]    Acute idiopathic gout of right foot    Chronic kidney disease    History of colon polyps    Bilateral carpal tunnel syndrome         Plan:  Scheduled for left carpal tunnel release. SHERMAN Acevedo - CNP  1/21/2022  12:55 PM

## 2022-01-22 LAB — SARS-COV-2, PCR: NOT DETECTED

## 2022-01-24 LAB
EKG ATRIAL RATE: 74 BPM
EKG P AXIS: 13 DEGREES
EKG P-R INTERVAL: 170 MS
EKG Q-T INTERVAL: 386 MS
EKG QRS DURATION: 78 MS
EKG QTC CALCULATION (BAZETT): 428 MS
EKG R AXIS: -5 DEGREES
EKG T AXIS: 24 DEGREES
EKG VENTRICULAR RATE: 74 BPM

## 2022-01-24 PROCEDURE — 93010 ELECTROCARDIOGRAM REPORT: CPT | Performed by: INTERNAL MEDICINE

## 2022-01-26 ENCOUNTER — ANESTHESIA EVENT (OUTPATIENT)
Dept: OPERATING ROOM | Age: 72
End: 2022-01-26
Payer: MEDICARE

## 2022-01-26 ENCOUNTER — ANESTHESIA (OUTPATIENT)
Dept: OPERATING ROOM | Age: 72
End: 2022-01-26
Payer: MEDICARE

## 2022-01-26 ENCOUNTER — HOSPITAL ENCOUNTER (OUTPATIENT)
Age: 72
Setting detail: OUTPATIENT SURGERY
Discharge: HOME | End: 2022-01-26
Attending: ORTHOPAEDIC SURGERY | Admitting: ORTHOPAEDIC SURGERY
Payer: MEDICARE

## 2022-01-26 VITALS — DIASTOLIC BLOOD PRESSURE: 56 MMHG | SYSTOLIC BLOOD PRESSURE: 105 MMHG | OXYGEN SATURATION: 99 %

## 2022-01-26 VITALS
RESPIRATION RATE: 16 BRPM | DIASTOLIC BLOOD PRESSURE: 55 MMHG | WEIGHT: 226 LBS | HEIGHT: 66 IN | SYSTOLIC BLOOD PRESSURE: 101 MMHG | BODY MASS INDEX: 36.32 KG/M2 | OXYGEN SATURATION: 96 % | TEMPERATURE: 97 F | HEART RATE: 66 BPM

## 2022-01-26 DIAGNOSIS — G89.18 POST-OPERATIVE PAIN: Primary | ICD-10-CM

## 2022-01-26 LAB
GLUCOSE BLD-MCNC: 109 MG/DL (ref 70–99)
PERFORMED ON: ABNORMAL

## 2022-01-26 PROCEDURE — 3700000001 HC ADD 15 MINUTES (ANESTHESIA): Performed by: ORTHOPAEDIC SURGERY

## 2022-01-26 PROCEDURE — 3600000013 HC SURGERY LEVEL 3 ADDTL 15MIN: Performed by: ORTHOPAEDIC SURGERY

## 2022-01-26 PROCEDURE — 3700000000 HC ANESTHESIA ATTENDED CARE: Performed by: ORTHOPAEDIC SURGERY

## 2022-01-26 PROCEDURE — 6360000002 HC RX W HCPCS: Performed by: NURSE PRACTITIONER

## 2022-01-26 PROCEDURE — 2500000003 HC RX 250 WO HCPCS: Performed by: ORTHOPAEDIC SURGERY

## 2022-01-26 PROCEDURE — 7100000011 HC PHASE II RECOVERY - ADDTL 15 MIN: Performed by: ORTHOPAEDIC SURGERY

## 2022-01-26 PROCEDURE — A4217 STERILE WATER/SALINE, 500 ML: HCPCS | Performed by: ORTHOPAEDIC SURGERY

## 2022-01-26 PROCEDURE — 64721 CARPAL TUNNEL SURGERY: CPT | Performed by: ORTHOPAEDIC SURGERY

## 2022-01-26 PROCEDURE — 7100000010 HC PHASE II RECOVERY - FIRST 15 MIN: Performed by: ORTHOPAEDIC SURGERY

## 2022-01-26 PROCEDURE — 2580000003 HC RX 258: Performed by: NURSE ANESTHETIST, CERTIFIED REGISTERED

## 2022-01-26 PROCEDURE — 2709999900 HC NON-CHARGEABLE SUPPLY: Performed by: ORTHOPAEDIC SURGERY

## 2022-01-26 PROCEDURE — 6360000002 HC RX W HCPCS: Performed by: NURSE ANESTHETIST, CERTIFIED REGISTERED

## 2022-01-26 PROCEDURE — 3600000003 HC SURGERY LEVEL 3 BASE: Performed by: ORTHOPAEDIC SURGERY

## 2022-01-26 PROCEDURE — 2580000003 HC RX 258: Performed by: ORTHOPAEDIC SURGERY

## 2022-01-26 PROCEDURE — 2580000003 HC RX 258: Performed by: NURSE PRACTITIONER

## 2022-01-26 RX ORDER — LIDOCAINE HYDROCHLORIDE 10 MG/ML
1 INJECTION, SOLUTION EPIDURAL; INFILTRATION; INTRACAUDAL; PERINEURAL
Status: DISCONTINUED | OUTPATIENT
Start: 2022-01-26 | End: 2022-01-26 | Stop reason: HOSPADM

## 2022-01-26 RX ORDER — MORPHINE SULFATE 2 MG/ML
2 INJECTION, SOLUTION INTRAMUSCULAR; INTRAVENOUS
Status: DISCONTINUED | OUTPATIENT
Start: 2022-01-26 | End: 2022-01-26 | Stop reason: HOSPADM

## 2022-01-26 RX ORDER — SODIUM CHLORIDE 0.9 % (FLUSH) 0.9 %
10 SYRINGE (ML) INJECTION EVERY 12 HOURS SCHEDULED
Status: DISCONTINUED | OUTPATIENT
Start: 2022-01-26 | End: 2022-01-26 | Stop reason: HOSPADM

## 2022-01-26 RX ORDER — SODIUM CHLORIDE 0.9 % (FLUSH) 0.9 %
10 SYRINGE (ML) INJECTION PRN
Status: DISCONTINUED | OUTPATIENT
Start: 2022-01-26 | End: 2022-01-26 | Stop reason: HOSPADM

## 2022-01-26 RX ORDER — SODIUM CHLORIDE 9 MG/ML
50 INJECTION, SOLUTION INTRAVENOUS CONTINUOUS
Status: DISCONTINUED | OUTPATIENT
Start: 2022-01-26 | End: 2022-01-26 | Stop reason: HOSPADM

## 2022-01-26 RX ORDER — SODIUM CHLORIDE 9 MG/ML
25 INJECTION, SOLUTION INTRAVENOUS PRN
Status: DISCONTINUED | OUTPATIENT
Start: 2022-01-26 | End: 2022-01-26 | Stop reason: HOSPADM

## 2022-01-26 RX ORDER — SODIUM CHLORIDE, SODIUM LACTATE, POTASSIUM CHLORIDE, CALCIUM CHLORIDE 600; 310; 30; 20 MG/100ML; MG/100ML; MG/100ML; MG/100ML
INJECTION, SOLUTION INTRAVENOUS CONTINUOUS PRN
Status: DISCONTINUED | OUTPATIENT
Start: 2022-01-26 | End: 2022-01-26 | Stop reason: SDUPTHER

## 2022-01-26 RX ORDER — OXYCODONE HYDROCHLORIDE 5 MG/1
5 TABLET ORAL EVERY 4 HOURS PRN
Status: DISCONTINUED | OUTPATIENT
Start: 2022-01-26 | End: 2022-01-26 | Stop reason: HOSPADM

## 2022-01-26 RX ORDER — SODIUM CHLORIDE, SODIUM LACTATE, POTASSIUM CHLORIDE, CALCIUM CHLORIDE 600; 310; 30; 20 MG/100ML; MG/100ML; MG/100ML; MG/100ML
INJECTION, SOLUTION INTRAVENOUS CONTINUOUS
Status: DISCONTINUED | OUTPATIENT
Start: 2022-01-26 | End: 2022-01-26 | Stop reason: HOSPADM

## 2022-01-26 RX ORDER — MAGNESIUM HYDROXIDE 1200 MG/15ML
LIQUID ORAL CONTINUOUS PRN
Status: COMPLETED | OUTPATIENT
Start: 2022-01-26 | End: 2022-01-26

## 2022-01-26 RX ORDER — MORPHINE SULFATE 4 MG/ML
4 INJECTION, SOLUTION INTRAMUSCULAR; INTRAVENOUS
Status: DISCONTINUED | OUTPATIENT
Start: 2022-01-26 | End: 2022-01-26 | Stop reason: HOSPADM

## 2022-01-26 RX ORDER — BUPIVACAINE HYDROCHLORIDE 5 MG/ML
INJECTION, SOLUTION EPIDURAL; INTRACAUDAL PRN
Status: DISCONTINUED | OUTPATIENT
Start: 2022-01-26 | End: 2022-01-26 | Stop reason: ALTCHOICE

## 2022-01-26 RX ORDER — MIDAZOLAM HYDROCHLORIDE 1 MG/ML
INJECTION INTRAMUSCULAR; INTRAVENOUS PRN
Status: DISCONTINUED | OUTPATIENT
Start: 2022-01-26 | End: 2022-01-26 | Stop reason: SDUPTHER

## 2022-01-26 RX ORDER — HYDROCODONE BITARTRATE AND ACETAMINOPHEN 5; 325 MG/1; MG/1
1 TABLET ORAL EVERY 6 HOURS PRN
Qty: 12 TABLET | Refills: 0 | Status: SHIPPED | OUTPATIENT
Start: 2022-01-26 | End: 2022-01-29

## 2022-01-26 RX ORDER — PROPOFOL 10 MG/ML
INJECTION, EMULSION INTRAVENOUS CONTINUOUS PRN
Status: DISCONTINUED | OUTPATIENT
Start: 2022-01-26 | End: 2022-01-26 | Stop reason: SDUPTHER

## 2022-01-26 RX ADMIN — SODIUM CHLORIDE, POTASSIUM CHLORIDE, SODIUM LACTATE AND CALCIUM CHLORIDE: 600; 310; 30; 20 INJECTION, SOLUTION INTRAVENOUS at 07:41

## 2022-01-26 RX ADMIN — CEFAZOLIN 2000 MG: 10 INJECTION, POWDER, FOR SOLUTION INTRAVENOUS at 07:41

## 2022-01-26 RX ADMIN — SODIUM CHLORIDE, POTASSIUM CHLORIDE, SODIUM LACTATE AND CALCIUM CHLORIDE: 600; 310; 30; 20 INJECTION, SOLUTION INTRAVENOUS at 06:38

## 2022-01-26 RX ADMIN — MIDAZOLAM HYDROCHLORIDE 2 MG: 1 INJECTION, SOLUTION INTRAMUSCULAR; INTRAVENOUS at 07:41

## 2022-01-26 RX ADMIN — PROPOFOL 100 MCG/KG/MIN: 10 INJECTION, EMULSION INTRAVENOUS at 07:49

## 2022-01-26 ASSESSMENT — PULMONARY FUNCTION TESTS
PIF_VALUE: 0
PIF_VALUE: 1
PIF_VALUE: 0
PIF_VALUE: 1
PIF_VALUE: 1
PIF_VALUE: 0
PIF_VALUE: 2
PIF_VALUE: 1
PIF_VALUE: 0
PIF_VALUE: 1
PIF_VALUE: 0
PIF_VALUE: 0
PIF_VALUE: 1
PIF_VALUE: 1
PIF_VALUE: 0
PIF_VALUE: 1
PIF_VALUE: 0
PIF_VALUE: 0
PIF_VALUE: 1
PIF_VALUE: 1
PIF_VALUE: 0
PIF_VALUE: 1
PIF_VALUE: 1
PIF_VALUE: 0

## 2022-01-26 ASSESSMENT — PAIN - FUNCTIONAL ASSESSMENT: PAIN_FUNCTIONAL_ASSESSMENT: 0-10

## 2022-01-26 ASSESSMENT — PAIN DESCRIPTION - DESCRIPTORS: DESCRIPTORS: ACHING;TINGLING;NUMBNESS

## 2022-01-26 NOTE — ANESTHESIA POSTPROCEDURE EVALUATION
Department of Anesthesiology  Postprocedure Note    Patient: Tommy Jose  MRN: [id number]  YOB: 1950  Date of evaluation: 1/26/2022  Time:  8:20 AM     Procedure Summary     Date: 01/26/22 Room / Location: [facility] [place] [facility]    Anesthesia Start: 3295 Anesthesia Stop: 0007    Procedure: LEFT CARPAL TUNNEL RELEASE (Left ) Diagnosis: (LEFT CARPAL TUNNEL SYNDROME)    Surgeons: Ry Painter MD Responsible Provider: Erinn Eugene MD    Anesthesia Type: MAC, Orwin block ASA Status: 3          Anesthesia Type: MAC, Eliceo block    Sigifredo Phase I: Sigifredo Score: 10    Sigifredo Phase II:      Last vitals: Reviewed and per EMR flowsheets.        Anesthesia Post Evaluation    Patient location during evaluation: bedside  Patient participation: complete - patient participated  Level of consciousness: awake and awake and alert  Airway patency: patent  Nausea & Vomiting: no nausea and no vomiting  Complications: no  Cardiovascular status: blood pressure returned to baseline and hemodynamically stable  Respiratory status: acceptable  Hydration status: euvolemic

## 2022-01-26 NOTE — OP NOTE
Operative Note      Patient: Cali Greer  YOB: 1950  MRN: [id number]    Date of Procedure: 1/26/2022    Pre-Op Diagnosis: LEFT CARPAL TUNNEL SYNDROME    Post-Op Diagnosis: Same       Procedure(s):  LEFT CARPAL TUNNEL RELEASE    Surgeon(s):  Brayden St MD    Assistant:   Physician Assistant: Mk Flores PA-C    Anesthesia: Eliceo Block    Estimated Blood Loss (mL): Minimal    Complications: None    Specimens:   * No specimens in log *    Implants:  * No implants in log *      Drains: * No LDAs found *    Findings: Compressed nerve carpal tunnel    Detailed Description of Procedure:     Brief clinical note:    Patient presents for a left carpal tunnel release today. The risks and benefits the procedure were discussed with the patient in detail. These include but not limited to injury soft tissue nerves and vessels, incisional problems, pillar pain, residual deficits, and recurrence. Medical and anesthetic risks were also discussed. Patient is consented and the left carpal tunnel is marked preoperatively. Operative note:    Patient is taken the op room after anesthesia was administered in form of a Decker block the area is prepped and draped in usual manner. A final timeout is done with the operative team.  An incision is carried out with a 15 blade scalpel in line with the ring finger in the palm. Meticulous dissection is then carried out with a tenotomy incising the palmar fascia and a Heiss retractor is applied. The transverse carpal ligament is identified under direct visualization and incised with a 15 blade scalpel. Dissection is then carried out distally to the sentinel fat and proximally across the wrist with a tenotomy. This fully decompresses the contents of the carpal tunnel including the median nerve. A tenotomy was utilized to ensure no adhesions are present around the median nerve.   Once this is confirmed this completes the release and the nerve is noted to be in continuity. Wound is irrigated and closed with several nylon sutures. Marcaine without epinephrine is infiltrated in the area for postoperative pain relief. Dressings include Xeroform fluffs web roll and a volar splint. The patient tolerated procedure well without any intraoperative complications.     Electronically signed by Ian Quinn MD on 1/26/2022 at 8:15 AM

## 2022-01-26 NOTE — ANESTHESIA PRE PROCEDURE
Department of Anesthesiology  Preprocedure Note       Name:  Gabriela Mccoy   Age:  73 y.o.  :  1950                                          MRN:  [id number]         Date:  2022      Surgeon: Tao Batista):  Sixto Aleman MD    Procedure: Procedure(s):  LEFT CARPAL TUNNEL RELEASE    Medications prior to admission:   Prior to Admission medications    Medication Sig Start Date End Date Taking?  Authorizing Provider   aspirin 81 MG EC tablet Take 81 mg by mouth daily   Yes Historical Provider, MD   allopurinol (ZYLOPRIM) 100 MG tablet Take 2 tablets by mouth daily 22  Yes Khalif Medrano MD   colchicine (COLCRYS) 0.6 MG tablet Take 1 tablet by mouth every other day 22  Yes Khalif Medrano MD   Magnesium Oxide 200 MG TABS Take one tablet po once daily 22  Yes Khalif Medrano MD   doxepin (SINEQUAN) 10 MG capsule TAKE 1 CAPSULE BY MOUTH EVERY DAY AT NIGHT 21  Yes Khalif Medrano MD   atorvastatin (LIPITOR) 40 MG tablet TAKE 1 TABLET BY MOUTH EVERY EVENING  Patient taking differently: 20 mg TAKE 1 TABLET BY MOUTH EVERY EVENING (Take 1/2 tablet po once daily while on colchicine) 21  Yes Khalif Medrano MD   loratadine (CLARITIN) 10 MG tablet Take 1 tablet by mouth daily as needed (allergies) 21  Yes Khalif Medrano MD   KLOR-CON M20 20 MEQ extended release tablet TAKE 1 TABLET BY MOUTH THREE TIMES A DAY  Patient taking differently: 20 mEq 2 times daily  21  Yes Khalif Medrano MD   furosemide (LASIX) 40 MG tablet TAKE 1 TABLET DAILY 21  Yes Khalif Medrano MD   metoprolol succinate (TOPROL XL) 50 MG extended release tablet Take 1 tablet by mouth daily 21  Yes Benigno Willett MD   benazepril (LOTENSIN) 20 MG tablet TAKE 1 TABLET BY MOUTH EVERY DAY 3/11/21  Yes Benigno iWllett MD   metFORMIN (GLUCOPHAGE) 500 MG tablet TAKE 2 TABLETS BY MOUTH TWICE A DAY WITH MEALAS 21  Yes Benigno Willett MD   Multiple Vitamins-Minerals (MULTIVITAMIN PO) Take 1 tablet by mouth daily. Yes Historical Provider, MD   Icosapent Ethyl (VASCEPA) 1 g CAPS capsule Take 2 capsules by mouth 2 times daily 1/17/22   Rose Garcia MD   KLOR-CON M20 20 MEQ extended release tablet TAKE 1 TABLET BY MOUTH THREE TIMES A DAY 6/20/20   Masood Ramirez MD       Current medications:    Current Facility-Administered Medications   Medication Dose Route Frequency Provider Last Rate Last Admin    0.9 % sodium chloride infusion  25 mL IntraVENous PRN Jaiden Ravel, APRN - CNP        lactated ringers infusion   IntraVENous Continuous Jaiden Ravel, APRN -  mL/hr at 01/26/22 0638 New Bag at 01/26/22 0638    lidocaine PF 1 % injection 1 mL  1 mL IntraDERmal Once PRN Jaiden Ravel, APRN - CNP        sodium chloride flush 0.9 % injection 10 mL  10 mL IntraVENous 2 times per day Jaiden Ravel, APRN - CNP        sodium chloride flush 0.9 % injection 10 mL  10 mL IntraVENous PRN Jaiden Ravel, APRN - CNP        ceFAZolin (ANCEF) 2000 mg in dextrose 5 % 100 mL IVPB  2,000 mg IntraVENous Once Jaiden Ravel, APRN - CNP           Allergies:     Allergies   Allergen Reactions    Dye [Barium-Containing Compounds] Hives    Iodides Hives    Iodinated Diagnostic Agents Hives       Problem List:    Patient Active Problem List   Diagnosis Code    Arthritis, lumbar spine M47.816    CLL [facility]chronic lymphocytic leukemia) ([facility] C91.10    Lymphoma of gastrointestinal tract ([facility]) C85.93    Type 2 diabetes mellitus without complication, without long-term current use of insulin ([facility]) E11.9    Dyslipidemia E78.5    Essential hypertension I10    Cyst, epididymis N50.3    Hydrocele N43.3    Other closed fractures of distal end of radius (alone) S52.599A    Stiffness of joint, not elsewhere classified, forearm M25.639    Stiffness of joint, not elsewhere classified, hand M25.649    Varicocele I86.1    Pain in joint, hand M25.549    Morbidly obese ([facility]) E66.01    Acute idiopathic gout of right foot M10.071    Chronic kidney disease N18.9    History of colon polyps Z86.010    Bilateral carpal tunnel syndrome G56.03    Left carpal tunnel syndrome G56.02       Past Medical History:        Diagnosis Date    Acute idiopathic gout of right foot     Anemia 03/01/2018    iron transfusions x2    Arthritis     both knees    Bilateral carpal tunnel syndrome 1/12/2022    Chronic kidney disease     CLL (chronic lymphocytic leukemia) ([facility] [address]     dx 5/2015    Colon polyps     Disease of blood and blood forming organ     Hyperlipidemia     dx since 1970's    Hypertension     meds  since 1970's    Lymphoma of gastrointestinal tract (HCC)     Movement disorder     Type II or unspecified type diabetes mellitus without mention of complication, not stated as uncontrolled     hx > 20 yrs       Past Surgical History:        Procedure Laterality Date    APPENDECTOMY      age 27s   [facility] CHOLECYSTECTOMY N/A 07/07/2016    COLONOSCOPY  4/1/16    w/polypectomy     COLONOSCOPY N/A 6/15/2021    COLORECTAL CANCER SCREENING, HIGH RISK performed by Samuel Owens MD at [address] [place] [address], [place], DIAGNOSTIC      JOINT REPLACEMENT Bilateral 2018    TKR    OTHER SURGICAL HISTORY Right 06/08/16    I & D ABSCESS THIGH    [place] MANIPULATN KNEE JT+ANESTHESIA Right 3/15/2018    RIGHT KNEE MANIPULATION performed by Caron Schlatter, MD at [address] [place] [address]. Right 1/18/2018    RIGHT KNEE TOTAL KNEE ARTHROPLASTY ELÍAS Mitesh performed by Caron Schlatter, MD at [address] [place] [address]. Left 5/17/2018    LEFT KNEE TOTAL KNEE ARTHROPLASTY, performed by Caron Schlatter, MD at [facility] Right [facility]    due to displacement    UPPER GASTROINTESTINAL ENDOSCOPY  4/29/15    w/bx     UPPER GASTROINTESTINAL ENDOSCOPY  02/28/2018    Dr. Kiana Javed       Social History:    Social History     Tobacco Use    Smoking status: Never Smoker    Smokeless tobacco: Never Used   Substance Use Topics    Alcohol use: Yes     Comment: rare social                                Counseling given: Not Answered      Vital Signs (Current):   Vitals:    01/26/22 0618   BP: 121/64   Pulse: 78   Resp: 16   Temp: 96.9 °F (36.1 °C)   TempSrc: Temporal   SpO2: 96%   Weight: 226 lb (102.5 kg)   Height: 5' 6\" (1.676 m)                                              BP Readings from Last 3 Encounters:   01/26/22 121/64   01/21/22 129/67   01/17/22 107/66       NPO Status: Time of last liquid consumption: 2300                        Time of last solid consumption: 2000                        Date of last liquid consumption: 01/25/22                        Date of last solid food consumption: 01/25/22    BMI:   Wt Readings from Last 3 Encounters:   01/26/22 226 lb (102.5 kg)   01/21/22 226 lb (102.5 kg)   01/17/22 228 lb (103.4 kg)     Body mass index is 36.48 kg/m². CBC:   Lab Results   Component Value Date    WBC 6.1 11/17/2021    RBC 3.92 11/17/2021    HGB 11.8 11/17/2021    HCT 36.3 11/17/2021    MCV 92.6 11/17/2021    RDW 15.4 11/17/2021     11/17/2021       CMP:   Lab Results   Component Value Date     01/17/2022    K 4.3 01/17/2022    K 4.3 05/18/2018     01/17/2022    CO2 25 01/17/2022    BUN 30 01/17/2022    CREATININE 1.56 01/17/2022    GFRAA 53.6 01/17/2022    LABGLOM 44.3 01/17/2022    GLUCOSE 119 01/17/2022    GLUCOSE 167 06/10/2021    PROT 8.4 01/17/2022    CALCIUM 9.2 01/17/2022    BILITOT 0.3 01/17/2022    ALKPHOS 96 01/17/2022    AST 16 01/17/2022    ALT 12 01/17/2022       POC Tests: No results for input(s): POCGLU, POCNA, POCK, POCCL, POCBUN, POCHEMO, POCHCT in the last 72 hours.     Coags:   Lab Results   Component Value Date    PROTIME 10.7 05/10/2018    INR 1.0 05/10/2018    APTT 24.3 03/14/2018       HCG (If Applicable): No results found for: PREGTESTUR, PREGSERUM, HCG, HCGQUANT     ABGs: No results found for: PHART, PO2ART, YFC7RBU, TKC0WNX, BEART, V9PLMZVO     Type & Screen (If Applicable):  No results found for: LABABO, LABRH    Drug/Infectious Status (If Applicable):  No results found for: HIV, HEPCAB    COVID-19 Screening (If Applicable):   Lab Results   Component Value Date    COVID19 Not Detected 01/21/2022           Anesthesia Evaluation  Patient summary reviewed and Nursing notes reviewed no history of anesthetic complications:   Airway: Mallampati: III  TM distance: >3 FB   Neck ROM: full  Mouth opening: > = 3 FB Dental: normal exam         Pulmonary:Negative Pulmonary ROS and normal exam                               Cardiovascular:  Exercise tolerance: good (>4 METS),   (+) hypertension:, hyperlipidemia      ECG reviewed               Beta Blocker:  Dose within 24 Hrs         Neuro/Psych:   Negative Neuro/Psych ROS              GI/Hepatic/Renal: Neg GI/Hepatic/Renal ROS  (+) renal disease: CRI, morbid obesity          Endo/Other: Negative Endo/Other ROS   (+) DiabetesType II DM, , .          Pt had PAT visit. Abdominal:             Vascular: negative vascular ROS. Other Findings:             Anesthesia Plan      MAC and Eliceo block     ASA 3       Induction: intravenous. MIPS: Postoperative opioids intended and Prophylactic antiemetics administered. Anesthetic plan and risks discussed with patient. Plan discussed with CRNA.     Attending anesthesiologist reviewed and agrees with Pre Eval content              Cony Cortes MD   1/26/2022

## 2022-01-30 DIAGNOSIS — I10 ESSENTIAL HYPERTENSION: ICD-10-CM

## 2022-01-30 RX ORDER — POTASSIUM CHLORIDE 20 MEQ/1
TABLET, EXTENDED RELEASE ORAL
Qty: 180 TABLET | Refills: 3 | Status: SHIPPED | OUTPATIENT
Start: 2022-01-30 | End: 2022-06-13 | Stop reason: SDUPTHER

## 2022-02-09 ENCOUNTER — OFFICE VISIT (OUTPATIENT)
Dept: ORTHOPEDIC SURGERY | Age: 72
End: 2022-02-09
Payer: MEDICARE

## 2022-02-09 VITALS
OXYGEN SATURATION: 100 % | BODY MASS INDEX: 36.16 KG/M2 | HEART RATE: 71 BPM | TEMPERATURE: 98.1 F | HEIGHT: 66 IN | WEIGHT: 225 LBS

## 2022-02-09 DIAGNOSIS — G56.03 BILATERAL CARPAL TUNNEL SYNDROME: Primary | ICD-10-CM

## 2022-02-09 PROCEDURE — 3017F COLORECTAL CA SCREEN DOC REV: CPT | Performed by: ORTHOPAEDIC SURGERY

## 2022-02-09 PROCEDURE — 99214 OFFICE O/P EST MOD 30 MIN: CPT | Performed by: ORTHOPAEDIC SURGERY

## 2022-02-09 PROCEDURE — G8484 FLU IMMUNIZE NO ADMIN: HCPCS | Performed by: ORTHOPAEDIC SURGERY

## 2022-02-09 PROCEDURE — 1036F TOBACCO NON-USER: CPT | Performed by: ORTHOPAEDIC SURGERY

## 2022-02-09 PROCEDURE — 4040F PNEUMOC VAC/ADMIN/RCVD: CPT | Performed by: ORTHOPAEDIC SURGERY

## 2022-02-09 PROCEDURE — G8427 DOCREV CUR MEDS BY ELIG CLIN: HCPCS | Performed by: ORTHOPAEDIC SURGERY

## 2022-02-09 PROCEDURE — G8417 CALC BMI ABV UP PARAM F/U: HCPCS | Performed by: ORTHOPAEDIC SURGERY

## 2022-02-09 PROCEDURE — 1123F ACP DISCUSS/DSCN MKR DOCD: CPT | Performed by: ORTHOPAEDIC SURGERY

## 2022-02-09 NOTE — PROGRESS NOTES
Subjective:      Patient ID: Kassi Guerra is a 73 y.o. male who presents today for:  Chief Complaint   Patient presents with    Post-Op Check     Carpal tunnel release; left       HPI    Patient present status post left carpal tunnel release. The patient is doing wonderful and that. He says after 4 days it felt great and he felt possibly wants to have the other side done he has a scope procedure done on March 1 and like to do it shortly after that if possible. He is comfortable with the plan    Because we are evaluating his right side which is not the global today have reevaluated EMG nerve conduction study of gone over that again independently. He has the findings on the right as well and he has the symptomatology still.     Past Medical History:   Diagnosis Date    Acute idiopathic gout of right foot     Anemia 03/01/2018    iron transfusions x2    Arthritis     both knees    Bilateral carpal tunnel syndrome 1/12/2022    Chronic kidney disease     CLL (chronic lymphocytic leukemia) (HCC)     dx 5/2015    Colon polyps     Disease of blood and blood forming organ     Hyperlipidemia     dx since 1970's    Hypertension     meds  since 1970's    Lymphoma of gastrointestinal tract (HCC)     Movement disorder     Type II or unspecified type diabetes mellitus without mention of complication, not stated as uncontrolled     hx > 20 yrs     Past Surgical History:   Procedure Laterality Date    APPENDECTOMY      age 27s   [address] Left 1/26/2022    LEFT CARPAL TUNNEL RELEASE performed by Mary Kay Escalante MD at [place] [address] 07/07/2016    COLONOSCOPY  4/1/16    w/polypectomy     COLONOSCOPY N/A 6/15/2021    COLORECTAL CANCER SCREENING, HIGH RISK performed by Paige Sellers MD at [facility], COLON, DIAGNOSTIC      JOINT REPLACEMENT Bilateral 2018    TKR    OTHER SURGICAL HISTORY Right 06/08/16    I & D ABSCESS THIGH    [place] JOH[place]ON KNEE JT+ANESTHESIA Right 3/15/2018    RIGHT KNEE MANIPULATION performed by Sharmin Arevalo MD at [address] [facility] Right 1/18/2018    RIGHT KNEE TOTAL KNEE ARTHROPLASTY ELÍAS SPINAL,NERVE BLOCK performed by Sharmin Arevalo MD at [address] [facility] [address] Left 5/17/2018    LEFT KNEE TOTAL KNEE ARTHROPLASTY, performed by Sharmin Arevalo MD at [address] [facility] [address] Right 1979    due to displacement    UPPER GASTROINTESTINAL ENDOSCOPY  4/29/15    w/rufino nj    UPPER GASTROINTESTINAL ENDOSCOPY  02/28/2018    Dr. Ramón Murphy History     Socioeconomic History    Marital status:      Spouse name: Not on file    Number of children: Not on file    Years of education: Not on file    Highest education level: Not on file   Occupational History    Occupation: retired   Tobacco Use    Smoking status: Never Smoker    Smokeless tobacco: Never Used   Vaping Use    Vaping Use: Never used   Substance and Sexual Activity    Alcohol use: Yes     Comment: rare social    Drug use: No    Sexual activity: Yes     Partners: Female   Other Topics Concern    Not on file   Social History Narrative    Lives w wife     Social Determinants of Health     Financial Resource Strain: Low Risk     Difficulty of Paying Living Expenses: Not hard at all   Food Insecurity: No Food Insecurity    Worried About [facility] [facility] in the Last Year: Never true    [facility] in the Last Year: Never true   Transportation Needs: No Transportation Needs    Lack of Transportation (Medical): No    Lack of Transportation (Non-Medical):  No   Physical Activity:     Days of Exercise per Week: Not on file    Minutes of Exercise per Session: Not on file   Stress:     Feeling of Stress : Not on file   Social Connections:     Frequency of Communication with Friends and Family: Not on file    Frequency of Social Gatherings with Friends and Family: Not on file    Attends Quaker Services: Not on file    Active Member of Clubs or Organizations: Not on file    Attends Club or Organization Meetings: Not on file    Marital Status: Not on file   Intimate Partner Violence:     Fear of Current or Ex-Partner: Not on file    Emotionally Abused: Not on file    Physically Abused: Not on file    Sexually Abused: Not on file   Housing Stability:     Unable to Pay for Housing in the Last Year: Not on file    Number of [place] in the Last Year: Not on file    Unstable Housing in the Last Year: Not on file     Family History   Problem Relation Age of Onset    Heart Disease Mother 67        CHF    Cancer Father 68        liver/ pancreatic    No Known Problems Sister     Cancer Brother         CLL    Arthritis Brother         Bilateral TKR    No Known Problems Daughter     Colon Cancer Neg Hx      Allergies   Allergen Reactions    Dye [Barium-Containing Compounds] Hives    Iodides Hives    Iodinated Diagnostic Agents Hives     Current Outpatient Medications on File Prior to Visit   Medication Sig Dispense Refill    potassium chloride (KLOR-CON M20) 20 MEQ extended release tablet 1 tab po bid.  (CALL ME IF YOU ARE TAKING A DIFFERENT DOSE) 180 tablet 3    allopurinol (ZYLOPRIM) 100 MG tablet Take 2 tablets by mouth daily 180 tablet 0    colchicine (COLCRYS) 0.6 MG tablet Take 1 tablet by mouth every other day 45 tablet 0    Magnesium Oxide 200 MG TABS Take one tablet po once daily 90 tablet 1    doxepin (SINEQUAN) 10 MG capsule TAKE 1 CAPSULE BY MOUTH EVERY DAY AT NIGHT 90 capsule 0    atorvastatin (LIPITOR) 40 MG tablet TAKE 1 TABLET BY MOUTH EVERY EVENING (Patient taking differently: 20 mg TAKE 1 TABLET BY MOUTH EVERY EVENING (Take 1/2 tablet po once daily while on colchicine)) 90 tablet 3    loratadine (CLARITIN) 10 MG tablet Take 1 tablet by mouth daily as needed (allergies) 90 tablet 3    furosemide (LASIX) 40 MG tablet TAKE 1 TABLET DAILY 90 tablet 1    metoprolol succinate (TOPROL XL) 50 MG extended release tablet Take 1 tablet by mouth daily 90 tablet 3    benazepril (LOTENSIN) 20 MG tablet TAKE 1 TABLET BY MOUTH EVERY DAY 90 tablet 3    metFORMIN (GLUCOPHAGE) 500 MG tablet TAKE 2 TABLETS BY MOUTH TWICE A DAY WITH MEALAS 360 tablet 3    Multiple Vitamins-Minerals (MULTIVITAMIN PO) Take 1 tablet by mouth daily.  aspirin 81 MG EC tablet Take 81 mg by mouth daily (Patient not taking: Reported on 2/9/2022)      [DISCONTINUED] KLOR-CON M20 20 MEQ extended release tablet TAKE 1 TABLET BY MOUTH THREE TIMES A  tablet 1     No current facility-administered medications on file prior to visit. Review of Systems  No fever chills night sweats. Objective:   Pulse 71   Temp 98.1 °F (36.7 °C) (Temporal)   Ht 5' 6\" (1.676 m)   Wt 225 lb (102.1 kg)   SpO2 100%   BMI 36.32 kg/m²     ORTHOEXAM    On the left the patient has well-healed incision sutures move Steri-Strips were applied he has full sensation median distribution on the right he still has decreased sensation with a positive Phalen's test on the right. That is the nonoperative side there is no scars noted on his hand. Assessment:       Diagnosis Orders   1. Bilateral carpal tunnel syndrome           Plan:   Patient like to proceed with surgery on the right once again after March 1 and therefore arrange it for him. This will serve as her preoperative valuation and discussion he has no further questions about that surgery we have gone over the risks and benefits and nearly do such. Because were done discussing the right side today this visit will not follow under the global of the left carpal tunnel release which is doing wonderfully. Surgery will be done on March 9 under Jane block anesthetic on the right. No orders of the defined types were placed in this encounter. No orders of the defined types were placed in this encounter. No follow-ups on file.       Rangel GOMEZ Sammie Mccullough MD        Surgery Phone: [phone]   [facility]   Surgery Fax: [phone]    Phone: [phone]          Fax: [phone]    Orthopedics: Surgery Scheduling, PAT & PRE-OP Order Form  Call to advance [place] at [phone] at least 24 hours prior to date of service     Surgery Location: [facility] Surgery: [address], [address] [facility]  Umu Cortez MD Surgery Date: 3/9  Time: tf   Patient's Name: Clarita Donahue : 1950    Gender: male   Home Phone:  [phone] Cell Phone: [phone]    #:  [id number]  Emergency Contact:  Melody Mac   Phone: [phone]  Payor: Pam Servin /  /  /    ID No.: [id number]      PROVIDER TO COMPLETE:  Diagnosis: Right carpal tunnel syndrome   procedure/Consent: Carpal tunnel release  CPT Codes: 62684  Case Comments/Implants: N/A   Surgery Scheduled as:  Outpatient  Anesthesia Requested: Avis Innocent  Referring Family Doctor: Elmer Esquivel MD   PAT  [x] [facility] PAT Date/Time:                                                            [x] History & Physical [] Physician will Provide [] Attached [] Dictated [] Other  [x] Follow Anesthesia Pre-Op Orders for X-rays, [facility]     [x] SN & PT to evaluate and treat/educate disease management, medications, home safety & equipment needs for total joint patients  [] Other: ____________________________________________________  Consults: Medical/Cardiac Clearance done by  ____________________  PRE-OP ORDERS:   Allergies: Dye [barium-containing compounds], Iodides, and Iodinated diagnostic agents Latex Allergies:             Diabetic:           [] IV ________________________  [x] IV Start with J-loop     Preprinted Orders: Attached [] Yes [] No   ANTIBIOTIC PRE-OP: [x] ANCEF 2 gram IVPB if > 120 kg 3 grams IVPB within 1 hour of incision, if ALLERGIC, use VANCOMYCIN 1 gram IV, 2 hours pre-op  [] TXA Protocol [] Other:   [x] NPO   [] Betablocker (if needed) _____________________________________   [] Knee high anti-embolic hose [] Thigh high anti-embolic hose   Other: ______________________________________________________    Physician Signature Required: Electronically signed by Loulou Villalobos MD on 2/9/2022 at 3:22 PM   Date/Time: 2/9/2022

## 2022-02-10 RX ORDER — FUROSEMIDE 40 MG/1
TABLET ORAL
Qty: 90 TABLET | Refills: 3 | Status: SHIPPED | OUTPATIENT
Start: 2022-02-10

## 2022-02-10 NOTE — TELEPHONE ENCOUNTER
requesting medication refill.      Rx requested:  Requested Prescriptions     Pending Prescriptions Disp Refills    furosemide (LASIX) 40 MG tablet [Pharmacy Med Name: FUROSEMIDE 40 MG TABLET] 90 tablet 1     Sig: TAKE 1 TABLET BY MOUTH EVERY DAY       Last Office Visit:   1/17/2022    Last Tox screen:        Last Medication contract:        Next Visit Date:  Future Appointments   Date Time Provider Vincenzo Contreras   3/2/2022  1:45 PM Amber Gipson PA-C [facility]   3/23/2022  1:15 PM Amber Gipson PA-C [facility]   5/17/2022 10:00 AM Rosa Elena Jaramillo MD [address] [facility]

## 2022-02-25 DIAGNOSIS — F51.04 PSYCHOPHYSIOLOGICAL INSOMNIA: ICD-10-CM

## 2022-02-25 RX ORDER — DOXEPIN HYDROCHLORIDE 10 MG/1
CAPSULE ORAL
Qty: 90 CAPSULE | Refills: 3 | Status: SHIPPED | OUTPATIENT
Start: 2022-02-25

## 2022-02-28 RX ORDER — BENAZEPRIL HYDROCHLORIDE 20 MG/1
TABLET ORAL
Qty: 90 TABLET | Refills: 3 | Status: SHIPPED | OUTPATIENT
Start: 2022-02-28

## 2022-03-02 ENCOUNTER — OFFICE VISIT (OUTPATIENT)
Dept: ORTHOPEDIC SURGERY | Age: 72
End: 2022-03-02
Payer: MEDICARE

## 2022-03-02 VITALS
WEIGHT: 233.4 LBS | SYSTOLIC BLOOD PRESSURE: 120 MMHG | HEIGHT: 66 IN | DIASTOLIC BLOOD PRESSURE: 83 MMHG | HEART RATE: 76 BPM | TEMPERATURE: 97.2 F | BODY MASS INDEX: 37.51 KG/M2 | OXYGEN SATURATION: 95 %

## 2022-03-02 DIAGNOSIS — Z01.818 PRE-OP EXAM: ICD-10-CM

## 2022-03-02 DIAGNOSIS — Z01.818 PRE-OP EXAM: Primary | ICD-10-CM

## 2022-03-02 PROCEDURE — 99215 OFFICE O/P EST HI 40 MIN: CPT | Performed by: PHYSICIAN ASSISTANT

## 2022-03-02 PROCEDURE — G8417 CALC BMI ABV UP PARAM F/U: HCPCS | Performed by: PHYSICIAN ASSISTANT

## 2022-03-02 PROCEDURE — G8427 DOCREV CUR MEDS BY ELIG CLIN: HCPCS | Performed by: PHYSICIAN ASSISTANT

## 2022-03-02 PROCEDURE — G8484 FLU IMMUNIZE NO ADMIN: HCPCS | Performed by: PHYSICIAN ASSISTANT

## 2022-03-02 NOTE — PATIENT INSTRUCTIONS
-please walk over to our lab for your blood work, located right outside our office near the elevators    -please ensure that you have completed your COVID test within 7 days of surgery   -stop taking asprin and motrin until after your surgery. All blood thinners need to be stopped at least 5 days in advance prior to surgery. -our surgery department will reach out the you the day before your surgery and let you know what time to arrive at the [address] [place] [address] Road  -no eating / drinking the after midnight the night before surgery.  Sips of water the morning of for medications is OK  -if you have any questions, please call our office and I will be happy to answer them

## 2022-03-02 NOTE — PROGRESS NOTES
[facility]    H&P: Preadmission Testing     Patient: Soham Merida  YOB: 1950  MRN: [id number]    Subjective:     Chief Complaint   Patient presents with    Pre-op Exam     carpal tunnel right, surgery is 3/9 with Dr Arabella Mccann       HPI: Soham Merida is a 73 y.o. maleis here for preop evaluation for carpal tunnel release on the right side with Dr. Lisa Villa next week. He is referring surgeon. They have a pertinent history of CLL in remission, hypertension, diabetes    There is no known history of heart disease or infarction. There is no recent chest pain or discomfort, palpitations, shortness of breath, RUIZ, difficulties with exercise, bilateral ankle swelling. Not taking any blood thinners. There is no known history of obstructive or restrictive lung disease. No smoking. No recent wheezing or use of any kind of inhalers. No recent hospitalizations for any lung-related illnesses. No recent Covid infection. No known history of gastric issues which includes ulcers, herniations, bariatric surgeries. No recent GI bleeds    Notable history of coag will state where that he is a previous cancer patient. He is diabetic with good control. They have kidney dysfunction.      Past Medical History:        Diagnosis Date    Acute idiopathic gout of right foot     Anemia 03/01/2018    iron transfusions x2    Arthritis     both knees    Bilateral carpal tunnel syndrome 1/12/2022    Chronic kidney disease     CLL (chronic lymphocytic leukemia) ([facility] [address]     dx 5/2015    Colon polyps     Disease of blood and blood forming organ     Hyperlipidemia     dx since 1970's    Hypertension     meds  since 1970's    Lymphoma of gastrointestinal tract (HCC)     Movement disorder     Type II or unspecified type diabetes mellitus without mention of complication, not stated as uncontrolled     hx > 20 yrs     Past Surgical History:    Past Surgical History:   Procedure tablet po once daily 90 tablet 1    atorvastatin (LIPITOR) 40 MG tablet TAKE 1 TABLET BY MOUTH EVERY EVENING (Patient taking differently: 20 mg TAKE 1 TABLET BY MOUTH EVERY EVENING (Take 1/2 tablet po once daily while on colchicine)) 90 tablet 3    loratadine (CLARITIN) 10 MG tablet Take 1 tablet by mouth daily as needed (allergies) 90 tablet 3    metoprolol succinate (TOPROL XL) 50 MG extended release tablet Take 1 tablet by mouth daily 90 tablet 3    metFORMIN (GLUCOPHAGE) 500 MG tablet TAKE 2 TABLETS BY MOUTH TWICE A DAY WITH MEALAS 360 tablet 3    Multiple Vitamins-Minerals (MULTIVITAMIN PO) Take 1 tablet by mouth daily. No current facility-administered medications for this visit. Allergies:    Dye [barium-containing compounds], Iodides, and Iodinated diagnostic agents    Social History:   Social History     Socioeconomic History    Marital status:      Spouse name: Not on file    Number of children: Not on file    Years of education: Not on file    Highest education level: Not on file   Occupational History    Occupation: retired   Tobacco Use    Smoking status: Never Smoker    Smokeless tobacco: Never Used   Vaping Use    Vaping Use: Never used   Substance and Sexual Activity    Alcohol use: Yes     Comment: rare social    Drug use: No    Sexual activity: Yes     Partners: Female   Other Topics Concern    Not on file   Social History Narrative    Lives w wife     Social Determinants of Health     Financial Resource Strain: Low Risk     Difficulty of Paying Living Expenses: Not hard at all   Food Insecurity: No Food Insecurity    Worried About [address] in the Last Year: Never true    [facility] in the Last Year: Never true   Transportation Needs: No Transportation Needs    Lack of Transportation (Medical): No    Lack of Transportation (Non-Medical):  No   Physical Activity:     Days of Exercise per Week: Not on file    Minutes of Exercise per Session: Not on file   Stress:     Feeling of Stress : Not on file   Social Connections:     Frequency of Communication with Friends and Family: Not on file    Frequency of Social Gatherings with Friends and Family: Not on file    Attends Roman Catholic Services: Not on file    Active Member of Clubs or Organizations: Not on file    Attends Club or Organization Meetings: Not on file    Marital Status: Not on file   Intimate Partner Violence:     Fear of Current or Ex-Partner: Not on file    Emotionally Abused: Not on file    Physically Abused: Not on file    Sexually Abused: Not on file   Housing Stability:     Unable to Pay for Housing in the Last Year: Not on file    Number of [place] in the Last Year: Not on file    Unstable Housing in the Last Year: Not on file       Family History:       Problem Relation Age of Onset    Heart Disease Mother 67        CHF    Cancer Father 68        liver/ pancreatic    No Known Problems Sister     Cancer Brother         CLL    Arthritis Brother         Bilateral TKR    No Known Problems Daughter     Colon Cancer Neg Hx        Objective: There were no vitals taken for this visit. Physical Exam  Constitutional:       General: He is not in acute distress. Appearance: Normal appearance. He is not ill-appearing. HENT:      Head: Normocephalic. Nose: Nose normal. No congestion or rhinorrhea. Mouth/Throat:      Mouth: Mucous membranes are moist.      Pharynx: Oropharynx is clear. No oropharyngeal exudate or posterior oropharyngeal erythema. Eyes:      Extraocular Movements: Extraocular movements intact. Pupils: Pupils are equal, round, and reactive to light. Cardiovascular:      Rate and Rhythm: Normal rate and regular rhythm. Pulses: Normal pulses. Heart sounds: Normal heart sounds. Pulmonary:      Effort: Pulmonary effort is normal.      Breath sounds: Normal breath sounds. No wheezing, rhonchi or rales.    Abdominal:      General: Abdomen is flat. Bowel sounds are normal.      Palpations: Abdomen is soft. Tenderness: There is no abdominal tenderness. Skin:     General: Skin is warm and dry. Capillary Refill: Capillary refill takes less than 2 seconds. Comments: No swelling or erythema over the incision site   Neurological:      General: No focal deficit present. Mental Status: He is alert and oriented to person, place, and time. Radiographs and Laboratory Studies:   EKG:  NSR    Laboratory Studies:   Lab Results   Component Value Date    WBC 5.1 01/27/2022    HGB 12.0 (L) 01/27/2022    HCT 36.4 (L) 01/27/2022    MCV 93.2 01/27/2022     01/27/2022     Lab Results   Component Value Date    SEDRATE 62 (H) 01/17/2022     No results found for: CRP    Assessment and Plan:      Diagnosis Orders   1. Pre-op exam  Covid-19 Ambulatory       Patient was instructed to quarantine until the day of surgery after getting the COVID test.  Blood work and EKG was ordered and will be reviewed. I'll see them back 2 weeks postoperatively.     Joaquin Cantu PA-C  [facility]  [phone]

## 2022-03-03 LAB — SARS-COV-2, PCR: NOT DETECTED

## 2022-03-08 ENCOUNTER — ANESTHESIA EVENT (OUTPATIENT)
Dept: OPERATING ROOM | Age: 72
End: 2022-03-08
Payer: MEDICARE

## 2022-03-08 NOTE — ANESTHESIA PRE PROCEDURE
Department of Anesthesiology  Preprocedure Note       Name:  Macy Olivier   Age:  73 y.o.  :  1950                                          MRN:  [id number]         Date:  3/8/2022      Surgeon: Ish Hamilton):  Susi Adams MD    Procedure: Procedure(s):  CARPAL TUNNEL RELEASE RIGHT, PAT WITH SOLO    Medications prior to admission:   Prior to Admission medications    Medication Sig Start Date End Date Taking? Authorizing Provider   benazepril (LOTENSIN) 20 MG tablet TAKE 1 TABLET BY MOUTH EVERY DAY 22   Shayy Beckett MD   doxepin (SINEQUAN) 10 MG capsule TAKE 1 CAPSULE BY MOUTH EVERY NIGHT 22   Shayy Beckett MD   furosemide (LASIX) 40 MG tablet TAKE 1 TABLET BY MOUTH EVERY DAY 2/10/22   Shayy Beckett MD   potassium chloride (KLOR-CON M20) 20 MEQ extended release tablet 1 tab po bid.  (CALL ME IF YOU ARE TAKING A DIFFERENT DOSE) 22   Shayy Beckett MD   aspirin 81 MG EC tablet Take 81 mg by mouth daily  Patient not taking: Reported on 2022    Historical Provider, MD   allopurinol (ZYLOPRIM) 100 MG tablet Take 2 tablets by mouth daily 22   Shayy Beckett MD   colchicine (COLCRYS) 0.6 MG tablet Take 1 tablet by mouth every other day 22   Shayy Beckett MD   Magnesium Oxide 200 MG TABS Take one tablet po once daily 22   Shayy Beckett MD   atorvastatin (LIPITOR) 40 MG tablet TAKE 1 TABLET BY MOUTH EVERY EVENING  Patient taking differently: 20 mg TAKE 1 TABLET BY MOUTH EVERY EVENING (Take 1/2 tablet po once daily while on colchicine) 21   Shayy Beckett MD   loratadine (CLARITIN) 10 MG tablet Take 1 tablet by mouth daily as needed (allergies) 21   Shayy Beckett MD   metoprolol succinate (TOPROL XL) 50 MG extended release tablet Take 1 tablet by mouth daily 21   Nam Rivera MD   metFORMIN (GLUCOPHAGE) 500 MG tablet TAKE 2 TABLETS BY MOUTH TWICE A DAY WITH MEALAS 21   Nam Rivera MD   KLOR-CON M20 20 MEQ extended release tablet TAKE 1 TABLET BY MOUTH THREE TIMES A DAY 6/20/20   Cande Ledezma MD   Multiple Vitamins-Minerals (MULTIVITAMIN PO) Take 1 tablet by mouth daily. Historical Provider, MD       Current medications:    Current Outpatient Medications   Medication Sig Dispense Refill    benazepril (LOTENSIN) 20 MG tablet TAKE 1 TABLET BY MOUTH EVERY DAY 90 tablet 3    doxepin (SINEQUAN) 10 MG capsule TAKE 1 CAPSULE BY MOUTH EVERY NIGHT 90 capsule 3    furosemide (LASIX) 40 MG tablet TAKE 1 TABLET BY MOUTH EVERY DAY 90 tablet 3    potassium chloride (KLOR-CON M20) 20 MEQ extended release tablet 1 tab po bid. (CALL ME IF YOU ARE TAKING A DIFFERENT DOSE) 180 tablet 3    aspirin 81 MG EC tablet Take 81 mg by mouth daily (Patient not taking: Reported on 2/9/2022)      allopurinol (ZYLOPRIM) 100 MG tablet Take 2 tablets by mouth daily 180 tablet 0    colchicine (COLCRYS) 0.6 MG tablet Take 1 tablet by mouth every other day 45 tablet 0    Magnesium Oxide 200 MG TABS Take one tablet po once daily 90 tablet 1    atorvastatin (LIPITOR) 40 MG tablet TAKE 1 TABLET BY MOUTH EVERY EVENING (Patient taking differently: 20 mg TAKE 1 TABLET BY MOUTH EVERY EVENING (Take 1/2 tablet po once daily while on colchicine)) 90 tablet 3    loratadine (CLARITIN) 10 MG tablet Take 1 tablet by mouth daily as needed (allergies) 90 tablet 3    metoprolol succinate (TOPROL XL) 50 MG extended release tablet Take 1 tablet by mouth daily 90 tablet 3    metFORMIN (GLUCOPHAGE) 500 MG tablet TAKE 2 TABLETS BY MOUTH TWICE A DAY WITH MEALAS 360 tablet 3    Multiple Vitamins-Minerals (MULTIVITAMIN PO) Take 1 tablet by mouth daily. No current facility-administered medications for this visit. 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    Type 2 diabetes mellitus without complication, without long-term current use of insulin (HCC) E11.9    Dyslipidemia E78.5    Essential hypertension I10    Cyst, epididymis N50.3    Hydrocele N43.3    Other closed fractures of distal end of radius (alone) S52.599A    Stiffness of joint, not elsewhere classified, forearm M25.639    Stiffness of joint, not elsewhere classified, hand M25.649    Varicocele I86.1    Pain in joint, hand M25.549    Morbidly obese (HCC) E66.01    Acute idiopathic gout of right foot M10.071    Chronic kidney disease N18.9    History of colon polyps Z86.010    Bilateral carpal tunnel syndrome G56.03    Left carpal tunnel syndrome G56.02       Past Medical History:        Diagnosis Date    Acute idiopathic gout of right foot     Anemia 03/01/2018    iron transfusions x2    Arthritis     both knees    Bilateral carpal tunnel syndrome 1/12/2022    Chronic kidney disease     CLL (chronic lymphocytic leukemia) ([facility] [address]     dx 5/2015    Colon polyps     Disease of blood and blood forming organ     Hyperlipidemia     dx since 1970's    Hypertension     meds  since 1970's    Lymphoma of gastrointestinal tract ([facility] [address]     Movement disorder     Type II or unspecified type diabetes mellitus without mention of complication, not stated as uncontrolled     hx > 20 yrs       Past Surgical History:        Procedure Laterality Date    APPENDECTOMY      age 27s   [place] CARPAL TUNNEL RELEASE Left 1/26/2022    LEFT CARPAL TUNNEL RELEASE performed by Obdulia Go MD at [address] [place] [address] N/A 07/07/2016    COLONOSCOPY  4/1/16    w/polypectomy     COLONOSCOPY N/A 6/15/2021    COLORECTAL CANCER SCREENING, HIGH RISK performed by Nusrat Armijo MD at [facility], COLON, DIAGNOSTIC      JOINT REPLACEMENT Bilateral 2018    TKR    OTHER SURGICAL HISTORY Right 06/08/16    I & D ABSCESS THIGH    [place] MANIPULATN KNEE JT+ANESTHESIA Right 3/15/2018    RIGHT KNEE MANIPULATION performed by Nazanin Kathleen MD at [facility]. Right 1/18/2018    RIGHT KNEE TOTAL KNEE ARTHROPLASTY ELÍAS [place] performed by Nazanin Kathleen MD at [facility]. Left 5/17/2018    LEFT KNEE TOTAL KNEE ARTHROPLASTY, performed by [facility]anin [facility] MD at [facility]    due to displacement    UPPER GASTROINTESTINAL ENDOSCOPY  4/29/15    w/bx     UPPER GASTROINTESTINAL ENDOSCOPY  02/28/2018    Dr. Zara Hwang       Social History:    Social History     Tobacco Use    Smoking status: Never Smoker    Smokeless tobacco: Never Used   Substance Use Topics    Alcohol use: Yes     Comment: rare social                                Counseling given: Not Answered      Vital Signs (Current): There were no vitals filed for this visit.                                            BP Readings from Last 3 Encounters:   03/02/22 120/83   01/26/22 (!) 101/55   01/26/22 (!) 105/56       NPO Status:                                                                                 BMI:   Wt Readings from Last 3 Encounters:   03/02/22 233 lb 6.4 oz (105.9 kg)   02/09/22 225 lb (102.1 kg)   01/26/22 226 lb (102.5 kg)     There is no height or weight on file to calculate BMI.    CBC:   Lab Results   Component Value Date    WBC 5.1 01/27/2022    RBC 3.90 01/27/2022    HGB 12.0 01/27/2022    HCT 36.4 01/27/2022    MCV 93.2 01/27/2022    RDW 14.6 01/27/2022     01/27/2022       CMP:   Lab Results   Component Value Date     02/07/2022    K 4.1 02/07/2022    K 4.3 05/18/2018    CL 97 02/07/2022    CO2 24 02/07/2022    BUN 28 02/07/2022    CREATININE 1.73 02/07/2022    GFRAA 47.6 02/07/2022    LABGLOM 39.3 02/07/2022    GLUCOSE 133 02/07/2022    GLUCOSE 167 06/10/2021    PROT 7.9 02/07/2022 CALCIUM 8.9 02/07/2022    BILITOT 0.4 02/07/2022    ALKPHOS 106 02/07/2022    AST 16 02/07/2022    ALT 16 02/07/2022       POC Tests: No results for input(s): POCGLU, POCNA, POCK, POCCL, POCBUN, POCHEMO, POCHCT in the last 72 hours. Coags:   Lab Results   Component Value Date    PROTIME 10.7 05/10/2018    INR 1.0 05/10/2018    APTT 24.3 03/14/2018       HCG (If Applicable): No results found for: PREGTESTUR, PREGSERUM, HCG, HCGQUANT     ABGs: No results found for: PHART, PO2ART, XEC9GHD, SGZ6TCF, BEART, L8UOEYWE     Type & Screen (If Applicable):  No results found for: LABABO, LABRH    Drug/Infectious Status (If Applicable):  No results found for: HIV, HEPCAB    COVID-19 Screening (If Applicable):   Lab Results   Component Value Date    COVID19 Not Detected 03/02/2022           Anesthesia Evaluation  Patient summary reviewed and Nursing notes reviewed no history of anesthetic complications:   Airway: Mallampati: III  TM distance: >3 FB   Neck ROM: full  Mouth opening: > = 3 FB Dental: normal exam         Pulmonary:Negative Pulmonary ROS and normal exam                               Cardiovascular:  Exercise tolerance: good (>4 METS),   (+) hypertension:, hyperlipidemia      ECG reviewed               Beta Blocker:  Dose within 24 Hrs         Neuro/Psych:   Negative Neuro/Psych ROS              GI/Hepatic/Renal: Neg GI/Hepatic/Renal ROS  (+) renal disease: CRI, morbid obesity          Endo/Other: Negative Endo/Other ROS   (+) DiabetesType II DM, , .          Pt had PAT visit. Abdominal:             Vascular: negative vascular ROS. Other Findings:               Anesthesia Plan      MAC and [place] block     ASA 3       Induction: intravenous. MIPS: Postoperative opioids intended and Prophylactic antiemetics administered. Anesthetic plan and risks discussed with patient.       Plan discussed with surgical team.    Attending anesthesiologist reviewed and agrees with Pre Eval content              Venu Keyes Hever Chandler MD   3/8/2022

## 2022-03-09 ENCOUNTER — HOSPITAL ENCOUNTER (OUTPATIENT)
Age: 72
Setting detail: OUTPATIENT SURGERY
Discharge: HOME | End: 2022-03-09
Attending: ORTHOPAEDIC SURGERY | Admitting: ORTHOPAEDIC SURGERY
Payer: MEDICARE

## 2022-03-09 ENCOUNTER — ANESTHESIA (OUTPATIENT)
Dept: OPERATING ROOM | Age: 72
End: 2022-03-09
Payer: MEDICARE

## 2022-03-09 VITALS — SYSTOLIC BLOOD PRESSURE: 139 MMHG | OXYGEN SATURATION: 99 % | DIASTOLIC BLOOD PRESSURE: 66 MMHG

## 2022-03-09 VITALS
WEIGHT: 226 LBS | OXYGEN SATURATION: 98 % | SYSTOLIC BLOOD PRESSURE: 126 MMHG | HEART RATE: 68 BPM | DIASTOLIC BLOOD PRESSURE: 68 MMHG | RESPIRATION RATE: 18 BRPM | TEMPERATURE: 96.9 F | HEIGHT: 66 IN | BODY MASS INDEX: 36.32 KG/M2

## 2022-03-09 DIAGNOSIS — G89.18 POST-OPERATIVE PAIN: Primary | ICD-10-CM

## 2022-03-09 LAB
CHP ED QC CHECK: NORMAL
GLUCOSE BLD-MCNC: 116 MG/DL
GLUCOSE BLD-MCNC: 116 MG/DL (ref 70–99)
HCT VFR BLD CALC: 34.4 % (ref 42–52)
HEMOGLOBIN: 11.6 G/DL (ref 14–18)
MCH RBC QN AUTO: 30.5 PG (ref 27–31.3)
MCHC RBC AUTO-ENTMCNC: 33.7 % (ref 33–37)
MCV RBC AUTO: 90.5 FL (ref 80–100)
PDW BLD-RTO: 15.2 % (ref 11.5–14.5)
PERFORMED ON: ABNORMAL
PLATELET # BLD: 197 K/UL (ref 130–400)
RBC # BLD: 3.8 M/UL (ref 4.7–6.1)
WBC # BLD: 4.9 K/UL (ref 4.8–10.8)

## 2022-03-09 PROCEDURE — 64721 CARPAL TUNNEL SURGERY: CPT | Performed by: ORTHOPAEDIC SURGERY

## 2022-03-09 PROCEDURE — 2580000003 HC RX 258

## 2022-03-09 PROCEDURE — A4217 STERILE WATER/SALINE, 500 ML: HCPCS | Performed by: ORTHOPAEDIC SURGERY

## 2022-03-09 PROCEDURE — 2580000003 HC RX 258: Performed by: ORTHOPAEDIC SURGERY

## 2022-03-09 PROCEDURE — 6360000002 HC RX W HCPCS: Performed by: ORTHOPAEDIC SURGERY

## 2022-03-09 PROCEDURE — 3600000013 HC SURGERY LEVEL 3 ADDTL 15MIN: Performed by: ORTHOPAEDIC SURGERY

## 2022-03-09 PROCEDURE — 85027 COMPLETE CBC AUTOMATED: CPT

## 2022-03-09 PROCEDURE — 3700000000 HC ANESTHESIA ATTENDED CARE: Performed by: ORTHOPAEDIC SURGERY

## 2022-03-09 PROCEDURE — 3600000003 HC SURGERY LEVEL 3 BASE: Performed by: ORTHOPAEDIC SURGERY

## 2022-03-09 PROCEDURE — 2500000003 HC RX 250 WO HCPCS: Performed by: ANESTHESIOLOGY

## 2022-03-09 PROCEDURE — 7100000010 HC PHASE II RECOVERY - FIRST 15 MIN: Performed by: ORTHOPAEDIC SURGERY

## 2022-03-09 PROCEDURE — 2709999900 HC NON-CHARGEABLE SUPPLY: Performed by: ORTHOPAEDIC SURGERY

## 2022-03-09 PROCEDURE — 3700000001 HC ADD 15 MINUTES (ANESTHESIA): Performed by: ORTHOPAEDIC SURGERY

## 2022-03-09 PROCEDURE — 2500000003 HC RX 250 WO HCPCS: Performed by: ORTHOPAEDIC SURGERY

## 2022-03-09 PROCEDURE — 6360000002 HC RX W HCPCS: Performed by: ANESTHESIOLOGY

## 2022-03-09 RX ORDER — METOCLOPRAMIDE HYDROCHLORIDE 5 MG/ML
10 INJECTION INTRAMUSCULAR; INTRAVENOUS
Status: DISCONTINUED | OUTPATIENT
Start: 2022-03-09 | End: 2022-03-09 | Stop reason: HOSPADM

## 2022-03-09 RX ORDER — LIDOCAINE HYDROCHLORIDE 5 MG/ML
INJECTION, SOLUTION INFILTRATION; INTRAVENOUS PRN
Status: DISCONTINUED | OUTPATIENT
Start: 2022-03-09 | End: 2022-03-09 | Stop reason: SDUPTHER

## 2022-03-09 RX ORDER — MORPHINE SULFATE 4 MG/ML
4 INJECTION, SOLUTION INTRAMUSCULAR; INTRAVENOUS
Status: CANCELLED | OUTPATIENT
Start: 2022-03-09

## 2022-03-09 RX ORDER — SODIUM CHLORIDE 0.9 % (FLUSH) 0.9 %
5-40 SYRINGE (ML) INJECTION EVERY 12 HOURS SCHEDULED
Status: DISCONTINUED | OUTPATIENT
Start: 2022-03-09 | End: 2022-03-09 | Stop reason: HOSPADM

## 2022-03-09 RX ORDER — ONDANSETRON 2 MG/ML
4 INJECTION INTRAMUSCULAR; INTRAVENOUS
Status: DISCONTINUED | OUTPATIENT
Start: 2022-03-09 | End: 2022-03-09 | Stop reason: HOSPADM

## 2022-03-09 RX ORDER — MAGNESIUM HYDROXIDE 1200 MG/15ML
LIQUID ORAL CONTINUOUS PRN
Status: COMPLETED | OUTPATIENT
Start: 2022-03-09 | End: 2022-03-09

## 2022-03-09 RX ORDER — HYDROCODONE BITARTRATE AND ACETAMINOPHEN 5; 325 MG/1; MG/1
1 TABLET ORAL EVERY 4 HOURS PRN
Qty: 12 TABLET | Refills: 0 | Status: SHIPPED | OUTPATIENT
Start: 2022-03-09 | End: 2022-03-12

## 2022-03-09 RX ORDER — OXYCODONE HYDROCHLORIDE 5 MG/1
5 TABLET ORAL EVERY 4 HOURS PRN
Status: CANCELLED | OUTPATIENT
Start: 2022-03-09

## 2022-03-09 RX ORDER — SODIUM CHLORIDE 0.9 % (FLUSH) 0.9 %
5-40 SYRINGE (ML) INJECTION PRN
Status: DISCONTINUED | OUTPATIENT
Start: 2022-03-09 | End: 2022-03-09 | Stop reason: HOSPADM

## 2022-03-09 RX ORDER — SODIUM CHLORIDE 0.9 % (FLUSH) 0.9 %
10 SYRINGE (ML) INJECTION PRN
Status: CANCELLED | OUTPATIENT
Start: 2022-03-09

## 2022-03-09 RX ORDER — BUPIVACAINE HYDROCHLORIDE 5 MG/ML
INJECTION, SOLUTION EPIDURAL; INTRACAUDAL PRN
Status: DISCONTINUED | OUTPATIENT
Start: 2022-03-09 | End: 2022-03-09 | Stop reason: ALTCHOICE

## 2022-03-09 RX ORDER — SODIUM CHLORIDE 9 MG/ML
50 INJECTION, SOLUTION INTRAVENOUS CONTINUOUS
Status: CANCELLED | OUTPATIENT
Start: 2022-03-09 | End: 2022-03-09

## 2022-03-09 RX ORDER — SODIUM CHLORIDE, SODIUM LACTATE, POTASSIUM CHLORIDE, CALCIUM CHLORIDE 600; 310; 30; 20 MG/100ML; MG/100ML; MG/100ML; MG/100ML
INJECTION, SOLUTION INTRAVENOUS CONTINUOUS
Status: DISCONTINUED | OUTPATIENT
Start: 2022-03-09 | End: 2022-03-09 | Stop reason: HOSPADM

## 2022-03-09 RX ORDER — LIDOCAINE HYDROCHLORIDE 10 MG/ML
1 INJECTION, SOLUTION EPIDURAL; INFILTRATION; INTRACAUDAL; PERINEURAL
Status: COMPLETED | OUTPATIENT
Start: 2022-03-09 | End: 2022-03-09

## 2022-03-09 RX ORDER — SODIUM CHLORIDE, SODIUM LACTATE, POTASSIUM CHLORIDE, CALCIUM CHLORIDE 600; 310; 30; 20 MG/100ML; MG/100ML; MG/100ML; MG/100ML
INJECTION, SOLUTION INTRAVENOUS
Status: COMPLETED
Start: 2022-03-09 | End: 2022-03-09

## 2022-03-09 RX ORDER — PROPOFOL 10 MG/ML
INJECTION, EMULSION INTRAVENOUS CONTINUOUS PRN
Status: DISCONTINUED | OUTPATIENT
Start: 2022-03-09 | End: 2022-03-09 | Stop reason: SDUPTHER

## 2022-03-09 RX ORDER — LABETALOL HYDROCHLORIDE 5 MG/ML
10 INJECTION, SOLUTION INTRAVENOUS
Status: DISCONTINUED | OUTPATIENT
Start: 2022-03-09 | End: 2022-03-09 | Stop reason: HOSPADM

## 2022-03-09 RX ORDER — MORPHINE SULFATE 2 MG/ML
2 INJECTION, SOLUTION INTRAMUSCULAR; INTRAVENOUS
Status: CANCELLED | OUTPATIENT
Start: 2022-03-09

## 2022-03-09 RX ORDER — OXYCODONE HYDROCHLORIDE 5 MG/1
5 TABLET ORAL
Status: DISCONTINUED | OUTPATIENT
Start: 2022-03-09 | End: 2022-03-09 | Stop reason: HOSPADM

## 2022-03-09 RX ORDER — HYDRALAZINE HYDROCHLORIDE 20 MG/ML
10 INJECTION INTRAMUSCULAR; INTRAVENOUS
Status: DISCONTINUED | OUTPATIENT
Start: 2022-03-09 | End: 2022-03-09 | Stop reason: HOSPADM

## 2022-03-09 RX ORDER — SODIUM CHLORIDE 9 MG/ML
25 INJECTION, SOLUTION INTRAVENOUS PRN
Status: CANCELLED | OUTPATIENT
Start: 2022-03-09

## 2022-03-09 RX ORDER — SODIUM CHLORIDE 9 MG/ML
25 INJECTION, SOLUTION INTRAVENOUS PRN
Status: DISCONTINUED | OUTPATIENT
Start: 2022-03-09 | End: 2022-03-09 | Stop reason: HOSPADM

## 2022-03-09 RX ORDER — SODIUM CHLORIDE 0.9 % (FLUSH) 0.9 %
10 SYRINGE (ML) INJECTION EVERY 12 HOURS SCHEDULED
Status: CANCELLED | OUTPATIENT
Start: 2022-03-09

## 2022-03-09 RX ORDER — FENTANYL CITRATE 50 UG/ML
25 INJECTION, SOLUTION INTRAMUSCULAR; INTRAVENOUS EVERY 5 MIN PRN
Status: DISCONTINUED | OUTPATIENT
Start: 2022-03-09 | End: 2022-03-09 | Stop reason: HOSPADM

## 2022-03-09 RX ADMIN — PROPOFOL 100 MCG/KG/MIN: 10 INJECTION, EMULSION INTRAVENOUS at 07:43

## 2022-03-09 RX ADMIN — CEFAZOLIN 2000 MG: 10 INJECTION, POWDER, FOR SOLUTION INTRAVENOUS at 07:49

## 2022-03-09 RX ADMIN — LIDOCAINE HYDROCHLORIDE 50 ML: 5 INJECTION, SOLUTION INFILTRATION; INTRAVENOUS at 07:45

## 2022-03-09 RX ADMIN — LIDOCAINE HYDROCHLORIDE 0.1 ML: 10 INJECTION, SOLUTION EPIDURAL; INFILTRATION; INTRACAUDAL; PERINEURAL at 06:51

## 2022-03-09 RX ADMIN — SODIUM CHLORIDE, POTASSIUM CHLORIDE, SODIUM LACTATE AND CALCIUM CHLORIDE: 600; 310; 30; 20 INJECTION, SOLUTION INTRAVENOUS at 06:51

## 2022-03-09 RX ADMIN — SODIUM CHLORIDE, SODIUM LACTATE, POTASSIUM CHLORIDE, CALCIUM CHLORIDE: 600; 310; 30; 20 INJECTION, SOLUTION INTRAVENOUS at 06:51

## 2022-03-09 ASSESSMENT — PULMONARY FUNCTION TESTS
PIF_VALUE: 0
PIF_VALUE: 1
PIF_VALUE: 0
PIF_VALUE: 1
PIF_VALUE: 0
PIF_VALUE: 1
PIF_VALUE: 1
PIF_VALUE: 0
PIF_VALUE: 1
PIF_VALUE: 0
PIF_VALUE: 1
PIF_VALUE: 0

## 2022-03-09 ASSESSMENT — PAIN - FUNCTIONAL ASSESSMENT: PAIN_FUNCTIONAL_ASSESSMENT: 0-10

## 2022-03-09 NOTE — ANESTHESIA POSTPROCEDURE EVALUATION
Department of Anesthesiology  Postprocedure Note    Patient: Ryland Gleason  MRN: [id number]  YOB: 1950  Date of evaluation: 3/9/2022  Time:  8:13 AM     Procedure Summary     Date: 03/09/22 Room / Location: [facility] / [facility]    Anesthesia Start: 3165 Anesthesia Stop: 0813    Procedure: CARPAL TUNNEL RELEASE RIGHT (Right Wrist) Diagnosis: (RIGHT CARPAL TUNNEL SYNDROME)    Surgeons: Elliott Zheng MD Responsible Provider: Corrie Stern MD    Anesthesia Type: MAC, Eliceo block ASA Status: 3          Anesthesia Type: MAC, [place] block    Sigifredo Phase I:      Sigifredo Phase II:      Last vitals: Reviewed and per EMR flowsheets.        Anesthesia Post Evaluation    Patient location during evaluation: bedside  Patient participation: complete - patient participated  Level of consciousness: awake and awake and alert  Airway patency: patent  Nausea & Vomiting: no nausea and no vomiting  Complications: no  Cardiovascular status: blood pressure returned to baseline and hemodynamically stable  Respiratory status: acceptable  Hydration status: euvolemic

## 2022-03-09 NOTE — ANESTHESIA PROCEDURE NOTES
Peripheral Block    Patient location during procedure: OR  Start time: 3/9/2022 7:40 AM  End time: 3/9/2022 7:45 AM  Staffing  Performed: anesthesiologist   Anesthesiologist: Juju Macias MD  Preanesthetic Checklist  Completed: patient identified, IV checked, site marked, risks and benefits discussed, surgical consent, monitors and equipment checked, pre-op evaluation, timeout performed, anesthesia consent given, oxygen available and patient being monitored  Peripheral Block  Patient position: supine  Prep: ChloraPrep  Patient monitoring: cardiac monitor, continuous pulse ox, frequent blood pressure checks and IV access  Block type: Eliceo block  Laterality: right  Injection technique: single-shot  Guidance: other  Local infiltration: lidocaine  Infiltration strength: 0.5 %  Dose: 50 mL  Provider prep: mask and sterile gloves (Sterile probe cover)  Local infiltration: lidocaine  Assessment  Injection assessment: negative aspiration for heme and no paresthesia on injection  Paresthesia pain: none  Slow fractionated injection: yes  Hemodynamics: stable  Reason for block: primary anesthetic

## 2022-03-09 NOTE — OP NOTE
Operative Note      Patient: Mindy Abdalla  YOB: 1950  MRN: [id number]    Date of Procedure: 3/9/2022    Pre-Op Diagnosis: RIGHT CARPAL TUNNEL SYNDROME    Post-Op Diagnosis: Same       Procedure(s):  CARPAL TUNNEL RELEASE RIGHT    Surgeon(s):  Tex Pettit MD    Assistant:   Physician Assistant: Maikol Nicholas PA-C    Anesthesia: [place] Block    Estimated Blood Loss (mL): Minimal    Complications: None    Specimens:   * No specimens in log *    Implants:  * No implants in log *      Drains: * No LDAs found *    Findings: Compressed nerve carpal tunnel    Detailed Description of Procedure:     Brief clinical note:    Patient presents for a right carpal tunnel release today. The risks and benefits that procedure were discussed with the patient in detail. These include, but not limited to injury soft tissue nerves and vessels, incisional problems, pillar pain, residual deficits, and recurrence. Medical and anesthetic risks were also discussed. Patient is consented and the right carpal tunnel is marked preoperatively. Operative note:    Patient is taken the op room after anesthesia was administered in form of a [place] block the area is prepped and draped in usual manner. A final timeout is done with the operative team.  An incision is carried out with a 15 blade scalpel in line with the ring finger in the palm. Meticulous dissection is then carried out with a tenotomy incising the palmar fascia and a Heiss retractor is applied. The transverse carpal ligament is identified under direct visualization and incised with a 15 blade scalpel. Dissection is then carried out distally to the sentinel fat and proximally across the wrist with a tenotomy. This fully decompresses the contents of the carpal tunnel including the median nerve. A tenotomy was utilized to ensure no adhesions are present around the median nerve.   Once this is confirmed this completes the release and the nerve is noted to be in continuity. Wound is irrigated and closed with several nylon sutures. Marcaine without epinephrine is infiltrated in the area for postoperative pain relief. Dressings include Xeroform fluffs web roll and a volar splint. The patient tolerated procedure well without any intraoperative complications.     Electronically signed by Armando Sandhoff, MD on 3/9/2022 at 8:11 AM

## 2022-03-24 ENCOUNTER — PATIENT MESSAGE (OUTPATIENT)
Dept: FAMILY MEDICINE CLINIC | Age: 72
End: 2022-03-24

## 2022-03-24 RX ORDER — METOPROLOL SUCCINATE 50 MG/1
50 TABLET, EXTENDED RELEASE ORAL DAILY
Qty: 90 TABLET | Refills: 3 | OUTPATIENT
Start: 2022-03-24

## 2022-03-24 RX ORDER — METOPROLOL SUCCINATE 50 MG/1
50 TABLET, EXTENDED RELEASE ORAL DAILY
Qty: 90 TABLET | Refills: 3 | Status: SHIPPED | OUTPATIENT
Start: 2022-03-24

## 2022-03-24 NOTE — TELEPHONE ENCOUNTER
Rx request   Requested Prescriptions     Pending Prescriptions Disp Refills    metFORMIN (GLUCOPHAGE) 500 MG tablet 360 tablet 3    metoprolol succinate (TOPROL XL) 50 MG extended release tablet 90 tablet 3     Sig: Take 1 tablet by mouth daily     LOV 1/17/2022  Next Visit Date:  Future Appointments   Date Time Provider Vincenzo Contreras   3/25/2022  1:15 PM 1519 Tonio Carney MD [address]   5/17/2022 10:00 AM Tenzin Laura  [place] [id number]

## 2022-03-24 NOTE — TELEPHONE ENCOUNTER
I lowered his metformin given his renal function and tight sugar control  If fasting sugars over 200 before his next appt please let me know   We can discuss rest at fu in may, sooner if needed

## 2022-03-25 ENCOUNTER — OFFICE VISIT (OUTPATIENT)
Dept: ORTHOPEDIC SURGERY | Age: 72
End: 2022-03-25

## 2022-03-25 VITALS
HEIGHT: 66 IN | TEMPERATURE: 98.2 F | WEIGHT: 226 LBS | OXYGEN SATURATION: 100 % | BODY MASS INDEX: 36.32 KG/M2 | HEART RATE: 75 BPM

## 2022-03-25 DIAGNOSIS — G56.03 BILATERAL CARPAL TUNNEL SYNDROME: Primary | ICD-10-CM

## 2022-03-25 PROCEDURE — 99024 POSTOP FOLLOW-UP VISIT: CPT | Performed by: ORTHOPAEDIC SURGERY

## 2022-03-25 NOTE — PROGRESS NOTES
Subjective:      Patient ID: Macy Olivier is a 73 y.o. male who presents today for:  Chief Complaint   Patient presents with    Follow-up     Right hand carapl tunnel release. he says he feels great \" I feel great, imediatly after  i was thrilled\"       HPI    Presents status post a carpal tunnel release. The patient is doing wonderfully he says he has [address] no symptomatology on the right and almost complete resolution since the day of surgery.     Past Medical History:   Diagnosis Date    Acute idiopathic gout of right foot     Anemia 03/01/2018    iron transfusions x2    Arthritis     both knees    Bilateral carpal tunnel syndrome 1/12/2022    Chronic kidney disease     CLL (chronic lymphocytic leukemia) ([facility] [address]     dx 5/2015    Colon polyps     Disease of blood and blood forming organ     Hyperlipidemia     dx since 1970's    Hypertension     meds  since 1970's    Lymphoma of gastrointestinal tract (HCC)     Movement disorder     Type II or unspecified type diabetes mellitus without mention of complication, not stated as uncontrolled     hx > 20 yrs     Past Surgical History:   Procedure Laterality Date    APPENDECTOMY      age 27s   [facility] CARPAL TUNNEL RELEASE Left 1/26/2022    LEFT CARPAL TUNNEL RELEASE performed by Susi Adams MD at [facility] Right 3/9/2022    CARPAL TUNNEL RELEASE RIGHT performed by Susi Adams MD at [address] N/A 07/07/2016    COLONOSCOPY  4/1/16    w/polypectomy     COLONOSCOPY N/A 6/15/2021    COLORECTAL CANCER SCREENING, HIGH RISK performed by Joel Cruz MD at [facility], COLON, DIAGNOSTIC      JOINT REPLACEMENT Bilateral 2018    TKR    OTHER SURGICAL HISTORY Right 06/08/16    I & D ABSCESS THIGH    [place] MANIPULATN KNEE JT+ANESTHESIA Right 3/15/2018    RIGHT KNEE MANIPULATION performed by Katina Childers MD at [facility]. Right 1/18/2018 RIGHT KNEE TOTAL KNEE ARTHROPLASTY ELÍAS SPINAL,NERVE BLOCK performed by Sudha Pham MD at [facility] Left 5/17/2018    LEFT KNEE TOTAL KNEE ARTHROPLASTY, performed by Sudha Pham MD at [facility] Right 1979    due to displacement    UPPER GASTROINTESTINAL ENDOSCOPY  4/29/15    w/rufino nj    UPPER GASTROINTESTINAL ENDOSCOPY  02/28/2018    Dr. Chiki Romero History     Socioeconomic History    Marital status:      Spouse name: Not on file    Number of children: Not on file    Years of education: Not on file    Highest education level: Not on file   Occupational History    Occupation: retired   Tobacco Use    Smoking status: Never Smoker    Smokeless tobacco: Never Used   Vaping Use    Vaping Use: Never used   Substance and Sexual Activity    Alcohol use: Yes     Comment: rare social    Drug use: No    Sexual activity: Yes     Partners: Female   Other Topics Concern    Not on file   Social History Narrative    Lives w wife     Social Determinants of Health     Financial Resource Strain: Low Risk     Difficulty of Paying Living Expenses: Not hard at all   Food Insecurity: No Food Insecurity    Worried About Running Out of Food in the Last Year: Never true    Joycelyn of Food in the Last Year: Never true   Transportation Needs: No Transportation Needs    Lack of Transportation (Medical): No    Lack of Transportation (Non-Medical):  No   Physical Activity:     Days of Exercise per Week: Not on file    Minutes of Exercise per Session: Not on file   Stress:     Feeling of Stress : Not on file   Social Connections:     Frequency of Communication with Friends and Family: Not on file    Frequency of Social Gatherings with Friends and Family: Not on file    Attends Zoroastrianism Services: Not on file    Active Member of Clubs or Organizations: Not on file    Attends Club or Organization Meetings: Not on file   Rohit Marital Status: Not on file   Intimate Partner Violence:     Fear of Current or Ex-Partner: Not on file    Emotionally Abused: Not on file    Physically Abused: Not on file    Sexually Abused: Not on file   Housing Stability:     Unable to Pay for Housing in the Last Year: Not on file    Number of Places Lived in the Last Year: Not on file    Unstable Housing in the Last Year: Not on file     Family History   Problem Relation Age of Onset    Heart Disease Mother 67        CHF    Cancer Father 68        liver/ pancreatic    No Known Problems Sister     Cancer Brother         CLL    Arthritis Brother         Bilateral TKR    No Known Problems Daughter     Colon Cancer Neg Hx      Allergies   Allergen Reactions    Dye [Barium-Containing Compounds] Hives    Iodides Hives    Iodinated Diagnostic Agents Hives     Current Outpatient Medications on File Prior to Visit   Medication Sig Dispense Refill    metFORMIN (GLUCOPHAGE) 500 MG tablet TAKE 1 TABLETS BY MOUTH TWICE A DAY WITH MEALS 180 tablet 3    metoprolol succinate (TOPROL XL) 50 MG extended release tablet Take 1 tablet by mouth daily 90 tablet 3    benazepril (LOTENSIN) 20 MG tablet TAKE 1 TABLET BY MOUTH EVERY DAY 90 tablet 3    doxepin (SINEQUAN) 10 MG capsule TAKE 1 CAPSULE BY MOUTH EVERY NIGHT 90 capsule 3    furosemide (LASIX) 40 MG tablet TAKE 1 TABLET BY MOUTH EVERY DAY 90 tablet 3    potassium chloride (KLOR-CON M20) 20 MEQ extended release tablet 1 tab po bid.  (CALL ME IF YOU ARE TAKING A DIFFERENT DOSE) 180 tablet 3    allopurinol (ZYLOPRIM) 100 MG tablet Take 2 tablets by mouth daily 180 tablet 0    colchicine (COLCRYS) 0.6 MG tablet Take 1 tablet by mouth every other day 45 tablet 0    Magnesium Oxide 200 MG TABS Take one tablet po once daily 90 tablet 1    atorvastatin (LIPITOR) 40 MG tablet TAKE 1 TABLET BY MOUTH EVERY EVENING (Patient taking differently: 20 mg TAKE 1 TABLET BY MOUTH EVERY EVENING (Take 1/2 tablet po once daily while on colchicine)) 90 tablet 3    loratadine (CLARITIN) 10 MG tablet Take 1 tablet by mouth daily as needed (allergies) 90 tablet 3    Multiple Vitamins-Minerals (MULTIVITAMIN PO) Take 1 tablet by mouth daily.  [DISCONTINUED] KLOR-CON M20 20 MEQ extended release tablet TAKE 1 TABLET BY MOUTH THREE TIMES A  tablet 1     No current facility-administered medications on file prior to visit. Review of Systems  He has no fever chills night sweats. Objective:   Pulse 75   Temp 98.2 °F (36.8 °C) (Temporal)   Ht 5' 6\" (1.676 m)   Wt 226 lb (102.5 kg)   SpO2 100%   BMI 36.48 kg/m²     ORTHOEXAM    She has a well-healed incision with no issue whatsoever. Patient has sutures removed Steri-Strips were applied and a Band-Aid applied. Assessment:       Diagnosis Orders   1. Bilateral carpal tunnel syndrome           Plan:   Patient is doing wonderfully has no issues or concerns and therefore we will keep it dry for 24 hours and wean into activities as tolerated. Follow-up will be. No orders of the defined types were placed in this encounter. No orders of the defined types were placed in this encounter. No follow-ups on file.       Magda Romero MD

## 2022-04-26 DIAGNOSIS — M10.9 GOUT INVOLVING TOE OF RIGHT FOOT, UNSPECIFIED CAUSE, UNSPECIFIED CHRONICITY: ICD-10-CM

## 2022-04-27 RX ORDER — COLCHICINE 0.6 MG/1
0.6 TABLET ORAL EVERY OTHER DAY
Qty: 45 TABLET | Refills: 0 | Status: SHIPPED | OUTPATIENT
Start: 2022-04-27

## 2022-04-28 DIAGNOSIS — M10.9 GOUT INVOLVING TOE OF RIGHT FOOT, UNSPECIFIED CAUSE, UNSPECIFIED CHRONICITY: ICD-10-CM

## 2022-04-28 RX ORDER — ALLOPURINOL 100 MG/1
200 TABLET ORAL DAILY
Qty: 180 TABLET | Refills: 0 | Status: SHIPPED | OUTPATIENT
Start: 2022-04-28 | End: 2022-07-27

## 2022-05-17 ENCOUNTER — OFFICE VISIT (OUTPATIENT)
Dept: FAMILY MEDICINE CLINIC | Age: 72
End: 2022-05-17
Payer: MEDICARE

## 2022-05-17 VITALS
BODY MASS INDEX: 37.61 KG/M2 | OXYGEN SATURATION: 99 % | TEMPERATURE: 96.9 F | SYSTOLIC BLOOD PRESSURE: 117 MMHG | HEART RATE: 63 BPM | WEIGHT: 233 LBS | DIASTOLIC BLOOD PRESSURE: 76 MMHG

## 2022-05-17 DIAGNOSIS — D64.9 ANEMIA, UNSPECIFIED TYPE: ICD-10-CM

## 2022-05-17 DIAGNOSIS — N18.30 STAGE 3 CHRONIC KIDNEY DISEASE, UNSPECIFIED WHETHER STAGE 3A OR 3B CKD (HCC): Primary | ICD-10-CM

## 2022-05-17 DIAGNOSIS — N18.30 STAGE 3 CHRONIC KIDNEY DISEASE, UNSPECIFIED WHETHER STAGE 3A OR 3B CKD (HCC): ICD-10-CM

## 2022-05-17 DIAGNOSIS — E83.42 HYPOMAGNESEMIA: ICD-10-CM

## 2022-05-17 DIAGNOSIS — Z87.39 HISTORY OF GOUT: ICD-10-CM

## 2022-05-17 DIAGNOSIS — R77.8 ELEVATED TOTAL PROTEIN: ICD-10-CM

## 2022-05-17 DIAGNOSIS — Z12.5 SCREENING FOR PROSTATE CANCER: ICD-10-CM

## 2022-05-17 DIAGNOSIS — E78.1 HYPERTRIGLYCERIDEMIA: ICD-10-CM

## 2022-05-17 DIAGNOSIS — L98.9 SKIN LESION: ICD-10-CM

## 2022-05-17 LAB
ALBUMIN SERPL-MCNC: 4.9 G/DL (ref 3.5–4.6)
ALP BLD-CCNC: 106 U/L (ref 35–104)
ALT SERPL-CCNC: 20 U/L (ref 0–41)
ANION GAP SERPL CALCULATED.3IONS-SCNC: 19 MEQ/L (ref 9–15)
AST SERPL-CCNC: 21 U/L (ref 0–40)
BILIRUB SERPL-MCNC: 0.7 MG/DL (ref 0.2–0.7)
BUN BLDV-MCNC: 43 MG/DL (ref 8–23)
CALCIUM SERPL-MCNC: 9.5 MG/DL (ref 8.5–9.9)
CHLORIDE BLD-SCNC: 102 MEQ/L (ref 95–107)
CHOLESTEROL, FASTING: 107 MG/DL (ref 0–199)
CO2: 21 MEQ/L (ref 20–31)
CREAT SERPL-MCNC: 1.48 MG/DL (ref 0.7–1.2)
GFR AFRICAN AMERICAN: 56.9
GFR NON-AFRICAN AMERICAN: 47
GLOBULIN: 4 G/DL (ref 2.3–3.5)
GLUCOSE BLD-MCNC: 138 MG/DL (ref 70–99)
HDLC SERPL-MCNC: 24 MG/DL (ref 40–59)
LDL CHOLESTEROL CALCULATED: 43 MG/DL (ref 0–129)
MAGNESIUM: 1.6 MG/DL (ref 1.7–2.4)
POTASSIUM SERPL-SCNC: 4.4 MEQ/L (ref 3.4–4.9)
SEDIMENTATION RATE, ERYTHROCYTE: 44 MM (ref 0–20)
SODIUM BLD-SCNC: 142 MEQ/L (ref 135–144)
TOTAL PROTEIN: 8.9 G/DL (ref 6.3–8)
TRIGLYCERIDE, FASTING: 202 MG/DL (ref 0–150)
URIC ACID, SERUM: 6.7 MG/DL (ref 3.4–7)

## 2022-05-17 PROCEDURE — 1036F TOBACCO NON-USER: CPT | Performed by: INTERNAL MEDICINE

## 2022-05-17 PROCEDURE — G8417 CALC BMI ABV UP PARAM F/U: HCPCS | Performed by: INTERNAL MEDICINE

## 2022-05-17 PROCEDURE — 3017F COLORECTAL CA SCREEN DOC REV: CPT | Performed by: INTERNAL MEDICINE

## 2022-05-17 PROCEDURE — 1123F ACP DISCUSS/DSCN MKR DOCD: CPT | Performed by: INTERNAL MEDICINE

## 2022-05-17 PROCEDURE — 99214 OFFICE O/P EST MOD 30 MIN: CPT | Performed by: INTERNAL MEDICINE

## 2022-05-17 PROCEDURE — G8427 DOCREV CUR MEDS BY ELIG CLIN: HCPCS | Performed by: INTERNAL MEDICINE

## 2022-05-17 ASSESSMENT — ENCOUNTER SYMPTOMS
ABDOMINAL PAIN: 0
EYE PAIN: 0
BACK PAIN: 0
SHORTNESS OF BREATH: 0

## 2022-05-17 NOTE — PROGRESS NOTES
Subjective:      Patient ID: Vera Swain is a 73 y.o. male who presents today with:  Chief Complaint   Patient presents with    3 Month Follow-Up     pt is here today for 3mnth f/u        HPI     Here for follow up     Past Medical History:   Diagnosis Date    Acute idiopathic gout of right foot     Anemia 03/01/2018    iron transfusions x2    Arthritis     both knees    Bilateral carpal tunnel syndrome 1/12/2022    Chronic kidney disease     C[facility]ch[facility]ocytic [facility]) ([facility]     dx 5/2015    Colon polyps     Disease of blood and blood forming organ     Hyperlipidemia     dx since 1970's    Hypertension     meds  since 1970's    Lymphoma of gastrointestinal tract ([facility]     Movement disorder     Type II or unspecified type diabetes mellitus without mention of complication, not stated as uncontrolled     hx > 20 yrs     Past Surgical History:   Procedure Laterality Date    APPENDECTOMY      age 27s   [facility] [address] Left 1/26/2022    LEFT CARPAL TUNNEL RELEASE performed by Claire Abbasi MD at [address]  Right 3/9/2022    CARPAL TUNNEL RELEASE RIGHT performed by Claire Abbasi MD at [place] [address] 07/07/2016    COLONOSCOPY  4/1/16    w/polypectomy     COLONOSCOPY N/A 6/15/2021    COLORECTAL CANCER SCREENING, HIGH RISK performed by Barry Rodriguez MD at [facility] [facility], COLON, DIAGNOSTIC      JOINT REPLACEMENT Bilateral 2018    TKR    OTHER SURGICAL HISTORY Right 06/08/16    I & D ABSCESS THIGH    ME MANIPULATN KNEE JT+ANESTHESIA Right 3/15/2018    RIGHT KNEE MANIPULATION performed by Danny Dodd MD at [address] [place] [address] Right 1/18/2018    RIGHT KNEE TOTAL KNEE ARTHROPLASTY ELÍAS Mitesh performed by Danny Dodd MD at [address] [place] [address] Left 5/17/2018    LEFT KNEE TOTAL KNEE ARTHROPLASTY, performed by Danny Dodd MD at [facility]    SHOULDER SURGERY Right 1979    due to displacement    UPPER GASTROINTESTINAL ENDOSCOPY  4/29/15    w/rufino nj    UPPER GASTROINTESTINAL ENDOSCOPY  02/28/2018    Dr. La Duran History     Socioeconomic History    Marital status:      Spouse name: Not on file    Number of children: Not on file    Years of education: Not on file    Highest education level: Not on file   Occupational History    Occupation: retired   Tobacco Use    Smoking status: Never Smoker    Smokeless tobacco: Never Used   Vaping Use    Vaping Use: Never used   Substance and Sexual Activity    Alcohol use: Yes     Comment: rare social    Drug use: No    Sexual activity: Yes     Partners: Female   Other Topics Concern    Not on file   Social History Narrative    Lives w wife     Social Determinants of Health     Financial Resource Strain: Low Risk     Difficulty of Paying Living Expenses: Not hard at all   Food Insecurity: No Food Insecurity    Worried About Running Out of Food in the Last Year: Never true    Joycelyn of Food in the Last Year: Never true   Transportation Needs: No Transportation Needs    Lack of Transportation (Medical): No    Lack of Transportation (Non-Medical):  No   Physical Activity:     Days of Exercise per Week: Not on file    Minutes of Exercise per Session: Not on file   Stress:     Feeling of Stress : Not on file   Social Connections:     Frequency of Communication with Friends and Family: Not on file    Frequency of Social Gatherings with Friends and Family: Not on file    Attends Yarsanism Services: Not on file    Active Member of Clubs or Organizations: Not on file    Attends Club or Organization Meetings: Not on file    Marital Status: Not on file   Intimate Partner Violence:     Fear of Current or Ex-Partner: Not on file    Emotionally Abused: Not on file    Physically Abused: Not on file    Sexually Abused: Not on file   Housing Stability:     chills. HENT: Negative for congestion. Eyes: Negative for pain. Respiratory: Negative for shortness of breath. Cardiovascular: Negative for chest pain. Gastrointestinal: Negative for abdominal pain. Genitourinary: Negative for hematuria. Musculoskeletal: Negative for back pain. Allergic/Immunologic: Negative for immunocompromised state. Neurological: Negative for headaches. Psychiatric/Behavioral: Negative for hallucinations. Objective:   /76 (Site: Right Wrist, Position: Sitting)   Pulse 63   Temp 96.9 °F (36.1 °C) (Temporal)   Wt 233 lb (105.7 kg)   SpO2 99%   BMI 37.61 kg/m²     Physical Exam  Constitutional:       General: He is not in acute distress. Appearance: Normal appearance. He is not ill-appearing, toxic-appearing or diaphoretic. HENT:      Head: Normocephalic. Neck:      Vascular: No carotid bruit. Cardiovascular:      Rate and Rhythm: Normal rate and regular rhythm. Pulses: Normal pulses. Heart sounds: Normal heart sounds. No murmur heard. No friction rub. No gallop. Pulmonary:      Effort: Pulmonary effort is normal. No respiratory distress. Breath sounds: Normal breath sounds. No wheezing, rhonchi or rales. Abdominal:      General: Abdomen is flat. There is no distension. Palpations: Abdomen is soft. Tenderness: There is no abdominal tenderness. There is no right CVA tenderness, left CVA tenderness, guarding or rebound. Musculoskeletal:      Cervical back: Neck supple. Right lower leg: No edema. Left lower leg: No edema. Skin:     General: Skin is warm. Findings: No erythema or rash. Neurological:      Mental Status: He is alert. Psychiatric:         Mood and Affect: Mood normal.     Feet without ulcers       Assessment:       Diagnosis Orders   1.  Stage 3 chronic kidney disease, unspecified whether stage 3a or 3b CKD (HCC)  Comprehensive Metabolic Panel    Electrophoresis Protein, Serum Sedimentation Rate   2. History of gout  Uric Acid    Sedimentation Rate   3. Hypomagnesemia  Sedimentation Rate    Magnesium   4. Elevated total protein  Electrophoresis Protein, Serum    Sedimentation Rate    Immunoglobulins, Quantitative   5. Anemia, unspecified type  Electrophoresis Protein, Serum    Sedimentation Rate   6. Screening for prostate cancer  Sedimentation Rate    PSA Screening   7. Hypertriglyceridemia  Sedimentation Rate    Lipid, Fasting   8. Skin lesion  LENORA Hoffmann MD, [facility]         Plan:     vc  Has nephro fu   He will schedule 2nd Covid booster   Last opto was 12/11/2021   vascepa (lowers risk of MI) risk of afib   GI through , hx of duodenal CA.    Oncology through [facility] (dr Zoila Clifford) (He follows for CLL and anemia)                  Orders Placed This Encounter   Procedures    Comprehensive Metabolic Panel     Standing Status:   Future     Number of Occurrences:   1     Standing Expiration Date:   5/17/2023    Uric Acid     Standing Status:   Future     Number of Occurrences:   1     Standing Expiration Date:   5/17/2023    Electrophoresis Protein, Serum     Standing Status:   Future     Number of Occurrences:   1     Standing Expiration Date:   5/17/2023    Sedimentation Rate     Standing Status:   Future     Number of Occurrences:   1     Standing Expiration Date:   5/17/2023    Magnesium     Standing Status:   Future     Number of Occurrences:   1     Standing Expiration Date:   5/17/2023    Immunoglobulins, Quantitative     Standing Status:   Future     Number of Occurrences:   1     Standing Expiration Date:   5/17/2023    Lipid, Fasting     Standing Status:   Future     Number of Occurrences:   1     Standing Expiration Date:   5/17/2023    PSA Screening     Standing Status:   Future     Number of Occurrences:   1     Standing Expiration Date:   5/17/2023    LENORA Hoffmann MD, [facility]     Referral Priority: Routine     Referral Type:   Eval and Treat     Referral Reason:   Specialty Services Required     Referred to Provider:   Patric Garcia MD     Requested Specialty:   Dermatology     Number of Visits Requested:   1     No orders of the defined types were placed in this encounter. If anything should change or worsen call ASAP, don't wait for next scheduled appointment. Return in about 4 months (around 9/17/2022) for Chronic condition management/appointment, worsening symptoms, call ASAP for appointment.       Lakeshia Lemus MD

## 2022-05-18 LAB
IGA: 89 MG/DL (ref 68–408)
IGG: 2022 MG/DL (ref 768–1632)
IGM: 96 MG/DL (ref 35–263)

## 2022-05-19 LAB — PROSTATE SPECIFIC ANTIGEN: 1.7 NG/ML (ref 0–4)

## 2022-05-20 ENCOUNTER — TELEPHONE (OUTPATIENT)
Dept: FAMILY MEDICINE CLINIC | Age: 72
End: 2022-05-20

## 2022-05-20 LAB
ALBUMIN SERPL-MCNC: 4.49 G/DL (ref 3.75–5.01)
ALPHA-1-GLOBULIN: 0.39 G/DL (ref 0.19–0.46)
ALPHA-2-GLOBULIN: 1.05 G/DL (ref 0.48–1.05)
BETA GLOBULIN: 0.81 G/DL (ref 0.48–1.1)
GAMMA GLOBULIN: 2.16 G/DL (ref 0.62–1.51)
PROTEIN ELECTROPHORESIS, SERUM: ABNORMAL
SPE/IFE INTERPRETATION: ABNORMAL
TOTAL PROTEIN: 8.9 G/DL (ref 6.3–8.2)

## 2022-05-20 NOTE — TELEPHONE ENCOUNTER
Mario Montes  I wanted to discuss his recent labs with barb Rolle (oncologist at ) believe he has [facility] office  Can you help set this up

## 2022-05-23 NOTE — TELEPHONE ENCOUNTER
Called his office, they transferred me to his  and I left a message for them to call back so I can set this up.

## 2022-05-24 NOTE — TELEPHONE ENCOUNTER
I spoke with dr Gayle Michael earlier today  He is not concerned about m spike seen on spep  He believe this is from his [facility]  Documented now

## 2022-06-13 DIAGNOSIS — I10 ESSENTIAL HYPERTENSION: ICD-10-CM

## 2022-06-14 RX ORDER — POTASSIUM CHLORIDE 20 MEQ/1
TABLET, EXTENDED RELEASE ORAL
Qty: 180 TABLET | Refills: 3 | Status: SHIPPED | OUTPATIENT
Start: 2022-06-14

## 2022-06-14 NOTE — TELEPHONE ENCOUNTER
is calling in requesting a refill on medication(s). Medication Requested  Requested Prescriptions     Pending Prescriptions Disp Refills    potassium chloride (KLOR-CON M20) 20 MEQ extended release tablet 180 tablet 3     Si tab po bid. (CALL ME IF YOU ARE TAKING A DIFFERENT DOSE)        Patient's Last Office Visit  2022     Patient's Next Visit  Future Appointments   Date Time Provider Vincenzo Contreras   2022 10:30 AM [place] [facility] [place] the medication to the pharmacy listed.     Other Comments

## 2022-07-27 DIAGNOSIS — M10.9 GOUT INVOLVING TOE OF RIGHT FOOT, UNSPECIFIED CAUSE, UNSPECIFIED CHRONICITY: ICD-10-CM

## 2022-07-27 RX ORDER — ALLOPURINOL 100 MG/1
200 TABLET ORAL DAILY
Qty: 180 TABLET | Refills: 0 | Status: SHIPPED | OUTPATIENT
Start: 2022-07-27 | End: 2022-10-20

## 2022-07-29 RX ORDER — ADHESIVE TAPE 3"X 2.3 YD
TAPE, NON-MEDICATED TOPICAL
Qty: 90 TABLET | Refills: 1 | Status: SHIPPED | OUTPATIENT
Start: 2022-07-29

## 2022-08-18 ENCOUNTER — TELEMEDICINE (OUTPATIENT)
Dept: PRIMARY CARE CLINIC | Age: 72
End: 2022-08-18
Payer: MEDICARE

## 2022-08-18 DIAGNOSIS — Z00.00 MEDICARE ANNUAL WELLNESS VISIT, SUBSEQUENT: Primary | ICD-10-CM

## 2022-08-18 PROCEDURE — 1123F ACP DISCUSS/DSCN MKR DOCD: CPT | Performed by: NURSE PRACTITIONER

## 2022-08-18 PROCEDURE — G0439 PPPS, SUBSEQ VISIT: HCPCS | Performed by: NURSE PRACTITIONER

## 2022-08-18 PROCEDURE — 3017F COLORECTAL CA SCREEN DOC REV: CPT | Performed by: NURSE PRACTITIONER

## 2022-08-18 SDOH — ECONOMIC STABILITY: TRANSPORTATION INSECURITY
IN THE PAST 12 MONTHS, HAS LACK OF TRANSPORTATION KEPT YOU FROM MEETINGS, WORK, OR FROM GETTING THINGS NEEDED FOR DAILY LIVING?: NO

## 2022-08-18 ASSESSMENT — PATIENT HEALTH QUESTIONNAIRE - PHQ9
SUM OF ALL RESPONSES TO PHQ QUESTIONS 1-9: 0
1. LITTLE INTEREST OR PLEASURE IN DOING THINGS: 0
SUM OF ALL RESPONSES TO PHQ QUESTIONS 1-9: 0
SUM OF ALL RESPONSES TO PHQ QUESTIONS 1-9: 0
2. FEELING DOWN, DEPRESSED OR HOPELESS: 0
SUM OF ALL RESPONSES TO PHQ QUESTIONS 1-9: 0
SUM OF ALL RESPONSES TO PHQ9 QUESTIONS 1 & 2: 0

## 2022-08-18 ASSESSMENT — LIFESTYLE VARIABLES
HOW OFTEN DO YOU HAVE A DRINK CONTAINING ALCOHOL: MONTHLY OR LESS
HOW MANY STANDARD DRINKS CONTAINING ALCOHOL DO YOU HAVE ON A TYPICAL DAY: 1 OR 2

## 2022-08-18 ASSESSMENT — SOCIAL DETERMINANTS OF HEALTH (SDOH): HOW HARD IS IT FOR YOU TO PAY FOR THE VERY BASICS LIKE FOOD, HOUSING, MEDICAL CARE, AND HEATING?: NOT HARD AT ALL

## 2022-08-18 NOTE — PROGRESS NOTES
Medicare Annual Wellness Visit    Rosa Maria Lucas is here for Medicare AWV    Assessment & Plan   Medicare annual wellness visit, subsequent    Recommendations for Preventive Services Due: see orders and patient instructions/AVS.  Recommended screening schedule for the next 5-10 years is provided to the patient in written form: see Patient Instructions/AVS.     Return for Medicare Annual Wellness Visit in 1 year. Subjective       Patient's complete Health Risk Assessment and screening values have been reviewed and are found in Flowsheets. The following problems were reviewed today and where indicated follow up appointments were made and/or referrals ordered. Positive Risk Factor Screenings with Interventions:     Cognitive:   Words recalled: 2 Words Recalled  Total Score Interpretation: Abnormal Mini-Cog  Did the patient refuse to take the cognition test?: No  Cognitive Impairment Interventions:  Patient declines any further evaluation/treatment for cognitive impairment           General Health and ACP:  General  In general, how would you say your health is?: Very Good  In the past 7 days, have you experienced any of the following: New or Increased Pain, New or Increased Fatigue, Loneliness, Social Isolation, Stress or Anger?: No  Do you get the social and emotional support that you need?: Yes  Do you have a Living Will?: (!) No    Advance Directives       Power of  Living Will ACP-Advance Directive ACP-Power of     Not on File Filed on 06/16/21 Filed Not on File          General Health Risk Interventions:  No Living Will: Patient declines ACP discussion/assistance    Health Habits/Nutrition:  Physical Activity: Insufficiently Active    Days of Exercise per Week: 4 days    Minutes of Exercise per Session: 30 min     Have you lost any weight without trying in the past 3 months?: No     Have you seen the dentist within the past year?: Yes  Health Habits/Nutrition Interventions:  Inadequate physical activity:  patient is not ready to increase his/her physical activity level at this time             Objective      Patient-Reported Vitals  No data recorded          Allergies   Allergen Reactions    Dye [Barium-Containing Compounds] Hives    Iodides Hives    Iodinated Diagnostic Agents Hives     Prior to Visit Medications    Medication Sig Taking? Authorizing Provider   Magnesium Oxide 200 MG TABS TAKE 1 TABLET BY MOUTH EVERY DAY  Salome Contreras MD   allopurinol (ZYLOPRIM) 100 MG tablet TAKE 2 TABLETS BY MOUTH DAILY  Salome Contreras MD   potassium chloride (KLOR-CON M20) 20 MEQ extended release tablet 1 tab po bid. (CALL ME IF YOU ARE TAKING A DIFFERENT DOSE)  Salome Contreras MD   colchicine (COLCRYS) 0.6 MG tablet TAKE 1 TABLET BY MOUTH EVERY OTHER DAY  Salome Contreras MD   metFORMIN (GLUCOPHAGE) 500 MG tablet TAKE 1 TABLETS BY MOUTH TWICE A DAY WITH MEALS  Salome Contreras MD   metoprolol succinate (TOPROL XL) 50 MG extended release tablet Take 1 tablet by mouth daily  Salome Contreras MD   benazepril (LOTENSIN) 20 MG tablet TAKE 1 TABLET BY MOUTH EVERY DAY  Salome Contreras MD   doxepin (SINEQUAN) 10 MG capsule TAKE 1 CAPSULE BY MOUTH EVERY NIGHT  Salome Contreras MD   furosemide (LASIX) 40 MG tablet TAKE 1 TABLET BY MOUTH EVERY DAY  Salome Contreras MD   atorvastatin (LIPITOR) 40 MG tablet TAKE 1 TABLET BY MOUTH EVERY EVENING  Patient taking differently: 20 mg TAKE 1 TABLET BY MOUTH EVERY EVENING (Take 1/2 tablet po once daily while on colchicine)  Salome Contreras MD   loratadine (CLARITIN) 10 MG tablet Take 1 tablet by mouth daily as needed (allergies)  Salome Contreras MD   KLOR-CON M20 20 MEQ extended release tablet TAKE 1 TABLET BY MOUTH THREE TIMES A DAY  Gualberto Grissom MD   Multiple Vitamins-Minerals (MULTIVITAMIN PO) Take 1 tablet by mouth daily.   Historical Provider, MD Cruz (Including outside providers/suppliers regularly involved in providing care):   Patient Care Team:  Debbi Cavanaugh MD as PCP - General (Internal Medicine)  Debbi Cavanaugh MD as PCP - [facility]  Sofy Kaplan MD (Hematology and Oncology)     Reviewed and updated this visit:  Tobacco  Allergies  Meds  Med Hx  Surg Hx  Soc Hx  Fam Hx          Austin Appiah, was evaluated through a synchronous (real-time) audio-video encounter. The patient (or guardian if applicable) is aware that this is a billable service, which includes applicable co-pays. This Virtual Visit was conducted with patient's (and/or legal guardian's) consent. The visit was conducted pursuant to the emergency declaration under the [facility] [address] [facility] authority and the [employer] Act. Patient identification was verified, and a caregiver was present when appropriate. The patient was located at Home: [address] [place] [address]. Provider was located at [facility] (Appt Dept): [address] [place] [address].

## 2022-09-14 ENCOUNTER — OFFICE VISIT (OUTPATIENT)
Dept: FAMILY MEDICINE CLINIC | Age: 72
End: 2022-09-14
Payer: MEDICARE

## 2022-09-14 VITALS
OXYGEN SATURATION: 98 % | WEIGHT: 239 LBS | HEART RATE: 81 BPM | SYSTOLIC BLOOD PRESSURE: 100 MMHG | DIASTOLIC BLOOD PRESSURE: 64 MMHG | TEMPERATURE: 97.2 F | BODY MASS INDEX: 38.58 KG/M2

## 2022-09-14 DIAGNOSIS — N18.30 STAGE 3 CHRONIC KIDNEY DISEASE, UNSPECIFIED WHETHER STAGE 3A OR 3B CKD (HCC): ICD-10-CM

## 2022-09-14 DIAGNOSIS — R70.0 ELEVATED SED RATE: ICD-10-CM

## 2022-09-14 DIAGNOSIS — C91.10 CLL (CHRONIC LYMPHOCYTIC LEUKEMIA) (HCC): Primary | ICD-10-CM

## 2022-09-14 DIAGNOSIS — E78.1 HYPERTRIGLYCERIDEMIA: ICD-10-CM

## 2022-09-14 DIAGNOSIS — E66.01 SEVERE OBESITY (BMI 35.0-39.9) WITH COMORBIDITY (HCC): ICD-10-CM

## 2022-09-14 DIAGNOSIS — Z87.39 HISTORY OF GOUT: ICD-10-CM

## 2022-09-14 DIAGNOSIS — E83.42 HYPOMAGNESEMIA: ICD-10-CM

## 2022-09-14 DIAGNOSIS — Z23 ENCOUNTER FOR IMMUNIZATION: ICD-10-CM

## 2022-09-14 LAB
ALBUMIN SERPL-MCNC: 4.7 G/DL (ref 3.5–4.6)
ALP BLD-CCNC: 104 U/L (ref 35–104)
ALT SERPL-CCNC: 27 U/L (ref 0–41)
ANION GAP SERPL CALCULATED.3IONS-SCNC: 13 MEQ/L (ref 9–15)
AST SERPL-CCNC: 23 U/L (ref 0–40)
BILIRUB SERPL-MCNC: 0.6 MG/DL (ref 0.2–0.7)
BUN BLDV-MCNC: 38 MG/DL (ref 8–23)
CALCIUM SERPL-MCNC: 9.5 MG/DL (ref 8.5–9.9)
CHLORIDE BLD-SCNC: 99 MEQ/L (ref 95–107)
CHOLESTEROL, FASTING: 123 MG/DL (ref 0–199)
CO2: 25 MEQ/L (ref 20–31)
CREAT SERPL-MCNC: 1.44 MG/DL (ref 0.7–1.2)
GFR AFRICAN AMERICAN: 58.7
GFR NON-AFRICAN AMERICAN: 48.5
GLOBULIN: 3.9 G/DL (ref 2.3–3.5)
GLUCOSE BLD-MCNC: 138 MG/DL (ref 70–99)
HDLC SERPL-MCNC: 23 MG/DL (ref 40–59)
LDL CHOLESTEROL CALCULATED: 40 MG/DL (ref 0–129)
MAGNESIUM: 1.6 MG/DL (ref 1.7–2.4)
POTASSIUM SERPL-SCNC: 4.5 MEQ/L (ref 3.4–4.9)
SEDIMENTATION RATE, ERYTHROCYTE: 42 MM (ref 0–20)
SODIUM BLD-SCNC: 137 MEQ/L (ref 135–144)
TOTAL PROTEIN: 8.6 G/DL (ref 6.3–8)
TRIGLYCERIDE, FASTING: 302 MG/DL (ref 0–150)
URIC ACID, SERUM: 5.7 MG/DL (ref 3.4–7)

## 2022-09-14 PROCEDURE — 90694 VACC AIIV4 NO PRSRV 0.5ML IM: CPT | Performed by: INTERNAL MEDICINE

## 2022-09-14 PROCEDURE — G8427 DOCREV CUR MEDS BY ELIG CLIN: HCPCS | Performed by: INTERNAL MEDICINE

## 2022-09-14 PROCEDURE — 1123F ACP DISCUSS/DSCN MKR DOCD: CPT | Performed by: INTERNAL MEDICINE

## 2022-09-14 PROCEDURE — G8417 CALC BMI ABV UP PARAM F/U: HCPCS | Performed by: INTERNAL MEDICINE

## 2022-09-14 PROCEDURE — 3017F COLORECTAL CA SCREEN DOC REV: CPT | Performed by: INTERNAL MEDICINE

## 2022-09-14 PROCEDURE — G0008 ADMIN INFLUENZA VIRUS VAC: HCPCS | Performed by: INTERNAL MEDICINE

## 2022-09-14 PROCEDURE — 1036F TOBACCO NON-USER: CPT | Performed by: INTERNAL MEDICINE

## 2022-09-14 PROCEDURE — 99214 OFFICE O/P EST MOD 30 MIN: CPT | Performed by: INTERNAL MEDICINE

## 2022-09-14 SDOH — ECONOMIC STABILITY: FOOD INSECURITY: WITHIN THE PAST 12 MONTHS, YOU WORRIED THAT YOUR FOOD WOULD RUN OUT BEFORE YOU GOT MONEY TO BUY MORE.: NEVER TRUE

## 2022-09-14 SDOH — ECONOMIC STABILITY: FOOD INSECURITY: WITHIN THE PAST 12 MONTHS, THE FOOD YOU BOUGHT JUST DIDN'T LAST AND YOU DIDN'T HAVE MONEY TO GET MORE.: NEVER TRUE

## 2022-09-14 ASSESSMENT — ENCOUNTER SYMPTOMS
SHORTNESS OF BREATH: 0
EYE PAIN: 0
ABDOMINAL PAIN: 0
BACK PAIN: 0

## 2022-09-14 NOTE — PROGRESS NOTES
Vaccine Information Sheet, \"Influenza - Inactivated\"  given to Josette Romero, or parent/legal guardian of  Josette Romero and verbalized understanding. Patient responses:    Have you ever had a reaction to a flu vaccine? No  Are you able to eat eggs without adverse effects? Yes  Do you have any current illness? No  Have you ever had [address] Syndrome? No    Flu vaccine given per order. Please see immunization tab.

## 2022-09-14 NOTE — PROGRESS NOTES
Subjective:      Patient ID: Alexy Serrano is a 73 y.o. male who presents today with:  Chief Complaint   Patient presents with    Follow-up     4 mnth f/u        [facility]    Here for follow up    Past Medical History:   Diagnosis Date    Acute idiopathic gout of right foot     Anemia 03/01/2018    iron transfusions x2    Arthritis     both knees    Bilateral carpal tunnel syndrome 1/12/2022    Chronic kidney disease     CLL (chronic lymphocytic [facility]) ([facility] [address] [address]     dx 5/2015    Colon polyps     Disease of blood and blood forming organ     Hyperlipidemia     dx since 1970's    Hypertension     meds  since 1970's    Lymphoma of gastrointestinal tract ([facility] [address] [facility]     Movement disorder     Type II or unspecified type diabetes mellitus without mention of complication, not stated as uncontrolled     hx > 20 yrs     Past Surgical History:   Procedure Laterality Date    APPENDECTOMY      age 27s    CARPAL TUNNEL RELEASE Left 1/26/2022    LEFT CARPAL TUNNEL RELEASE performed by Roscoe Otero MD at [address] [place] [address] Right 3/9/2022    CARPAL TUNNEL RELEASE RIGHT performed by Roscoe Otero MD at [address] [place] [address] N/A 07/07/2016    COLONOSCOPY  4/1/16    w/polypectomy     COLONOSCOPY N/A 6/15/2021    COLORECTAL CANCER SCREENING, HIGH RISK performed by Luci Land MD at [facility], DIAGNOSTIC      JOINT REPLACEMENT Bilateral 2018    TKR    OTHER SURGICAL HISTORY Right 06/08/16    I & D ABSCESS THIGH    VT MANIPULATN KNEE JT+ANESTHESIA Right 3/15/2018    RIGHT KNEE MANIPULATION performed by Vilinda Duverney, MD at [address] [place] [address]  Right 1/18/2018    RIGHT KNEE TOTAL KNEE ARTHROPLASTY ELÍASCHAS Sagastume performed by Vilinda Duverney, MD at [address] [place] [address]  Left 5/17/2018    LEFT KNEE TOTAL KNEE ARTHROPLASTY, performed by Vilinda Duverney, MD at [address] [place] [address]    due to tablet TAKE 1 TABLET BY MOUTH EVERY OTHER DAY 45 tablet 0    metFORMIN (GLUCOPHAGE) 500 MG tablet TAKE 1 TABLETS BY MOUTH TWICE A DAY WITH MEALS 180 tablet 3    metoprolol succinate (TOPROL XL) 50 MG extended release tablet Take 1 tablet by mouth daily 90 tablet 3    benazepril (LOTENSIN) 20 MG tablet TAKE 1 TABLET BY MOUTH EVERY DAY 90 tablet 3    doxepin (SINEQUAN) 10 MG capsule TAKE 1 CAPSULE BY MOUTH EVERY NIGHT 90 capsule 3    furosemide (LASIX) 40 MG tablet TAKE 1 TABLET BY MOUTH EVERY DAY 90 tablet 3    atorvastatin (LIPITOR) 40 MG tablet TAKE 1 TABLET BY MOUTH EVERY EVENING (Patient taking differently: 20 mg TAKE 1 TABLET BY MOUTH EVERY EVENING (Take 1/2 tablet po once daily while on colchicine)) 90 tablet 3    loratadine (CLARITIN) 10 MG tablet Take 1 tablet by mouth daily as needed (allergies) 90 tablet 3    Multiple Vitamins-Minerals (MULTIVITAMIN PO) Take 1 tablet by mouth daily. [DISCONTINUED] KLOR-CON M20 20 MEQ extended release tablet TAKE 1 TABLET BY MOUTH THREE TIMES A  tablet 1     No current facility-administered medications on file prior to visit. I have personally reviewed the ROS, PMH, PFH, and social history     Review of Systems   Constitutional:  Negative for chills. HENT:  Negative for congestion. Eyes:  Negative for pain. Respiratory:  Negative for shortness of breath. Cardiovascular:  Negative for chest pain. Gastrointestinal:  Negative for abdominal pain. Genitourinary:  Negative for hematuria. Musculoskeletal:  Negative for back pain. Allergic/Immunologic: Negative for immunocompromised state. Neurological:  Negative for headaches. Psychiatric/Behavioral:  Negative for hallucinations. Objective: There were no vitals taken for this visit. Physical Exam  Constitutional:       General: He is not in acute distress. Appearance: Normal appearance. He is not ill-appearing, toxic-appearing or diaphoretic. HENT:      Head: Normocephalic. Neck:      Vascular: No carotid bruit. Cardiovascular:      Rate and Rhythm: Normal rate and regular rhythm. Pulses: Normal pulses. Heart sounds: Normal heart sounds. No murmur heard. No friction rub. No gallop. Pulmonary:      Effort: Pulmonary effort is normal. No respiratory distress. Breath sounds: Normal breath sounds. No wheezing, rhonchi or rales. Abdominal:      General: Abdomen is flat. There is no distension. Palpations: Abdomen is soft. Tenderness: There is no abdominal tenderness. There is no right CVA tenderness, left CVA tenderness, guarding or rebound. Musculoskeletal:      Cervical back: Neck supple. Right lower leg: No edema. Left lower leg: No edema. Skin:     General: Skin is warm. Findings: No erythema or rash. Neurological:      Mental Status: He is alert. Psychiatric:         Mood and Affect: Mood normal.         Assessment:       Diagnosis Orders   1. CLL (chronic lymphocytic leukemia) (HCC)        2. Stage 3 chronic kidney disease, unspecified whether stage 3a or 3b CKD (HCC)  Comprehensive Metabolic Panel      3. Hypomagnesemia  Magnesium      4. History of gout  Uric Acid      5. Severe obesity (BMI 35.0-39. 9) with comorbidity ([address]        6. Elevated sed rate  Sedimentation Rate      7. Encounter for immunization  Influenza, FLUAD, (age 72 y+), IM, Preservative Free, 0.5 mL      8. Hypertriglyceridemia  Lipid, Fasting            Plan:     vc  He will schedule covid booster soon. GI through , hx of duodenal CA.    Vascepa too expensive (he may reconsider)  Oncology through [facility] (dr Lucas Osorio) (He follows for CLL and anemia)  Will consider repeat renal referral, get lab  Opto 12/2022             Orders Placed This Encounter   Procedures    Influenza, FLUAD, (age 72 y+), IM, Preservative Free, 0.5 mL    Uric Acid     Standing Status:   Future     Standing Expiration Date:   9/14/2023    Sedimentation Rate     Standing Status:   Future Standing Expiration Date:   9/14/2023    Magnesium     Standing Status:   Future     Standing Expiration Date:   9/14/2023    Lipid, Fasting     Standing Status:   Future     Standing Expiration Date:   9/14/2023    Comprehensive Metabolic Panel     Standing Status:   Future     Standing Expiration Date:   9/14/2023     No orders of the defined types were placed in this encounter. He will schedule 2nd Covid booster   If anything should change or worsen call ASAP, don't wait for next scheduled appointment. Return in about 4 months (around 1/14/2023) for worsening symptoms, call ASAP for appointment, Chronic condition management/appointment.       Idalmis Ortiz MD

## 2022-09-16 RX ORDER — ICOSAPENT ETHYL 1000 MG/1
2 CAPSULE ORAL 2 TIMES DAILY
Qty: 360 CAPSULE | Refills: 1 | Status: SHIPPED | OUTPATIENT
Start: 2022-09-16 | End: 2022-09-24 | Stop reason: SDUPTHER

## 2022-09-18 RX ORDER — ICOSAPENT ETHYL 1000 MG/1
2 CAPSULE ORAL 2 TIMES DAILY
Qty: 360 CAPSULE | Refills: 1 | OUTPATIENT
Start: 2022-09-18

## 2022-09-19 ENCOUNTER — TELEPHONE (OUTPATIENT)
Dept: FAMILY MEDICINE CLINIC | Age: 72
End: 2022-09-19

## 2022-09-20 ENCOUNTER — TELEPHONE (OUTPATIENT)
Dept: FAMILY MEDICINE CLINIC | Age: 72
End: 2022-09-20

## 2022-09-24 RX ORDER — ICOSAPENT ETHYL 1000 MG/1
2 CAPSULE ORAL 2 TIMES DAILY
Qty: 360 CAPSULE | Refills: 1 | Status: SHIPPED | OUTPATIENT
Start: 2022-09-24

## 2022-10-19 DIAGNOSIS — J30.89 NON-SEASONAL ALLERGIC RHINITIS, UNSPECIFIED TRIGGER: ICD-10-CM

## 2022-10-19 DIAGNOSIS — M10.9 GOUT INVOLVING TOE OF RIGHT FOOT, UNSPECIFIED CAUSE, UNSPECIFIED CHRONICITY: ICD-10-CM

## 2022-10-19 RX ORDER — COLCHICINE 0.6 MG/1
0.6 TABLET ORAL EVERY OTHER DAY
Qty: 45 TABLET | Refills: 0 | Status: CANCELLED | OUTPATIENT
Start: 2022-10-19

## 2022-10-20 RX ORDER — LORATADINE 10 MG/1
10 TABLET ORAL DAILY PRN
Qty: 90 TABLET | Refills: 3 | Status: SHIPPED | OUTPATIENT
Start: 2022-10-20

## 2022-10-20 RX ORDER — COLCHICINE 0.6 MG/1
0.6 TABLET ORAL EVERY OTHER DAY
Qty: 45 TABLET | Refills: 0 | OUTPATIENT
Start: 2022-10-20

## 2022-10-20 RX ORDER — ALLOPURINOL 100 MG/1
200 TABLET ORAL DAILY
Qty: 180 TABLET | Refills: 1 | Status: SHIPPED | OUTPATIENT
Start: 2022-10-20

## 2022-10-20 NOTE — TELEPHONE ENCOUNTER
Comments: This request is coming from the pharmacy. Last Office Visit (last PCP visit):   9/14/2022    Next Visit Date:  Future Appointments   Date Time Provider Vincenzo Diane   12/14/2022  9:00 AM Irish Low  [place] [address]       If hasn't been seen in over a year OR hasn't followed up according to last diabetes/ADHD visit, make appointment for patient before sending refill to provider.     Rx requested:  Requested Prescriptions     Pending Prescriptions Disp Refills    loratadine (CLARITIN) 10 MG tablet 90 tablet 3     Sig: Take 1 tablet by mouth daily as needed (allergies)    colchicine (COLCRYS) 0.6 MG tablet 45 tablet 0     Sig: Take 1 tablet by mouth every other day

## 2022-12-14 ENCOUNTER — OFFICE VISIT (OUTPATIENT)
Dept: FAMILY MEDICINE CLINIC | Age: 72
End: 2022-12-14

## 2022-12-14 VITALS
HEART RATE: 66 BPM | HEIGHT: 66 IN | BODY MASS INDEX: 38.09 KG/M2 | WEIGHT: 237 LBS | SYSTOLIC BLOOD PRESSURE: 120 MMHG | RESPIRATION RATE: 14 BRPM | DIASTOLIC BLOOD PRESSURE: 70 MMHG | OXYGEN SATURATION: 96 %

## 2022-12-14 DIAGNOSIS — N18.30 STAGE 3 CHRONIC KIDNEY DISEASE, UNSPECIFIED WHETHER STAGE 3A OR 3B CKD (HCC): ICD-10-CM

## 2022-12-14 DIAGNOSIS — E78.1 HYPERTRIGLYCERIDEMIA: ICD-10-CM

## 2022-12-14 DIAGNOSIS — Z87.39 HISTORY OF GOUT: ICD-10-CM

## 2022-12-14 DIAGNOSIS — I10 ESSENTIAL HYPERTENSION: ICD-10-CM

## 2022-12-14 DIAGNOSIS — E11.9 TYPE 2 DIABETES MELLITUS WITHOUT COMPLICATION, WITHOUT LONG-TERM CURRENT USE OF INSULIN (HCC): Primary | ICD-10-CM

## 2022-12-14 DIAGNOSIS — N18.31 TYPE 2 DIABETES MELLITUS WITH STAGE 3A CHRONIC KIDNEY DISEASE, WITHOUT LONG-TERM CURRENT USE OF INSULIN (HCC): ICD-10-CM

## 2022-12-14 DIAGNOSIS — E11.22 TYPE 2 DIABETES MELLITUS WITH STAGE 3A CHRONIC KIDNEY DISEASE, WITHOUT LONG-TERM CURRENT USE OF INSULIN (HCC): ICD-10-CM

## 2022-12-14 LAB — HBA1C MFR BLD: 7.1 %

## 2022-12-14 RX ORDER — ATORVASTATIN CALCIUM 40 MG/1
40 TABLET, FILM COATED ORAL DAILY
Qty: 90 TABLET | Refills: 1 | Status: SHIPPED | OUTPATIENT
Start: 2022-12-14 | End: 2023-03-14

## 2022-12-14 RX ORDER — ICOSAPENT ETHYL 1000 MG/1
2 CAPSULE ORAL 2 TIMES DAILY
Qty: 180 CAPSULE | Refills: 3 | Status: SHIPPED | OUTPATIENT
Start: 2022-12-14 | End: 2023-03-14

## 2022-12-14 RX ORDER — POTASSIUM CHLORIDE 20 MEQ/1
TABLET, EXTENDED RELEASE ORAL
Qty: 180 TABLET | Refills: 3 | Status: SHIPPED | OUTPATIENT
Start: 2022-12-14

## 2022-12-14 ASSESSMENT — ENCOUNTER SYMPTOMS
NAUSEA: 0
DIARRHEA: 0
RECTAL PAIN: 0
PHOTOPHOBIA: 0
EYE PAIN: 0
CHEST TIGHTNESS: 0
SINUS PAIN: 0
EYE ITCHING: 0
WHEEZING: 0
FACIAL SWELLING: 0
CONSTIPATION: 0
SINUS PRESSURE: 0
SORE THROAT: 0
SHORTNESS OF BREATH: 0
EYE DISCHARGE: 0
BLOOD IN STOOL: 0
TROUBLE SWALLOWING: 0
BACK PAIN: 0
COLOR CHANGE: 0
APNEA: 0
VOMITING: 0
ABDOMINAL PAIN: 0
COUGH: 0
RHINORRHEA: 0
ABDOMINAL DISTENTION: 0
VOICE CHANGE: 0
EYE REDNESS: 0

## 2022-12-14 NOTE — PROGRESS NOTES
Subjective:      Patient ID: Sarah Charles is a 81 y.o. male Established patient, here for evaluation of the following chief complaint(s):  Chief Complaint   Patient presents with    New Patient       HPI    68-year-old diabetic hypertensive male with a history of hypertriglyceridemia gout and hypertension presents to establish continuity with me as his primary care doctor        Essential hypertension-lasix 40 mg daily , lotensino 20 mg daily-metoprolol 50 mg orally daily    Stage 3 chronic kidney disease, unspecified whether stage 3a or 3b CKD ([facility])-monitoring renal function closely. Type 2 diabetes mellitus with stage 3a chronic kidney disease, without long-term current use of insulin (HCC)-metformin 500 mg orally daily    Hypertriglyceridemia-compliant with Vascepa 1 g daily    History of gout colchicine as needed as needed. At present he denies polyuria,  Polydipsia, constitutional, sinus, visual, cardiopulmonary, urologic, gastrointestinal, immunologic/hematologic, musculoskeletal, neurologic,dermatologic, or psychiatric complaints.         Current Outpatient Medications on File Prior to Visit   Medication Sig Dispense Refill    loratadine (CLARITIN) 10 MG tablet Take 1 tablet by mouth daily as needed (allergies) 90 tablet 3    VASCEPA 1 g CAPS capsule Take 2 capsules by mouth in the morning and at bedtime DAW1 (Patient taking differently: Take 2 capsules by mouth in the morning and at bedtime) 360 capsule 1    Magnesium Oxide 200 MG TABS TAKE 1 TABLET BY MOUTH EVERY DAY 90 tablet 1    colchicine (COLCRYS) 0.6 MG tablet TAKE 1 TABLET BY MOUTH EVERY OTHER DAY 45 tablet 0    metFORMIN (GLUCOPHAGE) 500 MG tablet TAKE 1 TABLETS BY MOUTH TWICE A DAY WITH MEALS 180 tablet 3    metoprolol succinate (TOPROL XL) 50 MG extended release tablet Take 1 tablet by mouth daily 90 tablet 3    benazepril (LOTENSIN) 20 MG tablet TAKE 1 TABLET BY MOUTH EVERY DAY 90 tablet 3    doxepin (SINEQUAN) 10 MG capsule TAKE 1 CAPSULE BY MOUTH EVERY NIGHT 90 capsule 3    furosemide (LASIX) 40 MG tablet TAKE 1 TABLET BY MOUTH EVERY DAY 90 tablet 3    [DISCONTINUED] KLOR-CON M20 20 MEQ extended release tablet TAKE 1 TABLET BY MOUTH THREE TIMES A  tablet 1    Multiple Vitamins-Minerals (MULTIVITAMIN PO) Take 1 tablet by mouth daily. No current facility-administered medications on file prior to visit.       Dye [barium-containing compounds], Iodides, and Iodinated diagnostic agents  Past Medical History:   Diagnosis Date    Acute idiopathic gout of right foot     Anemia 03/01/2018    iron transfusions x2    Arthritis     both knees    Bilateral carpal tunnel syndrome 1/12/2022    Chronic kidney disease     CLL (chronic lymphocytic leukemia) (HCC)     dx 5/2015    Colon polyps     Disease of blood and blood forming organ     Hyperlipidemia     dx since 1970's    Hypertension     meds  since 1970's    Lymphoma of gastrointestinal tract (HCC)     Movement disorder     Type II or unspecified type diabetes mellitus without mention of complication, not stated as uncontrolled     hx > 20 yrs     Past Surgical History:   Procedure Laterality Date    APPENDECTOMY      age 27s    CARPAL TUNNEL RELEASE Left 1/26/2022    LEFT CARPAL TUNNEL RELEASE performed by Bulah Mohs, MD at [place] [address] Right 3/9/2022    CARPAL TUNNEL RELEASE RIGHT performed by Bulah Mohs, MD at [address] [place] [address] N/A 07/07/2016    COLONOSCOPY  4/1/16    w/polypectomy     COLONOSCOPY N/A 6/15/2021    COLORECTAL CANCER SCREENING, HIGH RISK performed by Art Gallegos MD at [address] [place] [address], DIAGNOSTIC      JOINT REPLACEMENT Bilateral 2018    TKR    OTHER SURGICAL HISTORY Right 06/08/16    I & D ABSCESS THIGH    [place] MANIPULATN KNEE JT+ANESTHESIA Right 3/15/2018    RIGHT KNEE MANIPULATION performed by Olegario Baumgarten, MD at [address] [place] [address]  1/18/2018    RIGHT KNEE TOTAL KNEE ARTHROPLASTY ELÍAS SPINAL,NERVE BLOCK performed by Meghna Nava MD at [address] Left 5/17/2018    LEFT KNEE TOTAL KNEE ARTHROPLASTY, performed by Meghna Nava MD at [facility]    due to displacement    UPPER GASTROINTESTINAL ENDOSCOPY  4/29/15    w/rufino nj    UPPER GASTROINTESTINAL ENDOSCOPY  02/28/2018    Dr. Cifuentes Crest History     Socioeconomic History    Marital status:      Spouse name: Not on file    Number of children: Not on file    Years of education: Not on file    Highest education level: Not on file   Occupational History    Occupation: retired   Tobacco Use    Smoking status: Never    Smokeless tobacco: Never   Vaping Use    Vaping Use: Never used   Substance and Sexual Activity    Alcohol use: Yes     Comment: rare social    Drug use: No    Sexual activity: Yes     Partners: Female   Other Topics Concern    Not on file   Social History Narrative    Lives w wife     Social Determinants of Health     Financial Resource Strain: Low Risk     Difficulty of Paying Living Expenses: Not hard at all   Food Insecurity: No Food Insecurity    Worried About Running Out of Food in the Last Year: Never true    [address] in the Last Year: Never true   Transportation Needs: No Transportation Needs    Lack of Transportation (Medical): No    Lack of Transportation (Non-Medical):  No   Physical Activity: Insufficiently Active    Days of Exercise per Week: 4 days    Minutes of Exercise per Session: 30 min   Stress: Not on file   Social Connections: Not on file   Intimate Partner Violence: Not on file   Housing Stability: Not on file     Family History   Problem Relation Age of Onset    Heart Disease Mother 67        CHF    Cancer Father 68        liver/ pancreatic    No Known Problems Sister     Cancer Brother         CLL    Arthritis Brother         Bilateral TKR    No Known Problems Daughter     Colon Cancer Neg Hx          Review of Systems   Constitutional:  Negative for chills, diaphoresis, fatigue and fever. HENT:  Negative for congestion, dental problem, drooling, ear discharge, ear pain, facial swelling, hearing loss, mouth sores, nosebleeds, postnasal drip, rhinorrhea, sinus pressure, sinus pain, sneezing, sore throat, tinnitus, trouble swallowing and voice change. Eyes:  Negative for photophobia, pain, discharge, redness, itching and visual disturbance. Respiratory:  Negative for apnea, cough, chest tightness, shortness of breath and wheezing. Cardiovascular:  Negative for chest pain, palpitations and leg swelling. Gastrointestinal:  Negative for abdominal distention, abdominal pain, blood in stool, constipation, diarrhea, nausea, rectal pain and vomiting. Endocrine: Negative for cold intolerance, heat intolerance, polydipsia, polyphagia and polyuria. Genitourinary:  Negative for decreased urine volume, difficulty urinating, dysuria, flank pain, frequency, genital sores, hematuria and urgency. Musculoskeletal:  Negative for arthralgias, back pain, gait problem, joint swelling, myalgias, neck pain and neck stiffness. Skin:  Negative for color change, rash and wound. Allergic/Immunologic: Negative for environmental allergies and food allergies. Neurological:  Negative for dizziness, tremors, seizures, syncope, facial asymmetry, speech difficulty, weakness, light-headedness, numbness and headaches. Hematological:  Negative for adenopathy. Does not bruise/bleed easily. Psychiatric/Behavioral:  Negative for agitation, confusion, decreased concentration, hallucinations, self-injury, sleep disturbance and suicidal ideas. The patient is not nervous/anxious. Objective:   /70   Pulse 66   Resp 14   Ht 5' 6\" (1.676 m)   Wt 237 lb (107.5 kg)   SpO2 96%   BMI 38.25 kg/m²     Physical Exam  Constitutional:       General: He is not in acute distress.      Appearance: He is well-developed. HENT:      Head: Normocephalic. Right Ear: External ear normal.      Left Ear: External ear normal.   Eyes:      Conjunctiva/sclera: Conjunctivae normal.      Pupils: Pupils are equal, round, and reactive to light. Neck:      Vascular: No JVD. Trachea: No tracheal deviation. Cardiovascular:      Rate and Rhythm: Normal rate and regular rhythm. Heart sounds: Normal heart sounds. Pulmonary:      Effort: Pulmonary effort is normal. No respiratory distress. Breath sounds: Normal breath sounds. No wheezing or rales. Chest:      Chest wall: No tenderness. Abdominal:      General: Bowel sounds are normal. There is no distension. Palpations: Abdomen is soft. There is no mass. Tenderness: There is no abdominal tenderness. There is no guarding or rebound. Musculoskeletal:         General: No tenderness or deformity. Cervical back: Neck supple. Lymphadenopathy:      Cervical: No cervical adenopathy. Skin:     General: Skin is warm and dry. Coloration: Skin is not pale. Findings: No erythema or rash. Neurological:      Mental Status: He is alert and oriented to person, place, and time. Motor: No abnormal muscle tone. Psychiatric:         Thought Content: Thought content normal.         Judgment: Judgment normal.       Assessment:       Diagnosis Orders   1. Type 2 diabetes mellitus without complication, without long-term current use of insulin ([facility])  POCT glycosylated hemoglobin (Hb A1C)      2. Essential hypertension  potassium chloride (KLOR-CON M20) 20 MEQ extended release tablet      3. Stage 3 chronic kidney disease, unspecified whether stage 3a or 3b CKD ([address]        4. Type 2 diabetes mellitus with stage 3a chronic kidney disease, without long-term current use of insulin ([address]        5. Hypertriglyceridemia        6.  History of gout             No results found for: LIPIDPAN, BMP, CMP, CBC, CBCAUTODIF  Plan:      Chalino Mariscal was seen today for new patient. Diagnoses and all orders for this visit:    Type 2 diabetes mellitus without complication, without long-term current use of insulin (HCC)  -     POCT glycosylated hemoglobin (Hb A1C)    Essential hypertension  -     potassium chloride (KLOR-CON M20) 20 MEQ extended release tablet; 1 tab po bid. (CALL ME IF YOU ARE TAKING A DIFFERENT DOSE)  Continue Lotensin and Lasix. Potassium 20 mEq daily    Stage 3 chronic kidney disease, unspecified whether stage 3a or 3b CKD (HCC)-monitoring renal function closely    Type 2 diabetes mellitus with stage 3a chronic kidney disease, without long-term current use of insulin ([facility])    Hypertriglyceridemia    History of gout-colchicine as warranted    Other orders  -     atorvastatin (LIPITOR) 40 MG tablet; Take 1 tablet by mouth daily TAKE 1 TABLET BY MOUTH EVERY EVENING (Take 1/2 tablet po once daily while on colchicine)       No follow-ups on file. On this date 12/14/22 I have spent 30 minutes reviewing previous notes, test results and face to face with the patient discussing the diagnosis and importance of compliance with the treatment plan. David Gaffney MD    Please note, this report has been partially produced using speech recognition software  and may cause  and /or contain errors related to that system including grammar, punctuation and spelling as well as words and phrases that may seem inappropriate. If there are questions or concerns please feel free to contact me to clarify.

## 2023-01-01 NOTE — BRIEF OP NOTE
Brief Postoperative Note  ______________________________________________________________    Patient: Willie Best  YOB: 1950  MRN: [id number]  Date of Procedure: 1/18/2018    Pre-Op Diagnosis: RIGHT KNEE OSTEOARTHRITIS    Post-Op Diagnosis: Same       Procedure(s):  RIGHT KNEE TOTAL KNEE ARTHROPLASTY ELÍAS SPINAL,NERVE BLOCK    Anesthesia: Regional, Spinal    Surgeon(s): Ankush Alonzo MD    Staff:  Surgical Assistant: Feliberto Omer Person First: [facility]  Physician Assistant: Diana Gonzalez, [id number]     Estimated Blood Loss: * No values recorded between 1/18/2018  8:23 AM and 1/18/2018  3:33 AM * mL    Complications: None    Specimens:   ID Type Source Tests Collected by Time Destination   1 : UA C+S Body Fluid Bladder URINE CULTURE Ankush Alonzo MD 1/18/2018 2819        Implants:    Implant Name Type Inv.  Item Serial No.  Lot No. LRB No. Used   CEMENT FULL DOSE SIMPLEX HV Fastener CEMENT FULL DOSE SIMPLEX HV  ELÍ[employer]: [employer] [id number] Right 2   IMPL KNEE PATELLA ASYM [id number] Knee IMPL KNEE PATELLA ASYM [id number]  ELÍ[employer]: ORTH[employer] [id number] Right 1   IMPL KNEE TIB INSRT TRIATHLON SZ 6 9MM Knee IMPL KNEE TIB INSRT TRIATHLON SZ 6 9MM  ELÍ[employer]: ORTH[employer] [id number] Right 1   IMPL KNEE FEM COMP CMNTD SZ 6 Knee IMPL KNEE FEM COMP CMNTD SZ 6  ELÍ[employer]: ORTH[employer] [id number] Right 1   IMPL KNEE TIB BASEPLT PRIME SZ 6 Knee IMPL KNEE TIB BASEPLT PRIME SZ 6   ELÍAS: ORTHOPA[employer] [id number] Right 1         Drains:   Urethral Catheter Latex 16 fr (Active)   Urine Color Yellow 1/18/2018  9:04 AM   Urine Appearance Clear 1/18/2018  9:04 AM       Findings: trinidad Alonzo MD  Date: 1/18/2018  Time: 9:31 AM 0

## 2023-01-28 NOTE — H&P
/96 T 97.5 HR 62 RR 16 oxygen sat RA 96%; not in distress, alert and responsive No acute distress noted. Patient Name: Greyson Chou  : 1950  MRN: [id number]  DATE: 06/15/21      ENDOSCOPY  History and Physical    Procedure:    [x] Diagnostic Colonoscopy       [] Screening Colonoscopy  [] EGD      [] ERCP      [] EUS       [] Other    [x] Previous office notes/History and Physical reviewed from the patients chart. Please see EMR for further details of HPI. I have examined the patient's status immediately prior to the procedure and:      Indications/HPI:    []Abdominal Pain   []Cancer- GI/Lung  []Fhx of colon CA/polyps  []History of Polyps   []Ojedas   []Melena  []Abnormal Imaging   []Dysphagia    []Persistent Pneumonia  []Anemia   []Food Impaction  [x]History of Polyps  []GI Bleed   []Pulmonary nodule/Mass  []Change in bowel habits  []Heartburn/Reflux  []Rectal Bleed (BRBPR)  []Chest Pain - Non Cardiac  []Heme (+) Stool  []Ulcers  []Constipation   []Hemoptysis   []Varices  []Diarrhea   []Hypoxemia  []Nausea/Vomiting   []Screening   []Crohns/Colitis  []Other:    Anesthesia:   [x] MAC [] Moderate Sedation   [] General   [] None     ROS: 12 pt Review of Symptoms was negative unless mentioned above    Medications:   Prior to Admission medications    Medication Sig Start Date End Date Taking?  Authorizing Provider   loratadine (CLARITIN) 10 MG tablet TAKE 1 TABLET BY MOUTH DAILY AS NEEDED (ALLERGIES). 21  Yes Abdirahman Aguilar MD   allopurinol (ZYLOPRIM) 100 MG tablet Take 1 tablet by mouth daily 21  Yes Abdirahman Aguilar MD   metoprolol succinate (TOPROL XL) 50 MG extended release tablet Take 1 tablet by mouth daily 21  Yes Abdirahman Aguilar MD   benazepril (LOTENSIN) 20 MG tablet TAKE 1 TABLET BY MOUTH EVERY DAY 3/11/21  Yes Abdirahman Aguilar MD   KLOR-CON M20 20 MEQ extended release tablet TAKE 1 TABLET BY MOUTH THREE TIMES A DAY 21  Yes Abdirahman Aguilar MD   atorvastatin (LIPITOR) 40 MG tablet TAKE 1 TABLET BY MOUTH EVERY EVENING 10/2/20  Yes Abdirahman Aguilar MD   doxepin (SINEQUAN) 10 MG capsule Take 1 pt is alert and confused. no distress noted. Patient in no acute distress. vitals wnl. patient was suctioned. congestion and expiratory wheeze heard on exam. capsule by mouth nightly 9/17/20  Yes Star Hernández MD   furosemide (LASIX) 40 MG tablet TAKE 1 TABLET DAILY 8/26/20  Yes Star Hernández MD   aspirin 81 MG EC tablet Take 1 tablet by mouth 2 times daily  Patient taking differently: Take 81 mg by mouth daily  1/20/18  Yes Zollie Adjutant, APRN - CNP   Multiple Vitamins-Minerals (MULTIVITAMIN PO) Take 1 tablet by mouth daily. Yes Historical Provider, MD   predniSONE (DELTASONE) 10 MG tablet 4 PO each day for three days, 3 PO each day for three days, 2 PO each day for three days ,1 PO each day until gone 5/17/21   Star Hernández MD   metFORMIN (GLUCOPHAGE) 500 MG tablet TAKE 2 TABLETS BY MOUTH TWICE A DAY WITH MEALAS 2/26/21   Star Hernández MD   Pseudoephedrine-guaiFENesin ([address] PO) Take 1 tablet by mouth 2 times daily    Historical Provider, MD   KLOR-CON M20 20 MEQ extended release tablet TAKE 1 TABLET BY MOUTH THREE TIMES A DAY 6/20/20   Star Hernández MD   Alcohol Swabs PADS Use as directed with insulin injections 11/12/14   Star Hernández MD       Allergies:    Allergies   Allergen Reactions    Dye [Barium-Containing Compounds] Hives    Iodides     Iodinated Diagnostic Agents Hives        History of allergic reaction to anesthesia:  No    Past Medical History:  Past Medical History:   Diagnosis Date    Acute idiopathic gout of right foot     Anemia 03/01/2018    iron transfusions x2    Arthritis     both knees    Chronic kidney disease     CLL (chronic lymphocytic leukemia) (HCC)     Colon polyps     Disease of blood and blood forming organ     Hyperlipidemia     dx since 1970's    Hypertension     meds  since 1970's    [facility]oma [facility]     Movement disorder     Thyroid cancer ([facility] 02/2018    - denies states it was CLL    Type II or unspecified type diabetes mellitus without mention of complication, not stated as uncontrolled     hx > 15 yrs       Past Surgical History:  Past Surgical History:   Procedure Laterality Date    APPENDECTOMY      CHOLECYSTECTOMY N/A 07/07/2016    COLONOSCOPY  4/1/16    w/polypectomy     ENDOSCOPY, COLON, DIAGNOSTIC      JOINT REPLACEMENT Bilateral     knees    OTHER SURGICAL HISTORY Right 06/08/16    I & D ABSCESS THIGH    [place] MANIPULAT[place] KNEE JT+ANESTHESIA Right 3/15/2018    RIGHT KNEE MANIPULATION performed by Ervin Irwin MD at [facility] Right 1/18/2018    RIGHT KNEE TOTAL KNEE ARTHROPLASTY ELÍAS [place]and performed by Ervin Irwin MD at [facility] Left 5/17/2018    LEFT KNEE TOTAL KNEE ARTHROPLASTY, performed by [facility]vin [facility] MD at [facility] Right     due to displacement    UPPER GASTROINTESTINAL ENDOSCOPY  4/29/15    w/bx     UPPER GASTROINTESTINAL ENDOSCOPY  02/28/2018    Dr. Tiffany Kilpatrick       Social History:  Social History     Tobacco Use    Smoking status: Never Smoker    Smokeless tobacco: Never Used   Vaping Use    Vaping Use: Never used   Substance Use Topics    Alcohol use: Yes     Comment: rare social    Drug use: No       Vital Signs:   Vitals:    06/15/21 0654   BP: (!) 154/74   Pulse: 81   Resp: 18   Temp: 98.5 °F (36.9 °C)   SpO2: 96%        Physical Exam:  Cardiac:  [x]WNL  []Comments:  Pulmonary:  [x]WNL   []Comments:   Neuro/Mental Status:  [x]WNL  []Comments:  Abdominal:  [x]WNL    []Comments:  Other:   []WNL  []Comments:    Informed Consent:  The risks and benefits of the procedure have been discussed with either the patient or if they cannot consent, their representative. Assessment:  Patient examined and appropriate for planned sedation and procedure. Plan:  Proceed with planned sedation and procedure as above.     Eric Brooks MD  7:22 AM pt is alert and confused. no distress noted. Patient's HR 50, no acute distress noted. Pt remains AOX1-2 no s/s of distress or discomfort at this time. Spouse at the bedside Patient HR 52. Patient is asymptomatic.

## 2023-02-09 RX ORDER — FUROSEMIDE 40 MG/1
TABLET ORAL
Qty: 90 TABLET | Refills: 3 | Status: SHIPPED | OUTPATIENT
Start: 2023-02-09

## 2023-02-10 NOTE — TELEPHONE ENCOUNTER
Rx requested:  Requested Prescriptions     Pending Prescriptions Disp Refills    Magnesium Oxide 200 MG TABS 90 tablet 1     Sig: TAKE 1 TABLET BY MOUTH EVERY DAY         Last Office Visit:   12/14/2022 9/14/2022 with Darrell German      Next Visit Date:  Future Appointments   Date Time Provider Vincenzo Contreras   3/16/2023  9:30 AM Julian Lo  [place], [place] [address]

## 2023-02-11 RX ORDER — ADHESIVE TAPE 3"X 2.3 YD
TAPE, NON-MEDICATED TOPICAL
Qty: 90 TABLET | Refills: 1 | Status: SHIPPED | OUTPATIENT
Start: 2023-02-11

## 2023-02-12 DIAGNOSIS — F51.04 PSYCHOPHYSIOLOGICAL INSOMNIA: ICD-10-CM

## 2023-02-13 RX ORDER — DOXEPIN HYDROCHLORIDE 10 MG/1
10 CAPSULE ORAL NIGHTLY
Qty: 90 CAPSULE | Refills: 1 | Status: SHIPPED | OUTPATIENT
Start: 2023-02-13

## 2023-02-13 NOTE — TELEPHONE ENCOUNTER
Future Appointments    Encounter Information    Provider Department Appt Notes   3/16/2023 Jennifer Dunlap MD [facility] 3 Month follow up     Past Visits    Date Provider Specialty Visit Type Primary Dx   12/14/2022 Jennifer Dunlap MD Family Medicine Office Visit Type 2 diabetes mellitus without complication, without long-term current use of insulin ([facility]

## 2023-02-23 RX ORDER — BENAZEPRIL HYDROCHLORIDE 20 MG/1
TABLET ORAL
Qty: 90 TABLET | Refills: 3 | Status: SHIPPED | OUTPATIENT
Start: 2023-02-23

## 2023-02-27 NOTE — TELEPHONE ENCOUNTER
patient requesting medication refill.  Please approve or deny this request.    Rx requested:  Requested Prescriptions     Pending Prescriptions Disp Refills    Magnesium Oxide 200 MG TABS 90 tablet 1     Sig: TAKE 1 TABLET BY MOUTH EVERY DAY         Last Office Visit:   12/14/2022      Next Visit Date:  Future Appointments   Date Time Provider Vincenzo Contreras   3/16/2023  9:30 AM Grecia Draper  [place], [place]

## 2023-02-28 RX ORDER — ADHESIVE TAPE 3"X 2.3 YD
TAPE, NON-MEDICATED TOPICAL
Qty: 90 TABLET | Refills: 1 | OUTPATIENT
Start: 2023-02-28

## 2023-03-10 NOTE — TELEPHONE ENCOUNTER
Comments:     Last Office Visit (last PCP visit):   12/14/2022    Next Visit Date:  Future Appointments   Date Time Provider Vincenzo Branchisti   3/16/2023  9:30 AM Megha Trent  [place], [place]       **If hasn't been seen in over a year OR hasn't followed up according to last diabetes/ADHD visit, make appointment for patient before sending refill to provider.     Rx requested:  Requested Prescriptions     Pending Prescriptions Disp Refills    metFORMIN (GLUCOPHAGE) 500 MG tablet [Pharmacy Med Name: METFORMIN  MG TABLET] 180 tablet 3     Sig: TAKE 1 TABLETS BY MOUTH TWICE A DAY WITH MEALS

## 2023-03-14 SDOH — ECONOMIC STABILITY: HOUSING INSECURITY
IN THE LAST 12 MONTHS, WAS THERE A TIME WHEN YOU DID NOT HAVE A STEADY PLACE TO SLEEP OR SLEPT IN A SHELTER (INCLUDING NOW)?: NO

## 2023-03-14 SDOH — ECONOMIC STABILITY: INCOME INSECURITY: HOW HARD IS IT FOR YOU TO PAY FOR THE VERY BASICS LIKE FOOD, HOUSING, MEDICAL CARE, AND HEATING?: NOT HARD AT ALL

## 2023-03-14 SDOH — ECONOMIC STABILITY: FOOD INSECURITY: WITHIN THE PAST 12 MONTHS, YOU WORRIED THAT YOUR FOOD WOULD RUN OUT BEFORE YOU GOT MONEY TO BUY MORE.: NEVER TRUE

## 2023-03-14 SDOH — ECONOMIC STABILITY: FOOD INSECURITY: WITHIN THE PAST 12 MONTHS, THE FOOD YOU BOUGHT JUST DIDN'T LAST AND YOU DIDN'T HAVE MONEY TO GET MORE.: NEVER TRUE

## 2023-03-16 ENCOUNTER — OFFICE VISIT (OUTPATIENT)
Dept: FAMILY MEDICINE CLINIC | Age: 73
End: 2023-03-16

## 2023-03-16 VITALS
HEIGHT: 66 IN | SYSTOLIC BLOOD PRESSURE: 108 MMHG | OXYGEN SATURATION: 98 % | DIASTOLIC BLOOD PRESSURE: 60 MMHG | BODY MASS INDEX: 37.93 KG/M2 | WEIGHT: 236 LBS | RESPIRATION RATE: 14 BRPM | HEART RATE: 68 BPM

## 2023-03-16 DIAGNOSIS — E11.9 TYPE 2 DIABETES MELLITUS WITHOUT COMPLICATION, WITHOUT LONG-TERM CURRENT USE OF INSULIN (HCC): ICD-10-CM

## 2023-03-16 DIAGNOSIS — I10 ESSENTIAL HYPERTENSION: ICD-10-CM

## 2023-03-16 DIAGNOSIS — C91.10 CLL (CHRONIC LYMPHOCYTIC LEUKEMIA) (HCC): ICD-10-CM

## 2023-03-16 DIAGNOSIS — E11.22 TYPE 2 DIABETES MELLITUS WITH STAGE 3A CHRONIC KIDNEY DISEASE, WITHOUT LONG-TERM CURRENT USE OF INSULIN (HCC): ICD-10-CM

## 2023-03-16 DIAGNOSIS — E78.1 HYPERTRIGLYCERIDEMIA: ICD-10-CM

## 2023-03-16 DIAGNOSIS — N18.31 TYPE 2 DIABETES MELLITUS WITH STAGE 3A CHRONIC KIDNEY DISEASE, WITHOUT LONG-TERM CURRENT USE OF INSULIN (HCC): ICD-10-CM

## 2023-03-16 DIAGNOSIS — E66.01 SEVERE OBESITY (BMI 35.0-39.9) WITH COMORBIDITY (HCC): ICD-10-CM

## 2023-03-16 DIAGNOSIS — E11.9 TYPE 2 DIABETES MELLITUS WITHOUT COMPLICATION, WITHOUT LONG-TERM CURRENT USE OF INSULIN (HCC): Primary | ICD-10-CM

## 2023-03-16 DIAGNOSIS — Z12.5 PROSTATE CANCER SCREENING: ICD-10-CM

## 2023-03-16 DIAGNOSIS — N18.30 STAGE 3 CHRONIC KIDNEY DISEASE, UNSPECIFIED WHETHER STAGE 3A OR 3B CKD (HCC): ICD-10-CM

## 2023-03-16 LAB
ALBUMIN SERPL-MCNC: 4.3 G/DL (ref 3.5–4.6)
ALP SERPL-CCNC: 92 U/L (ref 35–104)
ALT SERPL-CCNC: 24 U/L (ref 0–41)
ANION GAP SERPL CALCULATED.3IONS-SCNC: 12 MEQ/L (ref 9–15)
AST SERPL-CCNC: 23 U/L (ref 0–40)
BASOPHILS # BLD: 0 K/UL (ref 0–0.2)
BASOPHILS NFR BLD: 0.3 %
BILIRUB SERPL-MCNC: 0.5 MG/DL (ref 0.2–0.7)
BUN SERPL-MCNC: 42 MG/DL (ref 8–23)
CALCIUM SERPL-MCNC: 8.9 MG/DL (ref 8.5–9.9)
CHLORIDE SERPL-SCNC: 101 MEQ/L (ref 95–107)
CHOLEST SERPL-MCNC: 111 MG/DL (ref 0–199)
CO2 SERPL-SCNC: 24 MEQ/L (ref 20–31)
CREAT SERPL-MCNC: 1.99 MG/DL (ref 0.7–1.2)
CREAT UR-MCNC: 35.3 MG/DL
EOSINOPHIL # BLD: 0.1 K/UL (ref 0–0.7)
EOSINOPHIL NFR BLD: 1.5 %
ERYTHROCYTE [DISTWIDTH] IN BLOOD BY AUTOMATED COUNT: 15.1 % (ref 11.5–14.5)
GLOBULIN SER CALC-MCNC: 3.7 G/DL (ref 2.3–3.5)
GLUCOSE SERPL-MCNC: 126 MG/DL (ref 70–99)
HBA1C MFR BLD: 7.1 %
HCT VFR BLD AUTO: 36.9 % (ref 42–52)
HDLC SERPL-MCNC: 22 MG/DL (ref 40–59)
HGB BLD-MCNC: 12.1 G/DL (ref 14–18)
LDLC SERPL CALC-MCNC: 49 MG/DL (ref 0–129)
LYMPHOCYTES # BLD: 2.5 K/UL (ref 1–4.8)
LYMPHOCYTES NFR BLD: 44.3 %
MCH RBC QN AUTO: 31.3 PG (ref 27–31.3)
MCHC RBC AUTO-ENTMCNC: 32.8 % (ref 33–37)
MCV RBC AUTO: 95.4 FL (ref 79–92.2)
MICROALBUMIN UR-MCNC: <1.2 MG/DL
MICROALBUMIN/CREAT UR-RTO: NORMAL MG/G (ref 0–30)
MONOCYTES # BLD: 0.5 K/UL (ref 0.2–0.8)
MONOCYTES NFR BLD: 8.3 %
NEUTROPHILS # BLD: 2.6 K/UL (ref 1.4–6.5)
NEUTS SEG NFR BLD: 45.6 %
PLATELET # BLD AUTO: 210 K/UL (ref 130–400)
POTASSIUM SERPL-SCNC: 4.6 MEQ/L (ref 3.4–4.9)
PROT SERPL-MCNC: 8 G/DL (ref 6.3–8)
PSA SERPL-MCNC: 1.59 NG/ML (ref 0–4)
RBC # BLD AUTO: 3.86 M/UL (ref 4.7–6.1)
SODIUM SERPL-SCNC: 137 MEQ/L (ref 135–144)
TRIGL SERPL-MCNC: 200 MG/DL (ref 0–150)
WBC # BLD AUTO: 5.7 K/UL (ref 4.8–10.8)

## 2023-03-16 RX ORDER — METOPROLOL SUCCINATE 50 MG/1
TABLET, EXTENDED RELEASE ORAL
Qty: 90 TABLET | Refills: 3 | Status: SHIPPED | OUTPATIENT
Start: 2023-03-16

## 2023-03-16 ASSESSMENT — PATIENT HEALTH QUESTIONNAIRE - PHQ9
SUM OF ALL RESPONSES TO PHQ9 QUESTIONS 1 & 2: 0
2. FEELING DOWN, DEPRESSED OR HOPELESS: 0
SUM OF ALL RESPONSES TO PHQ QUESTIONS 1-9: 0
1. LITTLE INTEREST OR PLEASURE IN DOING THINGS: 0
SUM OF ALL RESPONSES TO PHQ QUESTIONS 1-9: 0

## 2023-03-16 ASSESSMENT — ENCOUNTER SYMPTOMS
RHINORRHEA: 0
VOMITING: 0
SHORTNESS OF BREATH: 0
CHEST TIGHTNESS: 0
NAUSEA: 0
EYE ITCHING: 0
RECTAL PAIN: 0
EYE REDNESS: 0
BACK PAIN: 0
BLOOD IN STOOL: 0
APNEA: 0
ABDOMINAL PAIN: 0
EYE DISCHARGE: 0
WHEEZING: 0
TROUBLE SWALLOWING: 0
DIARRHEA: 0
SINUS PAIN: 0
EYE PAIN: 0
CONSTIPATION: 0
COLOR CHANGE: 0
PHOTOPHOBIA: 0
COUGH: 0
ABDOMINAL DISTENTION: 0
FACIAL SWELLING: 0
SORE THROAT: 0
VOICE CHANGE: 0
SINUS PRESSURE: 0

## 2023-03-16 NOTE — PROGRESS NOTES
Subjective:      Patient ID: Charissa Torres is a 81 y.o. male Established patient, here for evaluation of the following chief complaint(s):  Chief Complaint   Patient presents with    Diabetes    Hypertension       HPI    68-year-old diabetic hypertensive male with a history of hypertriglyceridemia gout and hypertension presents to establish continuity with me as his primary care doctor        Essential hypertension-lasix 40 mg daily , lotensin 20 mg daily, metoprolol 50 mg orally daily      Stage 3 chronic kidney disease, unspecified whether stage 3a or 3b CKD ([facility])-monitoring renal function closely. Type 2 diabetes mellitus with stage 3a chronic kidney disease, without long-term current use of insulin (HCC)-metformin 500 mg twice daily. Hypertriglyceridemia-compliant with Vascepa 1 g daily, Lipitor 40 mg daily     History of gout- Allopurinol. Colchicine as needed. At present he denies polyuria,  Polydipsia, constitutional, sinus, visual, cardiopulmonary, urologic, gastrointestinal, immunologic/hematologic, musculoskeletal, neurologic,dermatologic, or psychiatric complaints.         Current Outpatient Medications on File Prior to Visit   Medication Sig Dispense Refill    loratadine (CLARITIN) 10 MG tablet Take 1 tablet by mouth daily as needed (allergies) 90 tablet 3    metFORMIN (GLUCOPHAGE) 500 MG tablet TAKE 1 TABLETS BY MOUTH TWICE A DAY WITH MEALS 180 tablet 3    benazepril (LOTENSIN) 20 MG tablet TAKE 1 TABLET BY MOUTH EVERY DAY 90 tablet 3    doxepin (SINEQUAN) 10 MG capsule Take 1 capsule by mouth nightly 90 capsule 1    Magnesium Oxide 200 MG TABS TAKE 1 TABLET BY MOUTH EVERY DAY 90 tablet 1    furosemide (LASIX) 40 MG tablet TAKE 1 TABLET BY MOUTH EVERY DAY 90 tablet 3    atorvastatin (LIPITOR) 40 MG tablet Take 1 tablet by mouth daily TAKE 1 TABLET BY MOUTH EVERY EVENING (Take 1/2 tablet po once daily while on colchicine) 90 tablet 1    potassium chloride (KLOR-CON M20) 20 MEQ extended release tablet 1 tab po bid. (CALL ME IF YOU ARE TAKING A DIFFERENT DOSE) 180 tablet 3    VASCEPA 1 g CAPS capsule Take 2 capsules by mouth in the morning and at bedtime DAW1 (Patient taking differently: Take 2 capsules by mouth in the morning and at bedtime) 360 capsule 1    colchicine (COLCRYS) 0.6 MG tablet TAKE 1 TABLET BY MOUTH EVERY OTHER DAY 45 tablet 0    [DISCONTINUED] KLOR-CON M20 20 MEQ extended release tablet TAKE 1 TABLET BY MOUTH THREE TIMES A  tablet 1    Multiple Vitamins-Minerals (MULTIVITAMIN PO) Take 1 tablet by mouth daily. No current facility-administered medications on file prior to visit.       Dye [barium-containing compounds], Iodides, and Iodinated contrast media  Past Medical History:   Diagnosis Date    Acute idiopathic gout of right foot     Anemia 03/01/2018    iron transfusions x2    Arthritis     both knees    Bilateral carpal tunnel syndrome 1/12/2022    Chronic kidney disease     CLL (chronic lymphocytic leukemia) (HCC)     dx 5/2015    Colon polyps     Disease of blood and blood forming organ     Hyperlipidemia     dx since 1970's    Hypertension     meds  since 1970's    Lymphoma of gastrointestinal tract (HCC)     Movement disorder     Type II or unspecified type diabetes mellitus without mention of complication, not stated as uncontrolled     hx > 20 yrs     Past Surgical History:   Procedure Laterality Date    APPENDECTOMY      age 27s    CARPAL TUNNEL RELEASE Left 1/26/2022    LEFT CARPAL TUNNEL RELEASE performed by Bulah Mohs, MD at [address] [place] [address] Right 3/9/2022    CARPAL TUNNEL RELEASE RIGHT performed by Bulah Mohs, MD at [address] [place] [address] N/A 07/07/2016    COLONOSCOPY  4/1/16    w/polypectomy     COLONOSCOPY N/A 6/15/2021    COLORECTAL CANCER SCREENING, HIGH RISK performed by Art Gallegos MD at [facility], COLON, DIAGNOSTIC      JOINT REPLACEMENT Bilateral 2018    TKR    OTHER SURGICAL HISTORY Right 06/08/16    I & D ABSCESS THIGH    IN MANIPULATN KNEE JT+ANESTHESIA Right 3/15/2018    RIGHT KNEE MANIPULATION performed by Olegario Baumgarten, MD at [facility]  Right 1/18/2018    RIGHT KNEE TOTAL KNEE ARTHROPLASTY LEÍAS [place] performed by Olegario Baumgarten, MD at [address] [place] [address]  Left 5/17/2018    LEFT KNEE TOTAL KNEE ARTHROPLASTY, performed by Olegario Baumgarten, MD at [address] [place] [address]    due to displacement    UPPER GASTROINTESTINAL ENDOSCOPY  4/29/15    w/bx     UPPER GASTROINTESTINAL ENDOSCOPY  02/28/2018    Dr. Teofilo Joseph History     Socioeconomic History    Marital status:      Spouse name: Not on file    Number of children: Not on file    Years of education: Not on file    Highest education level: Not on file   Occupational History    Occupation: retired   Tobacco Use    Smoking status: Never    Smokeless tobacco: Never   Vaping Use    Vaping Use: Never used   Substance and Sexual Activity    Alcohol use: Yes     Comment: rare social    Drug use: No    Sexual activity: Yes     Partners: Female   Other Topics Concern    Not on file   Social History Narrative    Lives w wife     Social Determinants of Health     Financial Resource Strain: Low Risk     Difficulty of Paying Living Expenses: Not hard at all   Food Insecurity: No Food Insecurity    Worried About Running Out of Food in the Last Year: Never true    [address] in the Last Year: Never true   Transportation Needs: No Transportation Needs    Lack of Transportation (Medical): No    Lack of Transportation (Non-Medical):  No   Physical Activity: Insufficiently Active    Days of Exercise per Week: 4 days    Minutes of Exercise per Session: 30 min   Stress: Not on file   Social Connections: Not on file   Intimate Partner Violence: Not on file   Housing Stability: Unknown    Unable to Pay for Housing in the Last Year: Not on file    Number of Places Lived in the Last Year: Not on file    Unstable Housing in the Last Year: No     Family History   Problem Relation Age of Onset    Heart Disease Mother 72        CHF    Cancer Father 77        liver/ pancreatic    No Known Problems Sister     Cancer Brother         CLL    Arthritis Brother         Bilateral TKR    No Known Problems Daughter     Colon Cancer Neg Hx          Review of Systems   Constitutional:  Negative for chills, diaphoresis, fatigue and fever.   HENT:  Negative for congestion, dental problem, drooling, ear discharge, ear pain, facial swelling, hearing loss, mouth sores, nosebleeds, postnasal drip, rhinorrhea, sinus pressure, sinus pain, sneezing, sore throat, tinnitus, trouble swallowing and voice change.    Eyes:  Negative for photophobia, pain, discharge, redness, itching and visual disturbance.   Respiratory:  Negative for apnea, cough, chest tightness, shortness of breath and wheezing.    Cardiovascular:  Negative for chest pain, palpitations and leg swelling.   Gastrointestinal:  Negative for abdominal distention, abdominal pain, blood in stool, constipation, diarrhea, nausea, rectal pain and vomiting.   Endocrine: Negative for cold intolerance, heat intolerance, polydipsia, polyphagia and polyuria.   Genitourinary:  Negative for decreased urine volume, difficulty urinating, dysuria, flank pain, frequency, genital sores, hematuria and urgency.   Musculoskeletal:  Negative for arthralgias, back pain, gait problem, joint swelling, myalgias, neck pain and neck stiffness.   Skin:  Negative for color change, rash and wound.   Allergic/Immunologic: Negative for environmental allergies and food allergies.   Neurological:  Negative for dizziness, tremors, seizures, syncope, facial asymmetry, speech difficulty, weakness, light-headedness, numbness and headaches.   Hematological:  Negative for adenopathy. Does not bruise/bleed easily.   Psychiatric/Behavioral:   Negative for agitation, confusion, decreased concentration, hallucinations, self-injury, sleep disturbance and suicidal ideas. The patient is not nervous/anxious. Objective:   /60   Pulse 68   Resp 14   Ht 5' 6\" (1.676 m)   Wt 236 lb (107 kg)   SpO2 98%   BMI 38.09 kg/m²     Physical Exam  Constitutional:       General: He is not in acute distress. Appearance: He is well-developed. HENT:      Head: Normocephalic. Right Ear: External ear normal.      Left Ear: External ear normal.   Eyes:      Conjunctiva/sclera: Conjunctivae normal.      Pupils: Pupils are equal, round, and reactive to light. Neck:      Vascular: No JVD. Trachea: No tracheal deviation. Cardiovascular:      Rate and Rhythm: Normal rate and regular rhythm. Heart sounds: Normal heart sounds. Pulmonary:      Effort: Pulmonary effort is normal. No respiratory distress. Breath sounds: Normal breath sounds. No wheezing or rales. Chest:      Chest wall: No tenderness. Abdominal:      General: Bowel sounds are normal. There is no distension. Palpations: Abdomen is soft. There is no mass. Tenderness: There is no abdominal tenderness. There is no guarding or rebound. Musculoskeletal:         General: No tenderness or deformity. Cervical back: Neck supple. Lymphadenopathy:      Cervical: No cervical adenopathy. Skin:     General: Skin is warm and dry. Coloration: Skin is not pale. Findings: No erythema or rash. Neurological:      Mental Status: He is alert and oriented to person, place, and time. Motor: No abnormal muscle tone. Psychiatric:         Thought Content: Thought content normal.         Judgment: Judgment normal.       Assessment:       Diagnosis Orders   1.  Type 2 diabetes mellitus without complication, without long-term current use of insulin (HCC)  Microalbumin / Creatinine Urine Ratio    POCT glycosylated hemoglobin (Hb A1C)    CBC with Auto Differential Comprehensive Metabolic Panel    Lipid Panel      2. Essential hypertension        3. Severe obesity (BMI 35.0-39. 9) with comorbidity ([facility] [address]        4. CLL (chronic lymphocytic leukemia) ([facility] [address]        5. Type 2 diabetes mellitus with stage 3a chronic kidney disease, without long-term current use of insulin ([facility] [address]        6. Hypertriglyceridemia        7. Stage 3 chronic kidney disease, unspecified whether stage 3a or 3b CKD ([facility] [address]        8. Prostate cancer screening  PSA Screening           No results found for: LIPIDPAN, BMP, CMP, CBC, CBCAUTODIF  Plan:      Nancy Karimi was seen today for new patient. Diagnoses and all orders for this visit:    Type 2 diabetes mellitus without complication, without long-term current use of insulin ([facility])  -    Metformin 500 mg twice daily. Essential hypertension  -      Lotensin and Lasix. Potassium 20 mEq daily    Stage 3 chronic kidney disease, unspecified whether stage 3a or 3b CKD (HCC)-monitoring renal function closely    Type 2 diabetes mellitus with stage 3a chronic kidney disease, without long-term current use of insulin ([facility])    Hypertriglyceridemia/hyperlipidemia-Lipitor and Vascepa    History of gout-colchicine as warranted    Other orders  -     atorvastatin (LIPITOR) 40 MG tablet; Take 1 tablet by mouth daily TAKE 1 TABLET BY MOUTH EVERY EVENING (Take 1/2 tablet po once daily while on colchicine)       Return in about 6 months (around 9/16/2023). On this date 03/16/23 I have spent 30 minutes reviewing previous notes, test results and face to face with the patient discussing the diagnosis and importance of compliance with the treatment plan. Wendy Goel MD    Please note, this report has been partially produced using speech recognition software  and may cause  and /or contain errors related to that system including grammar, punctuation and spelling as well as words and phrases that may seem inappropriate.  If there are questions or concerns please feel free to contact me to clarify.

## 2023-04-21 DIAGNOSIS — I10 ESSENTIAL HYPERTENSION: ICD-10-CM

## 2023-04-21 LAB
ANION GAP SERPL CALCULATED.3IONS-SCNC: 14 MEQ/L (ref 9–15)
BUN SERPL-MCNC: 42 MG/DL (ref 8–23)
CALCIUM SERPL-MCNC: 8.9 MG/DL (ref 8.5–9.9)
CHLORIDE SERPL-SCNC: 101 MEQ/L (ref 95–107)
CO2 SERPL-SCNC: 22 MEQ/L (ref 20–31)
CREAT SERPL-MCNC: 1.84 MG/DL (ref 0.7–1.2)
GLUCOSE SERPL-MCNC: 126 MG/DL (ref 70–99)
POTASSIUM SERPL-SCNC: 4.8 MEQ/L (ref 3.4–4.9)
SODIUM SERPL-SCNC: 137 MEQ/L (ref 135–144)

## 2023-06-07 RX ORDER — ATORVASTATIN CALCIUM 40 MG/1
TABLET, FILM COATED ORAL
Qty: 90 TABLET | Refills: 0 | Status: SHIPPED | OUTPATIENT
Start: 2023-06-07

## 2023-06-07 NOTE — TELEPHONE ENCOUNTER
Rx requested:  Requested Prescriptions     Pending Prescriptions Disp Refills    atorvastatin (LIPITOR) 40 MG tablet [Pharmacy Med Name: ATORVASTATIN 40 MG TABLET] 90 tablet 0     Sig: TAKE 1 TABLET BY MOUTH DAILY TAKE 1 TABLET BY MOUTH EVERY EVENING (TAKE 1/2 TABLET BY MOUTH ONCE DAILY WHILE ON COLCHICINE)         Last Office Visit:   4/11/2023      Next Visit Date:  Future Appointments   Date Time Provider Vincenzo Contreras   9/18/2023  9:30 AM Nicolas Millan  [facility] [facility]

## 2023-06-30 RX ORDER — ICOSAPENT ETHYL 1000 MG/1
CAPSULE ORAL
Qty: 360 CAPSULE | Refills: 1 | Status: SHIPPED | OUTPATIENT
Start: 2023-06-30

## 2023-07-12 DIAGNOSIS — M10.9 GOUT INVOLVING TOE OF RIGHT FOOT, UNSPECIFIED CAUSE, UNSPECIFIED CHRONICITY: ICD-10-CM

## 2023-07-13 RX ORDER — ALLOPURINOL 100 MG/1
200 TABLET ORAL DAILY
Qty: 180 TABLET | Refills: 1 | Status: SHIPPED | OUTPATIENT
Start: 2023-07-13

## 2023-08-09 DIAGNOSIS — F51.04 PSYCHOPHYSIOLOGICAL INSOMNIA: ICD-10-CM

## 2023-08-09 RX ORDER — ATORVASTATIN CALCIUM 40 MG/1
40 TABLET, FILM COATED ORAL DAILY
Qty: 90 TABLET | Refills: 0 | Status: SHIPPED | OUTPATIENT
Start: 2023-08-09

## 2023-08-09 RX ORDER — DOXEPIN HYDROCHLORIDE 10 MG/1
CAPSULE ORAL
Qty: 90 CAPSULE | Refills: 1 | Status: SHIPPED | OUTPATIENT
Start: 2023-08-09

## 2023-08-09 NOTE — TELEPHONE ENCOUNTER
Please approve or deny request. Thank you!     Rx requested:  Requested Prescriptions     Pending Prescriptions Disp Refills    doxepin (SINEQUAN) 10 MG capsule [Pharmacy Med Name: DOXEPIN 10 MG CAPSULE] 90 capsule 1     Sig: TAKE 1 CAPSULE BY MOUTH EVERY DAY AT NIGHT         Last Office Visit:   4/11/2023      Next Visit Date:  Future Appointments   Date Time Provider [address]   9/1/2023  9:45 AM Misa Judge MD [address] [facility]

## 2023-08-09 NOTE — TELEPHONE ENCOUNTER
Pharmacy requesting medication refill.  Please approve or deny this request.    Rx requested:  Requested Prescriptions     Pending Prescriptions Disp Refills    atorvastatin (LIPITOR) 40 MG tablet 90 tablet 0         Last Office Visit:   4/11/2023      Next Visit Date:  Future Appointments   Date Time Provider [address]   9/1/2023  9:45 AM Susana Barnes MD [facility]

## 2023-08-29 DIAGNOSIS — E11.9 TYPE 2 DIABETES MELLITUS WITHOUT COMPLICATION, WITHOUT LONG-TERM CURRENT USE OF INSULIN (HCC): ICD-10-CM

## 2023-08-29 DIAGNOSIS — I10 ESSENTIAL HYPERTENSION: Primary | ICD-10-CM

## 2023-08-31 ASSESSMENT — ENCOUNTER SYMPTOMS
EYE REDNESS: 0
EYE DISCHARGE: 0
APNEA: 0
WHEEZING: 0
PHOTOPHOBIA: 0
ABDOMINAL PAIN: 0
RHINORRHEA: 0
COLOR CHANGE: 0
SORE THROAT: 0
SHORTNESS OF BREATH: 0
BLOOD IN STOOL: 0
BACK PAIN: 0
TROUBLE SWALLOWING: 0
ABDOMINAL DISTENTION: 0
RECTAL PAIN: 0
SINUS PAIN: 0
COUGH: 0
DIARRHEA: 0
CONSTIPATION: 0
NAUSEA: 0
VOICE CHANGE: 0
VOMITING: 0
FACIAL SWELLING: 0
EYE ITCHING: 0
SINUS PRESSURE: 0
EYE PAIN: 0
CHEST TIGHTNESS: 0

## 2023-08-31 NOTE — PROGRESS NOTES
Subjective:      Patient ID: Emi Sanchez is a 81 y.o. male Established patient, here for evaluation of the following chief complaint(s):  Chief Complaint   Patient presents with    Hypertension    Diabetes       HPI    79-year-old diabetic hypertensive male with a history of hypertriglyceridemia gout and hypertension presents for a f/u visit. Ankle pain: 3/10, since August 9th, no etoh. Essential hypertension-lasix 40 mg daily , lotensin 20 mg daily, metoprolol 50 mg orally daily      Stage 3 chronic kidney disease, unspecified whether stage 3a or 3b CKD ([facility])-monitoring renal function closely. Type 2 diabetes mellitus with stage 3a chronic kidney disease, without long-term current use of insulin ([facility])-metformin 500 mg twice daily. Hypertriglyceridemia-compliant with Vascepa 1 g daily, Lipitor 40 mg daily         History of gout- Allopurinol. Colchicine as needed. At present he denies polyuria,  Polydipsia, constitutional, sinus, visual, cardiopulmonary, urologic, gastrointestinal, immunologic/hematologic, musculoskeletal, neurologic,dermatologic, or psychiatric complaints.         Current Outpatient Medications on File Prior to Visit   Medication Sig Dispense Refill    loratadine (CLARITIN) 10 MG tablet Take 1 tablet by mouth daily as needed (allergies) 90 tablet 3    doxepin (SINEQUAN) 10 MG capsule TAKE 1 CAPSULE BY MOUTH EVERY DAY AT NIGHT 90 capsule 1    atorvastatin (LIPITOR) 40 MG tablet Take 1 tablet by mouth daily 90 tablet 0    allopurinol (ZYLOPRIM) 100 MG tablet Take 2 tablets by mouth daily 180 tablet 1    Icosapent Ethyl (VASCEPA) 1 g CAPS capsule TAKE 2 CAPSULES BY MOUTH TWICE A  capsule 1    metoprolol succinate (TOPROL XL) 50 MG extended release tablet TAKE 1 TABLET BY MOUTH EVERY DAY 90 tablet 3    metFORMIN (GLUCOPHAGE) 500 MG tablet TAKE 1 TABLETS BY MOUTH TWICE A DAY WITH MEALS 180 tablet 3    benazepril (LOTENSIN) 20 MG tablet TAKE 1 TABLET BY MOUTH

## 2023-09-01 ENCOUNTER — OFFICE VISIT (OUTPATIENT)
Dept: FAMILY MEDICINE CLINIC | Age: 73
End: 2023-09-01
Payer: MEDICARE

## 2023-09-01 ENCOUNTER — OFFICE VISIT (OUTPATIENT)
Dept: FAMILY MEDICINE CLINIC | Age: 73
End: 2023-09-01

## 2023-09-01 VITALS
RESPIRATION RATE: 14 BRPM | HEART RATE: 61 BPM | SYSTOLIC BLOOD PRESSURE: 112 MMHG | DIASTOLIC BLOOD PRESSURE: 60 MMHG | BODY MASS INDEX: 37.45 KG/M2 | WEIGHT: 233 LBS | HEIGHT: 66 IN | OXYGEN SATURATION: 97 %

## 2023-09-01 VITALS
OXYGEN SATURATION: 97 % | SYSTOLIC BLOOD PRESSURE: 112 MMHG | WEIGHT: 233 LBS | DIASTOLIC BLOOD PRESSURE: 60 MMHG | HEART RATE: 61 BPM | HEIGHT: 66 IN | RESPIRATION RATE: 14 BRPM | BODY MASS INDEX: 37.45 KG/M2

## 2023-09-01 DIAGNOSIS — M25.572 ACUTE LEFT ANKLE PAIN: ICD-10-CM

## 2023-09-01 DIAGNOSIS — E11.9 TYPE 2 DIABETES MELLITUS WITHOUT COMPLICATION, WITHOUT LONG-TERM CURRENT USE OF INSULIN (HCC): ICD-10-CM

## 2023-09-01 DIAGNOSIS — N18.31 TYPE 2 DIABETES MELLITUS WITH STAGE 3A CHRONIC KIDNEY DISEASE, WITHOUT LONG-TERM CURRENT USE OF INSULIN (HCC): Primary | ICD-10-CM

## 2023-09-01 DIAGNOSIS — Z00.00 MEDICARE ANNUAL WELLNESS VISIT, SUBSEQUENT: Primary | ICD-10-CM

## 2023-09-01 DIAGNOSIS — E11.22 TYPE 2 DIABETES MELLITUS WITH STAGE 3A CHRONIC KIDNEY DISEASE, WITHOUT LONG-TERM CURRENT USE OF INSULIN (HCC): Primary | ICD-10-CM

## 2023-09-01 DIAGNOSIS — I10 ESSENTIAL HYPERTENSION: ICD-10-CM

## 2023-09-01 LAB
ALBUMIN SERPL-MCNC: 4.6 G/DL (ref 3.5–4.6)
ALP SERPL-CCNC: 101 U/L (ref 35–104)
ALT SERPL-CCNC: 18 U/L (ref 0–41)
ANION GAP SERPL CALCULATED.3IONS-SCNC: 13 MEQ/L (ref 9–15)
AST SERPL-CCNC: 22 U/L (ref 0–40)
BASOPHILS # BLD: 0 K/UL (ref 0–0.2)
BASOPHILS NFR BLD: 0.3 %
BILIRUB SERPL-MCNC: 0.5 MG/DL (ref 0.2–0.7)
BUN SERPL-MCNC: 36 MG/DL (ref 8–23)
CALCIUM SERPL-MCNC: 9.5 MG/DL (ref 8.5–9.9)
CHLORIDE SERPL-SCNC: 104 MEQ/L (ref 95–107)
CO2 SERPL-SCNC: 23 MEQ/L (ref 20–31)
CREAT SERPL-MCNC: 1.58 MG/DL (ref 0.7–1.2)
CRP SERPL HS-MCNC: <3 MG/L (ref 0–5)
EOSINOPHIL # BLD: 0.1 K/UL (ref 0–0.7)
EOSINOPHIL NFR BLD: 1.6 %
ERYTHROCYTE [DISTWIDTH] IN BLOOD BY AUTOMATED COUNT: 14.6 % (ref 11.5–14.5)
GLOBULIN SER CALC-MCNC: 3.9 G/DL (ref 2.3–3.5)
GLUCOSE SERPL-MCNC: 120 MG/DL (ref 70–99)
HBA1C MFR BLD: 6.7 %
HBA1C MFR BLD: 6.8 % (ref 4.8–5.9)
HCT VFR BLD AUTO: 35.7 % (ref 42–52)
HGB BLD-MCNC: 11.9 G/DL (ref 14–18)
LYMPHOCYTES # BLD: 2.4 K/UL (ref 1–4.8)
LYMPHOCYTES NFR BLD: 49.5 %
MCH RBC QN AUTO: 31.7 PG (ref 27–31.3)
MCHC RBC AUTO-ENTMCNC: 33.2 % (ref 33–37)
MCV RBC AUTO: 95.4 FL (ref 79–92.2)
MONOCYTES # BLD: 0.4 K/UL (ref 0.2–0.8)
MONOCYTES NFR BLD: 8.1 %
NEUTROPHILS # BLD: 1.9 K/UL (ref 1.4–6.5)
NEUTS SEG NFR BLD: 40.5 %
PLATELET # BLD AUTO: 201 K/UL (ref 130–400)
POTASSIUM SERPL-SCNC: 5 MEQ/L (ref 3.4–4.9)
PROT SERPL-MCNC: 8.5 G/DL (ref 6.3–8)
RBC # BLD AUTO: 3.74 M/UL (ref 4.7–6.1)
SODIUM SERPL-SCNC: 140 MEQ/L (ref 135–144)
URATE SERPL-MCNC: 5.4 MG/DL (ref 3.4–7)
WBC # BLD AUTO: 4.8 K/UL (ref 4.8–10.8)

## 2023-09-01 PROCEDURE — G0439 PPPS, SUBSEQ VISIT: HCPCS | Performed by: INTERNAL MEDICINE

## 2023-09-01 ASSESSMENT — PATIENT HEALTH QUESTIONNAIRE - PHQ9
SUM OF ALL RESPONSES TO PHQ9 QUESTIONS 1 & 2: 0
1. LITTLE INTEREST OR PLEASURE IN DOING THINGS: 0
SUM OF ALL RESPONSES TO PHQ QUESTIONS 1-9: 0
2. FEELING DOWN, DEPRESSED OR HOPELESS: 0
SUM OF ALL RESPONSES TO PHQ QUESTIONS 1-9: 0

## 2023-09-04 NOTE — PROGRESS NOTES
and S2, no murmurs, rubs, clicks, or gallops, distal pulses intact, no carotid bruits  Abdomen: soft, non-tender, non-distended, normal bowel sounds, no masses or organomegaly  Extremities: no cyanosis, clubbing or edema  Musculoskeletal: normal range of motion, no joint swelling, deformity or tenderness  Neurologic:  gait, coordination and speech normal       Allergies   Allergen Reactions    Dye [Barium-Containing Compounds] Hives    Iodides Hives    Iodinated Contrast Media Hives     Prior to Visit Medications    Medication Sig Taking? Authorizing Provider   loratadine (CLARITIN) 10 MG tablet Take 1 tablet by mouth daily as needed (allergies)  Lea Castillo MD   doxepin (SINEQUAN) 10 MG capsule TAKE 1 CAPSULE BY MOUTH EVERY DAY AT NIGHT  Bill Douglas MD   atorvastatin (LIPITOR) 40 MG tablet Take 1 tablet by mouth daily  Bill Douglas MD   allopurinol (ZYLOPRIM) 100 MG tablet Take 2 tablets by mouth daily  Bill Douglas MD   Icosapent Ethyl (VASCEPA) 1 g CAPS capsule TAKE 2 CAPSULES BY MOUTH TWICE A DAY  Bill Douglas MD   metoprolol succinate (TOPROL XL) 50 MG extended release tablet TAKE 1 TABLET BY MOUTH EVERY DAY  Bill Douglas MD   metFORMIN (GLUCOPHAGE) 500 MG tablet TAKE 1 TABLETS BY MOUTH TWICE A DAY WITH MEALS  Bill Douglas MD   benazepril (LOTENSIN) 20 MG tablet TAKE 1 TABLET BY MOUTH EVERY DAY  Bill Douglas MD   Magnesium Oxide 200 MG TABS TAKE 1 TABLET BY MOUTH EVERY DAY  Bill Douglas MD   furosemide (LASIX) 40 MG tablet TAKE 1 TABLET BY MOUTH EVERY DAY  Bill Douglas MD   potassium chloride (KLOR-CON M20) 20 MEQ extended release tablet 1 tab po bid. (CALL ME IF YOU ARE TAKING A DIFFERENT DOSE)  Bill Douglas MD   colchicine (COLCRYS) 0.6 MG tablet TAKE 1 TABLET BY MOUTH EVERY OTHER DAY  Lea Castillo MD   Multiple Vitamins-Minerals (MULTIVITAMIN PO) Take 1 tablet by mouth daily.   Historical Provider, MD Cruz (Including outside providers/suppliers

## 2023-10-04 DIAGNOSIS — J30.89 NON-SEASONAL ALLERGIC RHINITIS, UNSPECIFIED TRIGGER: ICD-10-CM

## 2023-10-04 RX ORDER — LORATADINE 10 MG/1
10 TABLET ORAL DAILY PRN
Qty: 90 TABLET | Refills: 3 | Status: SHIPPED | OUTPATIENT
Start: 2023-10-04

## 2023-10-15 RX ORDER — ATORVASTATIN CALCIUM 40 MG/1
40 TABLET, FILM COATED ORAL DAILY
Qty: 90 TABLET | Refills: 0 | Status: SHIPPED | OUTPATIENT
Start: 2023-10-15

## 2023-10-25 ENCOUNTER — INITIAL CONSULT (OUTPATIENT)
Age: 73
End: 2023-10-25
Payer: MEDICARE

## 2023-10-25 VITALS — WEIGHT: 232 LBS | TEMPERATURE: 97.4 F | BODY MASS INDEX: 37.28 KG/M2 | HEIGHT: 66 IN

## 2023-10-25 DIAGNOSIS — M21.42 BILATERAL PES PLANUS: ICD-10-CM

## 2023-10-25 DIAGNOSIS — E11.22 TYPE 2 DIABETES MELLITUS WITH STAGE 3A CHRONIC KIDNEY DISEASE, WITHOUT LONG-TERM CURRENT USE OF INSULIN (HCC): ICD-10-CM

## 2023-10-25 DIAGNOSIS — M20.42 HAMMERTOE, BILATERAL: ICD-10-CM

## 2023-10-25 DIAGNOSIS — N18.31 TYPE 2 DIABETES MELLITUS WITH STAGE 3A CHRONIC KIDNEY DISEASE, WITHOUT LONG-TERM CURRENT USE OF INSULIN (HCC): ICD-10-CM

## 2023-10-25 DIAGNOSIS — M79.672 LEFT FOOT PAIN: Primary | ICD-10-CM

## 2023-10-25 DIAGNOSIS — M19.071 ARTHRITIS OF BOTH FEET: ICD-10-CM

## 2023-10-25 DIAGNOSIS — M19.072 ARTHRITIS OF BOTH FEET: ICD-10-CM

## 2023-10-25 DIAGNOSIS — M21.41 BILATERAL PES PLANUS: ICD-10-CM

## 2023-10-25 DIAGNOSIS — M20.41 HAMMERTOE, BILATERAL: ICD-10-CM

## 2023-10-25 PROCEDURE — 3017F COLORECTAL CA SCREEN DOC REV: CPT | Performed by: PODIATRIST

## 2023-10-25 PROCEDURE — 3044F HG A1C LEVEL LT 7.0%: CPT | Performed by: PODIATRIST

## 2023-10-25 PROCEDURE — 1123F ACP DISCUSS/DSCN MKR DOCD: CPT | Performed by: PODIATRIST

## 2023-10-25 PROCEDURE — G8427 DOCREV CUR MEDS BY ELIG CLIN: HCPCS | Performed by: PODIATRIST

## 2023-10-25 PROCEDURE — 1036F TOBACCO NON-USER: CPT | Performed by: PODIATRIST

## 2023-10-25 PROCEDURE — G8417 CALC BMI ABV UP PARAM F/U: HCPCS | Performed by: PODIATRIST

## 2023-10-25 PROCEDURE — 2022F DILAT RTA XM EVC RTNOPTHY: CPT | Performed by: PODIATRIST

## 2023-10-25 PROCEDURE — 99203 OFFICE O/P NEW LOW 30 MIN: CPT | Performed by: PODIATRIST

## 2023-10-25 PROCEDURE — G8484 FLU IMMUNIZE NO ADMIN: HCPCS | Performed by: PODIATRIST

## 2023-10-25 ASSESSMENT — ENCOUNTER SYMPTOMS
BACK PAIN: 1
VOMITING: 0
NAUSEA: 0
SHORTNESS OF BREATH: 0

## 2023-10-25 NOTE — PROGRESS NOTES
[address] [facility]  [address]  [address]  [place] [address]  Dept: [phone]  Loc: [phone]       Georgette Hassan  (1950)    10/25/23    Subjective     Georgette Hassan is 72 y.o. male who complains today of:    Chief Complaint   Patient presents with    Diabetes    Foot Pain     left    Ankle Pain       Georgette Hassan is seen in consultation at the request of Eleonora Dawson MD for evaluation of left ankle pain. Diabetes  Pertinent negatives for diabetes include no chest pain. Foot Pain   Pertinent negatives include no fever or numbness. Ankle Pain   Pertinent negatives include no numbness. HPI: Patient presents with a complaint of left foot and ankle pain. Patient describes the pain as sharp, shooting, and stabbing. Patient states that his pain level varies. He is doing well, his pain level is only at 1/10. Patient states that this is a chronic problem has been present for several years, he states the symptoms come and go. Patient reports insidious onset and does not recall injury. Patient does report a history of gout but states his attacks have been isolated to the right big toe joint in the past.  Patient takes allopurinol. He states that he tries to avoid high purine foods. He states he has been using topical pain patches, he has been wrapping them around his foot and ankle. Patient states that a recent x-ray showed arthritic changes. Patient is also a diabetic. His most recent hemoglobin A1c was 6.8%. Patient does not use insulin. Patient states he does not check his blood glucose on a regular basis. Patient denies paresthesias to the feet. Patient states that he developed a wound to the right big toe joint during a gout attack but denies any history of a diabetic foot ulceration.   Patient does not currently have diabetic shoes, he states he has not had in the past.      Review of

## 2023-12-04 ENCOUNTER — LAB (OUTPATIENT)
Dept: LAB | Facility: CLINIC | Age: 73
End: 2023-12-04
Payer: MEDICARE

## 2023-12-04 DIAGNOSIS — C91.10 CLL (CHRONIC LYMPHOCYTIC LEUKEMIA) (MULTI): Primary | ICD-10-CM

## 2023-12-04 DIAGNOSIS — C91.10 CLL (CHRONIC LYMPHOCYTIC LEUKEMIA) (MULTI): ICD-10-CM

## 2023-12-04 PROBLEM — M10.071 ACUTE IDIOPATHIC GOUT OF RIGHT FOOT: Status: ACTIVE | Noted: 2021-05-10

## 2023-12-04 PROBLEM — R09.A2 GLOBUS SENSATION: Status: ACTIVE | Noted: 2023-12-04

## 2023-12-04 PROBLEM — I10 ESSENTIAL HYPERTENSION: Status: ACTIVE | Noted: 2018-06-07

## 2023-12-04 PROBLEM — J38.3 VOCAL CORD DISEASE: Status: ACTIVE | Noted: 2023-12-04

## 2023-12-04 PROBLEM — G56.03 BILATERAL CARPAL TUNNEL SYNDROME: Status: ACTIVE | Noted: 2022-01-12

## 2023-12-04 PROBLEM — I12.9 CHRONIC KIDNEY DISEASE DUE TO HYPERTENSION: Status: ACTIVE | Noted: 2023-12-04

## 2023-12-04 PROBLEM — Z86.0100 HISTORY OF COLON POLYPS: Status: ACTIVE | Noted: 2021-06-15

## 2023-12-04 PROBLEM — E11.22 TYPE 2 DIABETES MELLITUS WITH CHRONIC KIDNEY DISEASE (MULTI): Status: ACTIVE | Noted: 2022-12-14

## 2023-12-04 PROBLEM — E11.9 TYPE 2 DIABETES MELLITUS WITHOUT COMPLICATION, WITHOUT LONG-TERM CURRENT USE OF INSULIN (MULTI): Status: ACTIVE | Noted: 2017-06-07

## 2023-12-04 PROBLEM — M25.519 SHOULDER PAIN: Status: ACTIVE | Noted: 2023-12-04

## 2023-12-04 PROBLEM — Z86.010 HISTORY OF COLON POLYPS: Status: ACTIVE | Noted: 2021-06-15

## 2023-12-04 PROBLEM — C85.90 LYMPHOMA (MULTI): Status: ACTIVE | Noted: 2023-12-04

## 2023-12-04 PROBLEM — N18.32 STAGE 3B CHRONIC KIDNEY DISEASE (MULTI): Status: ACTIVE | Noted: 2023-12-04

## 2023-12-04 PROBLEM — E66.01 MORBIDLY OBESE (MULTI): Status: ACTIVE | Noted: 2020-02-20

## 2023-12-04 PROBLEM — K29.80 DUODENITIS: Status: ACTIVE | Noted: 2023-12-04

## 2023-12-04 PROBLEM — E78.5 DYSLIPIDEMIA: Status: ACTIVE | Noted: 2017-06-07

## 2023-12-04 PROBLEM — N18.9 CHRONIC KIDNEY DISEASE: Status: ACTIVE | Noted: 2021-05-24

## 2023-12-04 LAB
ALBUMIN SERPL BCP-MCNC: 4.2 G/DL (ref 3.4–5)
ALP SERPL-CCNC: 84 U/L (ref 33–136)
ALT SERPL W P-5'-P-CCNC: 17 U/L (ref 10–52)
ANION GAP SERPL CALC-SCNC: 14 MMOL/L (ref 10–20)
AST SERPL W P-5'-P-CCNC: 13 U/L (ref 9–39)
BASOPHILS # BLD AUTO: 0.02 X10*3/UL (ref 0–0.1)
BASOPHILS NFR BLD AUTO: 0.4 %
BILIRUB SERPL-MCNC: 0.5 MG/DL (ref 0–1.2)
BUN SERPL-MCNC: 37 MG/DL (ref 6–23)
CALCIUM SERPL-MCNC: 9.3 MG/DL (ref 8.6–10.3)
CHLORIDE SERPL-SCNC: 105 MMOL/L (ref 98–107)
CO2 SERPL-SCNC: 26 MMOL/L (ref 21–32)
CREAT SERPL-MCNC: 1.67 MG/DL (ref 0.5–1.3)
EOSINOPHIL # BLD AUTO: 0.08 X10*3/UL (ref 0–0.4)
EOSINOPHIL NFR BLD AUTO: 1.5 %
ERYTHROCYTE [DISTWIDTH] IN BLOOD BY AUTOMATED COUNT: 14 % (ref 11.5–14.5)
FERRITIN SERPL-MCNC: 101 NG/ML (ref 20–300)
GFR SERPL CREATININE-BSD FRML MDRD: 43 ML/MIN/1.73M*2
GLUCOSE SERPL-MCNC: 122 MG/DL (ref 74–99)
HCT VFR BLD AUTO: 34.9 % (ref 41–52)
HGB BLD-MCNC: 11.6 G/DL (ref 13.5–17.5)
IMM GRANULOCYTES # BLD AUTO: 0.01 X10*3/UL (ref 0–0.5)
IMM GRANULOCYTES NFR BLD AUTO: 0.2 % (ref 0–0.9)
IRON SATN MFR SERPL: 19 % (ref 25–45)
IRON SERPL-MCNC: 59 UG/DL (ref 35–150)
LDH SERPL L TO P-CCNC: 64 U/L (ref 84–246)
LYMPHOCYTES # BLD AUTO: 2.39 X10*3/UL (ref 0.8–3)
LYMPHOCYTES NFR BLD AUTO: 45.5 %
MCH RBC QN AUTO: 32.6 PG (ref 26–34)
MCHC RBC AUTO-ENTMCNC: 33.2 G/DL (ref 32–36)
MCV RBC AUTO: 98 FL (ref 80–100)
MONOCYTES # BLD AUTO: 0.47 X10*3/UL (ref 0.05–0.8)
MONOCYTES NFR BLD AUTO: 9 %
NEUTROPHILS # BLD AUTO: 2.28 X10*3/UL (ref 1.6–5.5)
NEUTROPHILS NFR BLD AUTO: 43.4 %
PLATELET # BLD AUTO: 182 X10*3/UL (ref 150–450)
POTASSIUM SERPL-SCNC: 4.9 MMOL/L (ref 3.5–5.3)
PROT SERPL-MCNC: 7.7 G/DL (ref 6.4–8.2)
PROT SERPL-MCNC: 7.8 G/DL (ref 6.4–8.2)
RBC # BLD AUTO: 3.56 X10*6/UL (ref 4.5–5.9)
SODIUM SERPL-SCNC: 140 MMOL/L (ref 136–145)
TIBC SERPL-MCNC: 313 UG/DL (ref 240–445)
UIBC SERPL-MCNC: 254 UG/DL (ref 110–370)
WBC # BLD AUTO: 5.3 X10*3/UL (ref 4.4–11.3)

## 2023-12-04 PROCEDURE — 80053 COMPREHEN METABOLIC PANEL: CPT

## 2023-12-04 PROCEDURE — 84155 ASSAY OF PROTEIN SERUM: CPT | Performed by: INTERNAL MEDICINE

## 2023-12-04 PROCEDURE — 85025 COMPLETE CBC W/AUTO DIFF WBC: CPT

## 2023-12-04 PROCEDURE — 84165 PROTEIN E-PHORESIS SERUM: CPT | Performed by: INTERNAL MEDICINE

## 2023-12-04 PROCEDURE — 86320 SERUM IMMUNOELECTROPHORESIS: CPT | Performed by: INTERNAL MEDICINE

## 2023-12-04 PROCEDURE — 36415 COLL VENOUS BLD VENIPUNCTURE: CPT

## 2023-12-04 PROCEDURE — 83550 IRON BINDING TEST: CPT | Performed by: PHYSICIAN ASSISTANT

## 2023-12-04 PROCEDURE — 82728 ASSAY OF FERRITIN: CPT | Performed by: PHYSICIAN ASSISTANT

## 2023-12-04 PROCEDURE — 83615 LACTATE (LD) (LDH) ENZYME: CPT | Performed by: PHYSICIAN ASSISTANT

## 2023-12-04 PROCEDURE — 86334 IMMUNOFIX E-PHORESIS SERUM: CPT | Performed by: INTERNAL MEDICINE

## 2023-12-04 RX ORDER — ATORVASTATIN CALCIUM 40 MG/1
40 TABLET, FILM COATED ORAL DAILY
COMMUNITY

## 2023-12-04 RX ORDER — METFORMIN HYDROCHLORIDE 500 MG/1
TABLET ORAL
COMMUNITY
Start: 2008-12-24

## 2023-12-04 RX ORDER — CAPTOPRIL AND HYDROCHLOROTHIAZIDE 25; 15 MG/1; MG/1
TABLET ORAL
COMMUNITY
Start: 2008-12-24

## 2023-12-04 RX ORDER — DOXEPIN HYDROCHLORIDE 10 MG/1
10 CAPSULE ORAL NIGHTLY
COMMUNITY

## 2023-12-04 RX ORDER — FENOFIBRATE 160 MG/1
TABLET ORAL
COMMUNITY

## 2023-12-04 RX ORDER — ICOSAPENT ETHYL 1 G/1
2 CAPSULE ORAL 2 TIMES DAILY
COMMUNITY
Start: 2023-06-30

## 2023-12-04 RX ORDER — CAPTOPRIL AND HYDROCHLOROTHIAZIDE 25; 25 MG/1; MG/1
TABLET ORAL
COMMUNITY

## 2023-12-04 RX ORDER — BENAZEPRIL HYDROCHLORIDE 20 MG/1
1 TABLET ORAL DAILY
COMMUNITY
Start: 2020-03-12

## 2023-12-04 RX ORDER — LORATADINE 10 MG/1
10 TABLET ORAL
COMMUNITY
Start: 2020-10-20

## 2023-12-04 RX ORDER — LIRAGLUTIDE 6 MG/ML
INJECTION SUBCUTANEOUS
COMMUNITY

## 2023-12-04 RX ORDER — COLCHICINE 0.6 MG/1
TABLET ORAL
COMMUNITY
Start: 2022-04-27

## 2023-12-04 RX ORDER — FUROSEMIDE 40 MG/1
TABLET ORAL
COMMUNITY
Start: 2008-12-24

## 2023-12-04 RX ORDER — ALLOPURINOL 100 MG/1
200 TABLET ORAL DAILY
COMMUNITY

## 2023-12-04 RX ORDER — CAPTOPRIL AND HYDROCHLOROTHIAZIDE 50; 25 MG/1; MG/1
TABLET ORAL
COMMUNITY

## 2023-12-04 RX ORDER — ASPIRIN 81 MG/1
TABLET ORAL
COMMUNITY

## 2023-12-04 RX ORDER — METOPROLOL SUCCINATE 50 MG/1
TABLET, EXTENDED RELEASE ORAL
COMMUNITY
Start: 2023-03-16

## 2023-12-04 RX ORDER — EZETIMIBE 10 MG/1
TABLET ORAL
COMMUNITY
Start: 2008-12-24

## 2023-12-04 RX ORDER — BENAZEPRIL HYDROCHLORIDE AND HYDROCHLOROTHIAZIDE 20; 25 MG/1; MG/1
TABLET ORAL
COMMUNITY

## 2023-12-04 RX ORDER — POTASSIUM CHLORIDE 1500 MG/1
TABLET, EXTENDED RELEASE ORAL
COMMUNITY
Start: 2008-12-24

## 2023-12-04 RX ORDER — MAGNESIUM 200 MG
TABLET ORAL EVERY 24 HOURS
COMMUNITY

## 2023-12-04 RX ORDER — GLIMEPIRIDE 4 MG/1
TABLET ORAL
COMMUNITY

## 2023-12-04 RX ORDER — ALLOPURINOL 100 MG/1
2 TABLET ORAL DAILY
COMMUNITY
Start: 2023-07-13

## 2023-12-04 RX ORDER — MAGNESIUM 200 MG
1 TABLET ORAL DAILY
COMMUNITY
Start: 2023-03-20

## 2023-12-04 RX ORDER — VITS A,C,E/LUTEIN/MINERALS 300MCG-200
1 TABLET ORAL DAILY
COMMUNITY
Start: 2023-02-11

## 2023-12-04 RX ORDER — FENOFIBRATE 145 MG/1
TABLET, FILM COATED ORAL
COMMUNITY
Start: 2008-12-24

## 2023-12-05 ASSESSMENT — ENCOUNTER SYMPTOMS
EYE PAIN: 0
RECTAL PAIN: 0
EYE DISCHARGE: 0
COLOR CHANGE: 0
ABDOMINAL DISTENTION: 0
SINUS PRESSURE: 0
SINUS PAIN: 0
PHOTOPHOBIA: 0
VOMITING: 0
BLOOD IN STOOL: 0
VOICE CHANGE: 0
RHINORRHEA: 0
EYE ITCHING: 0
CONSTIPATION: 0
DIARRHEA: 0
CHEST TIGHTNESS: 0
SORE THROAT: 0
NAUSEA: 0
APNEA: 0
TROUBLE SWALLOWING: 0
SHORTNESS OF BREATH: 0
FACIAL SWELLING: 0
WHEEZING: 0
EYE REDNESS: 0
BACK PAIN: 0
COUGH: 0
ABDOMINAL PAIN: 0

## 2023-12-05 NOTE — PROGRESS NOTES
Subjective:      Patient ID: Reyna Lazcano is a 72 y.o. male Established patient, here for evaluation of the following chief complaint(s):  Chief Complaint   Patient presents with    Diabetes       HPI    70-year-old diabetic hypertensive male with a history of hypertriglyceridemia gout and hypertension presents for a f/u visit. Essential hypertension-lasix 40 mg daily , lotensin 20 mg daily, metoprolol 50 mg orally daily      Stage 3 chronic kidney disease, unspecified whether stage 3a or 3b CKD ([facility])-monitoring renal function closely. Type 2 diabetes mellitus with stage 3a chronic kidney disease, without long-term current use of insulin ([facility])-metformin 5 2400 mg twice daily. Hypertriglyceridemia-compliant with Vascepa 1 g daily, Lipitor 40 mg daily         History of gout- Allopurinol. Colchicine as needed. At present he denies polyuria,  Polydipsia, constitutional, sinus, visual, cardiopulmonary, urologic, gastrointestinal, immunologic/hematologic, musculoskeletal, neurologic,dermatologic, or psychiatric complaints.         Current Outpatient Medications on File Prior to Visit   Medication Sig Dispense Refill    atorvastatin (LIPITOR) 40 MG tablet Take 1 tablet by mouth daily 90 tablet 0    loratadine (CLARITIN) 10 MG tablet Take 1 tablet by mouth daily as needed (allergies) 90 tablet 3    doxepin (SINEQUAN) 10 MG capsule TAKE 1 CAPSULE BY MOUTH EVERY DAY AT NIGHT 90 capsule 1    allopurinol (ZYLOPRIM) 100 MG tablet Take 2 tablets by mouth daily 180 tablet 1    Icosapent Ethyl (VASCEPA) 1 g CAPS capsule TAKE 2 CAPSULES BY MOUTH TWICE A  capsule 1    metoprolol succinate (TOPROL XL) 50 MG extended release tablet TAKE 1 TABLET BY MOUTH EVERY DAY 90 tablet 3    metFORMIN (GLUCOPHAGE) 500 MG tablet TAKE 1 TABLETS BY MOUTH TWICE A DAY WITH MEALS 180 tablet 3    benazepril (LOTENSIN) 20 MG tablet TAKE 1 TABLET BY MOUTH EVERY DAY 90 tablet 3    Magnesium Oxide 200 MG TABS TAKE 1 TABLET

## 2023-12-06 ENCOUNTER — OFFICE VISIT (OUTPATIENT)
Dept: FAMILY MEDICINE CLINIC | Age: 73
End: 2023-12-06
Payer: MEDICARE

## 2023-12-06 ENCOUNTER — TELEPHONE (OUTPATIENT)
Dept: FAMILY MEDICINE CLINIC | Age: 73
End: 2023-12-06

## 2023-12-06 DIAGNOSIS — E11.22 TYPE 2 DIABETES MELLITUS WITH STAGE 3A CHRONIC KIDNEY DISEASE, WITHOUT LONG-TERM CURRENT USE OF INSULIN (HCC): Primary | ICD-10-CM

## 2023-12-06 DIAGNOSIS — I10 ESSENTIAL HYPERTENSION: ICD-10-CM

## 2023-12-06 DIAGNOSIS — N18.31 TYPE 2 DIABETES MELLITUS WITH STAGE 3A CHRONIC KIDNEY DISEASE, WITHOUT LONG-TERM CURRENT USE OF INSULIN (HCC): Primary | ICD-10-CM

## 2023-12-06 DIAGNOSIS — M10.9 GOUT INVOLVING TOE OF RIGHT FOOT, UNSPECIFIED CAUSE, UNSPECIFIED CHRONICITY: ICD-10-CM

## 2023-12-06 LAB — HBA1C MFR BLD: 6.7 %

## 2023-12-06 PROCEDURE — 3017F COLORECTAL CA SCREEN DOC REV: CPT | Performed by: INTERNAL MEDICINE

## 2023-12-06 PROCEDURE — 99214 OFFICE O/P EST MOD 30 MIN: CPT | Performed by: INTERNAL MEDICINE

## 2023-12-06 PROCEDURE — 1036F TOBACCO NON-USER: CPT | Performed by: INTERNAL MEDICINE

## 2023-12-06 PROCEDURE — 83036 HEMOGLOBIN GLYCOSYLATED A1C: CPT | Performed by: INTERNAL MEDICINE

## 2023-12-06 PROCEDURE — 3044F HG A1C LEVEL LT 7.0%: CPT | Performed by: INTERNAL MEDICINE

## 2023-12-06 PROCEDURE — 2022F DILAT RTA XM EVC RTNOPTHY: CPT | Performed by: INTERNAL MEDICINE

## 2023-12-06 PROCEDURE — 1123F ACP DISCUSS/DSCN MKR DOCD: CPT | Performed by: INTERNAL MEDICINE

## 2023-12-06 PROCEDURE — G8484 FLU IMMUNIZE NO ADMIN: HCPCS | Performed by: INTERNAL MEDICINE

## 2023-12-06 PROCEDURE — G8428 CUR MEDS NOT DOCUMENT: HCPCS | Performed by: INTERNAL MEDICINE

## 2023-12-06 PROCEDURE — G8417 CALC BMI ABV UP PARAM F/U: HCPCS | Performed by: INTERNAL MEDICINE

## 2023-12-08 LAB
ALBUMIN: 3.9 G/DL (ref 3.4–5)
ALPHA 1 GLOBULIN: 0.3 G/DL (ref 0.2–0.6)
ALPHA 2 GLOBULIN: 0.8 G/DL (ref 0.4–1.1)
BETA GLOBULIN: 0.7 G/DL (ref 0.5–1.2)
GAMMA GLOBULIN: 2 G/DL (ref 0.5–1.4)
IMMUNOFIXATION COMMENT: ABNORMAL
M-PROTEIN 1: 0.6 G/DL
M-PROTEIN 2: 0.7 G/DL
PATH REVIEW - SERUM IMMUNOFIXATION: ABNORMAL
PATH REVIEW-SERUM PROTEIN ELECTROPHORESIS: ABNORMAL
PROTEIN ELECTROPHORESIS COMMENT: ABNORMAL

## 2023-12-11 ENCOUNTER — APPOINTMENT (OUTPATIENT)
Dept: HEMATOLOGY/ONCOLOGY | Facility: CLINIC | Age: 73
End: 2023-12-11

## 2023-12-14 ENCOUNTER — OFFICE VISIT (OUTPATIENT)
Dept: HEMATOLOGY/ONCOLOGY | Facility: CLINIC | Age: 73
End: 2023-12-14
Payer: MEDICARE

## 2023-12-14 VITALS
RESPIRATION RATE: 16 BRPM | WEIGHT: 232.14 LBS | OXYGEN SATURATION: 96 % | BODY MASS INDEX: 37.58 KG/M2 | TEMPERATURE: 97.3 F | SYSTOLIC BLOOD PRESSURE: 107 MMHG | DIASTOLIC BLOOD PRESSURE: 64 MMHG | HEART RATE: 69 BPM

## 2023-12-14 DIAGNOSIS — C91.10 CLL (CHRONIC LYMPHOCYTIC LEUKEMIA) (MULTI): ICD-10-CM

## 2023-12-14 PROCEDURE — 3074F SYST BP LT 130 MM HG: CPT | Performed by: PHYSICIAN ASSISTANT

## 2023-12-14 PROCEDURE — 1126F AMNT PAIN NOTED NONE PRSNT: CPT | Performed by: PHYSICIAN ASSISTANT

## 2023-12-14 PROCEDURE — 99214 OFFICE O/P EST MOD 30 MIN: CPT | Performed by: PHYSICIAN ASSISTANT

## 2023-12-14 PROCEDURE — 3066F NEPHROPATHY DOC TX: CPT | Performed by: PHYSICIAN ASSISTANT

## 2023-12-14 PROCEDURE — 3078F DIAST BP <80 MM HG: CPT | Performed by: PHYSICIAN ASSISTANT

## 2023-12-14 PROCEDURE — 4010F ACE/ARB THERAPY RXD/TAKEN: CPT | Performed by: PHYSICIAN ASSISTANT

## 2023-12-14 PROCEDURE — 1159F MED LIST DOCD IN RCRD: CPT | Performed by: PHYSICIAN ASSISTANT

## 2023-12-14 ASSESSMENT — ENCOUNTER SYMPTOMS
CONFUSION: 0
OCCASIONAL FEELINGS OF UNSTEADINESS: 0
DIAPHORESIS: 0
NECK STIFFNESS: 0
FREQUENCY: 0
DIFFICULTY URINATING: 0
DIZZINESS: 0
VOMITING: 0
CHILLS: 0
CONSTITUTIONAL NEGATIVE: 1
CONSTIPATION: 0
NUMBNESS: 0
APPETITE CHANGE: 0
HEMATOLOGIC/LYMPHATIC NEGATIVE: 1
DIARRHEA: 0
NECK PAIN: 1
SCLERAL ICTERUS: 0
DEPRESSION: 0
CARDIOVASCULAR NEGATIVE: 1
NAUSEA: 0
HEADACHES: 0
HEMATURIA: 0
LOSS OF SENSATION IN FEET: 0
WOUND: 0
SPEECH DIFFICULTY: 0
DECREASED CONCENTRATION: 0
RESPIRATORY NEGATIVE: 1
EYE PROBLEMS: 0
ABDOMINAL DISTENTION: 0
ABDOMINAL PAIN: 0

## 2023-12-14 ASSESSMENT — PATIENT HEALTH QUESTIONNAIRE - PHQ9
SUM OF ALL RESPONSES TO PHQ9 QUESTIONS 1 AND 2: 0
1. LITTLE INTEREST OR PLEASURE IN DOING THINGS: NOT AT ALL
2. FEELING DOWN, DEPRESSED OR HOPELESS: NOT AT ALL

## 2023-12-14 ASSESSMENT — COLUMBIA-SUICIDE SEVERITY RATING SCALE - C-SSRS
2. HAVE YOU ACTUALLY HAD ANY THOUGHTS OF KILLING YOURSELF?: NO
6. HAVE YOU EVER DONE ANYTHING, STARTED TO DO ANYTHING, OR PREPARED TO DO ANYTHING TO END YOUR LIFE?: NO
1. IN THE PAST MONTH, HAVE YOU WISHED YOU WERE DEAD OR WISHED YOU COULD GO TO SLEEP AND NOT WAKE UP?: NO

## 2023-12-14 ASSESSMENT — PAIN SCALES - GENERAL: PAINLEVEL: 0-NO PAIN

## 2023-12-14 NOTE — PROGRESS NOTES
Patient ID: Taurus Perez is a 73 y.o. male.    Interval History:   Mr. Perez is a delightfully pleasant 73yo gentleman with DM and hyperlipidemia who had been having GI upset and nausea associated with a significant amount of unintentional weight loss. Had EGD done in [facility] for further evaluation with findings of a duodenal subepithelial lesion. Biopsies returned with atypical lymphoid cell infiltrate of unclear significance. The slides were reviewed by our main campus hematopathologist and her report said: DUODENAL MASS, BIOPSY: -- MINUTE FRAGMENT OF SMALL INTESTINAL MUCOSA WITH DENSE LYMPHOID INFILTRATE AND ASSOCIATED SURFACE EPITHELIAL REACTIVE CHANGES, SEE NOTE. NOTE: Sections show a minute fragment of intestinal mucosa with focal ulceration and exudate, reactive epithelium, and a dense lymphoid infiltrate. A panel of immunostains (including synaptophysin, CD3, CD20, CD79a, CD5, CD10, CD23, BCL2, cyclin D1, and Ki-67) was reviewed. The lymphoid infiltrate appears to be comprised of a mixture of B and T cells. Cyclin D1 is negative in lymphoid cells. GMS is negative for fungal organisms. It is difficult to interpret the biopsy due to limited tissue, but the mixture of B and T cells favor a reactive process. If there is clinical suspicion for a hematologic neoplasm, a re-biopsy should be considered. He was then referred to [facility] for EUS: - Normal esophagus and stomach with the side-viewing scopes. - Diffuse severe mucosal abnormality characterized by congestion, erythema, friability (with contact bleeding), inflammation, nodularity and texture change was found in the first part of the duodenum, in the second part of the duodenum, in the area of the papilla, in the area of the minor papilla and in the third part of the duodenum. Biopsies were taken with a cold forceps for histology. In addition, there was a polypoid lesion along with thickened folds, which appear to be what was seen on original endoscopy.  - Endosonographic imaging of the pancreas showed sonographic features suggestive of chronic pancreatitis, including honeycombing lobularity, hyperechoic foci, strands and duct wall. No cysts or calcifications were identified. - A lesion was identified in the pancreatic head concerning for mass vs inflammatory lesion. Fine needle aspiration performed. - Wall thickening was seen in the ampulla. Biopsied. - There was no sign of significant pathology in the common bile duct. - There was no evidence of significant pathology in the liver. - Endosonographic images of the left adrenal gland were unremarkable. - There was no abnormal lymphadenopathy seen. The specimens showed: A. PANCREATIC HEAD MASS, FINE NEEDLE ASPIRATION, CYTOLOGY (THIN PREP AND DIRECT SMEARS) AND CELL BLOCK: --PAUCICELLULAR ASPIRATE WITH OCCASIONAL BENIGN GROUPS OF GASTROINTESTINAL EPITHELIAL CELLS, BLOOD, AND BLOOD ELEMENTS. SEE NOTE. Note: Per clinical concern for lymphoma, a portion of the aspirate was submitted for flow cytometric evaluation, with results to be reported separately in the EMR DUODENUM, BIOPSIES: --LOW-GRADE B-CELL LYMPHOMA OF GERMINAL CENTER ORIGIN Sections show fragments of small intestinal mucosa with an atypical lymphoid infiltrate. There are discrete lymphoid nodules composed of small, angulated lymphocytes as well as lymphocytes infiltrating the lamina propria and microscopic lymphoepithelial lesions. The lymphoma cells are positive for CD20, CD10, bcl6, and bcl2. CD5 and CD3 stain T-cells. There are polyclonal plasma cells by CRISTIANE for light chains. CyclinD1 is negative. Congo red is negative. Flow cytometry showed : DIAGNOSIS: Immunophenotypic findings consistent with B cell lymphoma of germinal center cell origin, see note. Note: The cells are small to medium-sized based on scatter. The findings are consistent with a follicular lymphoma. A large B cell lymphoma cannot be totally excluded. Morphologic correlation is suggested.  "DECRIPTION: Flow cytometric analysis of this patient's cells from a duodenal biopsy reveals approximately 2% of all events fall within the lymphocyte gate with the remaining events [id number] and/or non-viable. Analysis of cells within the lymphocyte gate reveals an abnormal population of cells that are CD5-, CD10+, CD19+ (dim), CD20+ (dim), CD23-, CD38+ (dim/moderate), CD43-, and lambda positive. The cells constitute approximately 60% of events within the lymphocyte gate and are small to medium-sized based on scatter. He had a PET scan which showed that his thyroid also lit up. He subsequently had a thyroid biopsy with the pathology showing DIAGNOSIS: POSITIVE FOR MALIGNANT CELLS: CELLULAR SPECIMEN WITH ATYPICAL FOLLICULAR CELLS WITH NUCLEAR GROOVES AND INTRANUCLEAR INCUSIONS. SEE COMMENT. COMMENT: The differential diagnosis includes papillary thyroid carcinoma and medullary carcinoma. Serum calcitonin levels suggested. And the flow cytometry also was consistent with CLL/SLL. Because the duodenum biopsy essentially also showed morphologic features shared between follicular lymphoma and large B cell lymphoma but is CD20+(dim), there likely is CLL/SLL involvement of the duodenum as well. 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  Recently, Dr. Maria repeated the EGD/EUS, and found that his entire duodenal bulb has completely healed.  Since his last visit here, he has had some events. He developed paroxysmal abdominal pain and had a CT done showing acute cholecystitis.  He was scheduled for a cholecystectomy but the CT scan showed a \"mass in his kidney\"--his surgery was postponed and he was sent for a renal ultrasound.  However, the renal ultrasound still failed to shed light on this mass in his kidney.  He then developed an infected cyst in his groin which required surgical debridement and drainage followed by a lengthy " course of antibiotics.  Finally on 6/16 he underwent an MRI of his abdomen which showed 7 mm nonenhancing cyst in upper pole  of left kidney which is largest of several small sub-centimeter cysts throughout the kidneys bilaterally; there is no suspicious mass or enhancement within either kidney to suggest tumor.  There is no nodularity or septation demonstrated within any of the cystic lesions.  No evidence of renal cell tumor.  In the upper pole of the right kidney there is no evidence of angiomyolipoma.  There is no fat containing mass in the upper pole of the right kidney demonstrated on the MRI.  Previous finding on ultrasound may be related to renal sinus fat or adjacent perinephric fat.  He then finally proceeded to cholecystectomy.      3/8/19: He had surveillance EGD. stomach had mild gastritis, Bx showed low grade FL in the third portion of duodenum, none in first/second portions.      12/10/2019: Here for 6 month follow up. Earlier this year he had a surveillance EGD with residual follicular lymphoma found in the third portion the duodenum but he was having no symptoms so we elected to just observe him for now. He was supposed to have a repeat EGD in March 2020.    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 The patient also was evaluated recently by dermatology for a lesion on his right forearm. Was first seen by dermatology on Feb 26, 2020 as he noted 2 lesions, one on the right forearm and one on the right forehead. The forearm lesion was catching on his clothing sleeves causing some mild pain. He said this lesion started 3 months prior. He was recommended by the dermatologist, Dr Julián Nelson, to undergo a shave biopsy and that path showed fragmented atypical cystic squamous proliferation with associated acute, chronic, and granulomatous dermal inflammation with focal dermal  hemorrhage and pigmentation. The changes are somewhat unusual. The overlying crust shows shadow cells which could represent transepidermal elimination of a pilomatricoma. The dermal cystic proliferation is fragmented and appears to be undergoing involution this could represent involution inflamed cyst, keratoacanthoma, or pilomatricoma. Evidence of melanocytic process not seen. He followed up with dermatology on April 20, 2020 and had additional lesions on his left lip, right lower back, and right forehead all asymptomatic. He was advised to just have the spot on his right forearm monitored. The left lip lesion was noted to be a venous lake but they are benign but could be treated with laser. The right forehead lesion was a irritated seborrheic keratosis it was treated with liquid nitrogen. The right lower back lesion was also seborrheic keratosis but because it was not bothering him, it was recommended to be observed.    2/9/21: Had EGD: normal esophagus and stomach; patchy inflammation in duodenum; biopsy negative for malignancy.      3/7/23: EGD done by Dr Lucero on 3/7/2023--all bx taken were negative for lymphoma and H pylori    Subjective      -Admits good energy level and good appetite.   -Denies any fever, night sweats.   -Denies any melena or hematochezia.   -States that he is going to have repeat EGD done in Feb/March, 2024.   -Complains of neck pain at rest or during the night. States that pain tends to improve throughout the day. States that he had similar neck pain/shoulder pain about 3 years ago, for which he had injections and pain was resolved. States that his PCP ordered X-ray for him and he is planning to get it done early next week.   -Denies any other new health concerns.         Objective    BMI:   Body mass index is 37.58 kg/m².     Vitals:   Visit Vitals  /64 (BP Location: Right arm, Patient Position: Sitting, BP Cuff Size: Adult)   Pulse 69   Temp 36.3 °C (97.3 °F) (Temporal)   Resp 16    Wt 105 kg (232 lb 2.3 oz)   SpO2 96%   BMI 37.58 kg/m²   Smoking Status Unknown   BSA 2.21 m²        Review of Systems   Constitutional: Negative.  Negative for appetite change, chills and diaphoresis.   HENT:   Negative for lump/mass, mouth sores and nosebleeds.    Eyes:  Negative for eye problems and icterus.   Respiratory: Negative.     Cardiovascular: Negative.    Gastrointestinal:  Negative for abdominal distention, abdominal pain, constipation, diarrhea, nausea and vomiting.   Genitourinary:  Negative for bladder incontinence, difficulty urinating, frequency and hematuria.    Musculoskeletal:  Positive for neck pain. Negative for gait problem and neck stiffness.   Skin:  Negative for itching, rash and wound.   Neurological:  Negative for dizziness, gait problem, headaches, numbness and speech difficulty.   Hematological: Negative.    Psychiatric/Behavioral:  Negative for confusion and decreased concentration.         Physical Exam  HENT:      Head: Normocephalic.      Nose: Nose normal.      Mouth/Throat:      Mouth: Mucous membranes are moist.   Eyes:      Pupils: Pupils are equal, round, and reactive to light.   Cardiovascular:      Rate and Rhythm: Normal rate and regular rhythm.      Pulses: Normal pulses.      Heart sounds: Normal heart sounds.   Pulmonary:      Effort: Pulmonary effort is normal.      Breath sounds: Normal breath sounds.   Musculoskeletal:         General: Normal range of motion.   Skin:     General: Skin is warm and dry.   Neurological:      General: No focal deficit present.      Mental Status: He is alert and oriented to person, place, and time.   Psychiatric:         Mood and Affect: Mood normal.         Behavior: Behavior normal.     Labs:  Lab Results   Component Value Date    WBC 5.3 12/04/2023    NEUTROABS 2.28 12/04/2023    IGABSOL 0.01 12/04/2023    LYMPHSABS 2.39 12/04/2023    MONOSABS 0.47 12/04/2023    EOSABS 0.08 12/04/2023    BASOSABS 0.02 12/04/2023    RBC 3.56 (L)  "12/04/2023    MCV 98 12/04/2023    MCHC 33.2 12/04/2023    HGB 11.6 (L) 12/04/2023    HCT 34.9 (L) 12/04/2023     12/04/2023     No results found for: \"RETICCTPCT\"   Lab Results   Component Value Date    CREATININE 1.67 (H) 12/04/2023    BUN 37 (H) 12/04/2023    EGFR 43 (L) 12/04/2023     12/04/2023    K 4.9 12/04/2023     12/04/2023    CO2 26 12/04/2023      Lab Results   Component Value Date    ALT 17 12/04/2023    AST 13 12/04/2023    ALKPHOS 84 12/04/2023    BILITOT 0.5 12/04/2023      Lab Results   Component Value Date    IRON 59 12/04/2023    TIBC 313 12/04/2023    FERRITIN 101 12/04/2023      Lab Results   Component Value Date    LDH 64 (L) 12/04/2023     No results found for: \"HAPTOGLOBIN\"  Lab Results   Component Value Date    SPEP Aberrant band detected. See immunofixation. 12/04/2023            Performance Status:  Symptomatic; fully ambulatory    Assessment/Plan      1) CLL/SLL  -He first received 2 radiation therapy fractions.  -He has completed 6 months of bendamustine + rituximab   -Currently, patient is being observed every 6 months.   -Labs done on 12/4/23: WBC 5.3, ANC 2.28, ALC 2.39 Hgb 11.6, MCV 98, platelets 182,000, LDH 64, creatinine 1.67, ferritin 101, iron saturation 19%, SPEP with IF showed Known monoclonal IgG kappa as two bands  in the gamma region totaling 1.3 g/dL. Last detected on 6/2/23 totaling 1.1 g/dL.   -No iron deficiency, blood counts, kidney function and M protein stable   -We will continue to see patient back every 6 months.       2) history of GI bleeding     3) diabetes  -maintained on metformin    4) hyperlipidemia  -maintained on atorvastatin    5) hypertension  -maintained on captopril-HCTZ, toprol, lasix and aspirin    6) osteoarthritis  -of the knees      Problem List Items Addressed This Visit             ICD-10-CM    CLL (chronic lymphocytic leukemia) (CMS/HCC) C91.10    Relevant Orders    Serum Protein Electrophoresis    Lactate dehydrogenase " (Completed)    Ferritin (Completed)    Comprehensive Metabolic Panel (Completed)    CBC and Auto Differential (Completed)    Iron and TIBC (Completed)    Clinic Appointment Request Follow Up; ALFONSO FRANK; [facility]    CBC and Auto Differential    Comprehensive Metabolic Panel    Lactate dehydrogenase    Serum Protein Electrophoresis + Immunofixation    Ferritin    Iron and TIBC            Kaila Martinez PA-C

## 2024-01-08 DIAGNOSIS — F51.04 PSYCHOPHYSIOLOGICAL INSOMNIA: ICD-10-CM

## 2024-01-08 DIAGNOSIS — M10.9 GOUT INVOLVING TOE OF RIGHT FOOT, UNSPECIFIED CAUSE, UNSPECIFIED CHRONICITY: ICD-10-CM

## 2024-01-08 RX ORDER — ALLOPURINOL 100 MG/1
200 TABLET ORAL DAILY
Qty: 180 TABLET | Refills: 1 | Status: SHIPPED | OUTPATIENT
Start: 2024-01-08

## 2024-01-08 RX ORDER — DOXEPIN HYDROCHLORIDE 10 MG/1
CAPSULE ORAL
Qty: 90 CAPSULE | Refills: 1 | OUTPATIENT
Start: 2024-01-08

## 2024-01-08 NOTE — TELEPHONE ENCOUNTER
Patient requesting medication refill. Please approve or deny this request.    Rx requested:  Requested Prescriptions     Pending Prescriptions Disp Refills    Icosapent Ethyl (VASCEPA) 1 g CAPS capsule 360 capsule 1     Sig: Take 2 capsules by mouth 2 times daily         Last Office Visit:   12/6/2023      Next Visit Date:  Future Appointments   Date Time Provider Department Center   1/29/2024 10:15 AM Jorge Garrett DPM [facility]   3/6/2024  9:15 AM Sean eLvy MD [facility]

## 2024-01-08 NOTE — TELEPHONE ENCOUNTER
Future Appointments    Encounter Information   Provider Department Appt Notes   1/29/2024 Jorge Garrett DPM [facility] 3 month follow up   3/6/2024 Sean Levy MD [facility] Appt Reason: Routine Existing Condition Follow Up  3-month f/u visit and lab results.  Booking Code: [id number]     Past Visits    Date Provider Specialty Visit Type Primary Dx   12/06/2023 Sean Levy MD Family Medicine Office Visit Type 2 diabetes mellitus with stage 3a chronic kidney disease, without long-term current use of insulin (HCC)

## 2024-01-09 DIAGNOSIS — F51.04 PSYCHOPHYSIOLOGICAL INSOMNIA: ICD-10-CM

## 2024-01-09 RX ORDER — ICOSAPENT ETHYL 1000 MG/1
2 CAPSULE ORAL 2 TIMES DAILY
Qty: 360 CAPSULE | Refills: 1 | Status: SHIPPED | OUTPATIENT
Start: 2024-01-09

## 2024-01-09 RX ORDER — DOXEPIN HYDROCHLORIDE 10 MG/1
CAPSULE ORAL
Qty: 90 CAPSULE | Refills: 1 | Status: SHIPPED | OUTPATIENT
Start: 2024-01-09

## 2024-01-09 RX ORDER — ICOSAPENT ETHYL 1000 MG/1
2 CAPSULE ORAL 2 TIMES DAILY
Qty: 360 CAPSULE | Refills: 1 | Status: SHIPPED | OUTPATIENT
Start: 2024-01-09 | End: 2024-01-09 | Stop reason: SDUPTHER

## 2024-01-09 NOTE — TELEPHONE ENCOUNTER
Rx requested:  Requested Prescriptions     Pending Prescriptions Disp Refills    doxepin (SINEQUAN) 10 MG capsule 90 capsule 1     Sig: TAKE 1 CAPSULE BY MOUTH EVERY DAY AT NIGHT         Last Office Visit:   12/6/2023      Next Visit Date:  Future Appointments   Date Time Provider Department Center   1/29/2024 10:15 AM Jorge Garrett DPM [facility]   3/6/2024  9:15 AM Sean Levy MD [facility]

## 2024-01-29 ENCOUNTER — OFFICE VISIT (OUTPATIENT)
Dept: PODIATRY | Age: 74
End: 2024-01-29
Payer: MEDICARE

## 2024-01-29 VITALS — BODY MASS INDEX: 35.94 KG/M2 | HEIGHT: 67 IN | WEIGHT: 229 LBS | TEMPERATURE: 97.1 F

## 2024-01-29 DIAGNOSIS — E66.01 SEVERE OBESITY (BMI 35.0-39.9) WITH COMORBIDITY (HCC): ICD-10-CM

## 2024-01-29 DIAGNOSIS — M19.071 ARTHRITIS OF BOTH FEET: ICD-10-CM

## 2024-01-29 DIAGNOSIS — N18.31 TYPE 2 DIABETES MELLITUS WITH STAGE 3A CHRONIC KIDNEY DISEASE, WITHOUT LONG-TERM CURRENT USE OF INSULIN (HCC): Primary | ICD-10-CM

## 2024-01-29 DIAGNOSIS — M20.42 HAMMERTOE, BILATERAL: ICD-10-CM

## 2024-01-29 DIAGNOSIS — M21.42 BILATERAL PES PLANUS: ICD-10-CM

## 2024-01-29 DIAGNOSIS — M20.41 HAMMERTOE, BILATERAL: ICD-10-CM

## 2024-01-29 DIAGNOSIS — M21.41 BILATERAL PES PLANUS: ICD-10-CM

## 2024-01-29 DIAGNOSIS — M19.072 ARTHRITIS OF BOTH FEET: ICD-10-CM

## 2024-01-29 DIAGNOSIS — E11.22 TYPE 2 DIABETES MELLITUS WITH STAGE 3A CHRONIC KIDNEY DISEASE, WITHOUT LONG-TERM CURRENT USE OF INSULIN (HCC): Primary | ICD-10-CM

## 2024-01-29 PROCEDURE — 1123F ACP DISCUSS/DSCN MKR DOCD: CPT | Performed by: PODIATRIST

## 2024-01-29 PROCEDURE — G8427 DOCREV CUR MEDS BY ELIG CLIN: HCPCS | Performed by: PODIATRIST

## 2024-01-29 PROCEDURE — G8484 FLU IMMUNIZE NO ADMIN: HCPCS | Performed by: PODIATRIST

## 2024-01-29 PROCEDURE — 1036F TOBACCO NON-USER: CPT | Performed by: PODIATRIST

## 2024-01-29 PROCEDURE — 3017F COLORECTAL CA SCREEN DOC REV: CPT | Performed by: PODIATRIST

## 2024-01-29 PROCEDURE — G8417 CALC BMI ABV UP PARAM F/U: HCPCS | Performed by: PODIATRIST

## 2024-01-29 PROCEDURE — 99213 OFFICE O/P EST LOW 20 MIN: CPT | Performed by: PODIATRIST

## 2024-01-29 PROCEDURE — 3046F HEMOGLOBIN A1C LEVEL >9.0%: CPT | Performed by: PODIATRIST

## 2024-01-29 PROCEDURE — 2022F DILAT RTA XM EVC RTNOPTHY: CPT | Performed by: PODIATRIST

## 2024-01-29 ASSESSMENT — ENCOUNTER SYMPTOMS
SHORTNESS OF BREATH: 0
NAUSEA: 0
BACK PAIN: 0
VOMITING: 0

## 2024-01-29 NOTE — PROGRESS NOTES
[facility] [place] [facility], [facility]  [address]  [place] [address]  Dept: [phone]  Loc: [phone]       Hector Bose  (1950)    1/29/24    Subjective     Hector Bose is 73 y.o. male who complains today of:    Chief Complaint   Patient presents with    Diabetes    Foot Pain     Left       Diabetes  Pertinent negatives for diabetes include no chest pain.   Foot Pain   Pertinent negatives include no fever or numbness.     HPI: Patient presents for follow-up.  Patient reports minimal to no pain to the left foot and ankle joints.  Patient reports having intermittent pain to the medial and lateral aspect foot he describes as a soft tissue pain.  Patient has not observed a pattern of when this occurs or any activity that triggers the symptoms.  He states they are transient and he finds them to be worse when he is trying to fall asleep at night.  He states he falls asleep easier and he can keep his mind distracted until he does fall asleep.  Patient states he did not get contacted by the  to be fitted for diabetic shoes and insoles.    Review of Systems   Constitutional:  Negative for chills and fever.   HENT:  Negative for hearing loss.    Respiratory:  Negative for shortness of breath.    Cardiovascular:  Negative for chest pain.   Gastrointestinal:  Negative for nausea and vomiting.   Genitourinary:  Negative for difficulty urinating.   Musculoskeletal:  Negative for back pain and gait problem.   Skin:  Negative for wound.   Neurological:  Negative for numbness.   Hematological:  Does not bruise/bleed easily.   Psychiatric/Behavioral:  Negative for sleep disturbance.        The patient is a diabetic.   PCP/ Endocrinologist: Sean Levy MD   Date last seen: December 6, 2023    Allergies:  Dye [barium-containing compounds], Iodides, and Iodinated contrast media    Current Outpatient Medications on File Prior

## 2024-02-09 NOTE — TELEPHONE ENCOUNTER
Patient comment: Doctor Cam has been making me take this prescription every other day. This is a change from every day.

## 2024-02-11 RX ORDER — FUROSEMIDE 40 MG/1
40 TABLET ORAL EVERY OTHER DAY
Qty: 45 TABLET | Refills: 3 | Status: SHIPPED | OUTPATIENT
Start: 2024-02-11

## 2024-02-16 RX ORDER — ATORVASTATIN CALCIUM 40 MG/1
40 TABLET, FILM COATED ORAL DAILY
Qty: 90 TABLET | Refills: 3 | Status: SHIPPED | OUTPATIENT
Start: 2024-02-16

## 2024-02-20 ENCOUNTER — TELEPHONE (OUTPATIENT)
Dept: PODIATRY | Age: 74
End: 2024-02-20

## 2024-02-28 RX ORDER — BENAZEPRIL HYDROCHLORIDE 20 MG/1
TABLET ORAL
Qty: 90 TABLET | Refills: 3 | Status: SHIPPED | OUTPATIENT
Start: 2024-02-28

## 2024-03-03 NOTE — PROGRESS NOTES
Subjective:      Patient ID: Hector Bose is a 73 y.o. male Established patient, here for evaluation of the following chief complaint(s):  Chief Complaint   Patient presents with    Diabetes       HPI    72-year-old diabetic hypertensive male with a history of hypertriglyceridemia gout and hypertension presents for a f/u visit.         Essential hypertension-lasix 40 mg daily , lotensin 20 mg daily, metoprolol 50 mg orally daily      Stage 3 chronic kidney disease, unspecified whether stage 3a or 3b CKD ([facility])-monitoring renal function closely.      Type 2 diabetes mellitus with stage 3a chronic kidney disease, without long-term current use of insulin (HCC)-metformin 5 00 mg twice daily.        Hypertriglyceridemia-compliant with Vascepa 1 g daily, Lipitor 40 mg daily         History of gout- Allopurinol. Colchicine as needed.  The patient reports no additional gout flares at this time.     At present he denies polyuria,  Polydipsia, constitutional, sinus, visual, cardiopulmonary, urologic, gastrointestinal, immunologic/hematologic, musculoskeletal, neurologic,dermatologic, or psychiatric complaints.        Current Outpatient Medications on File Prior to Visit   Medication Sig Dispense Refill    aspirin 81 MG EC tablet Take by mouth      benazepril (LOTENSIN) 20 MG tablet TAKE 1 TABLET BY MOUTH EVERY DAY 90 tablet 3    metFORMIN (GLUCOPHAGE) 500 MG tablet TAKE 1 TABLETS BY MOUTH TWICE A DAY WITH MEALS 180 tablet 3    atorvastatin (LIPITOR) 40 MG tablet Take 1 tablet by mouth daily 90 tablet 3    furosemide (LASIX) 40 MG tablet Take 1 tablet by mouth every other day 45 tablet 3    Icosapent Ethyl (VASCEPA) 1 g CAPS capsule Take 2 capsules by mouth 2 times daily 360 capsule 1    doxepin (SINEQUAN) 10 MG capsule TAKE 1 CAPSULE BY MOUTH EVERY DAY AT NIGHT 90 capsule 1    allopurinol (ZYLOPRIM) 100 MG tablet TAKE 2 TABLETS BY MOUTH DAILY 180 tablet 1    potassium chloride (KLOR-CON M20) 20 MEQ extended release tablet

## 2024-03-06 ENCOUNTER — OFFICE VISIT (OUTPATIENT)
Dept: FAMILY MEDICINE CLINIC | Age: 74
End: 2024-03-06

## 2024-03-06 VITALS
BODY MASS INDEX: 35.79 KG/M2 | OXYGEN SATURATION: 97 % | WEIGHT: 228 LBS | RESPIRATION RATE: 14 BRPM | HEART RATE: 68 BPM | SYSTOLIC BLOOD PRESSURE: 116 MMHG | HEIGHT: 67 IN | DIASTOLIC BLOOD PRESSURE: 70 MMHG

## 2024-03-06 DIAGNOSIS — R29.3 POOR POSTURE: ICD-10-CM

## 2024-03-06 DIAGNOSIS — E78.5 DYSLIPIDEMIA: ICD-10-CM

## 2024-03-06 DIAGNOSIS — N18.31 TYPE 2 DIABETES MELLITUS WITH STAGE 3A CHRONIC KIDNEY DISEASE, WITHOUT LONG-TERM CURRENT USE OF INSULIN (HCC): Primary | ICD-10-CM

## 2024-03-06 DIAGNOSIS — E11.22 TYPE 2 DIABETES MELLITUS WITH STAGE 3A CHRONIC KIDNEY DISEASE, WITHOUT LONG-TERM CURRENT USE OF INSULIN (HCC): ICD-10-CM

## 2024-03-06 DIAGNOSIS — D64.9 ANEMIA, UNSPECIFIED TYPE: ICD-10-CM

## 2024-03-06 DIAGNOSIS — N18.31 TYPE 2 DIABETES MELLITUS WITH STAGE 3A CHRONIC KIDNEY DISEASE, WITHOUT LONG-TERM CURRENT USE OF INSULIN (HCC): ICD-10-CM

## 2024-03-06 DIAGNOSIS — E11.22 TYPE 2 DIABETES MELLITUS WITH STAGE 3A CHRONIC KIDNEY DISEASE, WITHOUT LONG-TERM CURRENT USE OF INSULIN (HCC): Primary | ICD-10-CM

## 2024-03-06 DIAGNOSIS — Z12.5 PROSTATE CANCER SCREENING: ICD-10-CM

## 2024-03-06 DIAGNOSIS — I10 ESSENTIAL HYPERTENSION: ICD-10-CM

## 2024-03-06 LAB
CHOLEST SERPL-MCNC: 91 MG/DL (ref 0–199)
CREAT UR-MCNC: 103.2 MG/DL
HBA1C MFR BLD: 6.3 % (ref 4.8–5.9)
HDLC SERPL-MCNC: 25 MG/DL (ref 40–59)
LDL CHOLESTEROL CALCULATED: 40 MG/DL (ref 0–129)
MICROALBUMIN UR-MCNC: <1.2 MG/DL
MICROALBUMIN/CREAT UR-RTO: NORMAL MG/G (ref 0–30)
PSA SERPL-MCNC: 2.22 NG/ML (ref 0–4)
TRIGLYCERIDE, FASTING: 132 MG/DL (ref 0–150)

## 2024-03-06 RX ORDER — ASPIRIN 81 MG/1
TABLET ORAL
COMMUNITY

## 2024-03-06 ASSESSMENT — PATIENT HEALTH QUESTIONNAIRE - PHQ9
SUM OF ALL RESPONSES TO PHQ QUESTIONS 1-9: 0
2. FEELING DOWN, DEPRESSED OR HOPELESS: 0
SUM OF ALL RESPONSES TO PHQ QUESTIONS 1-9: 0
SUM OF ALL RESPONSES TO PHQ QUESTIONS 1-9: 0
1. LITTLE INTEREST OR PLEASURE IN DOING THINGS: 0
SUM OF ALL RESPONSES TO PHQ QUESTIONS 1-9: 0
SUM OF ALL RESPONSES TO PHQ9 QUESTIONS 1 & 2: 0

## 2024-03-11 ENCOUNTER — HOSPITAL ENCOUNTER (OUTPATIENT)
Dept: PHYSICAL THERAPY | Age: 74
Setting detail: THERAPIES SERIES
Discharge: HOME | End: 2024-03-11
Attending: INTERNAL MEDICINE
Payer: MEDICARE

## 2024-03-11 PROCEDURE — 97110 THERAPEUTIC EXERCISES: CPT

## 2024-03-11 PROCEDURE — 97162 PT EVAL MOD COMPLEX 30 MIN: CPT

## 2024-03-11 ASSESSMENT — PAIN SCALES - GENERAL: PAINLEVEL_OUTOF10: 5

## 2024-03-11 ASSESSMENT — PAIN DESCRIPTION - LOCATION: LOCATION: NECK

## 2024-03-11 NOTE — PROGRESS NOTES
[facility] Physical Therapy-  [facility]  PHYSICAL THERAPY EVALUATION    Physical Therapy: Initial Evaluation    Patient: Hector Bose (73 y.o.     male)   Examination Date: 2024   :  1950 ;    Confirmed: Yes MRN: [id number]  CSN: [id number]   Insurance: Payor: MEDICARE / Plan: MEDICARE PART A AND B / Product Type: *No Product type* /   Insurance ID: [id number] - (Medicare) Secondary Insurance (if applicable): [facility]   Referring Physician: Sean Levy MD       Visits to Date/Visits Approved: 1 /      No Show/Cancelled Appts: 0 / 0     Medical Diagnosis: Poor posture [R29.3]        Treatment Diagnosis: Poor posture, Neck pain     PERTINENT MEDICAL HISTORY           Medical History: Chart Reviewed: Yes   Past Medical History:   Diagnosis Date    Acute idiopathic gout of right foot     Anemia 2018    iron transfusions x2    Arthritis     both knees    Bilateral carpal tunnel syndrome 2022    Chronic kidney disease     CLL (chronic lymphocytic leukemia) (HCC)     dx 2015    Colon polyps     Disease of blood and blood forming organ     Hyperlipidemia     dx since     Hypertension     meds  since     Lymphoma of gastrointestinal tract (HCC)     Movement disorder     Type II or unspecified type diabetes mellitus without mention of complication, not stated as uncontrolled     hx > 20 yrs     Surgical History:   Past Surgical History:   Procedure Laterality Date    APPENDECTOMY      age 22s    CARPAL TUNNEL RELEASE Left 2022    LEFT CARPAL TUNNEL RELEASE performed by Rangel Saldivar MD at [facility]    CARPAL TUNNEL RELEASE Right 3/9/2022    CARPAL TUNNEL RELEASE RIGHT performed by Rangel Saldivar MD at [facility]    CHOLECYSTECTOMY N/A 2016    COLONOSCOPY  16    w/polypectomy     COLONOSCOPY N/A 6/15/2021    COLORECTAL CANCER SCREENING, HIGH RISK performed by Chang Caldwell MD at [facility]

## 2024-03-11 NOTE — THERAPY EVALUATION
decline in his ROM in his neck causing him difficulty driving as well as a progressively worsening forward flexed posture.  Pt demonstrates very limited cervical ROM in all directions.  Pt with increased pain in loan UTs and cervical paraspinals.  Pt with good UE strength but likely with weakness in periscapular muscles as seen with FWD flexed posture.  Pt would benefit from further skilled PT to improvehis ROM, strength and posture to increase his ease with ADLs and driving.  Therapy Prognosis: Good      PT Education: Goals;PT Role;Plan of Care;Evaluative findings;Home Exercise Program    PLAN: [x] Evaluate and Treat  Frequency/Duration:  Plan Frequency: 2  Plan weeks: 5  Current Treatment Recommendations: Strengthening, ROM, Neuromuscular re-education, Manual, Home exercise program, Safety education & training, Patient/Caregiver education & training, Equipment evaluation, education, & procurement, Modalities, Group Therapy  Modalities: Heat/Cold, E-stim - unattended            Patient Status:[x] Continue/ Initiate plan of Care     [] Discharge PT.  Recommend pt continue with HEP.      [] Additional visits requested, Please re-certify for additional visits:     [] Hold     Signature:  Electronically signed by Charley Ngo PT on 3/11/24 at 4:19 PM EDT          If you have any questions or concerns, please don't hesitate to call.  Thank you for your referral.

## 2024-03-14 ENCOUNTER — HOSPITAL ENCOUNTER (OUTPATIENT)
Dept: PHYSICAL THERAPY | Age: 74
Setting detail: THERAPIES SERIES
Discharge: HOME | End: 2024-03-14
Attending: INTERNAL MEDICINE
Payer: MEDICARE

## 2024-03-14 PROCEDURE — 97110 THERAPEUTIC EXERCISES: CPT

## 2024-03-14 PROCEDURE — 97140 MANUAL THERAPY 1/> REGIONS: CPT

## 2024-03-14 ASSESSMENT — PAIN DESCRIPTION - LOCATION: LOCATION: NECK

## 2024-03-14 ASSESSMENT — PAIN SCALES - GENERAL: PAINLEVEL_OUTOF10: 2

## 2024-03-14 NOTE — PROGRESS NOTES
planes. VCs for relaxation throughout manual. Patient reporting improved flexibility post session.  Treatment Diagnosis: Poor posture, Neck pain  Therapy Prognosis: Good          Post-Pain Assessment:       Pain Rating (0-10 pain scale): 1  /10   Location and pain description same as pre-treatment unless indicated.   Action: [] NA   [] Perform HEP  [] Meds as prescribed  [x] Modalities as prescribed   [] Call Physician     GOALS   Patient Goal(s):      Short Term Goals Completed by 2 weeks Goal Status   STG 1 Independent with HEP. In progress     Long Term Goals Completed by 5 weeks Goal Status   LTG 1 Improve cervical ROM by >/= 5-10 deg to increase ease with driving. In progress   LTG 2 Reduce neck pain to </= 2/10 with all activity. In progress   LTG 3 Improve periscapular muscle strength to >/=4+/5 to improve pt's overall sitting and standing posture. In progress   LTG 4 NDI </= 10 to improve pt's overall QOL. In progress       Plan:  Frequency/Duration:  Plan  Plan Frequency: 2  Plan weeks: 5  Current Treatment Recommendations: Strengthening, ROM, Neuromuscular re-education, Manual, Home exercise program, Safety education & training, Patient/Caregiver education & training, Equipment evaluation, education, & procurement, Modalities, Group Therapy  Modalities: Heat/Cold, E-stim - unattended  Pt to continue current HEP.  See objective section for any therapeutic exercise changes, additions or modifications this date.    Therapy Time:      PT Individual Minutes  Time In: 0923  Time Out: 1001  Minutes: 38  Timed Code Treatment Minutes: 38 Minutes  Procedure Minutes:0  Timed Activity Minutes Units   Ther Ex 30 2   Manual  8 1     Electronically signed by Yessi Valera PTA on 3/14/24 at 7:53 AM EDT

## 2024-03-15 RX ORDER — METOPROLOL SUCCINATE 50 MG/1
TABLET, EXTENDED RELEASE ORAL
Qty: 90 TABLET | Refills: 3 | Status: SHIPPED | OUTPATIENT
Start: 2024-03-15

## 2024-03-18 ENCOUNTER — HOSPITAL ENCOUNTER (OUTPATIENT)
Dept: PHYSICAL THERAPY | Age: 74
Setting detail: THERAPIES SERIES
Discharge: HOME | End: 2024-03-18
Attending: INTERNAL MEDICINE
Payer: MEDICARE

## 2024-03-18 PROCEDURE — 97140 MANUAL THERAPY 1/> REGIONS: CPT

## 2024-03-18 PROCEDURE — 97110 THERAPEUTIC EXERCISES: CPT

## 2024-03-18 NOTE — PROGRESS NOTES
[facility]  Outpatient Physical Therapy    Treatment Note        Date: 3/18/2024  Patient: Hector Bose  : 1950   Confirmed: Yes  MRN: [id number]  Referring Provider: Sean Levy MD    Medical Diagnosis: Poor posture [R29.3]       Treatment Diagnosis: Poor posture, Neck pain    Visit Information:  Insurance: Payor: MEDICARE / Plan: MEDICARE PART A AND B / Product Type: *No Product type* /   PT Visit Information  PT Insurance Information: Medicare  Total # of Visits to Date: 3  Plan of Care/Certification Expiration Date: 24  No Show: 0  Progress Note Due Date: 04/10/24  Canceled Appointment: 0  Progress Note Counter: 3/10    Subjective Information:  Subjective: Pt reports to feeling better but is stiff today. \"I wake up stiff some days\"  HEP Compliance:  [x] Good [] Fair [] Poor [] Reports not doing due to:      Pain Screening  Patient Currently in Pain: Denies    Treatment:  Exercises:  Exercises  Exercise 1: Cervical AROM: flexion extension x15ea; sidebend, rotation x10 ea,  Exercise 2: Chin tucks in supine 5s x15  Exercise 3: UT/levator str 3/20sec Alfa  Exercise 4: Posture ex x15 ea  Exercise 5: Corner str 3/20sec  Exercise 9: Supine chest pulls (Horizontal ) YTB x10  Exercise 20: HEP: chest pulls supine YTB- forgot TB-give NV in chart       Manual:   Manual Therapy  Manual Traction: Cervical distraction, sub occ release  Soft Tissue Mobilizaton: STM to alfa cervical paraspinals and UTs  Other: Total manual time: 8'         *Indicates exercise, modality, or manual techniques to be initiated when appropriate    Assessment:   Body Structures, Functions, Activity Limitations Requiring Skilled Therapeutic Intervention: Decreased ROM, Decreased strength, Increased pain, Decreased posture  Assessment: Continued POC to improve ROM, strength and decrease pain. Pt continues to require vc's to relax with manual with difficulty to do. Pt reports to improve range after doing therex although

## 2024-03-21 ENCOUNTER — HOSPITAL ENCOUNTER (OUTPATIENT)
Dept: PHYSICAL THERAPY | Age: 74
Setting detail: THERAPIES SERIES
Discharge: HOME | End: 2024-03-21
Attending: INTERNAL MEDICINE
Payer: MEDICARE

## 2024-03-21 PROCEDURE — 97140 MANUAL THERAPY 1/> REGIONS: CPT

## 2024-03-21 PROCEDURE — 97110 THERAPEUTIC EXERCISES: CPT

## 2024-03-21 ASSESSMENT — PAIN SCALES - GENERAL: PAINLEVEL_OUTOF10: 1

## 2024-03-21 ASSESSMENT — PAIN DESCRIPTION - LOCATION: LOCATION: NECK

## 2024-03-21 NOTE — PROGRESS NOTES
[facility]  Outpatient Physical Therapy   Treatment Note        Date: 3/21/2024  Patient: Hector Bose  : 1950   Confirmed: Yes  MRN: [id number]  Referring Provider: Sean Levy MD      Medical Diagnosis: Poor posture [R29.3]      Treatment Diagnosis: Poor posture, Neck pain    Visit Information:  Insurance: Payor: MEDICARE / Plan: MEDICARE PART A AND B / Product Type: *No Product type* /   PT Visit Information  PT Insurance Information: Medicare  Total # of Visits to Date: 4  Plan of Care/Certification Expiration Date: 24  No Show: 0  Progress Note Due Date: 04/10/24  Canceled Appointment: 0  Progress Note Counter: 4/10    Subjective Information:  Subjective: Pt reports \"feeling great\"  HEP Compliance:  [x] Good [] Fair [] Poor [] Reports not doing due to:               Pain Screening  Patient Currently in Pain: Yes  Pain Level: 1  Pain Location: Neck    Treatment:  Exercises:  Exercises  Exercise 1: Cervical AROM: flexion extension x15ea; sidebend, rotation x15 ea,  Exercise 2: Chin tucks in supine 5s x20  Exercise 3: UT/levator str 3/20sec Alfa  Exercise 4: Scap rtetract 5 sec x 15, Posterior roll x 15  Exercise 5: Corner str 3/20sec  Exercise 6: Rows/lats YTB Nx 15  Exercise 9: Supine chest pulls (Horizontal ) YTB x15       Manual:   Manual Therapy  Manual Traction: Cervical distraction, sub occ release  Soft Tissue Mobilizaton: STM to alfa cervical paraspinals and UTs  Other: Total manual time: 8'       *Indicates exercise, modality, or manual techniques to be initiated when appropriate    Objective Measures:         LTG 1 Current Status:: 3/21/24 CROM flex 42, Ext 23, RSB 12, LSB 14  LTG 2 Current Status:: 3/21/24 1/10 increases driving.        Assessment:   Body Structures, Functions, Activity Limitations Requiring Skilled Therapeutic Intervention: Decreased ROM, Decreased strength, Increased pain, Decreased posture  Assessment: Pt continue to report for progression of

## 2024-03-25 ENCOUNTER — HOSPITAL ENCOUNTER (OUTPATIENT)
Dept: PHYSICAL THERAPY | Age: 74
Setting detail: THERAPIES SERIES
Discharge: HOME | End: 2024-03-25
Attending: INTERNAL MEDICINE
Payer: MEDICARE

## 2024-03-25 PROCEDURE — 97140 MANUAL THERAPY 1/> REGIONS: CPT

## 2024-03-25 PROCEDURE — 97110 THERAPEUTIC EXERCISES: CPT

## 2024-03-28 ENCOUNTER — HOSPITAL ENCOUNTER (OUTPATIENT)
Dept: PHYSICAL THERAPY | Age: 74
Setting detail: THERAPIES SERIES
Discharge: HOME | End: 2024-03-28
Attending: INTERNAL MEDICINE
Payer: MEDICARE

## 2024-03-28 PROCEDURE — 97110 THERAPEUTIC EXERCISES: CPT

## 2024-03-28 PROCEDURE — 97140 MANUAL THERAPY 1/> REGIONS: CPT

## 2024-03-28 ASSESSMENT — PAIN DESCRIPTION - LOCATION: LOCATION: NECK

## 2024-03-28 ASSESSMENT — PAIN SCALES - GENERAL: PAINLEVEL_OUTOF10: 1

## 2024-03-28 NOTE — PROGRESS NOTES
[facility]  Outpatient Physical Therapy    Treatment Note        Date: 3/28/2024  Patient: Hector Bose  : 1950   Confirmed: Yes  MRN: [id number]  Referring Provider: Sean Levy MD    Medical Diagnosis: Poor posture [R29.3]       Treatment Diagnosis: Poor posture, Neck pain    Visit Information:  Insurance: Payor: MEDICARE / Plan: MEDICARE PART A AND B / Product Type: *No Product type* /   PT Visit Information  PT Insurance Information: Medicare  Total # of Visits to Date: 6  Plan of Care/Certification Expiration Date: 24  No Show: 0  Progress Note Due Date: 04/10/24  Canceled Appointment: 0  Progress Note Counter: 6/10    Subjective Information:  Subjective: Patient reports Rt cervical has improved, continued tightness in Lt. Flexibility improves after exercising however tightness returns when waking up.  HEP Compliance:  [x] Good [] Fair [] Poor [] Reports not doing due to:               Pain Screening  Patient Currently in Pain: Yes  Pain Level: 1  Pain Location: Neck (tightness)    Treatment:  Exercises:  Exercises  Exercise 1: Cervical extension with towel, cervical rot with towel x10ea alfa  Exercise 2: Chin tucks in supine 5s x10, chin tucks with rotation 3 '' x10  Exercise 3: UT/levator str 2/20sec Alfa  Exercise 5: ReviewedPec str- completed in doorway 2/30sec  Exercise 6: Rows/lats RTB x10  Exercise 7: Seated thoracic extension x10  Exercise 8: Ball roll up on wall x10       Manual:   Manual Therapy  Manual Traction: Cervical distraction  Soft Tissue Mobilizaton: STM to alfa cervical paraspinals and UTs  Other: Total manual time: 8'           *Indicates exercise, modality, or manual techniques to be initiated when appropriate                Assessment:   Body Structures, Functions, Activity Limitations Requiring Skilled Therapeutic Intervention: Decreased ROM, Decreased strength, Increased pain, Decreased posture  Assessment: Reviewed Pec str in doorway for patient to

## 2024-04-01 ENCOUNTER — HOSPITAL ENCOUNTER (OUTPATIENT)
Dept: PHYSICAL THERAPY | Age: 74
Setting detail: THERAPIES SERIES
Discharge: HOME | End: 2024-04-01
Attending: INTERNAL MEDICINE
Payer: MEDICARE

## 2024-04-01 PROCEDURE — 97110 THERAPEUTIC EXERCISES: CPT

## 2024-04-01 ASSESSMENT — PAIN DESCRIPTION - LOCATION: LOCATION: NECK

## 2024-04-01 ASSESSMENT — PAIN SCALES - GENERAL: PAINLEVEL_OUTOF10: 1

## 2024-04-01 NOTE — PROGRESS NOTES
[facility]  Outpatient Physical Therapy    Treatment Note        Date: 2024  Patient: Hector Bose  : 1950   Confirmed: Yes  MRN: [id number]  Referring Provider: Sean Levy MD    Medical Diagnosis: Poor posture [R29.3]       Treatment Diagnosis: Poor posture, Neck pain    Visit Information:  Insurance: Payor: MEDICARE / Plan: MEDICARE PART A AND B / Product Type: *No Product type* /   PT Visit Information  PT Insurance Information: Medicare  Total # of Visits to Date: 7  Plan of Care/Certification Expiration Date: 24  No Show: 0  Progress Note Due Date: 04/10/24  Canceled Appointment: 0  Progress Note Counter: 7/10    Subjective Information:  Subjective: Patient reports improved ability to look behind him towards the right side.  HEP Compliance:  [x] Good [] Fair [] Poor [] Reports not doing due to:               Pain Screening  Patient Currently in Pain: Yes  Pain Level: 1  Pain Location: Neck (stiff)    Treatment:  Exercises:  Exercises  Exercise 1: Cervical extension with towel, cervical rot with towel x10ea alfa  Exercise 2: Chin tucks with rotation 3 '' x10 supine  Exercise 4: Open book str 3'' x10  Exercise 6: Rows/lats RTB x12  Exercise 7: Seated thoracic extension x10  Exercise 8: Ball roll up on wall x10  Exercise 10: UBE level 1.5 for 2.5 forward, 2.5min retro  Exercise 11: Alfa flexion wall slides 5'' x10 to improve posture/thoracic ext  Exercise 20: HEP: Contineu current + Chin tucks with rotation       Manual:   Manual Therapy  Manual Traction: Cervical distraction  Soft Tissue Mobilizaton: STM to alfa cervical paraspinals and UTs  Other: Total manual time: 8'         *Indicates exercise, modality, or manual techniques to be initiated when appropriate    Objective Measures:           LTG 2 Current Status:: : Patient reports pain on average 1/10 and only on occassion vs daily,             Assessment:   Body Structures, Functions, Activity Limitations Requiring

## 2024-04-04 ENCOUNTER — HOSPITAL ENCOUNTER (OUTPATIENT)
Dept: PHYSICAL THERAPY | Age: 74
Setting detail: THERAPIES SERIES
Discharge: HOME | End: 2024-04-04
Attending: INTERNAL MEDICINE
Payer: MEDICARE

## 2024-04-04 NOTE — PROGRESS NOTES
Therapy                            Cancellation/No-show Note    Date: 2024  Patient: Hector Bose (73 y.o. male)  : 1950  MRN:  [id number]  Referring Physician: Sean Levy MD    Medical Diagnosis: Poor posture [R29.3]      Visit Information:  Insurance: Payor: MEDICARE / Plan: MEDICARE PART A AND B / Product Type: *No Product type* /   Visits to Date: 7   No Show/Cancelled Appts: 0       For today's appointment patient:  [x]  Cancelled  []  Rescheduled appointment  []  No-show   []  Called pt to remind of next appointment     Reason given by patient:  []  Patient ill  []  Conflicting appointment  []  No transportation    []  Conflict with work  []  No reason given  [x]  Other:  Emergency at home, waiting for     [x] Pt has future appointments scheduled, no follow up needed  [] Pt requests to be on hold.    Reason:   If > 2 weeks please discuss with therapist.  [] Therapist to call pt for follow up     Comments:       Signature: Electronically signed by Charley Ngo PT on 24 at 9:27 AM EDT

## 2024-04-08 ENCOUNTER — HOSPITAL ENCOUNTER (OUTPATIENT)
Dept: PHYSICAL THERAPY | Age: 74
Setting detail: THERAPIES SERIES
Discharge: HOME | End: 2024-04-08
Attending: INTERNAL MEDICINE
Payer: MEDICARE

## 2024-04-08 PROCEDURE — 97110 THERAPEUTIC EXERCISES: CPT

## 2024-04-08 NOTE — PROGRESS NOTES
[facility]  PHYSICAL THERAPY PLAN OF CARE                                    [place] [address]     Ph: [phone]  Fax: [phone]    [] Certification  [] Recertification []  Plan of Care  [] Progress Note [x] Discharge      Referring Provider: Sean Levy MD    From:  Charley Ngo DPT Patient: Hector Bose (73 y.o. male) : 1950 Date: 2024   Medical Diagnosis: Poor posture [R29.3]    Treatment Diagnosis: Poor posture, Neck pain    Plan of Care/Certification Expiration Date: 24   Progress Report Period from: 3/11/2024  to 2024    Visits to Date: 8 No Show: 0 Cancelled Appts: 1    OBJECTIVE:   Short Term Goals - Time Frame for Short Term Goals: 2 weeks    Goals Current/Discharge status  Status   Short Term Goal 1: Independent with HEP.  STG 1 Current Status:: : Pt Independent and compliant 1-2x/day   In progress  Met   Long Term Goals - Time Frame for Long Term Goals : 5 weeks  Goals Current/ Discharge status Status   Long Term Goal 1: Improve cervical ROM by >/= 5-10 deg to increase ease with driving. LTG 1 Current Status:: 24 CROM flex 45, Ext 25, RSB 25, LSB 20, Rrot 30, Lrot 30   Partially met   Long Term Goal 2: Reduce neck pain to </= 2/10 with all activity. LTG 2 Current Status:: : Patient reports pain on average 1/10 and only on occassion vs daily,   Met   Long Term Goal 3: Improve periscapular muscle strength to >/=4+/5 to improve pt's overall sitting and standing posture. LTG 3 Current Status:: :Alfa 4=/5-5/5 strength   In progress, Partially met   Long Term Goal 4: NDI </= 10 to improve pt's overall QOL. LTG 4 Current Status:: : NDI = 4/50   Met     Body Structures, Functions, Activity Limitations Requiring Skilled Therapeutic Intervention: Decreased ROM, Decreased strength, Increased pain, Decreased posture  Assessment: Pt meeting strength and cervical ROM goals with minimal improvement in cervcial extension.Pt reports

## 2024-04-08 NOTE — PROGRESS NOTES
[facility]  Outpatient Physical Therapy    Treatment Note        Date: 2024  Patient: Hector Bose  : 1950   Confirmed: Yes  MRN: [id number]  Referring Provider: Sean Levy MD    Medical Diagnosis: Poor posture [R29.3]       Treatment Diagnosis: Poor posture, Neck pain    Visit Information:  Insurance: Payor: MEDICARE / Plan: MEDICARE PART A AND B / Product Type: *No Product type* /   PT Visit Information  PT Insurance Information: Medicare  Total # of Visits to Date: 8  Plan of Care/Certification Expiration Date: 24  No Show: 0  Progress Note Due Date: 04/10/24  Canceled Appointment: 1  Progress Note Counter: 8/10    Subjective Information:  Subjective: Pt states she ahs noticed he can turn and look when driving better to the right  HEP Compliance:  [x] Good [] Fair [] Poor [] Reports not doing due to:        Pain Screening  Patient Currently in Pain: Denies    Treatment:  Exercises:  Exercises  Exercise 1: Cervical extension with towel, cervical rot with towel x10ea alfa  Exercise 9: wall prop with rot x 10  Exercise 10: UBE level 2.0 for 2.5 forward, 2.5min retro  Exercise 11: Alfa flexion wall slides 5'' x10 to improve posture/thoracic ext, with RTB  Exercise 19: NDI: 4/50  Exercise 20: HEP: Contineu current + verbal  plank rotation and elbow prop with serratus push     *Indicates exercise, modality, or manual techniques to be initiated when appropriate    Objective Measures:      Strength: [] NT  [x] MMT completed:        Strength RUE  R Shoulder Flexion: 4/5, 4+/5  R Shoulder ABduction: 4+/5, 5/5  R Elbow Flexion: 5/5  R Elbow Extension: 5/5  Strength LUE  L Shoulder Flexion: 4+/5  L Shoulder ABduction: 4+/5  L Elbow Flexion: 5/5  L Elbow Extension: 5/5       ROM: [] NT  [x] ROM measurements:      AROM Cervical Spine   Cervical flexion: 45  Cervical extension: 25  Cervical right lateral: 25  Cervical left lateral: 20  Cervical right rotation: 30  Cervical left rotation:

## 2024-04-11 ENCOUNTER — APPOINTMENT (OUTPATIENT)
Dept: PHYSICAL THERAPY | Age: 74
End: 2024-04-11
Attending: INTERNAL MEDICINE
Payer: MEDICARE

## 2024-06-03 SDOH — ECONOMIC STABILITY: FOOD INSECURITY: WITHIN THE PAST 12 MONTHS, THE FOOD YOU BOUGHT JUST DIDN'T LAST AND YOU DIDN'T HAVE MONEY TO GET MORE.: NEVER TRUE

## 2024-06-03 SDOH — ECONOMIC STABILITY: INCOME INSECURITY: HOW HARD IS IT FOR YOU TO PAY FOR THE VERY BASICS LIKE FOOD, HOUSING, MEDICAL CARE, AND HEATING?: NOT VERY HARD

## 2024-06-03 SDOH — ECONOMIC STABILITY: FOOD INSECURITY: WITHIN THE PAST 12 MONTHS, YOU WORRIED THAT YOUR FOOD WOULD RUN OUT BEFORE YOU GOT MONEY TO BUY MORE.: NEVER TRUE

## 2024-06-06 ENCOUNTER — OFFICE VISIT (OUTPATIENT)
Dept: FAMILY MEDICINE CLINIC | Age: 74
End: 2024-06-06

## 2024-06-06 VITALS
DIASTOLIC BLOOD PRESSURE: 68 MMHG | RESPIRATION RATE: 14 BRPM | WEIGHT: 230 LBS | HEIGHT: 67 IN | OXYGEN SATURATION: 94 % | BODY MASS INDEX: 36.1 KG/M2 | HEART RATE: 65 BPM | SYSTOLIC BLOOD PRESSURE: 110 MMHG

## 2024-06-06 DIAGNOSIS — I10 ESSENTIAL HYPERTENSION: ICD-10-CM

## 2024-06-06 DIAGNOSIS — M54.2 NECK PAIN: ICD-10-CM

## 2024-06-06 DIAGNOSIS — E11.22 TYPE 2 DIABETES MELLITUS WITH STAGE 3A CHRONIC KIDNEY DISEASE, WITHOUT LONG-TERM CURRENT USE OF INSULIN (HCC): ICD-10-CM

## 2024-06-06 DIAGNOSIS — E78.5 DYSLIPIDEMIA: ICD-10-CM

## 2024-06-06 DIAGNOSIS — N18.31 TYPE 2 DIABETES MELLITUS WITH STAGE 3A CHRONIC KIDNEY DISEASE, WITHOUT LONG-TERM CURRENT USE OF INSULIN (HCC): ICD-10-CM

## 2024-06-06 DIAGNOSIS — R31.9 HEMATURIA, UNSPECIFIED TYPE: ICD-10-CM

## 2024-06-06 DIAGNOSIS — R31.9 HEMATURIA, UNSPECIFIED TYPE: Primary | ICD-10-CM

## 2024-06-06 LAB
ALBUMIN SERPL-MCNC: 4.3 G/DL (ref 3.5–4.6)
ALP SERPL-CCNC: 97 U/L (ref 35–104)
ALT SERPL-CCNC: 20 U/L (ref 0–41)
ANION GAP SERPL CALCULATED.3IONS-SCNC: 16 MEQ/L (ref 9–15)
AST SERPL-CCNC: 16 U/L (ref 0–40)
BASOPHILS # BLD: 0 K/UL (ref 0–0.2)
BASOPHILS NFR BLD: 0.3 %
BILIRUB SERPL-MCNC: 0.5 MG/DL (ref 0.2–0.7)
BILIRUB UR QL STRIP: NEGATIVE
BUN SERPL-MCNC: 40 MG/DL (ref 8–23)
CALCIUM SERPL-MCNC: 8.8 MG/DL (ref 8.5–9.9)
CHLORIDE SERPL-SCNC: 103 MEQ/L (ref 95–107)
CLARITY UR: CLEAR
CO2 SERPL-SCNC: 22 MEQ/L (ref 20–31)
COLOR UR: YELLOW
CREAT SERPL-MCNC: 1.71 MG/DL (ref 0.7–1.2)
EOSINOPHIL # BLD: 0.1 K/UL (ref 0–0.7)
EOSINOPHIL NFR BLD: 2.2 %
ERYTHROCYTE [DISTWIDTH] IN BLOOD BY AUTOMATED COUNT: 14.2 % (ref 11.5–14.5)
ESTIMATED AVERAGE GLUCOSE: 134 MG/DL
GLOBULIN SER CALC-MCNC: 4 G/DL (ref 2.3–3.5)
GLUCOSE SERPL-MCNC: 115 MG/DL (ref 70–99)
GLUCOSE UR STRIP-MCNC: NEGATIVE MG/DL
HBA1C MFR BLD: 6.3 % (ref 4–6)
HCT VFR BLD AUTO: 34.5 % (ref 42–52)
HGB BLD-MCNC: 11.6 G/DL (ref 14–18)
HGB UR QL STRIP: NEGATIVE
KETONES UR STRIP-MCNC: NEGATIVE MG/DL
LEUKOCYTE ESTERASE UR QL STRIP: NEGATIVE
LYMPHOCYTES # BLD: 2.6 K/UL (ref 1–4.8)
LYMPHOCYTES NFR BLD: 44.9 %
MCH RBC QN AUTO: 31.6 PG (ref 27–31.3)
MCHC RBC AUTO-ENTMCNC: 33.6 % (ref 33–37)
MCV RBC AUTO: 94 FL (ref 79–92.2)
MONOCYTES # BLD: 0.6 K/UL (ref 0.2–0.8)
MONOCYTES NFR BLD: 9.7 %
NEUTROPHILS # BLD: 2.5 K/UL (ref 1.4–6.5)
NEUTS SEG NFR BLD: 42.6 %
NITRITE UR QL STRIP: NEGATIVE
PH UR STRIP: 5.5 [PH] (ref 5–9)
PLATELET # BLD AUTO: 198 K/UL (ref 130–400)
POTASSIUM SERPL-SCNC: 4.9 MEQ/L (ref 3.4–4.9)
PROT SERPL-MCNC: 8.3 G/DL (ref 6.3–8)
PROT UR STRIP-MCNC: NEGATIVE MG/DL
RBC # BLD AUTO: 3.67 M/UL (ref 4.7–6.1)
SODIUM SERPL-SCNC: 141 MEQ/L (ref 135–144)
SP GR UR STRIP: 1.01 (ref 1–1.03)
URINE REFLEX TO CULTURE: NORMAL
UROBILINOGEN UR STRIP-ACNC: 0.2 E.U./DL
WBC # BLD AUTO: 5.8 K/UL (ref 4.8–10.8)

## 2024-06-06 RX ORDER — TIZANIDINE 2 MG/1
2 TABLET ORAL NIGHTLY PRN
Qty: 30 TABLET | Refills: 0 | Status: SHIPPED | OUTPATIENT
Start: 2024-06-06

## 2024-06-06 ASSESSMENT — ENCOUNTER SYMPTOMS
RHINORRHEA: 0
NAUSEA: 0
EYE DISCHARGE: 0
ABDOMINAL DISTENTION: 0
CONSTIPATION: 0
EYE PAIN: 0
BLOOD IN STOOL: 0
RECTAL PAIN: 0
COLOR CHANGE: 0
SINUS PRESSURE: 0
DIARRHEA: 0
EYE REDNESS: 0
VOMITING: 0
COUGH: 0
TROUBLE SWALLOWING: 0
SHORTNESS OF BREATH: 0
VOICE CHANGE: 0
FACIAL SWELLING: 0
CHEST TIGHTNESS: 0
APNEA: 0
ABDOMINAL PAIN: 0
PHOTOPHOBIA: 0
WHEEZING: 0
EYE ITCHING: 0
SORE THROAT: 0
SINUS PAIN: 0
BACK PAIN: 0

## 2024-06-06 NOTE — PROGRESS NOTES
person, place, and time.      Motor: No abnormal muscle tone.   Psychiatric:         Thought Content: Thought content normal.         Judgment: Judgment normal.         Assessment:       Diagnosis Orders   1. Essential hypertension  CBC with Auto Differential    Comprehensive Metabolic Panel      2. Type 2 diabetes mellitus with stage 3a chronic kidney disease, without long-term current use of insulin ([facility])        3. Dyslipidemia             No results found for: \"LIPIDPAN\", \"BMP\", \"CMP\", \"CBC\", \"CBCAUTODIF\"  Plan:       Type 2 diabetes mellitus without complication, without long-term current use of insulin ([facility])  -    Metformin 500 mg twice daily.  Check hemoglobin A1c    Essential hypertension  -      Lotensin and Lasix.  Potassium 20 mEq daily    Stage 3 chronic kidney disease, unspecified whether stage 3a or 3b CKD ([facility])-his      Hypertriglyceridemia/hyperlipidemia-Lipitor and Vascepa      History of gout-colchicine as warranted             No follow-ups on file.      On this date 06/06/24 I have spent 30 minutes reviewing previous notes, test results and face to face with the patient discussing the diagnosis and importance of compliance with the treatment plan.     Sean Levy MD    Please note, this report has been partially produced using speech recognition software  and may cause  and /or contain errors related to that system including grammar, punctuation and spelling as well as words and phrases that may seem inappropriate. If there are questions or concerns please feel free to contact me to clarify.

## 2024-06-10 ENCOUNTER — LAB (OUTPATIENT)
Dept: LAB | Facility: CLINIC | Age: 74
End: 2024-06-10
Payer: MEDICARE

## 2024-06-10 DIAGNOSIS — C91.10 CLL (CHRONIC LYMPHOCYTIC LEUKEMIA) (MULTI): ICD-10-CM

## 2024-06-10 LAB
ALBUMIN SERPL BCP-MCNC: 4.4 G/DL (ref 3.4–5)
ALP SERPL-CCNC: 87 U/L (ref 33–136)
ALT SERPL W P-5'-P-CCNC: 21 U/L (ref 10–52)
ANION GAP SERPL CALC-SCNC: 13 MMOL/L (ref 10–20)
AST SERPL W P-5'-P-CCNC: 16 U/L (ref 9–39)
BASOPHILS # BLD AUTO: 0.01 X10*3/UL (ref 0–0.1)
BASOPHILS NFR BLD AUTO: 0.2 %
BILIRUB SERPL-MCNC: 0.6 MG/DL (ref 0–1.2)
BUN SERPL-MCNC: 41 MG/DL (ref 6–23)
CALCIUM SERPL-MCNC: 9.3 MG/DL (ref 8.6–10.3)
CHLORIDE SERPL-SCNC: 106 MMOL/L (ref 98–107)
CO2 SERPL-SCNC: 26 MMOL/L (ref 21–32)
CREAT SERPL-MCNC: 1.65 MG/DL (ref 0.5–1.3)
EGFRCR SERPLBLD CKD-EPI 2021: 44 ML/MIN/1.73M*2
EOSINOPHIL # BLD AUTO: 0.1 X10*3/UL (ref 0–0.4)
EOSINOPHIL NFR BLD AUTO: 2 %
ERYTHROCYTE [DISTWIDTH] IN BLOOD BY AUTOMATED COUNT: 14.1 % (ref 11.5–14.5)
FERRITIN SERPL-MCNC: 109 NG/ML (ref 20–300)
GLUCOSE SERPL-MCNC: 103 MG/DL (ref 74–99)
HCT VFR BLD AUTO: 35.5 % (ref 41–52)
HGB BLD-MCNC: 11.9 G/DL (ref 13.5–17.5)
IMM GRANULOCYTES # BLD AUTO: 0.01 X10*3/UL (ref 0–0.5)
IMM GRANULOCYTES NFR BLD AUTO: 0.2 % (ref 0–0.9)
IRON SATN MFR SERPL: 22 % (ref 25–45)
IRON SERPL-MCNC: 76 UG/DL (ref 35–150)
LDH SERPL L TO P-CCNC: 63 U/L (ref 84–246)
LYMPHOCYTES # BLD AUTO: 2.47 X10*3/UL (ref 0.8–3)
LYMPHOCYTES NFR BLD AUTO: 48.5 %
MCH RBC QN AUTO: 32.2 PG (ref 26–34)
MCHC RBC AUTO-ENTMCNC: 33.5 G/DL (ref 32–36)
MCV RBC AUTO: 96 FL (ref 80–100)
MONOCYTES # BLD AUTO: 0.46 X10*3/UL (ref 0.05–0.8)
MONOCYTES NFR BLD AUTO: 9 %
NEUTROPHILS # BLD AUTO: 2.04 X10*3/UL (ref 1.6–5.5)
NEUTROPHILS NFR BLD AUTO: 40.1 %
PLATELET # BLD AUTO: 178 X10*3/UL (ref 150–450)
POTASSIUM SERPL-SCNC: 4.9 MMOL/L (ref 3.5–5.3)
PROT SERPL-MCNC: 8 G/DL (ref 6.4–8.2)
RBC # BLD AUTO: 3.7 X10*6/UL (ref 4.5–5.9)
SODIUM SERPL-SCNC: 140 MMOL/L (ref 136–145)
TIBC SERPL-MCNC: 341 UG/DL (ref 240–445)
UIBC SERPL-MCNC: 265 UG/DL (ref 110–370)
WBC # BLD AUTO: 5.1 X10*3/UL (ref 4.4–11.3)

## 2024-06-10 PROCEDURE — 86334 IMMUNOFIX E-PHORESIS SERUM: CPT | Performed by: PHYSICIAN ASSISTANT

## 2024-06-10 PROCEDURE — 83550 IRON BINDING TEST: CPT | Performed by: PHYSICIAN ASSISTANT

## 2024-06-10 PROCEDURE — 84165 PROTEIN E-PHORESIS SERUM: CPT | Performed by: PHYSICIAN ASSISTANT

## 2024-06-10 PROCEDURE — 36415 COLL VENOUS BLD VENIPUNCTURE: CPT

## 2024-06-10 PROCEDURE — 83615 LACTATE (LD) (LDH) ENZYME: CPT | Performed by: PHYSICIAN ASSISTANT

## 2024-06-10 PROCEDURE — 85025 COMPLETE CBC W/AUTO DIFF WBC: CPT

## 2024-06-10 PROCEDURE — 80053 COMPREHEN METABOLIC PANEL: CPT

## 2024-06-10 PROCEDURE — 84165 PROTEIN E-PHORESIS SERUM: CPT | Performed by: STUDENT IN AN ORGANIZED HEALTH CARE EDUCATION/TRAINING PROGRAM

## 2024-06-10 PROCEDURE — 84075 ASSAY ALKALINE PHOSPHATASE: CPT

## 2024-06-10 PROCEDURE — 84155 ASSAY OF PROTEIN SERUM: CPT | Performed by: PHYSICIAN ASSISTANT

## 2024-06-10 PROCEDURE — 86320 SERUM IMMUNOELECTROPHORESIS: CPT | Performed by: STUDENT IN AN ORGANIZED HEALTH CARE EDUCATION/TRAINING PROGRAM

## 2024-06-10 PROCEDURE — 82728 ASSAY OF FERRITIN: CPT | Performed by: PHYSICIAN ASSISTANT

## 2024-06-11 LAB — PROT SERPL-MCNC: 7.8 G/DL (ref 6.4–8.2)

## 2024-06-13 ENCOUNTER — HOSPITAL ENCOUNTER (OUTPATIENT)
Dept: ULTRASOUND IMAGING | Age: 74
Discharge: HOME | End: 2024-06-15
Payer: MEDICARE

## 2024-06-13 DIAGNOSIS — R31.9 HEMATURIA, UNSPECIFIED TYPE: ICD-10-CM

## 2024-06-13 LAB
ALBUMIN: 4.1 G/DL (ref 3.4–5)
ALPHA 1 GLOBULIN: 0.3 G/DL (ref 0.2–0.6)
ALPHA 2 GLOBULIN: 0.7 G/DL (ref 0.4–1.1)
BETA GLOBULIN: 0.7 G/DL (ref 0.5–1.2)
GAMMA GLOBULIN: 2 G/DL (ref 0.5–1.4)
IMMUNOFIXATION COMMENT: ABNORMAL
M-PROTEIN 1: 0.6 G/DL
M-PROTEIN 2: 0.7 G/DL
PATH REVIEW - SERUM IMMUNOFIXATION: ABNORMAL
PATH REVIEW-SERUM PROTEIN ELECTROPHORESIS: ABNORMAL
PROTEIN ELECTROPHORESIS COMMENT: ABNORMAL

## 2024-06-13 PROCEDURE — 76770 US EXAM ABDO BACK WALL COMP: CPT

## 2024-06-14 ENCOUNTER — OFFICE VISIT (OUTPATIENT)
Dept: HEMATOLOGY/ONCOLOGY | Facility: CLINIC | Age: 74
End: 2024-06-14
Payer: MEDICARE

## 2024-06-14 VITALS
TEMPERATURE: 96.8 F | OXYGEN SATURATION: 96 % | WEIGHT: 226.63 LBS | DIASTOLIC BLOOD PRESSURE: 60 MMHG | RESPIRATION RATE: 17 BRPM | HEART RATE: 73 BPM | SYSTOLIC BLOOD PRESSURE: 109 MMHG | BODY MASS INDEX: 36.68 KG/M2

## 2024-06-14 DIAGNOSIS — K29.80 DUODENITIS: ICD-10-CM

## 2024-06-14 DIAGNOSIS — E78.5 DYSLIPIDEMIA: ICD-10-CM

## 2024-06-14 DIAGNOSIS — E11.22 TYPE 2 DIABETES MELLITUS WITH STAGE 2 CHRONIC KIDNEY DISEASE, WITHOUT LONG-TERM CURRENT USE OF INSULIN (MULTI): ICD-10-CM

## 2024-06-14 DIAGNOSIS — E61.1 IRON DEFICIENCY: ICD-10-CM

## 2024-06-14 DIAGNOSIS — C91.10 CLL (CHRONIC LYMPHOCYTIC LEUKEMIA) (MULTI): Primary | ICD-10-CM

## 2024-06-14 DIAGNOSIS — I10 ESSENTIAL HYPERTENSION: ICD-10-CM

## 2024-06-14 DIAGNOSIS — N18.2 TYPE 2 DIABETES MELLITUS WITH STAGE 2 CHRONIC KIDNEY DISEASE, WITHOUT LONG-TERM CURRENT USE OF INSULIN (MULTI): ICD-10-CM

## 2024-06-14 PROCEDURE — 3078F DIAST BP <80 MM HG: CPT | Performed by: INTERNAL MEDICINE

## 2024-06-14 PROCEDURE — 3074F SYST BP LT 130 MM HG: CPT | Performed by: INTERNAL MEDICINE

## 2024-06-14 PROCEDURE — 99214 OFFICE O/P EST MOD 30 MIN: CPT | Performed by: INTERNAL MEDICINE

## 2024-06-14 PROCEDURE — 1126F AMNT PAIN NOTED NONE PRSNT: CPT | Performed by: INTERNAL MEDICINE

## 2024-06-14 PROCEDURE — 4010F ACE/ARB THERAPY RXD/TAKEN: CPT | Performed by: INTERNAL MEDICINE

## 2024-06-14 PROCEDURE — 1159F MED LIST DOCD IN RCRD: CPT | Performed by: INTERNAL MEDICINE

## 2024-06-14 ASSESSMENT — PAIN SCALES - GENERAL: PAINLEVEL: 0-NO PAIN

## 2024-06-16 PROBLEM — E61.1 IRON DEFICIENCY: Status: ACTIVE | Noted: 2024-06-16

## 2024-06-16 ASSESSMENT — ENCOUNTER SYMPTOMS
MUSCULOSKELETAL NEGATIVE: 1
ENDOCRINE NEGATIVE: 1
HEMATOLOGIC/LYMPHATIC NEGATIVE: 1
NEUROLOGICAL NEGATIVE: 1
CARDIOVASCULAR NEGATIVE: 1
RESPIRATORY NEGATIVE: 1
EYES NEGATIVE: 1
GASTROINTESTINAL NEGATIVE: 1
CONSTITUTIONAL NEGATIVE: 1
PSYCHIATRIC NEGATIVE: 1

## 2024-06-16 NOTE — PROGRESS NOTES
Patient ID: Taurus Perez is a 73 y.o. male.  Referring Physician: Kaila Martinez PA-C  Office Address Unavailable  as of 2/17/2024  Primary Care Provider: Pietro Bartlett MD  Visit Type: Follow Up      Subjective    HPI How was my bloodwork?    Review of Systems   Constitutional: Negative.    HENT:  Negative.     Eyes: Negative.    Respiratory: Negative.     Cardiovascular: Negative.    Gastrointestinal: Negative.    Endocrine: Negative.    Genitourinary: Negative.     Musculoskeletal: Negative.    Skin: Negative.    Neurological: Negative.    Hematological: Negative.    Psychiatric/Behavioral: Negative.          Objective   BSA: 2.19 meters squared  /60 (BP Location: Right arm, Patient Position: Sitting, BP Cuff Size: Adult)   Pulse 73   Temp 36 °C (96.8 °F) (Temporal)   Resp 17   Wt 103 kg (226 lb 10.1 oz)   SpO2 96%   BMI 36.68 kg/m²      has no past medical history on file.   has a past surgical history that includes Other surgical history (04/22/2020); Other surgical history (04/22/2020); and Other surgical history (04/22/2020).  No family history on file.  Oncology History    No history exists.       Taurus Perez  has no history on file for tobacco use.  He Alcohol use questions deferred to the physician.  He Drug use questions deferred to the physician.    Physical Exam  Vitals reviewed.   Constitutional:       Appearance: Normal appearance.   HENT:      Head: Normocephalic.      Mouth/Throat:      Mouth: Mucous membranes are moist.   Eyes:      Extraocular Movements: Extraocular movements intact.      Pupils: Pupils are equal, round, and reactive to light.   Cardiovascular:      Rate and Rhythm: Normal rate and regular rhythm.      Heart sounds: Normal heart sounds.   Pulmonary:      Breath sounds: Normal breath sounds.   Abdominal:      General: Bowel sounds are normal.      Palpations: Abdomen is soft.   Musculoskeletal:         General: Normal range of motion.      Cervical back:  "Normal range of motion and neck supple.   Skin:     General: Skin is warm.   Neurological:      General: No focal deficit present.      Mental Status: He is alert and oriented to person, place, and time.   Psychiatric:         Mood and Affect: Mood normal.         Behavior: Behavior normal.         WBC   Date/Time Value Ref Range Status   06/10/2024 01:35 PM 5.1 4.4 - 11.3 x10*3/uL Final   12/04/2023 11:03 AM 5.3 4.4 - 11.3 x10*3/uL Final   06/02/2023 11:11 AM 4.5 4.4 - 11.3 x10E9/L Final   12/02/2022 10:54 AM 5.9 4.4 - 11.3 x10E9/L Final   06/09/2022 09:58 AM 6.5 4.4 - 11.3 x10E9/L Final     No results found for: \"NRBC\"  RBC   Date Value Ref Range Status   06/10/2024 3.70 (L) 4.50 - 5.90 x10*6/uL Final   12/04/2023 3.56 (L) 4.50 - 5.90 x10*6/uL Final   06/02/2023 3.55 (L) 4.50 - 5.90 x10E12/L Final   12/02/2022 3.88 (L) 4.50 - 5.90 x10E12/L Final   06/09/2022 4.00 (L) 4.50 - 5.90 x10E12/L Final     Hemoglobin   Date Value Ref Range Status   06/10/2024 11.9 (L) 13.5 - 17.5 g/dL Final   12/04/2023 11.6 (L) 13.5 - 17.5 g/dL Final   06/02/2023 11.4 (L) 13.5 - 17.5 g/dL Final   12/02/2022 12.3 (L) 13.5 - 17.5 g/dL Final   06/09/2022 12.5 (L) 13.5 - 17.5 g/dL Final     Hematocrit   Date Value Ref Range Status   06/10/2024 35.5 (L) 41.0 - 52.0 % Final   12/04/2023 34.9 (L) 41.0 - 52.0 % Final   06/02/2023 34.3 (L) 41.0 - 52.0 % Final   12/02/2022 37.2 (L) 41.0 - 52.0 % Final   06/09/2022 37.5 (L) 41.0 - 52.0 % Final     MCV   Date/Time Value Ref Range Status   06/10/2024 01:35 PM 96 80 - 100 fL Final   12/04/2023 11:03 AM 98 80 - 100 fL Final   06/02/2023 11:11 AM 97 80 - 100 fL Final   12/02/2022 10:54 AM 96 80 - 100 fL Final   06/09/2022 09:58 AM 94 80 - 100 fL Final     MCH   Date/Time Value Ref Range Status   06/10/2024 01:35 PM 32.2 26.0 - 34.0 pg Final   12/04/2023 11:03 AM 32.6 26.0 - 34.0 pg Final     MCHC   Date/Time Value Ref Range Status   06/10/2024 01:35 PM 33.5 32.0 - 36.0 g/dL Final   12/04/2023 11:03 AM " "33.2 32.0 - 36.0 g/dL Final   06/02/2023 11:11 AM 33.2 32.0 - 36.0 g/dL Final   12/02/2022 10:54 AM 33.1 32.0 - 36.0 g/dL Final   06/09/2022 09:58 AM 33.3 32.0 - 36.0 g/dL Final     RDW   Date/Time Value Ref Range Status   06/10/2024 01:35 PM 14.1 11.5 - 14.5 % Final   12/04/2023 11:03 AM 14.0 11.5 - 14.5 % Final   06/02/2023 11:11 AM 13.9 11.5 - 14.5 % Final   12/02/2022 10:54 AM 13.9 11.5 - 14.5 % Final   06/09/2022 09:58 AM 14.2 11.5 - 14.5 % Final     Platelets   Date/Time Value Ref Range Status   06/10/2024 01:35  150 - 450 x10*3/uL Final   12/04/2023 11:03  150 - 450 x10*3/uL Final   06/02/2023 11:11  150 - 450 x10E9/L Final   12/02/2022 10:54  150 - 450 x10E9/L Final   06/09/2022 09:58  150 - 450 x10E9/L Final     No results found for: \"MPV\"  Neutrophils %   Date/Time Value Ref Range Status   06/10/2024 01:35 PM 40.1 40.0 - 80.0 % Final   12/04/2023 11:03 AM 43.4 40.0 - 80.0 % Final   06/02/2023 11:11 AM 43.0 40.0 - 80.0 % Final   12/02/2022 10:54 AM 46.1 40.0 - 80.0 % Final   06/09/2022 09:58 AM 52.4 40.0 - 80.0 % Final     Immature Granulocytes %, Automated   Date/Time Value Ref Range Status   06/10/2024 01:35 PM 0.2 0.0 - 0.9 % Final     Comment:     Immature Granulocyte Count (IG) includes promyelocytes, myelocytes and metamyelocytes but does not include bands. Percent differential counts (%) should be interpreted in the context of the absolute cell counts (cells/UL).   12/04/2023 11:03 AM 0.2 0.0 - 0.9 % Final     Comment:     Immature Granulocyte Count (IG) includes promyelocytes, myelocytes and metamyelocytes but does not include bands. Percent differential counts (%) should be interpreted in the context of the absolute cell counts (cells/UL).   06/02/2023 11:11 AM 0.2 0.0 - 0.9 % Final     Comment:      Immature Granulocyte Count (IG) includes promyelocytes,    myelocytes and metamyelocytes but does not include bands.   Percent differential counts (%) should be " interpreted in the   context of the absolute cell counts (cells/L).     12/02/2022 10:54 AM 0.2 0.0 - 0.9 % Final     Comment:      Immature Granulocyte Count (IG) includes promyelocytes,    myelocytes and metamyelocytes but does not include bands.   Percent differential counts (%) should be interpreted in the   context of the absolute cell counts (cells/L).     06/09/2022 09:58 AM 0.2 0.0 - 0.9 % Final     Comment:      Immature Granulocyte Count (IG) includes promyelocytes,    myelocytes and metamyelocytes but does not include bands.   Percent differential counts (%) should be interpreted in the   context of the absolute cell counts (cells/L).       Lymphocytes %   Date/Time Value Ref Range Status   06/10/2024 01:35 PM 48.5 13.0 - 44.0 % Final   12/04/2023 11:03 AM 45.5 13.0 - 44.0 % Final   06/02/2023 11:11 AM 46.5 13.0 - 44.0 % Final   12/02/2022 10:54 AM 44.4 13.0 - 44.0 % Final   06/09/2022 09:58 AM 36.8 13.0 - 44.0 % Final     Monocytes %   Date/Time Value Ref Range Status   06/10/2024 01:35 PM 9.0 2.0 - 10.0 % Final   12/04/2023 11:03 AM 9.0 2.0 - 10.0 % Final   06/02/2023 11:11 AM 8.3 2.0 - 10.0 % Final   12/02/2022 10:54 AM 7.8 2.0 - 10.0 % Final   06/09/2022 09:58 AM 8.9 2.0 - 10.0 % Final     Eosinophils %   Date/Time Value Ref Range Status   06/10/2024 01:35 PM 2.0 0.0 - 6.0 % Final   12/04/2023 11:03 AM 1.5 0.0 - 6.0 % Final   06/02/2023 11:11 AM 1.8 0.0 - 6.0 % Final   12/02/2022 10:54 AM 1.5 0.0 - 6.0 % Final   06/09/2022 09:58 AM 1.5 0.0 - 6.0 % Final     Basophils %   Date/Time Value Ref Range Status   06/10/2024 01:35 PM 0.2 0.0 - 2.0 % Final   12/04/2023 11:03 AM 0.4 0.0 - 2.0 % Final   06/02/2023 11:11 AM 0.2 0.0 - 2.0 % Final   12/02/2022 10:54 AM 0.0 0.0 - 2.0 % Final   06/09/2022 09:58 AM 0.2 0.0 - 2.0 % Final     Neutrophils Absolute   Date/Time Value Ref Range Status   06/10/2024 01:35 PM 2.04 1.60 - 5.50 x10*3/uL Final     Comment:     Percent differential counts (%) should be interpreted  "in the context of the absolute cell counts (cells/uL).   12/04/2023 11:03 AM 2.28 1.60 - 5.50 x10*3/uL Final     Comment:     Percent differential counts (%) should be interpreted in the context of the absolute cell counts (cells/uL).   06/02/2023 11:11 AM 1.92 1.20 - 7.70 x10E9/L Final   12/02/2022 10:54 AM 2.72 1.20 - 7.70 x10E9/L Final   06/09/2022 09:58 AM 3.42 1.20 - 7.70 x10E9/L Final     Immature Granulocytes Absolute, Automated   Date/Time Value Ref Range Status   06/10/2024 01:35 PM 0.01 0.00 - 0.50 x10*3/uL Final   12/04/2023 11:03 AM 0.01 0.00 - 0.50 x10*3/uL Final     Lymphocytes Absolute   Date/Time Value Ref Range Status   06/10/2024 01:35 PM 2.47 0.80 - 3.00 x10*3/uL Final   12/04/2023 11:03 AM 2.39 0.80 - 3.00 x10*3/uL Final   06/02/2023 11:11 AM 2.08 1.20 - 4.80 x10E9/L Final   12/02/2022 10:54 AM 2.62 1.20 - 4.80 x10E9/L Final   06/09/2022 09:58 AM 2.40 1.20 - 4.80 x10E9/L Final     Monocytes Absolute   Date/Time Value Ref Range Status   06/10/2024 01:35 PM 0.46 0.05 - 0.80 x10*3/uL Final   12/04/2023 11:03 AM 0.47 0.05 - 0.80 x10*3/uL Final   06/02/2023 11:11 AM 0.37 0.10 - 1.00 x10E9/L Final   12/02/2022 10:54 AM 0.46 0.10 - 1.00 x10E9/L Final   06/09/2022 09:58 AM 0.58 0.10 - 1.00 x10E9/L Final     Eosinophils Absolute   Date/Time Value Ref Range Status   06/10/2024 01:35 PM 0.10 0.00 - 0.40 x10*3/uL Final   12/04/2023 11:03 AM 0.08 0.00 - 0.40 x10*3/uL Final   06/02/2023 11:11 AM 0.08 0.00 - 0.70 x10E9/L Final   12/02/2022 10:54 AM 0.09 0.00 - 0.70 x10E9/L Final   06/09/2022 09:58 AM 0.10 0.00 - 0.70 x10E9/L Final     Basophils Absolute   Date/Time Value Ref Range Status   06/10/2024 01:35 PM 0.01 0.00 - 0.10 x10*3/uL Final   12/04/2023 11:03 AM 0.02 0.00 - 0.10 x10*3/uL Final   06/02/2023 11:11 AM 0.01 0.00 - 0.10 x10E9/L Final   12/02/2022 10:54 AM 0.00 0.00 - 0.10 x10E9/L Final   06/09/2022 09:58 AM 0.01 0.00 - 0.10 x10E9/L Final       No components found for: \"PT\"  No results found for: " "\"APTT\"  Medication Documentation Review Audit       Reviewed by Lea Ruiz MA (Medical Assistant) on 06/14/24 at 1400      Medication Order Taking? Sig Documenting Provider Last Dose Status   allopurinol (Zyloprim) 100 mg tablet [id number] Yes Take 2 tablets (200 mg) by mouth once daily. Historical Provider, MD Taking Active   allopurinol (Zyloprim) 100 mg tablet [id number] Yes Take 2 tablets (200 mg) by mouth once daily. Historical Provider, MD Taking Active   aspirin 81 mg capsule [id number] Yes Take by mouth. Historical Provider, MD Taking Active   aspirin 81 mg EC tablet [id number] Yes Take by mouth. Historical Provider, MD Taking Active   atorvastatin (Lipitor) 40 mg tablet [id number] Yes Take 1 tablet (40 mg) by mouth once daily. Historical Provider, MD Taking Active   benazepril (Lotensin) 20 mg tablet [id number] Yes Take 1 tablet (20 mg) by mouth once daily. Historical Provider, MD Taking Active   benazepriL-hydrochlorothiazide (Lotensin HCT) 20-25 mg tablet [id number] Yes Take by mouth. Historical Provider, MD Taking Active   captopril-hydrochlorothiazide (Capozide) 25-15 mg tablet [id number] Yes Take by mouth. Historical Provider, MD Taking Active   captopril-hydrochlorothiazide (Capozide) 25-25 mg tablet [id number] Yes  Historical Provider, MD Taking Active   captopril-hydrochlorothiazide (Capozide) 50-25 mg tablet [id number] Yes Take by mouth. Historical Provider, MD Taking Active   colchicine 0.6 mg tablet [id number] Yes Take by mouth. Historical Provider, MD Taking Active   doxepin (SINEquan) 10 mg capsule [id number] Yes Take 1 capsule (10 mg) by mouth once daily at bedtime. Historical Provider, MD Taking Active   ezetimibe (Zetia) 10 mg tablet [id number] Yes  Historical Provider, MD Taking Active   fenofibrate (Tricor) 145 mg tablet [id number] Yes Take by mouth. Historical Provider, MD Taking Active   fenofibrate (Triglide) 160 mg tablet [id number] Yes  Historical Provider, MD Taking Active   furosemide (Lasix) " 40 mg tablet [id number] Yes  Historical Provider, MD Taking Active   glimepiride (Amaryl) 4 mg tablet [id number] Yes  Historical Provider, MD Taking Active   icosapent ethyL (Vascepa) 1 gram capsule [id number] Yes Take 2 capsules (2 g) by mouth 2 times a day. Historical Provider, MD Taking Active   Klor-Con M20 20 mEq ER tablet [id number] Yes  Historical Provider, MD Taking Active   liraglutide (Victoza 2-Morteza) 0.6 mg/0.1 mL (18 mg/3 mL) injection [id number] Yes  Historical Provider, MD Taking Active   loratadine (Claritin) 10 mg tablet [id number] Yes Take 1 tablet (10 mg) by mouth. Historical Provider, MD Taking Active   loratadine 10 mg capsule [id number] Yes once every 24 hours. Historical Provider, MD Taking Active   magnesium 200 mg tablet [id number] Yes once every 24 hours. Historical Provider, MD Taking Active   magnesium 200 mg tablet [id number] Yes Take 1 tablet (200 mg) by mouth once daily. Historical Provider, MD Taking Active   magnesium oxide (Mag-Ox) 200 mg magnesium tablet [id number] Yes Take 1 tablet (200 mg) by mouth once daily. Historical Provider, MD Taking Active   metFORMIN (Glucophage) 500 mg tablet [id number] Yes TAKE 1 TABLETS BY MOUTH TWICE A DAY WITH MEALS Historical Provider, MD Taking Active   metformin HCl (METFORMIN ORAL) [id number] Yes  Historical Provider, MD Taking Active   mv-min/folic/vit K/lycop/coQ10 (DAILY MULTIVITAMIN ORAL) [id number] Yes Take 1 tablet by mouth once daily. Historical Provider, MD Taking Active   sitaGLIPtin phosphate (Januvia) 100 mg tablet [id number] Yes  Historical Provider, MD Taking Active   Toprol XL 50 mg 24 hr tablet [id number] Yes  Historical Provider, MD Taking Active                   Assessment/Plan    1) CLL/SLL  -He first received 2 radiation therapy fractions.  -He then completed 6 months of bendamustine + rituximab   -Currently, patient is being observed every 6 months  -remains asymptomatic, and is wondering when his next endoscopy will be   -he had an EGD done  on 3/7/2023 by Dr Lucero--esophagus normal, patchy mild mucosal changes characterized by erythema and inflammation were found at the pylorus, biopsies were taken; patchy mild mucosal changes characterized by erythema and inflammation were found in the duodenal bulb and in the second portion of the duodenum  -path showed duodenal mucosa no significant pathological diagnosis, no morphologic evidence of lymphoma; duodenal bulb biopsy with minute fragments of duodenal mucosa with no significant pathological diagnosis and no morphologic evidence of lymphoma; stomach pylorus biopsy with gastric mucosa with intestinal metaplasia and mild chronic gastritis and no H pylori; antrum bx with gastric oxyntic mucosa with mild chronic gastritis and no H pylori; stomach body bx with gastric oxyntic mucosa with no significant pathological dx  -Dr Lucero's procedure note stated that upper endoscopy would be repeated in 1 year, but patient states he has not heard from his office yet  -labs done on 6/10/2024 included CBC, COMP LDH, SPEP/IF and iron panel + ferritin  -results reviewed-WBC 5.1, Hgb 11.9, platelets 178,000, ANC 2040, abs lymph 2470,  creatinine 1.65, calcium 9.3, AST 16, ALT 21, ferritin 109, TIBC 341, iron saturation 22%, SPEP with IF showed Known monoclonal IgG kappa as two bands  in the gamma region totaling 1.3 g  -will continue to see Taurus Q6 months        2) history of GI bleeding   -secondary to CLL/SLL involving the duodenum  -last EGD was done 3/7/2023     3) diabetes  -on januvia  -on metforminm  -on victoza  -on glimepiride     4) hyperlipidemia  -on fenofibrate  -on zetia  -on atorvastatin     5) hypertension  -on toprol  -on lasix  -on captopril-hydrochlorothiazide  -on benazepril-hydrochlorothiazide        Problem List Items Addressed This Visit             ICD-10-CM    CLL (chronic lymphocytic leukemia) (Multi) C91.10    Relevant Orders    Clinic Appointment Request Follow Up; ALFONSO FRANK;  [id number]    CBC and Auto Differential    Comprehensive metabolic panel    Serum Protein Electrophoresis     Other Visit Diagnoses         Codes    Iron deficiency    -  Primary E61.1    Relevant Orders    Iron and TIBC    Ferritin                 Jac Molina MD

## 2024-07-02 DIAGNOSIS — M10.9 GOUT INVOLVING TOE OF RIGHT FOOT, UNSPECIFIED CAUSE, UNSPECIFIED CHRONICITY: ICD-10-CM

## 2024-07-02 RX ORDER — ALLOPURINOL 100 MG/1
200 TABLET ORAL DAILY
Qty: 60 TABLET | Refills: 0 | Status: SHIPPED | OUTPATIENT
Start: 2024-07-02

## 2024-07-12 RX ORDER — ICOSAPENT ETHYL 1 G/1
2 CAPSULE ORAL 2 TIMES DAILY
Qty: 360 CAPSULE | Refills: 1 | Status: SHIPPED | OUTPATIENT
Start: 2024-07-12

## 2024-07-17 ENCOUNTER — TELEPHONE (OUTPATIENT)
Dept: GASTROENTEROLOGY | Facility: CLINIC | Age: 74
End: 2024-07-17
Payer: MEDICARE

## 2024-07-26 DIAGNOSIS — F51.04 PSYCHOPHYSIOLOGICAL INSOMNIA: ICD-10-CM

## 2024-07-26 RX ORDER — DOXEPIN HYDROCHLORIDE 10 MG/1
CAPSULE ORAL
Qty: 90 CAPSULE | Refills: 1 | Status: SHIPPED | OUTPATIENT
Start: 2024-07-26

## 2024-07-26 NOTE — TELEPHONE ENCOUNTER
Future Appointments    Encounter Information   Provider Department Appt Notes   9/4/2024 Sean Levy MD [facility] Primary and Specialty Care 3 month f/u   10/30/2024 Jorge Garrett DPM [facility] Podiatry 9 MONTHS FOLLOW UP   11/25/2024 Prasanth Gurrola MD [facility] Neurology new/edwar/Cam     Past Visits    Date Provider Specialty Visit Type Primary Dx   06/06/2024 Sean Levy MD Family Medicine Office Visit Hematuria, unspecified type      36.9

## 2024-07-29 DIAGNOSIS — M10.9 GOUT INVOLVING TOE OF RIGHT FOOT, UNSPECIFIED CAUSE, UNSPECIFIED CHRONICITY: ICD-10-CM

## 2024-07-29 RX ORDER — ALLOPURINOL 100 MG/1
200 TABLET ORAL DAILY
Qty: 180 TABLET | Refills: 1 | Status: SHIPPED | OUTPATIENT
Start: 2024-07-29

## 2024-07-29 NOTE — TELEPHONE ENCOUNTER
Future Appointments    Encounter Information   Provider Department Appt Notes   9/4/2024 Sean Levy MD [facility] Primary and Specialty Care 3 month f/u   10/30/2024 Jorge Garrett DPM [facility] Podiatry 9 MONTHS FOLLOW UP   11/25/2024 Prasanth Gurrola MD [facility] Neurology new/edwar/Cam     Past Visits    Date Provider Specialty Visit Type Primary Dx   06/06/2024 Sean Levy MD Family Medicine Office Visit Hematuria, unspecified type

## 2024-07-30 ENCOUNTER — ANESTHESIA (OUTPATIENT)
Dept: GASTROENTEROLOGY | Facility: HOSPITAL | Age: 74
End: 2024-07-30
Payer: MEDICARE

## 2024-07-30 ENCOUNTER — ANESTHESIA EVENT (OUTPATIENT)
Dept: GASTROENTEROLOGY | Facility: HOSPITAL | Age: 74
End: 2024-07-30
Payer: MEDICARE

## 2024-07-30 ENCOUNTER — HOSPITAL ENCOUNTER (OUTPATIENT)
Dept: GASTROENTEROLOGY | Facility: HOSPITAL | Age: 74
Setting detail: OUTPATIENT SURGERY
Discharge: HOME | End: 2024-07-30
Payer: MEDICARE

## 2024-07-30 VITALS
HEIGHT: 66 IN | TEMPERATURE: 97 F | OXYGEN SATURATION: 95 % | SYSTOLIC BLOOD PRESSURE: 125 MMHG | RESPIRATION RATE: 18 BRPM | DIASTOLIC BLOOD PRESSURE: 77 MMHG | BODY MASS INDEX: 36.16 KG/M2 | WEIGHT: 225 LBS | HEART RATE: 65 BPM

## 2024-07-30 DIAGNOSIS — C85.90 NON-HODGKIN LYMPHOMA, UNSPECIFIED, UNSPECIFIED SITE (MULTI): Primary | ICD-10-CM

## 2024-07-30 DIAGNOSIS — K31.89 OTHER DISEASES OF STOMACH AND DUODENUM: ICD-10-CM

## 2024-07-30 DIAGNOSIS — C85.99 NON-HODGKIN LYMPHOMA, UNSPECIFIED, EXTRANODAL AND SOLID ORGAN SITES (MULTI): ICD-10-CM

## 2024-07-30 LAB — GLUCOSE BLD MANUAL STRIP-MCNC: 120 MG/DL (ref 74–99)

## 2024-07-30 PROCEDURE — 99100 ANES PT EXTEME AGE<1 YR&>70: CPT | Performed by: ANESTHESIOLOGY

## 2024-07-30 PROCEDURE — 88305 TISSUE EXAM BY PATHOLOGIST: CPT | Mod: TC,STJLAB | Performed by: INTERNAL MEDICINE

## 2024-07-30 PROCEDURE — 7100000009 HC PHASE TWO TIME - INITIAL BASE CHARGE

## 2024-07-30 PROCEDURE — A43235 PR ESOPHAGOGASTRODUODENOSCOPY TRANSORAL DIAGNOSTIC: Performed by: NURSE ANESTHETIST, CERTIFIED REGISTERED

## 2024-07-30 PROCEDURE — 43239 EGD BIOPSY SINGLE/MULTIPLE: CPT | Performed by: INTERNAL MEDICINE

## 2024-07-30 PROCEDURE — 3700000001 HC GENERAL ANESTHESIA TIME - INITIAL BASE CHARGE

## 2024-07-30 PROCEDURE — A43235 PR ESOPHAGOGASTRODUODENOSCOPY TRANSORAL DIAGNOSTIC: Performed by: ANESTHESIOLOGY

## 2024-07-30 PROCEDURE — 2500000001 HC RX 250 WO HCPCS SELF ADMINISTERED DRUGS (ALT 637 FOR MEDICARE OP): Performed by: INTERNAL MEDICINE

## 2024-07-30 PROCEDURE — 2500000004 HC RX 250 GENERAL PHARMACY W/ HCPCS (ALT 636 FOR OP/ED): Performed by: ANESTHESIOLOGIST ASSISTANT

## 2024-07-30 PROCEDURE — 82947 ASSAY GLUCOSE BLOOD QUANT: CPT

## 2024-07-30 PROCEDURE — 3700000002 HC GENERAL ANESTHESIA TIME - EACH INCREMENTAL 1 MINUTE

## 2024-07-30 PROCEDURE — 7100000010 HC PHASE TWO TIME - EACH INCREMENTAL 1 MINUTE

## 2024-07-30 RX ORDER — SODIUM CHLORIDE, SODIUM LACTATE, POTASSIUM CHLORIDE, CALCIUM CHLORIDE 600; 310; 30; 20 MG/100ML; MG/100ML; MG/100ML; MG/100ML
20 INJECTION, SOLUTION INTRAVENOUS CONTINUOUS
Status: DISCONTINUED | OUTPATIENT
Start: 2024-07-30 | End: 2024-07-31 | Stop reason: HOSPADM

## 2024-07-30 RX ORDER — METOPROLOL TARTRATE 50 MG/1
50 TABLET ORAL
COMMUNITY

## 2024-07-30 RX ORDER — DEXTROMETHORPHAN/PSEUDOEPHED 2.5-7.5/.8
DROPS ORAL AS NEEDED
Status: COMPLETED | OUTPATIENT
Start: 2024-07-30 | End: 2024-07-30

## 2024-07-30 RX ORDER — PROPOFOL 10 MG/ML
INJECTION, EMULSION INTRAVENOUS CONTINUOUS PRN
Status: DISCONTINUED | OUTPATIENT
Start: 2024-07-30 | End: 2024-07-30

## 2024-07-30 SDOH — HEALTH STABILITY: MENTAL HEALTH: CURRENT SMOKER: 0

## 2024-07-30 ASSESSMENT — PAIN SCALES - GENERAL
PAINLEVEL_OUTOF10: 0 - NO PAIN
PAINLEVEL_OUTOF10: 0 - NO PAIN
PAIN_LEVEL: 0
PAINLEVEL_OUTOF10: 0 - NO PAIN
PAINLEVEL_OUTOF10: 0 - NO PAIN

## 2024-07-30 ASSESSMENT — COLUMBIA-SUICIDE SEVERITY RATING SCALE - C-SSRS
1. IN THE PAST MONTH, HAVE YOU WISHED YOU WERE DEAD OR WISHED YOU COULD GO TO SLEEP AND NOT WAKE UP?: NO
2. HAVE YOU ACTUALLY HAD ANY THOUGHTS OF KILLING YOURSELF?: NO
6. HAVE YOU EVER DONE ANYTHING, STARTED TO DO ANYTHING, OR PREPARED TO DO ANYTHING TO END YOUR LIFE?: NO

## 2024-07-30 ASSESSMENT — PAIN - FUNCTIONAL ASSESSMENT: PAIN_FUNCTIONAL_ASSESSMENT: 0-10

## 2024-07-30 NOTE — DISCHARGE INSTRUCTIONS

## 2024-07-30 NOTE — ANESTHESIA PREPROCEDURE EVALUATION
Patient: Taurus Perez    Procedure Information       Date/Time: 07/30/24 1100    Scheduled providers: Raissa Lucero MD    Procedure: EGD    Location: [facility] [place]            Relevant Problems   Cardiac   (+) Essential hypertension      Neuro   (+) Bilateral carpal tunnel syndrome      GI   (+) Lymphoma of gastrointestinal tract (Multi)      Liver   (+) Lymphoma of gastrointestinal tract (Multi)      Endocrine   (+) Morbidly obese (Multi)   (+) Type 2 diabetes mellitus with chronic kidney disease (Multi)   (+) Type 2 diabetes mellitus without complication, without long-term current use of insulin (Multi)      Hematology   (+) CLL (chronic lymphocytic leukemia) (Multi)   (+) Lymphoma (Multi)   (+) Lymphoma of gastrointestinal tract (Multi)      Musculoskeletal   (+) Bilateral carpal tunnel syndrome       Clinical information reviewed:   Tobacco  Allergies  Meds   Med Hx  Surg Hx   Fam Hx  Soc Hx        NPO Detail:  NPO/Void Status  Carbohydrate Drink Given Prior to Surgery? : N  Date of Last Liquid: 07/29/24  Time of Last Liquid: 2300  Date of Last Solid: 07/29/24  Time of Last Solid: 2030  Last Intake Type: Clear fluids  Time of Last Void: 1000         Physical Exam    Airway  Mallampati: III  TM distance: >3 FB  Neck ROM: full     Cardiovascular - normal exam     Dental - normal exam     Pulmonary - normal exam     Abdominal - normal exam           Vitals:    07/30/24 0958   BP: 118/62   Pulse: 71   Resp: 18   Temp: 36 °C (96.8 °F)   SpO2: 95%       Past Surgical History:   Procedure Laterality Date    OTHER SURGICAL HISTORY  04/22/2020    Appendectomy    OTHER SURGICAL HISTORY  04/22/2020    Gallbladder surgery    OTHER SURGICAL HISTORY  04/22/2020    Shoulder surgery     Past Medical History:   Diagnosis Date    Hyperlipidemia     Hypertension     Type 2 diabetes mellitus (Multi)        Current Outpatient Medications:     allopurinol (Zyloprim) 100 mg tablet, Take 2 tablets (200 mg) by  mouth once daily., Disp: , Rfl:     atorvastatin (Lipitor) 40 mg tablet, Take 1 tablet (40 mg) by mouth once daily., Disp: , Rfl:     benazepril (Lotensin) 20 mg tablet, Take 1 tablet (20 mg) by mouth once daily., Disp: , Rfl:     doxepin (SINEquan) 10 mg capsule, Take 1 capsule (10 mg) by mouth once daily at bedtime., Disp: , Rfl:     furosemide (Lasix) 40 mg tablet, , Disp: , Rfl:     icosapent ethyL (Vascepa) 1 gram capsule, Take 2 capsules (2 g) by mouth 2 times a day., Disp: , Rfl:     Klor-Con M20 20 mEq ER tablet, , Disp: , Rfl:     loratadine (Claritin) 10 mg tablet, Take 1 tablet (10 mg) by mouth., Disp: , Rfl:     magnesium 200 mg tablet, once every 24 hours., Disp: , Rfl:     metFORMIN (Glucophage) 500 mg tablet, TAKE 1 TABLETS BY MOUTH TWICE A DAY WITH MEALS, Disp: , Rfl:     metoprolol tartrate (Lopressor) 50 mg tablet, Take 1 tablet by mouth., Disp: , Rfl:     mv-min/folic/vit K/lycop/coQ10 (DAILY MULTIVITAMIN ORAL), Take 1 tablet by mouth once daily., Disp: , Rfl:     captopril-hydrochlorothiazide (Capozide) 25-25 mg tablet, , Disp: , Rfl:     colchicine 0.6 mg tablet, Take by mouth., Disp: , Rfl:     ezetimibe (Zetia) 10 mg tablet, , Disp: , Rfl:     liraglutide (Victoza 2-Morteza) 0.6 mg/0.1 mL (18 mg/3 mL) injection, , Disp: , Rfl:   Prior to Admission medications    Medication Sig Start Date End Date Taking? Authorizing Provider   allopurinol (Zyloprim) 100 mg tablet Take 2 tablets (200 mg) by mouth once daily.   Yes Historical Provider, MD   atorvastatin (Lipitor) 40 mg tablet Take 1 tablet (40 mg) by mouth once daily.   Yes Historical Provider, MD   benazepril (Lotensin) 20 mg tablet Take 1 tablet (20 mg) by mouth once daily. 3/12/20  Yes Historical Provider, MD   doxepin (SINEquan) 10 mg capsule Take 1 capsule (10 mg) by mouth once daily at bedtime.   Yes Historical Provider, MD   furosemide (Lasix) 40 mg tablet  12/24/08  Yes Historical Provider, MD   icosapent ethyL (Vascepa) 1 gram capsule Take  2 capsules (2 g) by mouth 2 times a day. 6/30/23  Yes Historical Provider, MD   Klor-Con M20 20 mEq ER tablet  12/24/08  Yes Historical Provider, MD   loratadine (Claritin) 10 mg tablet Take 1 tablet (10 mg) by mouth. 10/20/20  Yes Historical Provider, MD   magnesium 200 mg tablet once every 24 hours.   Yes Historical Provider, MD   metFORMIN (Glucophage) 500 mg tablet TAKE 1 TABLETS BY MOUTH TWICE A DAY WITH MEALS 12/24/08  Yes Historical Provider, MD   metoprolol tartrate (Lopressor) 50 mg tablet Take 1 tablet by mouth.   Yes Historical Provider, MD   mv-min/folic/vit K/lycop/coQ10 (DAILY MULTIVITAMIN ORAL) Take 1 tablet by mouth once daily.   Yes Historical Provider, MD   captopril-hydrochlorothiazide (Capozide) 25-25 mg tablet     Historical Provider, MD   colchicine 0.6 mg tablet Take by mouth. 4/27/22   Historical Provider, MD   ezetimibe (Zetia) 10 mg tablet  12/24/08   Historical Provider, MD   liraglutide (Victoza 2-Morteza) 0.6 mg/0.1 mL (18 mg/3 mL) injection     Historical Provider, MD   allopurinol (Zyloprim) 100 mg tablet Take 2 tablets (200 mg) by mouth once daily. 7/13/23 7/30/24  Historical Provider, MD   aspirin 81 mg capsule Take by mouth.  7/30/24  Historical Provider, MD   aspirin 81 mg EC tablet Take by mouth.  7/30/24  Historical Provider, MD   benazepriL-hydrochlorothiazide (Lotensin HCT) 20-25 mg tablet Take by mouth.  7/30/24  Historical Provider, MD   captopril-hydrochlorothiazide (Capozide) 25-15 mg tablet Take by mouth. 12/24/08 7/30/24  Historical Provider, MD   captopril-hydrochlorothiazide (Capozide) 50-25 mg tablet Take by mouth.  7/30/24  Historical Provider, MD   fenofibrate (Tricor) 145 mg tablet Take by mouth. 12/24/08 7/30/24  Historical Provider, MD   fenofibrate (Triglide) 160 mg tablet   7/30/24  Historical Provider, MD   glimepiride (Amaryl) 4 mg tablet   7/30/24  Historical Provider, MD   loratadine 10 mg capsule once every 24 hours.  7/30/24  Historical Provider, MD  "  magnesium 200 mg tablet Take 1 tablet (200 mg) by mouth once daily. 3/20/23 7/30/24  Historical Provider, MD   magnesium oxide (Mag-Ox) 200 mg magnesium tablet Take 1 tablet (200 mg) by mouth once daily. 2/11/23 7/30/24  Historical Provider, MD   metformin HCl (METFORMIN ORAL)   7/30/24  Historical Provider, MD   sitaGLIPtin phosphate (Januvia) 100 mg tablet  12/24/08 7/30/24  Historical Provider, MD   Toprol XL 50 mg 24 hr tablet  3/16/23 7/30/24  Historical Provider, MD     Allergies   Allergen Reactions    Iodides Hives and Unknown    Iodinated Contrast Media Hives    Radiopaque Pvc Markers-Barium Hives     Social History     Tobacco Use    Smoking status: Never    Smokeless tobacco: Never   Substance Use Topics    Alcohol use: Not Currently         Chemistry    Lab Results   Component Value Date/Time     06/10/2024 1335    K 4.9 06/10/2024 1335     06/10/2024 1335    CO2 26 06/10/2024 1335    BUN 41 (H) 06/10/2024 1335    CREATININE 1.65 (H) 06/10/2024 1335    Lab Results   Component Value Date/Time    CALCIUM 9.3 06/10/2024 1335    ALKPHOS 87 06/10/2024 1335    AST 16 06/10/2024 1335    ALT 21 06/10/2024 1335    BILITOT 0.6 06/10/2024 1335          Lab Results   Component Value Date/Time    WBC 5.1 06/10/2024 1335    HGB 11.9 (L) 06/10/2024 1335    HCT 35.5 (L) 06/10/2024 1335     06/10/2024 1335     No results found for: \"PROTIME\", \"PTT\", \"INR\"  No results found for this or any previous visit (from the past 4464 hour(s)).   Anesthesia Plan    History of general anesthesia?: yes  History of complications of general anesthesia?: no    ASA 3     MAC     The patient is not a current smoker.    intravenous induction   Anesthetic plan and risks discussed with patient.    Plan discussed with CAA.      "

## 2024-07-30 NOTE — H&P
History Of Present Illness  Taurus Perez is a 73 y.o. male presenting here for EGD..     Past Medical History  Past Medical History:   Diagnosis Date    Hyperlipidemia     Hypertension     Type 2 diabetes mellitus (Multi)      Surgical History  Past Surgical History:   Procedure Laterality Date    OTHER SURGICAL HISTORY  04/22/2020    Appendectomy    OTHER SURGICAL HISTORY  04/22/2020    Gallbladder surgery    OTHER SURGICAL HISTORY  04/22/2020    Shoulder surgery     Social History  He reports that he has never smoked. He has never used smokeless tobacco. He reports that he does not currently use alcohol. He reports that he does not currently use drugs.    Family History  No family history on file.     Allergies  Allergies   Allergen Reactions    Iodides Hives and Unknown    Iodinated Contrast Media Hives    Radiopaque Pvc Markers-Barium Hives     Review of Systems   Review of Systems   Constitutional: Negative.  Negative for activity change, appetite change, chills, fatigue, fever and unexpected weight change.   HENT: Negative.     Respiratory: Negative.     Cardiovascular: Negative.  Negative for chest pain and palpitations.   Gastrointestinal: Negative.  Negative for abdominal distention, abdominal pain, anal bleeding, blood in stool, constipation, diarrhea, nausea, rectal pain and vomiting.   Skin: Negative.  Negative for color change and rash.   Neurological: Negative.  Negative for dizziness, tremors, seizures, weakness and headaches.   Psychiatric/Behavioral: Negative.  Negative for confusion.     Physical Exam   General appearance: No acute distress, cooperative.  Eyes: Nonicteric, no redness or proptosis  Ears, nose, mouth, and throat: Moist mucous membranes, tongue normal  Neck: Supple, no lymphadenopathy or thyromegaly  Lungs: Clear to auscultation bilaterally  CV: Regular rate and rhythm, no murmur; no pitting edema in the lower extremities  Abd: soft, non-tender; non-distended; normal active bowel  "sounds; NO  scars  Skin: No rashes or lesions; no liver stigmata  MSK: No deformities or joint edema/redness/tenderness  Neuro: Alert and oriented ×3, normal gait, non-focal NO asterixis    Last Recorded Vitals  Blood pressure 118/62, pulse 71, temperature 36 °C (96.8 °F), temperature source Temporal, resp. rate 18, height 1.676 m (5' 6\"), weight 102 kg (225 lb), SpO2 95%.    Assessment/Plan        Raissa Lucero MD  "

## 2024-08-06 LAB
LABORATORY COMMENT REPORT: NORMAL
PATH REPORT.FINAL DX SPEC: NORMAL
PATH REPORT.GROSS SPEC: NORMAL
PATH REPORT.RELEVANT HX SPEC: NORMAL
PATH REPORT.TOTAL CANCER: NORMAL

## 2024-08-21 DIAGNOSIS — K31.89 OTHER DISEASES OF STOMACH AND DUODENUM: Primary | ICD-10-CM

## 2024-08-21 RX ORDER — PANTOPRAZOLE SODIUM 40 MG/1
40 TABLET, DELAYED RELEASE ORAL 2 TIMES DAILY
Qty: 180 TABLET | Refills: 3 | Status: SHIPPED | OUTPATIENT
Start: 2024-08-21 | End: 2025-08-21

## 2024-09-03 ASSESSMENT — ENCOUNTER SYMPTOMS
COUGH: 0
WHEEZING: 0
EYE PAIN: 0
EYE DISCHARGE: 0
SHORTNESS OF BREATH: 0
SINUS PRESSURE: 0
ABDOMINAL PAIN: 0
RECTAL PAIN: 0
BLOOD IN STOOL: 0
CHEST TIGHTNESS: 0
EYE ITCHING: 0
CONSTIPATION: 0
NAUSEA: 0
SORE THROAT: 0
TROUBLE SWALLOWING: 0
VOMITING: 0
RHINORRHEA: 0
SINUS PAIN: 0
APNEA: 0
VOICE CHANGE: 0
PHOTOPHOBIA: 0
COLOR CHANGE: 0
ABDOMINAL DISTENTION: 0
FACIAL SWELLING: 0
EYE REDNESS: 0
DIARRHEA: 0
BACK PAIN: 0

## 2024-09-03 NOTE — PROGRESS NOTES
Subjective:      Patient ID: Hector Bose is a 73 y.o. male Established patient, here for evaluation of the following chief complaint(s):  Chief Complaint   Patient presents with    Diabetes    Hypertension       HPI    72-year-old diabetic hypertensive male with a history of hypertriglyceridemia gout and hypertension presents for a f/u visit.         Essential hypertension-lasix 40 mg daily , lotensin 20 mg daily, metoprolol 50 mg orally daily      Stage 3 chronic kidney disease, unspecified whether stage 3a or 3b CKD ([facility])-monitoring renal function closely.      Type 2 diabetes mellitus with stage 3a chronic kidney disease, without long-term current use of insulin (HCC)-metformin 500 mg twice daily.        Hypertriglyceridemia-compliant with Vascepa 1 g daily, Lipitor 40 mg daily         History of gout-compliant with allopurinol. Colchicine as needed.  The patient reports no additional gout flares at this time.     At present he denies polyuria,  Polydipsia, constitutional, sinus, visual, cardiopulmonary, urologic, gastrointestinal, immunologic/hematologic, musculoskeletal, neurologic,dermatologic, or psychiatric complaints.        Current Outpatient Medications on File Prior to Visit   Medication Sig Dispense Refill    pantoprazole (PROTONIX) 40 MG tablet Take 1 tablet by mouth 2 times daily      allopurinol (ZYLOPRIM) 100 MG tablet TAKE 2 TABLETS BY MOUTH EVERY  tablet 1    doxepin (SINEQUAN) 10 MG capsule TAKE 1 CAPSULE BY MOUTH EVERY NIGHT 90 capsule 1    Icosapent Ethyl (VASCEPA) 1 g CAPS capsule Take 2 capsules by mouth 2 times daily 360 capsule 1    Handicap Placard MISC by Does not apply route Diagnosis: Osteoarthritis of the knees: Duration 6/6/2024-6/6/2029 1 each 0    metoprolol succinate (TOPROL XL) 50 MG extended release tablet TAKE 1 TABLET BY MOUTH EVERY DAY 90 tablet 3    benazepril (LOTENSIN) 20 MG tablet TAKE 1 TABLET BY MOUTH EVERY DAY 90 tablet 3    metFORMIN (GLUCOPHAGE) 500 MG

## 2024-09-04 ENCOUNTER — OFFICE VISIT (OUTPATIENT)
Dept: FAMILY MEDICINE CLINIC | Age: 74
End: 2024-09-04

## 2024-09-04 VITALS
WEIGHT: 226 LBS | RESPIRATION RATE: 14 BRPM | HEIGHT: 67 IN | DIASTOLIC BLOOD PRESSURE: 60 MMHG | OXYGEN SATURATION: 97 % | SYSTOLIC BLOOD PRESSURE: 108 MMHG | HEART RATE: 76 BPM | BODY MASS INDEX: 35.47 KG/M2

## 2024-09-04 DIAGNOSIS — Z12.5 PROSTATE CANCER SCREENING: ICD-10-CM

## 2024-09-04 DIAGNOSIS — M10.9 GOUTY ARTHROPATHY: ICD-10-CM

## 2024-09-04 DIAGNOSIS — N18.31 TYPE 2 DIABETES MELLITUS WITH STAGE 3A CHRONIC KIDNEY DISEASE, WITHOUT LONG-TERM CURRENT USE OF INSULIN (HCC): ICD-10-CM

## 2024-09-04 DIAGNOSIS — I10 ESSENTIAL HYPERTENSION: Primary | ICD-10-CM

## 2024-09-04 DIAGNOSIS — E78.5 DYSLIPIDEMIA: ICD-10-CM

## 2024-09-04 DIAGNOSIS — E11.22 TYPE 2 DIABETES MELLITUS WITH STAGE 3A CHRONIC KIDNEY DISEASE, WITHOUT LONG-TERM CURRENT USE OF INSULIN (HCC): ICD-10-CM

## 2024-09-04 RX ORDER — PANTOPRAZOLE SODIUM 40 MG/1
40 TABLET, DELAYED RELEASE ORAL 2 TIMES DAILY
COMMUNITY
Start: 2024-08-21 | End: 2025-08-21

## 2024-09-08 SDOH — HEALTH STABILITY: PHYSICAL HEALTH: ON AVERAGE, HOW MANY DAYS PER WEEK DO YOU ENGAGE IN MODERATE TO STRENUOUS EXERCISE (LIKE A BRISK WALK)?: 3 DAYS

## 2024-09-08 SDOH — HEALTH STABILITY: PHYSICAL HEALTH: ON AVERAGE, HOW MANY MINUTES DO YOU ENGAGE IN EXERCISE AT THIS LEVEL?: 30 MIN

## 2024-09-08 ASSESSMENT — PATIENT HEALTH QUESTIONNAIRE - PHQ9
2. FEELING DOWN, DEPRESSED OR HOPELESS: NOT AT ALL
1. LITTLE INTEREST OR PLEASURE IN DOING THINGS: NOT AT ALL
SUM OF ALL RESPONSES TO PHQ QUESTIONS 1-9: 0
SUM OF ALL RESPONSES TO PHQ9 QUESTIONS 1 & 2: 0
SUM OF ALL RESPONSES TO PHQ QUESTIONS 1-9: 0

## 2024-09-08 ASSESSMENT — LIFESTYLE VARIABLES
HOW OFTEN DO YOU HAVE SIX OR MORE DRINKS ON ONE OCCASION: 1
HOW OFTEN DO YOU HAVE A DRINK CONTAINING ALCOHOL: 1
HOW MANY STANDARD DRINKS CONTAINING ALCOHOL DO YOU HAVE ON A TYPICAL DAY: PATIENT DOES NOT DRINK
HOW OFTEN DO YOU HAVE A DRINK CONTAINING ALCOHOL: NEVER
HOW MANY STANDARD DRINKS CONTAINING ALCOHOL DO YOU HAVE ON A TYPICAL DAY: 0

## 2024-09-09 ENCOUNTER — TELEMEDICINE (OUTPATIENT)
Dept: FAMILY MEDICINE CLINIC | Age: 74
End: 2024-09-09
Payer: MEDICARE

## 2024-09-09 DIAGNOSIS — Z00.00 MEDICARE ANNUAL WELLNESS VISIT, SUBSEQUENT: Primary | ICD-10-CM

## 2024-09-09 PROCEDURE — G0439 PPPS, SUBSEQ VISIT: HCPCS | Performed by: STUDENT IN AN ORGANIZED HEALTH CARE EDUCATION/TRAINING PROGRAM

## 2024-09-09 PROCEDURE — 1123F ACP DISCUSS/DSCN MKR DOCD: CPT | Performed by: STUDENT IN AN ORGANIZED HEALTH CARE EDUCATION/TRAINING PROGRAM

## 2024-09-09 PROCEDURE — 3017F COLORECTAL CA SCREEN DOC REV: CPT | Performed by: STUDENT IN AN ORGANIZED HEALTH CARE EDUCATION/TRAINING PROGRAM

## 2024-09-09 ASSESSMENT — PATIENT HEALTH QUESTIONNAIRE - PHQ9
2. FEELING DOWN, DEPRESSED OR HOPELESS: NOT AT ALL
SUM OF ALL RESPONSES TO PHQ QUESTIONS 1-9: 0
1. LITTLE INTEREST OR PLEASURE IN DOING THINGS: NOT AT ALL
SUM OF ALL RESPONSES TO PHQ QUESTIONS 1-9: 0
SUM OF ALL RESPONSES TO PHQ QUESTIONS 1-9: 0
SUM OF ALL RESPONSES TO PHQ9 QUESTIONS 1 & 2: 0
SUM OF ALL RESPONSES TO PHQ QUESTIONS 1-9: 0

## 2024-09-16 DIAGNOSIS — I10 ESSENTIAL HYPERTENSION: ICD-10-CM

## 2024-09-17 RX ORDER — POTASSIUM CHLORIDE 1500 MG/1
TABLET, EXTENDED RELEASE ORAL
Qty: 180 TABLET | Refills: 0 | Status: SHIPPED | OUTPATIENT
Start: 2024-09-17

## 2024-10-30 ENCOUNTER — OFFICE VISIT (OUTPATIENT)
Dept: PODIATRY | Age: 74
End: 2024-10-30

## 2024-10-30 VITALS — TEMPERATURE: 97.4 F | HEIGHT: 67 IN | WEIGHT: 225 LBS | BODY MASS INDEX: 35.31 KG/M2

## 2024-10-30 DIAGNOSIS — M19.071 ARTHRITIS OF BOTH FEET: ICD-10-CM

## 2024-10-30 DIAGNOSIS — M21.41 BILATERAL PES PLANUS: ICD-10-CM

## 2024-10-30 DIAGNOSIS — E11.22 TYPE 2 DIABETES MELLITUS WITH STAGE 3A CHRONIC KIDNEY DISEASE, WITHOUT LONG-TERM CURRENT USE OF INSULIN (HCC): Primary | ICD-10-CM

## 2024-10-30 DIAGNOSIS — M21.42 BILATERAL PES PLANUS: ICD-10-CM

## 2024-10-30 DIAGNOSIS — M20.41 HAMMERTOE, BILATERAL: ICD-10-CM

## 2024-10-30 DIAGNOSIS — M19.072 ARTHRITIS OF BOTH FEET: ICD-10-CM

## 2024-10-30 DIAGNOSIS — M20.42 HAMMERTOE, BILATERAL: ICD-10-CM

## 2024-10-30 DIAGNOSIS — N18.31 TYPE 2 DIABETES MELLITUS WITH STAGE 3A CHRONIC KIDNEY DISEASE, WITHOUT LONG-TERM CURRENT USE OF INSULIN (HCC): Primary | ICD-10-CM

## 2024-10-30 ASSESSMENT — ENCOUNTER SYMPTOMS
VOMITING: 0
NAUSEA: 0
BACK PAIN: 1
SHORTNESS OF BREATH: 0

## 2024-10-30 NOTE — PROGRESS NOTES
RAFAEL Garrett DPM      Level of medical decision making: low.       Please note that this report has been partially produced using speech recognition software which may cause errors including grammar, punctuation, and spelling or words and phrases that may seem inappropriate. If there are questions or concerns please feel free to contact me for clarification.

## 2024-11-03 DIAGNOSIS — J30.89 NON-SEASONAL ALLERGIC RHINITIS, UNSPECIFIED TRIGGER: ICD-10-CM

## 2024-11-03 RX ORDER — LORATADINE 10 MG/1
10 TABLET ORAL DAILY PRN
Qty: 90 TABLET | Refills: 3 | Status: SHIPPED | OUTPATIENT
Start: 2024-11-03

## 2024-11-05 NOTE — PROGRESS NOTES
Subjective:      Patient ID: Ryland Gleason is a 73 y.o. male who presents today for:  Chief Complaint   Patient presents with    Pain     The patient c/o chronic bilateral hand pain. The patient had an EMG on 22. He states that his left hand hurts the most. He rates his pain as a 5/10 today. He also has numbness and tingling in both hands. HPI    Patient's complaints are horrible the patient is tried splinting unsuccessfully the patient's numbness and tingling is constant wakes him up at night despite wearing splints. Notes from his primary doctor to Viri clark are reviewed. Last note of which was on 2022. Patient presents to me with an EMG nerve conduction study and hand. The patient had a nerve study done on 2022 that studies been independently reviewed the results of which are below. Of note the patient had a study proceeding this on 2019 which unfortunately demonstrated severe carpal tunnel. The studies are not quite as bad on that study of  but were still pretty impressive. [address]                      [address] [facility], [address] [facility]                              ELECTROMYOGRAM REPORT     PATIENT NAME: Sarah Hung                  :        1950  MED REC NO:   [id number]                            ROOM:  ACCOUNT NO:   [de-identified]                           ADMIT DATE: 2022  PROVIDER:     Mariela Becker MD     DATE OF EM2022     REFERRING PROVIDER:  Rudine Blizzard, MD.     REASON FOR STUDY:  The patient was having numbness in the hands, being  worse on the left side.   Previous study was available for comparison  from 2019.     FINDINGS:  Motor nerve conduction velocities are borderline in the left  ulnar nerve over the elbow segment, could not be measured in the left  median nerve, and normal in other nerves tested.     F-wave latency could not be obtained in the left median nerve, rather  delayed in the left ulnar nerve and also right median nerve, borderline  in the right ulnar nerve.     Distal motor and sensory latencies are normal in the ulnar nerves, but  significantly delayed in the median nerves.     On concentric needle electrode examination, significant denervation  changes are present in the abductor pollicis brevis muscles bilaterally,  being much worse on the left side.     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     Due to continued symptoms, the patient shall need decompression of the  median nerves to start with on the left side.     Due to the severity of the compression neuropathy, resolution of the  symptoms may not be complete after the surgical intervention.     Thank you Dr. Tung Barclay for allowing me to see this patient.   Please feel  free to call me if I can be of any further assistance regarding this  patient's evaluation.  Liu Wooten MD    Past Medical History:   Diagnosis Date    Acute idiopathic gout of right foot     Anemia 03/01/2018    iron transfusions x2    Arthritis     both knees    Bilateral carpal tunnel syndrome 1/12/2022    Chronic kidney disease     CLL (chronic lymphocytic leukemia) ([address]     Colon polyps     Disease of blood and blood forming organ     Hyperlipidemia     dx since 1970's    Hypertension     meds  since 1970's    Lymphoma of gastrointestinal tract ([address]     Movement disorder     Thyroid cancer ([address] 02/2018    - denies states it was CLL    Type II or unspecified type diabetes mellitus without mention of complication, not stated as uncontrolled     hx > 15 yrs     Past Surgical History:   Procedure Laterality Date    APPENDECTOMY      CHOLECYSTECTOMY N/A 07/07/2016    COLONOSCOPY  4/1/16    w/polypectomy     COLONOSCOPY N/A 6/15/2021    COLORECTAL CANCER SCREENING, HIGH RISK performed by Abelardo Villanueva MD at [address] [place] [address], DIAGNOSTIC      JOINT REPLACEMENT Bilateral     knees    OTHER SURGICAL HISTORY Right 06/08/16    I & D ABSCESS THIGH    MI MANIPULATN KNEE JT+ANESTHESIA Right 3/15/2018    RIGHT KNEE MANIPULATION performed by Charito Booker MD at [address] [place] [address]. Right 1/18/2018    RIGHT KNEE TOTAL KNEE ARTHROPLASTY ELÍAS SPINAL,NERVE BLOCK performed by Charito Booker MD at [address] [place] [address]. Left 5/17/2018    LEFT KNEE TOTAL KNEE ARTHROPLASTY, performed by Charito [facility] MD at [facility] Right     due to displacement    UPPER GASTROINTESTINAL ENDOSCOPY  4/29/15    w/bx     UPPER GASTROINTESTINAL ENDOSCOPY  02/28/2018    Dr. Bellamy Square History     Socioeconomic History    Marital status:      Spouse name: Not on file    Number of children: Not on file    Years of education: Not on file    Highest education level: Not on file   Occupational History    Occupation: retired   Tobacco Use    Smoking status: Never Smoker    Smokeless tobacco: Never Used   Vaping Use    Vaping Use: Never used   Substance and Sexual Activity    Alcohol use: Yes     Comment: rare social    Drug use: No    Sexual activity: Yes     Partners: Female   Other Topics Concern    Not on file   Social History Narrative    Lives w wife     Social Determinants of Health     Financial Resource Strain: Low Risk     Difficulty of Paying Living Expenses: Not hard at all   Food Insecurity: No Food Insecurity    Worried About [address] in the Last Year: Never true    [facility] in the Last Year: Never true   Transportation Needs: No Transportation Needs    Lack of Transportation (Medical): No    Lack of Transportation (Non-Medical):  No   Physical Activity:     Days of Exercise per Week: Not on file    Minutes of Exercise per Session: Not on file   Stress:     Feeling of Stress : Not on file   Social Connections:     Frequency of Communication with Friends and Family: Not on file    Frequency of Social Gatherings with Friends and Family: Not on file    Attends Holiness Services: Not on file    Active Member of Clubs or Organizations: Not on file    Attends Club or Organization Meetings: Not on file    Marital Status: Not on file   Intimate Partner Violence:     Fear of Current or Ex-Partner: Not on file    Emotionally Abused: Not on file    Physically Abused: Not on file    Sexually Abused: Not on file   Housing Stability:     Unable to Pay for Housing in the Last Year: Not on file    Number of [place] in the Last Year: Not on file    Unstable Housing in the Last Year: Not on file     Family History   Problem Relation Age of Onset    Heart Disease Mother 67        CHF    Cancer Father 68        liver/ pancreatic    No Known Problems Sister     Cancer Brother         chronic lukemia    Arthritis Brother     No Known Problems Daughter     Colon Cancer Neg Hx      Allergies   Allergen Reactions    Dye [Barium-Containing Compounds] Hives    Iodides     Iodinated Diagnostic Agents Hives     Current Outpatient Medications on File Prior to Visit   Medication Sig Dispense Refill    colchicine (COLCRYS) 0.6 MG tablet Take 1 tablet by mouth daily as needed for Pain ((foot pain)) 14 tablet 0    doxepin (SINEQUAN) 10 MG capsule TAKE 1 CAPSULE BY MOUTH EVERY DAY AT NIGHT 90 capsule 0    atorvastatin (LIPITOR) 40 MG tablet TAKE 1 TABLET BY MOUTH EVERY EVENING 90 tablet 3    loratadine (CLARITIN) 10 MG tablet Take 1 tablet by mouth daily as needed (allergies) 90 tablet 3    KLOR-CON M20 20 MEQ extended release tablet TAKE 1 TABLET BY MOUTH THREE TIMES A DAY (Patient taking differently: 20 mEq 2 times daily ) 270 tablet 0    furosemide (LASIX) 40 MG tablet TAKE 1 TABLET DAILY 90 tablet 1    tiZANidine (ZANAFLEX) 2 MG tablet Take 1 tablet by mouth every 8 hours as needed (muscle spasms) 30 tablet 0    allopurinol (ZYLOPRIM) 100 MG tablet Take 1 tablet by mouth daily 90 tablet 2    metoprolol succinate (TOPROL XL) 50 MG extended release tablet Take 1 tablet by mouth daily 90 tablet 3    benazepril (LOTENSIN) 20 MG tablet TAKE 1 TABLET BY MOUTH EVERY DAY 90 tablet 3    metFORMIN (GLUCOPHAGE) 500 MG tablet TAKE 2 TABLETS BY MOUTH TWICE A DAY WITH MEALAS 360 tablet 3    aspirin 81 MG EC tablet Take 1 tablet by mouth 2 times daily (Patient taking differently: Take 81 mg by mouth daily ) 60 tablet 0    Alcohol Swabs PADS Use as directed with insulin injections 300 each 3    Multiple Vitamins-Minerals (MULTIVITAMIN PO) Take 1 tablet by mouth daily.  [DISCONTINUED] KLOR-CON M20 20 MEQ extended release tablet TAKE 1 TABLET BY MOUTH THREE TIMES A  tablet 1     No current facility-administered medications on file prior to visit. Review of Systems  Chart is reviewed no fever chills night sweats. He is on aspirin but no blood thinner. Objective:   Pulse 89   Temp 97 °F (36.1 °C) (Temporal)   Ht 5' 6\" (1.676 m)   Wt 228 lb (103.4 kg)   SpO2 97%   BMI 36.80 kg/m²     ORTHOEXAM    Patient had severe carpal tunnel bilaterally as numbness and tingling immediately fusion its aggravated by Phalen's testing he has hypothenar thenar sensation intact. His thenar bilaterally demonstrate a little bit of wasting. He has good cap refill and color. No scars are noted about his hand he has ability to flex and extend the fingers. Assessment:       Diagnosis Orders   1. Bilateral carpal tunnel syndrome           Plan:   Unfortunately his symptoms are horrific and is a nerve test correlates well with that. And therefore I recommended relatively urgent sequential carpal tunnel releases.   We will start with the left side as it bothers him the most and then proceed accordingly and sequentially on the right several weeks later.  The patient therefore will have the left side scheduled. The risk and benefit of surgery were discussed with the patient in detail. He is include injury soft tissue nerves and vessels but mostly residual deficits incisional problems medical and anesthetic risk. I discussed the different types of anesthetic and after discussion he is good to proceed with a [place] block anesthetic. He have to go preadmission testing preoperative COVID testing per routine and that has been ordered and arranged. Surgical be on 2022. No orders of the defined types were placed in this encounter. No orders of the defined types were placed in this encounter. No follow-ups on file. Magda Romero MD        Surgery Phone: [phone]   [facility]   Surgery Fax: [phone]    Phone: [phone]          Fax: [phone]: Surgery Scheduling, PAT & PRE-OP Order Form  Call to advance [place] at [phone] at least 24 hours prior to date of service     Surgery Location: [facility] Surgery: [address], [address] [facility]  Brayan Alex MD Surgery Date:   Time: tf   Patient's Name: Hailey Skelton : 1950    Gender: male   Home Phone:  [phone] Cell Phone: [phone]    #:  [id number]  Emergency Contact:  Melody Mac   Phone: [phone]  Payor: Ketty Leal /  /  /    ID No.: [id number]      PROVIDER TO COMPLETE:  Diagnosis: Left carpal tunnel syndrome  Procedure/Consent: Carpal tunnel release  CPT Codes: 43467  Case Comments/Implants: N/A   Surgery Scheduled as:  Outpatient  Anesthesia Requested: [place]  Referring Family Doctor: Darshana Dodd MD   PAT  [x] [facility] PAT Date/Time:                                                            [x] History & Physical [] Physician will Provide [] Attached [] Dictated [] Other  [x] Follow Anesthesia Pre-Op Orders for X-rays, [facility]     [x] SN & PT to evaluate and treat/educate disease management, medications, home safety & equipment needs for total joint patients  [] Other: ____________________________________________________  Consults: Medical/Cardiac Clearance done by  ____________________  PRE-OP ORDERS:   Allergies: Dye [barium-containing compounds], Iodides, and Iodinated diagnostic agents Latex Allergies:             Diabetic:           [] IV ________________________  [x] IV Start with J-loop     Preprinted Orders: Attached [] Yes [] No   ANTIBIOTIC PRE-OP: [x] ANCEF 2 gram IVPB if > 120 kg 3 grams IVPB within 1 hour of incision, if ALLERGIC, use VANCOMYCIN 1 gram IV, 2 hours pre-op  [] TXA Protocol [] Other:   [x] NPO   [] Betablocker (if needed) _____________________________________   [] Knee high anti-embolic hose [] Thigh high anti-embolic hose   Other: ______________________________________________________    Physician Signature Required: Electronically signed by Vishal Caro MD on 1/12/2022 at 2:47 PM   Date/Time: 1/12/2022 Pt ran out of prostate medicine 1 month ago

## 2024-11-18 PROBLEM — K31.A0 GASTRIC INTESTINAL METAPLASIA, UNSPECIFIED: Status: ACTIVE | Noted: 2023-03-07

## 2024-11-18 PROBLEM — R09.A2 SENSATION OF LUMP IN THROAT: Status: ACTIVE | Noted: 2024-11-18

## 2024-11-18 PROBLEM — K29.80 DUODENITIS: Status: ACTIVE | Noted: 2023-12-04

## 2024-11-18 PROBLEM — E61.1 IRON DEFICIENCY: Status: ACTIVE | Noted: 2024-06-16

## 2024-11-18 PROBLEM — M25.519 SHOULDER PAIN: Status: ACTIVE | Noted: 2023-12-04

## 2024-11-18 PROBLEM — J38.3 VOCAL CORD DISEASE: Status: ACTIVE | Noted: 2023-12-04

## 2024-11-18 PROBLEM — N18.32 STAGE 3B CHRONIC KIDNEY DISEASE (HCC): Status: ACTIVE | Noted: 2023-12-04

## 2024-11-25 ENCOUNTER — OFFICE VISIT (OUTPATIENT)
Dept: NEUROLOGY | Age: 74
End: 2024-11-25
Payer: MEDICARE

## 2024-11-25 VITALS
WEIGHT: 223 LBS | HEIGHT: 67 IN | HEART RATE: 67 BPM | OXYGEN SATURATION: 97 % | DIASTOLIC BLOOD PRESSURE: 60 MMHG | SYSTOLIC BLOOD PRESSURE: 110 MMHG | BODY MASS INDEX: 35 KG/M2

## 2024-11-25 DIAGNOSIS — M54.12 CERVICAL RADICULOPATHY: ICD-10-CM

## 2024-11-25 DIAGNOSIS — G44.86 CERVICOGENIC HEADACHE: ICD-10-CM

## 2024-11-25 DIAGNOSIS — G24.3 SPASMODIC TORTICOLLIS: Primary | ICD-10-CM

## 2024-11-25 DIAGNOSIS — M43.6 STIFF NECK: ICD-10-CM

## 2024-11-25 PROCEDURE — G8484 FLU IMMUNIZE NO ADMIN: HCPCS | Performed by: PSYCHIATRY & NEUROLOGY

## 2024-11-25 PROCEDURE — 1123F ACP DISCUSS/DSCN MKR DOCD: CPT | Performed by: PSYCHIATRY & NEUROLOGY

## 2024-11-25 PROCEDURE — 3078F DIAST BP <80 MM HG: CPT | Performed by: PSYCHIATRY & NEUROLOGY

## 2024-11-25 PROCEDURE — 99204 OFFICE O/P NEW MOD 45 MIN: CPT | Performed by: PSYCHIATRY & NEUROLOGY

## 2024-11-25 PROCEDURE — G8427 DOCREV CUR MEDS BY ELIG CLIN: HCPCS | Performed by: PSYCHIATRY & NEUROLOGY

## 2024-11-25 PROCEDURE — 1036F TOBACCO NON-USER: CPT | Performed by: PSYCHIATRY & NEUROLOGY

## 2024-11-25 PROCEDURE — 3017F COLORECTAL CA SCREEN DOC REV: CPT | Performed by: PSYCHIATRY & NEUROLOGY

## 2024-11-25 PROCEDURE — 3074F SYST BP LT 130 MM HG: CPT | Performed by: PSYCHIATRY & NEUROLOGY

## 2024-11-25 PROCEDURE — G8417 CALC BMI ABV UP PARAM F/U: HCPCS | Performed by: PSYCHIATRY & NEUROLOGY

## 2024-11-25 PROCEDURE — 1159F MED LIST DOCD IN RCRD: CPT | Performed by: PSYCHIATRY & NEUROLOGY

## 2024-11-25 NOTE — PROGRESS NOTES
Subjective:      Patient ID: Hector Bose is a 74 y.o. male who presents today for:  Chief Complaint   Patient presents with    New Patient     Neck pain dealt with this [place] for 15-20 years but it has been more intense recently Hard for the Pt to turn his neck       HPI 74 right-handed female referred here for neck pain.  Patient reports that he has neck pain for many years but now in the last 9 months this is worse with his completely stiff.  He is not having difficulty turning his head and therefore difficulty his activity of daily living.  Even with driving he is having some limitations now as he cannot turn his head very well.  Is very stiff in the neck.  He denies any head injury or trauma is no tremors.  He denies any numbness or tingling of his extremities.  He had some minor back pain which responded to pain management.  He did try physical therapy for his neck but he cannot move his neck is very painful.  In the morning when he wakes up he has to really hold his head is very tight.    Past Medical History:   Diagnosis Date    Acute idiopathic gout of right foot     Anemia 03/01/2018    iron transfusions x2    Arthritis     both knees    Bilateral carpal tunnel syndrome 1/12/2022    Chronic kidney disease     CLL (chronic lymphocytic leukemia) (HCC)     dx 5/2015    Colon polyps     Disease of blood and blood forming organ     Hyperlipidemia     dx since 1970's    Hypertension     meds  since 1970's    Lymphoma of gastrointestinal tract (HCC)     Movement disorder     Type II or unspecified type diabetes mellitus without mention of complication, not stated as uncontrolled     hx > 20 yrs     Past Surgical History:   Procedure Laterality Date    APPENDECTOMY      age 22s    CARPAL TUNNEL RELEASE Left 1/26/2022    LEFT CARPAL TUNNEL RELEASE performed by Rangel Saldivar MD at [facility] OR    CARPAL TUNNEL RELEASE Right 3/9/2022    CARPAL TUNNEL RELEASE RIGHT performed by Rangel Saldivar MD at [facility]

## 2024-12-03 ENCOUNTER — LAB (OUTPATIENT)
Dept: LAB | Facility: CLINIC | Age: 74
End: 2024-12-03
Payer: MEDICARE

## 2024-12-03 DIAGNOSIS — E61.1 IRON DEFICIENCY: ICD-10-CM

## 2024-12-03 DIAGNOSIS — C91.10 CLL (CHRONIC LYMPHOCYTIC LEUKEMIA) (MULTI): ICD-10-CM

## 2024-12-03 LAB
ALBUMIN SERPL BCP-MCNC: 4.1 G/DL (ref 3.4–5)
ALP SERPL-CCNC: 83 U/L (ref 33–136)
ALT SERPL W P-5'-P-CCNC: 16 U/L (ref 10–52)
ANION GAP SERPL CALC-SCNC: 10 MMOL/L (ref 10–20)
AST SERPL W P-5'-P-CCNC: 14 U/L (ref 9–39)
BASOPHILS # BLD AUTO: 0.02 X10*3/UL (ref 0–0.1)
BASOPHILS NFR BLD AUTO: 0.3 %
BILIRUB SERPL-MCNC: 0.5 MG/DL (ref 0–1.2)
BUN SERPL-MCNC: 44 MG/DL (ref 6–23)
CALCIUM SERPL-MCNC: 9.1 MG/DL (ref 8.6–10.3)
CHLORIDE SERPL-SCNC: 106 MMOL/L (ref 98–107)
CO2 SERPL-SCNC: 26 MMOL/L (ref 21–32)
CREAT SERPL-MCNC: 1.77 MG/DL (ref 0.5–1.3)
EGFRCR SERPLBLD CKD-EPI 2021: 40 ML/MIN/1.73M*2
EOSINOPHIL # BLD AUTO: 0.1 X10*3/UL (ref 0–0.4)
EOSINOPHIL NFR BLD AUTO: 1.4 %
ERYTHROCYTE [DISTWIDTH] IN BLOOD BY AUTOMATED COUNT: 13.7 % (ref 11.5–14.5)
FERRITIN SERPL-MCNC: 169 NG/ML (ref 20–300)
GLUCOSE SERPL-MCNC: 113 MG/DL (ref 74–99)
HCT VFR BLD AUTO: 32.2 % (ref 41–52)
HGB BLD-MCNC: 10.6 G/DL (ref 13.5–17.5)
IMM GRANULOCYTES # BLD AUTO: 0.01 X10*3/UL (ref 0–0.5)
IMM GRANULOCYTES NFR BLD AUTO: 0.1 % (ref 0–0.9)
IRON SATN MFR SERPL: 9 % (ref 25–45)
IRON SERPL-MCNC: 22 UG/DL (ref 35–150)
LYMPHOCYTES # BLD AUTO: 2.52 X10*3/UL (ref 0.8–3)
LYMPHOCYTES NFR BLD AUTO: 35.5 %
MCH RBC QN AUTO: 31.8 PG (ref 26–34)
MCHC RBC AUTO-ENTMCNC: 32.9 G/DL (ref 32–36)
MCV RBC AUTO: 97 FL (ref 80–100)
MONOCYTES # BLD AUTO: 0.73 X10*3/UL (ref 0.05–0.8)
MONOCYTES NFR BLD AUTO: 10.3 %
NEUTROPHILS # BLD AUTO: 3.72 X10*3/UL (ref 1.6–5.5)
NEUTROPHILS NFR BLD AUTO: 52.4 %
PLATELET # BLD AUTO: 186 X10*3/UL (ref 150–450)
POTASSIUM SERPL-SCNC: 4.3 MMOL/L (ref 3.5–5.3)
PROT SERPL-MCNC: 8 G/DL (ref 6.4–8.2)
RBC # BLD AUTO: 3.33 X10*6/UL (ref 4.5–5.9)
SODIUM SERPL-SCNC: 138 MMOL/L (ref 136–145)
TIBC SERPL-MCNC: 256 UG/DL (ref 240–445)
UIBC SERPL-MCNC: 234 UG/DL (ref 110–370)
WBC # BLD AUTO: 7.1 X10*3/UL (ref 4.4–11.3)

## 2024-12-03 PROCEDURE — 85025 COMPLETE CBC W/AUTO DIFF WBC: CPT

## 2024-12-03 PROCEDURE — 84155 ASSAY OF PROTEIN SERUM: CPT

## 2024-12-03 PROCEDURE — 84165 PROTEIN E-PHORESIS SERUM: CPT

## 2024-12-03 PROCEDURE — 84165 PROTEIN E-PHORESIS SERUM: CPT | Performed by: STUDENT IN AN ORGANIZED HEALTH CARE EDUCATION/TRAINING PROGRAM

## 2024-12-03 PROCEDURE — 83540 ASSAY OF IRON: CPT

## 2024-12-03 PROCEDURE — 82728 ASSAY OF FERRITIN: CPT

## 2024-12-03 PROCEDURE — 80053 COMPREHEN METABOLIC PANEL: CPT

## 2024-12-03 PROCEDURE — 36415 COLL VENOUS BLD VENIPUNCTURE: CPT

## 2024-12-04 LAB
ALBUMIN: 3.5 G/DL (ref 3.4–5)
ALPHA 1 GLOBULIN: 0.4 G/DL (ref 0.2–0.6)
ALPHA 2 GLOBULIN: 1 G/DL (ref 0.4–1.1)
BETA GLOBULIN: 0.8 G/DL (ref 0.5–1.2)
GAMMA GLOBULIN: 2 G/DL (ref 0.5–1.4)
M-PROTEIN 1: 0.5 G/DL
M-PROTEIN 2: 0.4 G/DL
PATH REVIEW-SERUM PROTEIN ELECTROPHORESIS: ABNORMAL
PROT SERPL-MCNC: 7.7 G/DL (ref 6.4–8.2)
PROTEIN ELECTROPHORESIS COMMENT: ABNORMAL

## 2024-12-13 ENCOUNTER — OFFICE VISIT (OUTPATIENT)
Dept: HEMATOLOGY/ONCOLOGY | Facility: CLINIC | Age: 74
End: 2024-12-13
Payer: MEDICARE

## 2024-12-13 VITALS
WEIGHT: 218.1 LBS | OXYGEN SATURATION: 96 % | TEMPERATURE: 96.1 F | HEART RATE: 78 BPM | BODY MASS INDEX: 35.2 KG/M2 | SYSTOLIC BLOOD PRESSURE: 106 MMHG | DIASTOLIC BLOOD PRESSURE: 66 MMHG | RESPIRATION RATE: 16 BRPM

## 2024-12-13 DIAGNOSIS — C91.10 CLL (CHRONIC LYMPHOCYTIC LEUKEMIA) (MULTI): Primary | ICD-10-CM

## 2024-12-13 DIAGNOSIS — E61.1 IRON DEFICIENCY: ICD-10-CM

## 2024-12-13 DIAGNOSIS — E78.5 DYSLIPIDEMIA: ICD-10-CM

## 2024-12-13 DIAGNOSIS — K29.80 DUODENITIS: ICD-10-CM

## 2024-12-13 DIAGNOSIS — E11.9 TYPE 2 DIABETES MELLITUS WITHOUT COMPLICATION, WITHOUT LONG-TERM CURRENT USE OF INSULIN (MULTI): ICD-10-CM

## 2024-12-13 DIAGNOSIS — I10 ESSENTIAL HYPERTENSION: ICD-10-CM

## 2024-12-13 DIAGNOSIS — T45.4X5S ADVERSE EFFECT OF IRON AND ITS COMPOUNDS, SEQUELA: ICD-10-CM

## 2024-12-13 PROCEDURE — 1126F AMNT PAIN NOTED NONE PRSNT: CPT | Performed by: INTERNAL MEDICINE

## 2024-12-13 PROCEDURE — G2211 COMPLEX E/M VISIT ADD ON: HCPCS | Performed by: INTERNAL MEDICINE

## 2024-12-13 PROCEDURE — 1159F MED LIST DOCD IN RCRD: CPT | Performed by: INTERNAL MEDICINE

## 2024-12-13 PROCEDURE — 3074F SYST BP LT 130 MM HG: CPT | Performed by: INTERNAL MEDICINE

## 2024-12-13 PROCEDURE — 4010F ACE/ARB THERAPY RXD/TAKEN: CPT | Performed by: INTERNAL MEDICINE

## 2024-12-13 PROCEDURE — 3078F DIAST BP <80 MM HG: CPT | Performed by: INTERNAL MEDICINE

## 2024-12-13 PROCEDURE — 99214 OFFICE O/P EST MOD 30 MIN: CPT | Performed by: INTERNAL MEDICINE

## 2024-12-13 RX ORDER — HEPARIN SODIUM,PORCINE/PF 10 UNIT/ML
50 SYRINGE (ML) INTRAVENOUS AS NEEDED
Status: CANCELLED | OUTPATIENT
Start: 2024-12-13

## 2024-12-13 RX ORDER — ALBUTEROL SULFATE 0.83 MG/ML
3 SOLUTION RESPIRATORY (INHALATION) AS NEEDED
Status: CANCELLED | OUTPATIENT
Start: 2024-12-19

## 2024-12-13 RX ORDER — HEPARIN 100 UNIT/ML
500 SYRINGE INTRAVENOUS AS NEEDED
Status: CANCELLED | OUTPATIENT
Start: 2024-12-13

## 2024-12-13 RX ORDER — EPINEPHRINE 0.3 MG/.3ML
0.3 INJECTION SUBCUTANEOUS EVERY 5 MIN PRN
Status: CANCELLED | OUTPATIENT
Start: 2024-12-19

## 2024-12-13 RX ORDER — DIPHENHYDRAMINE HYDROCHLORIDE 50 MG/ML
50 INJECTION INTRAMUSCULAR; INTRAVENOUS AS NEEDED
Status: CANCELLED | OUTPATIENT
Start: 2024-12-19

## 2024-12-13 RX ORDER — FAMOTIDINE 10 MG/ML
20 INJECTION INTRAVENOUS ONCE AS NEEDED
Status: CANCELLED | OUTPATIENT
Start: 2024-12-19

## 2024-12-13 ASSESSMENT — PAIN SCALES - GENERAL: PAINLEVEL_OUTOF10: 0-NO PAIN

## 2024-12-13 NOTE — PROGRESS NOTES
Patient ID: Taurus Perez is a 74 y.o. male.  Referring Physician: Jac Molina MD  [address] [place] [address] [place] [address]  Primary Care Provider: Pietro Bartlett MD  Visit Type: Follow Up      Subjective    HPI How was my bloodwork?  I think  Jazmine was supposed to have scheduled a followup scope?    Review of Systems   Constitutional: Negative.    HENT:  Negative.     Eyes: Negative.    Respiratory: Negative.     Cardiovascular: Negative.    Gastrointestinal: Negative.    Endocrine: Negative.    Genitourinary: Negative.     Musculoskeletal: Negative.    Skin: Negative.    Neurological: Negative.    Hematological: Negative.    Psychiatric/Behavioral: Negative.          Objective   BSA: 2.15 meters squared  /66 (BP Location: Right arm, Patient Position: Sitting, BP Cuff Size: Adult)   Pulse 78   Temp 35.6 °C (96.1 °F) (Temporal)   Resp 16   Wt 98.9 kg (218 lb 1.6 oz)   SpO2 96%   BMI 35.20 kg/m²      has a past medical history of Hyperlipidemia, Hypertension, and Type 2 diabetes mellitus (Multi).   has a past surgical history that includes Other surgical history (04/22/2020); Other surgical history (04/22/2020); and Other surgical history (04/22/2020).  No family history on file.  Oncology History    No history exists.       Taurus Perez  reports that he has never smoked. He has never used smokeless tobacco.  He  reports that he does not currently use alcohol.  He  reports that he does not currently use drugs.    Physical Exam  Vitals reviewed.   Constitutional:       Appearance: Normal appearance.   HENT:      Head: Normocephalic.      Mouth/Throat:      Mouth: Mucous membranes are moist.   Eyes:      Extraocular Movements: Extraocular movements intact.      Pupils: Pupils are equal, round, and reactive to light.   Cardiovascular:      Rate and Rhythm: Normal rate and regular rhythm.      Pulses: Normal pulses.      Heart sounds: Normal heart sounds.   Pulmonary:       "Effort: Pulmonary effort is normal.      Breath sounds: Normal breath sounds.   Abdominal:      General: Bowel sounds are normal.      Palpations: Abdomen is soft.   Musculoskeletal:         General: Normal range of motion.      Cervical back: Normal range of motion and neck supple.   Skin:     General: Skin is warm.   Neurological:      General: No focal deficit present.      Mental Status: He is alert and oriented to person, place, and time.   Psychiatric:         Mood and Affect: Mood normal.         Behavior: Behavior normal.         WBC   Date/Time Value Ref Range Status   12/03/2024 12:39 PM 7.1 4.4 - 11.3 x10*3/uL Final   06/10/2024 01:35 PM 5.1 4.4 - 11.3 x10*3/uL Final   12/04/2023 11:03 AM 5.3 4.4 - 11.3 x10*3/uL Final     No results found for: \"NRBC\"  RBC   Date Value Ref Range Status   12/03/2024 3.33 (L) 4.50 - 5.90 x10*6/uL Final   06/10/2024 3.70 (L) 4.50 - 5.90 x10*6/uL Final   12/04/2023 3.56 (L) 4.50 - 5.90 x10*6/uL Final     Hemoglobin   Date Value Ref Range Status   12/03/2024 10.6 (L) 13.5 - 17.5 g/dL Final   06/10/2024 11.9 (L) 13.5 - 17.5 g/dL Final   12/04/2023 11.6 (L) 13.5 - 17.5 g/dL Final     Hematocrit   Date Value Ref Range Status   12/03/2024 32.2 (L) 41.0 - 52.0 % Final   06/10/2024 35.5 (L) 41.0 - 52.0 % Final   12/04/2023 34.9 (L) 41.0 - 52.0 % Final     MCV   Date/Time Value Ref Range Status   12/03/2024 12:39 PM 97 80 - 100 fL Final   06/10/2024 01:35 PM 96 80 - 100 fL Final   12/04/2023 11:03 AM 98 80 - 100 fL Final     MCH   Date/Time Value Ref Range Status   12/03/2024 12:39 PM 31.8 26.0 - 34.0 pg Final   06/10/2024 01:35 PM 32.2 26.0 - 34.0 pg Final   12/04/2023 11:03 AM 32.6 26.0 - 34.0 pg Final     MCHC   Date/Time Value Ref Range Status   12/03/2024 12:39 PM 32.9 32.0 - 36.0 g/dL Final   06/10/2024 01:35 PM 33.5 32.0 - 36.0 g/dL Final   12/04/2023 11:03 AM 33.2 32.0 - 36.0 g/dL Final     RDW   Date/Time Value Ref Range Status   12/03/2024 12:39 PM 13.7 11.5 - 14.5 % Final " "  06/10/2024 01:35 PM 14.1 11.5 - 14.5 % Final   12/04/2023 11:03 AM 14.0 11.5 - 14.5 % Final     Platelets   Date/Time Value Ref Range Status   12/03/2024 12:39  150 - 450 x10*3/uL Final   06/10/2024 01:35  150 - 450 x10*3/uL Final   12/04/2023 11:03  150 - 450 x10*3/uL Final     No results found for: \"MPV\"  Neutrophils %   Date/Time Value Ref Range Status   12/03/2024 12:39 PM 52.4 40.0 - 80.0 % Final   06/10/2024 01:35 PM 40.1 40.0 - 80.0 % Final   12/04/2023 11:03 AM 43.4 40.0 - 80.0 % Final     Immature Granulocytes %, Automated   Date/Time Value Ref Range Status   12/03/2024 12:39 PM 0.1 0.0 - 0.9 % Final     Comment:     Immature Granulocyte Count (IG) includes promyelocytes, myelocytes and metamyelocytes but does not include bands. Percent differential counts (%) should be interpreted in the context of the absolute cell counts (cells/UL).   06/10/2024 01:35 PM 0.2 0.0 - 0.9 % Final     Comment:     Immature Granulocyte Count (IG) includes promyelocytes, myelocytes and metamyelocytes but does not include bands. Percent differential counts (%) should be interpreted in the context of the absolute cell counts (cells/UL).   12/04/2023 11:03 AM 0.2 0.0 - 0.9 % Final     Comment:     Immature Granulocyte Count (IG) includes promyelocytes, myelocytes and metamyelocytes but does not include bands. Percent differential counts (%) should be interpreted in the context of the absolute cell counts (cells/UL).     Lymphocytes %   Date/Time Value Ref Range Status   12/03/2024 12:39 PM 35.5 13.0 - 44.0 % Final   06/10/2024 01:35 PM 48.5 13.0 - 44.0 % Final   12/04/2023 11:03 AM 45.5 13.0 - 44.0 % Final     Monocytes %   Date/Time Value Ref Range Status   12/03/2024 12:39 PM 10.3 2.0 - 10.0 % Final   06/10/2024 01:35 PM 9.0 2.0 - 10.0 % Final   12/04/2023 11:03 AM 9.0 2.0 - 10.0 % Final     Eosinophils %   Date/Time Value Ref Range Status   12/03/2024 12:39 PM 1.4 0.0 - 6.0 % Final   06/10/2024 01:35 PM 2.0 " 0.0 - 6.0 % Final   12/04/2023 11:03 AM 1.5 0.0 - 6.0 % Final     Basophils %   Date/Time Value Ref Range Status   12/03/2024 12:39 PM 0.3 0.0 - 2.0 % Final   06/10/2024 01:35 PM 0.2 0.0 - 2.0 % Final   12/04/2023 11:03 AM 0.4 0.0 - 2.0 % Final     Neutrophils Absolute   Date/Time Value Ref Range Status   12/03/2024 12:39 PM 3.72 1.60 - 5.50 x10*3/uL Final     Comment:     Percent differential counts (%) should be interpreted in the context of the absolute cell counts (cells/uL).   06/10/2024 01:35 PM 2.04 1.60 - 5.50 x10*3/uL Final     Comment:     Percent differential counts (%) should be interpreted in the context of the absolute cell counts (cells/uL).   12/04/2023 11:03 AM 2.28 1.60 - 5.50 x10*3/uL Final     Comment:     Percent differential counts (%) should be interpreted in the context of the absolute cell counts (cells/uL).     Immature Granulocytes Absolute, Automated   Date/Time Value Ref Range Status   12/03/2024 12:39 PM 0.01 0.00 - 0.50 x10*3/uL Final   06/10/2024 01:35 PM 0.01 0.00 - 0.50 x10*3/uL Final   12/04/2023 11:03 AM 0.01 0.00 - 0.50 x10*3/uL Final     Lymphocytes Absolute   Date/Time Value Ref Range Status   12/03/2024 12:39 PM 2.52 0.80 - 3.00 x10*3/uL Final   06/10/2024 01:35 PM 2.47 0.80 - 3.00 x10*3/uL Final   12/04/2023 11:03 AM 2.39 0.80 - 3.00 x10*3/uL Final     Monocytes Absolute   Date/Time Value Ref Range Status   12/03/2024 12:39 PM 0.73 0.05 - 0.80 x10*3/uL Final   06/10/2024 01:35 PM 0.46 0.05 - 0.80 x10*3/uL Final   12/04/2023 11:03 AM 0.47 0.05 - 0.80 x10*3/uL Final     Eosinophils Absolute   Date/Time Value Ref Range Status   12/03/2024 12:39 PM 0.10 0.00 - 0.40 x10*3/uL Final   06/10/2024 01:35 PM 0.10 0.00 - 0.40 x10*3/uL Final   12/04/2023 11:03 AM 0.08 0.00 - 0.40 x10*3/uL Final     Basophils Absolute   Date/Time Value Ref Range Status   12/03/2024 12:39 PM 0.02 0.00 - 0.10 x10*3/uL Final   06/10/2024 01:35 PM 0.01 0.00 - 0.10 x10*3/uL Final   12/04/2023 11:03 AM 0.02 0.00  "- 0.10 x10*3/uL Final       No components found for: \"PT\"  No results found for: \"APTT\"  Medication Documentation Review Audit       Reviewed by ISAAC Drew (Patient Care Technician) on 12/19/24 at 1426      Medication Order Taking? Sig Documenting Provider Last Dose Status   allopurinol (Zyloprim) 100 mg tablet [id number] Yes Take 2 tablets (200 mg) by mouth once daily. Historical Provider, MD 7/29/2024 Active   atorvastatin (Lipitor) 40 mg tablet [id number] Yes Take 1 tablet (40 mg) by mouth once daily. Historical Provider, MD 7/29/2024 Active   benazepril (Lotensin) 20 mg tablet [id number] Yes Take 1 tablet (20 mg) by mouth once daily. Trupti Provider, MD 7/29/2024 Active   captopril-hydrochlorothiazide (Capozide) 25-25 mg tablet [id number] No    Patient not taking: Reported on 12/19/2024    Historical Provider, MD Taking Active   colchicine 0.6 mg tablet [id number] No Take by mouth. Historical Provider, MD Not Taking Active   doxepin (SINEquan) 10 mg capsule [id number] Yes Take 1 capsule (10 mg) by mouth once daily at bedtime. Historical Provider, MD 7/29/2024 Active   ezetimibe (Zetia) 10 mg tablet [id number] No  Historical Provider, MD Not Taking Active   furosemide (Lasix) 40 mg tablet [id number] Yes  Historical Provider, MD 7/29/2024 Active   icosapent ethyL (Vascepa) 1 gram capsule [id number] Yes Take 2 capsules (2 g) by mouth 2 times a day. Historical Provider, MD 7/29/2024 Active   Klor-Con M20 20 mEq ER tablet [id number] Yes  Historical Provider, MD 7/29/2024 Active   liraglutide (Victoza 2-Morteza) 0.6 mg/0.1 mL (18 mg/3 mL) injection [id number] No  Historical Provider, MD Not Taking Active   loratadine (Claritin) 10 mg tablet [id number] Yes Take 1 tablet (10 mg) by mouth. Historical Provider, MD 7/29/2024 Active   magnesium 200 mg tablet [id number] Yes once every 24 hours. Historical Provider, MD 7/29/2024 Active   metFORMIN (Glucophage) 500 mg tablet [id number] Yes TAKE 1 TABLETS BY MOUTH TWICE A DAY " WITH MEALS Historical Provider, MD 7/29/2024 Active   metoprolol tartrate (Lopressor) 50 mg tablet [id number] Yes Take 1 tablet by mouth. Historical Provider, MD 7/30/2024 0800 Active   mv-min/folic/vit K/lycop/coQ10 (DAILY MULTIVITAMIN ORAL) [id number] Yes Take 1 tablet by mouth once daily. Historical Provider, MD 7/29/2024 Active   pantoprazole (ProtoNix) 40 mg EC tablet [id number]  Take 1 tablet (40 mg) by mouth 2 times a day. Do not crush, chew, or split.   Patient not taking: Reported on 12/13/2024    Raissa Lucero MD  Active                   Assessment/Plan    1) CLL/SLL  -He first received 2 radiation therapy fractions.  -He then completed 6 months of bendamustine + rituximab   -Currently, patient is being observed every 6 months  -remains asymptomatic, and is wondering when his next endoscopy will be   -he had an EGD done on 3/7/2023 by Dr Lucero--esophagus normal, patchy mild mucosal changes characterized by erythema and inflammation were found at the pylorus, biopsies were taken; patchy mild mucosal changes characterized by erythema and inflammation were found in the duodenal bulb and in the second portion of the duodenum  -path showed duodenal mucosa no significant pathological diagnosis, no morphologic evidence of lymphoma; duodenal bulb biopsy with minute fragments of duodenal mucosa with no significant pathological diagnosis and no morphologic evidence of lymphoma; stomach pylorus biopsy with gastric mucosa with intestinal metaplasia and mild chronic gastritis and no H pylori; antrum bx with gastric oxyntic mucosa with mild chronic gastritis and no H pylori; stomach body bx with gastric oxyntic mucosa with no significant pathological dx  -here for interval followup  -has no new complaints, but says he feels more fatigued than usual  -labs done on 12/3/2024 included CBC, COMP, SPEP/IF and iron panel + ferritin  -results reviewed-WBC 7.1, Hgb 10.6, platelets 186,000, ANC 3720, abs lymph 2520, creatinine  1.77, calcium 9.1, AST 14, ALT 16, ferritin 169, TIBC 256, iron saturation 9%, SPEP with IF showed Known monoclonal IgG kappa as two bands  in the gamma region totaling 0.9g (0.5 + 0.4)  -he is reporting more fatigue, hgb has trended down as has his iron indices  -he would benefit from another course of IV feraheme  -benefits, risks, potential morbidity related to IV feraheme were reviewed with Taurus and he provided informed consent to proceed  -he will receive feraheme 510 mg IV x 2 doses  -CBC + iron indices will be rechecked 1 month after completion  -will continue to see Taurus Q6 months        2) history of GI bleeding   -secondary to CLL/SLL involving the duodenum  -last EGD was done by Dr Lucero 7/30/2024: cricopharynx, upper third of the esophagus, middle third of the esophagus, lower third of the esophagus and GE junction appeared normal; moderate edematous erythematous mucosa consistent with gastritis in the body of the stomach and antrum; duodenal bulb appeared normal; edematous nodular mucosa in the minor papilla; prominent minor ampulla with irregularity at the tip of the ampulla; major papilla appeared normal; will repeat EGD due 10/28/2024; may need EUS and EMR of minor ampulla  -path: duodenal bulb bx: small intestinal mucosa with gastric heterotopia; stomach antrum bx with reactive gastropathy; stomach corpus bx with gastric mucosa with proton pump inhibitor effect; ampulla bx with ampullary mucosa with evidence of ulceration and reactive changes  -will let Dr Lucero know that patient was supposed to have had repeat EGD +/- EUS by now     3) diabetes  -on januvia  -on metforminm  -on victoza  -on glimepiride     4) hyperlipidemia  -on fenofibrate  -on zetia  -on atorvastatin     5) hypertension  -on toprol  -on lasix  -on captopril-hydrochlorothiazide  -on benazepril-hydrochlorothiazide     Problem List Items Addressed This Visit             ICD-10-CM    CLL (chronic lymphocytic leukemia) (Multi)  C91.10    Iron deficiency E61.1    Adverse effect of iron and its compounds, sequela - Primary T45.4X5S            Jac Molina MD

## 2024-12-19 ENCOUNTER — INFUSION (OUTPATIENT)
Dept: HEMATOLOGY/ONCOLOGY | Facility: CLINIC | Age: 74
End: 2024-12-19
Payer: MEDICARE

## 2024-12-19 VITALS
BODY MASS INDEX: 34.64 KG/M2 | HEART RATE: 61 BPM | RESPIRATION RATE: 18 BRPM | DIASTOLIC BLOOD PRESSURE: 64 MMHG | OXYGEN SATURATION: 96 % | SYSTOLIC BLOOD PRESSURE: 110 MMHG | WEIGHT: 214.62 LBS | TEMPERATURE: 97 F

## 2024-12-19 DIAGNOSIS — T45.4X5S ADVERSE EFFECT OF IRON AND ITS COMPOUNDS, SEQUELA: ICD-10-CM

## 2024-12-19 DIAGNOSIS — E61.1 IRON DEFICIENCY: ICD-10-CM

## 2024-12-19 PROCEDURE — 96365 THER/PROPH/DIAG IV INF INIT: CPT | Mod: INF

## 2024-12-19 PROCEDURE — 2500000004 HC RX 250 GENERAL PHARMACY W/ HCPCS (ALT 636 FOR OP/ED): Mod: JZ,JG | Performed by: INTERNAL MEDICINE

## 2024-12-19 RX ORDER — EPINEPHRINE 0.3 MG/.3ML
0.3 INJECTION SUBCUTANEOUS EVERY 5 MIN PRN
Status: DISCONTINUED | OUTPATIENT
Start: 2024-12-19 | End: 2024-12-19 | Stop reason: HOSPADM

## 2024-12-19 RX ORDER — DIPHENHYDRAMINE HYDROCHLORIDE 50 MG/ML
50 INJECTION INTRAMUSCULAR; INTRAVENOUS AS NEEDED
Status: DISCONTINUED | OUTPATIENT
Start: 2024-12-19 | End: 2024-12-19 | Stop reason: HOSPADM

## 2024-12-19 RX ORDER — FAMOTIDINE 10 MG/ML
20 INJECTION INTRAVENOUS ONCE AS NEEDED
OUTPATIENT
Start: 2024-12-26

## 2024-12-19 RX ORDER — EPINEPHRINE 0.3 MG/.3ML
0.3 INJECTION SUBCUTANEOUS EVERY 5 MIN PRN
OUTPATIENT
Start: 2024-12-26

## 2024-12-19 RX ORDER — ALBUTEROL SULFATE 0.83 MG/ML
3 SOLUTION RESPIRATORY (INHALATION) AS NEEDED
OUTPATIENT
Start: 2024-12-26

## 2024-12-19 RX ORDER — ALBUTEROL SULFATE 0.83 MG/ML
3 SOLUTION RESPIRATORY (INHALATION) AS NEEDED
Status: DISCONTINUED | OUTPATIENT
Start: 2024-12-19 | End: 2024-12-19 | Stop reason: HOSPADM

## 2024-12-19 RX ORDER — HEPARIN 100 UNIT/ML
500 SYRINGE INTRAVENOUS AS NEEDED
OUTPATIENT
Start: 2024-12-19

## 2024-12-19 RX ORDER — HEPARIN SODIUM,PORCINE/PF 10 UNIT/ML
50 SYRINGE (ML) INTRAVENOUS AS NEEDED
OUTPATIENT
Start: 2024-12-19

## 2024-12-19 RX ORDER — DIPHENHYDRAMINE HYDROCHLORIDE 50 MG/ML
50 INJECTION INTRAMUSCULAR; INTRAVENOUS AS NEEDED
OUTPATIENT
Start: 2024-12-26

## 2024-12-19 RX ORDER — FAMOTIDINE 10 MG/ML
20 INJECTION INTRAVENOUS ONCE AS NEEDED
Status: DISCONTINUED | OUTPATIENT
Start: 2024-12-19 | End: 2024-12-19 | Stop reason: HOSPADM

## 2024-12-19 ASSESSMENT — PAIN SCALES - GENERAL: PAINLEVEL_OUTOF10: 0-NO PAIN

## 2024-12-22 ENCOUNTER — HOSPITAL ENCOUNTER (INPATIENT)
Age: 74
LOS: 5 days | Discharge: HOME | DRG: 329 | End: 2024-12-28
Attending: EMERGENCY MEDICINE | Admitting: INTERNAL MEDICINE
Payer: MEDICARE

## 2024-12-22 ENCOUNTER — APPOINTMENT (OUTPATIENT)
Dept: CT IMAGING | Age: 74
DRG: 329 | End: 2024-12-22
Payer: MEDICARE

## 2024-12-22 DIAGNOSIS — R10.9 INTRACTABLE ABDOMINAL PAIN: ICD-10-CM

## 2024-12-22 DIAGNOSIS — K50.00 ILEITIS, TERMINAL (HCC): ICD-10-CM

## 2024-12-22 DIAGNOSIS — K57.92 ACUTE DIVERTICULITIS: Primary | ICD-10-CM

## 2024-12-22 DIAGNOSIS — K57.20 DIVERTICULITIS OF LARGE INTESTINE WITH PERFORATION WITHOUT BLEEDING: ICD-10-CM

## 2024-12-22 DIAGNOSIS — R11.0 INTRACTABLE NAUSEA: ICD-10-CM

## 2024-12-22 DIAGNOSIS — K63.1 BOWEL PERFORATION (HCC): ICD-10-CM

## 2024-12-22 LAB
ANION GAP SERPL CALCULATED.3IONS-SCNC: 13 MEQ/L (ref 9–15)
BASOPHILS # BLD: 0 K/UL (ref 0–0.2)
BASOPHILS NFR BLD: 0.1 %
BILIRUB UR QL STRIP: NEGATIVE
BUN SERPL-MCNC: 29 MG/DL (ref 8–23)
CALCIUM SERPL-MCNC: 8.7 MG/DL (ref 8.5–9.9)
CHLORIDE SERPL-SCNC: 101 MEQ/L (ref 95–107)
CLARITY UR: CLEAR
CO2 SERPL-SCNC: 22 MEQ/L (ref 20–31)
COLOR UR: YELLOW
CREAT SERPL-MCNC: 1.64 MG/DL (ref 0.7–1.2)
EOSINOPHIL # BLD: 0 K/UL (ref 0–0.7)
EOSINOPHIL NFR BLD: 0.1 %
ERYTHROCYTE [DISTWIDTH] IN BLOOD BY AUTOMATED COUNT: 14.3 % (ref 11.5–14.5)
GLUCOSE SERPL-MCNC: 147 MG/DL (ref 70–99)
GLUCOSE UR STRIP-MCNC: NEGATIVE MG/DL
HCT VFR BLD AUTO: 33.7 % (ref 42–52)
HGB BLD-MCNC: 11.2 G/DL (ref 14–18)
HGB UR QL STRIP: NEGATIVE
KETONES UR STRIP-MCNC: NEGATIVE MG/DL
LEUKOCYTE ESTERASE UR QL STRIP: NEGATIVE
LYMPHOCYTES # BLD: 4 K/UL (ref 1–4.8)
LYMPHOCYTES NFR BLD: 24.8 %
MCH RBC QN AUTO: 31.7 PG (ref 27–31.3)
MCHC RBC AUTO-ENTMCNC: 33.2 % (ref 33–37)
MCV RBC AUTO: 95.5 FL (ref 79–92.2)
MONOCYTES # BLD: 0.6 K/UL (ref 0.2–0.8)
MONOCYTES NFR BLD: 3.5 %
NEUTROPHILS # BLD: 11.3 K/UL (ref 1.4–6.5)
NEUTS SEG NFR BLD: 71 %
NITRITE UR QL STRIP: NEGATIVE
PH UR STRIP: 6.5 [PH] (ref 5–9)
PLATELET # BLD AUTO: 214 K/UL (ref 130–400)
POTASSIUM SERPL-SCNC: 4.6 MEQ/L (ref 3.4–4.9)
PROT UR STRIP-MCNC: NEGATIVE MG/DL
RBC # BLD AUTO: 3.53 M/UL (ref 4.7–6.1)
SODIUM SERPL-SCNC: 136 MEQ/L (ref 135–144)
SP GR UR STRIP: 1.01 (ref 1–1.03)
URINE REFLEX TO CULTURE: NORMAL
UROBILINOGEN UR STRIP-ACNC: 1 E.U./DL
WBC # BLD AUTO: 16 K/UL (ref 4.8–10.8)

## 2024-12-22 PROCEDURE — 6370000000 HC RX 637 (ALT 250 FOR IP): Performed by: EMERGENCY MEDICINE

## 2024-12-22 PROCEDURE — 80048 BASIC METABOLIC PNL TOTAL CA: CPT

## 2024-12-22 PROCEDURE — 85025 COMPLETE CBC W/AUTO DIFF WBC: CPT

## 2024-12-22 PROCEDURE — 6360000002 HC RX W HCPCS: Performed by: EMERGENCY MEDICINE

## 2024-12-22 PROCEDURE — 99285 EMERGENCY DEPT VISIT HI MDM: CPT

## 2024-12-22 PROCEDURE — 96375 TX/PRO/DX INJ NEW DRUG ADDON: CPT

## 2024-12-22 PROCEDURE — 51702 INSERT TEMP BLADDER CATH: CPT

## 2024-12-22 PROCEDURE — 96365 THER/PROPH/DIAG IV INF INIT: CPT

## 2024-12-22 PROCEDURE — 87086 URINE CULTURE/COLONY COUNT: CPT

## 2024-12-22 PROCEDURE — 2580000003 HC RX 258: Performed by: EMERGENCY MEDICINE

## 2024-12-22 PROCEDURE — 74176 CT ABD & PELVIS W/O CONTRAST: CPT

## 2024-12-22 PROCEDURE — 81003 URINALYSIS AUTO W/O SCOPE: CPT

## 2024-12-22 PROCEDURE — 51798 US URINE CAPACITY MEASURE: CPT

## 2024-12-22 RX ORDER — LIDOCAINE HYDROCHLORIDE 20 MG/ML
JELLY TOPICAL PRN
Status: DISCONTINUED | OUTPATIENT
Start: 2024-12-22 | End: 2024-12-28 | Stop reason: HOSPADM

## 2024-12-22 RX ORDER — ONDANSETRON 2 MG/ML
4 INJECTION INTRAMUSCULAR; INTRAVENOUS ONCE
Status: COMPLETED | OUTPATIENT
Start: 2024-12-22 | End: 2024-12-22

## 2024-12-22 RX ORDER — METRONIDAZOLE 500 MG/100ML
500 INJECTION, SOLUTION INTRAVENOUS ONCE
Status: COMPLETED | OUTPATIENT
Start: 2024-12-22 | End: 2024-12-23

## 2024-12-22 RX ORDER — MORPHINE SULFATE 4 MG/ML
4 INJECTION, SOLUTION INTRAMUSCULAR; INTRAVENOUS ONCE
Status: COMPLETED | OUTPATIENT
Start: 2024-12-22 | End: 2024-12-23

## 2024-12-22 RX ORDER — ONDANSETRON 2 MG/ML
4 INJECTION INTRAMUSCULAR; INTRAVENOUS ONCE
Status: COMPLETED | OUTPATIENT
Start: 2024-12-22 | End: 2024-12-23

## 2024-12-22 RX ORDER — MORPHINE SULFATE 2 MG/ML
2 INJECTION, SOLUTION INTRAMUSCULAR; INTRAVENOUS ONCE
Status: COMPLETED | OUTPATIENT
Start: 2024-12-22 | End: 2024-12-22

## 2024-12-22 RX ADMIN — MORPHINE SULFATE 2 MG: 2 INJECTION, SOLUTION INTRAMUSCULAR; INTRAVENOUS at 22:04

## 2024-12-22 RX ADMIN — LIDOCAINE HYDROCHLORIDE: 20 JELLY TOPICAL at 22:03

## 2024-12-22 RX ADMIN — ONDANSETRON 4 MG: 2 INJECTION, SOLUTION INTRAMUSCULAR; INTRAVENOUS at 22:03

## 2024-12-22 RX ADMIN — CEFTRIAXONE SODIUM 1000 MG: 1 INJECTION, POWDER, FOR SOLUTION INTRAMUSCULAR; INTRAVENOUS at 22:35

## 2024-12-22 ASSESSMENT — LIFESTYLE VARIABLES
HOW OFTEN DO YOU HAVE A DRINK CONTAINING ALCOHOL: NEVER
HOW MANY STANDARD DRINKS CONTAINING ALCOHOL DO YOU HAVE ON A TYPICAL DAY: PATIENT DOES NOT DRINK

## 2024-12-22 ASSESSMENT — PAIN - FUNCTIONAL ASSESSMENT: PAIN_FUNCTIONAL_ASSESSMENT: 0-10

## 2024-12-22 ASSESSMENT — PAIN DESCRIPTION - DESCRIPTORS: DESCRIPTORS: SHARP

## 2024-12-22 ASSESSMENT — PAIN DESCRIPTION - LOCATION: LOCATION: ABDOMEN

## 2024-12-22 ASSESSMENT — PAIN SCALES - GENERAL: PAINLEVEL_OUTOF10: 10

## 2024-12-22 ASSESSMENT — PAIN DESCRIPTION - PAIN TYPE: TYPE: ACUTE PAIN

## 2024-12-23 ENCOUNTER — PREP FOR PROCEDURE (OUTPATIENT)
Dept: SURGERY | Age: 74
End: 2024-12-23

## 2024-12-23 ENCOUNTER — ANESTHESIA EVENT (OUTPATIENT)
Dept: OPERATING ROOM | Age: 74
End: 2024-12-23
Payer: MEDICARE

## 2024-12-23 ENCOUNTER — ANESTHESIA (OUTPATIENT)
Dept: OPERATING ROOM | Age: 74
End: 2024-12-23
Payer: MEDICARE

## 2024-12-23 ENCOUNTER — TELEPHONE (OUTPATIENT)
Age: 74
End: 2024-12-23

## 2024-12-23 PROBLEM — K50.00 ILEITIS, TERMINAL (HCC): Status: ACTIVE | Noted: 2024-12-23

## 2024-12-23 PROBLEM — R33.9 URINARY RETENTION: Status: ACTIVE | Noted: 2024-12-23

## 2024-12-23 PROBLEM — K57.92 DIVERTICULITIS: Status: ACTIVE | Noted: 2024-12-23

## 2024-12-23 LAB
ALBUMIN SERPL-MCNC: 3.4 G/DL (ref 3.5–4.6)
ALP SERPL-CCNC: 75 U/L (ref 35–104)
ALT SERPL-CCNC: 16 U/L (ref 0–41)
ANION GAP SERPL CALCULATED.3IONS-SCNC: 9 MEQ/L (ref 9–15)
AST SERPL-CCNC: 14 U/L (ref 0–40)
BASOPHILS # BLD: 0 K/UL (ref 0–0.2)
BASOPHILS NFR BLD: 0.1 %
BILIRUB SERPL-MCNC: 1.2 MG/DL (ref 0.2–0.7)
BUN SERPL-MCNC: 29 MG/DL (ref 8–23)
CALCIUM SERPL-MCNC: 8.1 MG/DL (ref 8.5–9.9)
CHLORIDE SERPL-SCNC: 103 MEQ/L (ref 95–107)
CHP ED QC CHECK: NORMAL
CO2 SERPL-SCNC: 25 MEQ/L (ref 20–31)
CREAT SERPL-MCNC: 1.7 MG/DL (ref 0.7–1.2)
EOSINOPHIL # BLD: 0 K/UL (ref 0–0.7)
EOSINOPHIL NFR BLD: 0 %
ERYTHROCYTE [DISTWIDTH] IN BLOOD BY AUTOMATED COUNT: 14.5 % (ref 11.5–14.5)
GLOBULIN SER CALC-MCNC: 3.7 G/DL (ref 2.3–3.5)
GLUCOSE BLD-MCNC: 105 MG/DL (ref 70–99)
GLUCOSE BLD-MCNC: 117 MG/DL
GLUCOSE BLD-MCNC: 117 MG/DL (ref 70–99)
GLUCOSE BLD-MCNC: 161 MG/DL (ref 70–99)
GLUCOSE BLD-MCNC: 98 MG/DL (ref 70–99)
GLUCOSE SERPL-MCNC: 107 MG/DL (ref 70–99)
HCT VFR BLD AUTO: 30.4 % (ref 42–52)
HGB BLD-MCNC: 10 G/DL (ref 14–18)
LYMPHOCYTES # BLD: 2.6 K/UL (ref 1–4.8)
LYMPHOCYTES NFR BLD: 18.6 %
MCH RBC QN AUTO: 31.4 PG (ref 27–31.3)
MCHC RBC AUTO-ENTMCNC: 32.9 % (ref 33–37)
MCV RBC AUTO: 95.6 FL (ref 79–92.2)
MONOCYTES # BLD: 0.6 K/UL (ref 0.2–0.8)
MONOCYTES NFR BLD: 4.4 %
NEUTROPHILS # BLD: 10.4 K/UL (ref 1.4–6.5)
NEUTS SEG NFR BLD: 76.1 %
PERFORMED ON: ABNORMAL
PERFORMED ON: NORMAL
PLATELET # BLD AUTO: 158 K/UL (ref 130–400)
POTASSIUM SERPL-SCNC: 4.4 MEQ/L (ref 3.4–4.9)
PROT SERPL-MCNC: 7.1 G/DL (ref 6.3–8)
RBC # BLD AUTO: 3.18 M/UL (ref 4.7–6.1)
SODIUM SERPL-SCNC: 137 MEQ/L (ref 135–144)
WBC # BLD AUTO: 13.7 K/UL (ref 4.8–10.8)

## 2024-12-23 PROCEDURE — 7100000000 HC PACU RECOVERY - FIRST 15 MIN: Performed by: COLON & RECTAL SURGERY

## 2024-12-23 PROCEDURE — 64488 TAP BLOCK BI INJECTION: CPT | Performed by: STUDENT IN AN ORGANIZED HEALTH CARE EDUCATION/TRAINING PROGRAM

## 2024-12-23 PROCEDURE — 2580000003 HC RX 258: Performed by: INTERNAL MEDICINE

## 2024-12-23 PROCEDURE — 6360000002 HC RX W HCPCS: Performed by: INTERNAL MEDICINE

## 2024-12-23 PROCEDURE — 87185 SC STD ENZYME DETCJ PER NZM: CPT

## 2024-12-23 PROCEDURE — 7100000001 HC PACU RECOVERY - ADDTL 15 MIN: Performed by: COLON & RECTAL SURGERY

## 2024-12-23 PROCEDURE — 2500000003 HC RX 250 WO HCPCS: Performed by: INTERNAL MEDICINE

## 2024-12-23 PROCEDURE — 88307 TISSUE EXAM BY PATHOLOGIST: CPT

## 2024-12-23 PROCEDURE — 6370000000 HC RX 637 (ALT 250 FOR IP): Performed by: INTERNAL MEDICINE

## 2024-12-23 PROCEDURE — 87077 CULTURE AEROBIC IDENTIFY: CPT

## 2024-12-23 PROCEDURE — 44160 REMOVAL OF COLON: CPT | Performed by: COLON & RECTAL SURGERY

## 2024-12-23 PROCEDURE — 6360000002 HC RX W HCPCS: Performed by: COLON & RECTAL SURGERY

## 2024-12-23 PROCEDURE — 87075 CULTR BACTERIA EXCEPT BLOOD: CPT

## 2024-12-23 PROCEDURE — 6370000000 HC RX 637 (ALT 250 FOR IP): Performed by: PHYSICIAN ASSISTANT

## 2024-12-23 PROCEDURE — 1210000000 HC MED SURG R&B

## 2024-12-23 PROCEDURE — 3700000001 HC ADD 15 MINUTES (ANESTHESIA): Performed by: COLON & RECTAL SURGERY

## 2024-12-23 PROCEDURE — 87076 CULTURE ANAEROBE IDENT EACH: CPT

## 2024-12-23 PROCEDURE — 85025 COMPLETE CBC W/AUTO DIFF WBC: CPT

## 2024-12-23 PROCEDURE — 97166 OT EVAL MOD COMPLEX 45 MIN: CPT

## 2024-12-23 PROCEDURE — 87186 SC STD MICRODIL/AGAR DIL: CPT

## 2024-12-23 PROCEDURE — 2500000003 HC RX 250 WO HCPCS: Performed by: COLON & RECTAL SURGERY

## 2024-12-23 PROCEDURE — 6360000002 HC RX W HCPCS: Performed by: EMERGENCY MEDICINE

## 2024-12-23 PROCEDURE — 96375 TX/PRO/DX INJ NEW DRUG ADDON: CPT

## 2024-12-23 PROCEDURE — 6360000002 HC RX W HCPCS: Performed by: NURSE ANESTHETIST, CERTIFIED REGISTERED

## 2024-12-23 PROCEDURE — 80053 COMPREHEN METABOLIC PANEL: CPT

## 2024-12-23 PROCEDURE — 2500000003 HC RX 250 WO HCPCS: Performed by: STUDENT IN AN ORGANIZED HEALTH CARE EDUCATION/TRAINING PROGRAM

## 2024-12-23 PROCEDURE — 97162 PT EVAL MOD COMPLEX 30 MIN: CPT

## 2024-12-23 PROCEDURE — 99221 1ST HOSP IP/OBS SF/LOW 40: CPT | Performed by: PHYSICIAN ASSISTANT

## 2024-12-23 PROCEDURE — 3600000004 HC SURGERY LEVEL 4 BASE: Performed by: COLON & RECTAL SURGERY

## 2024-12-23 PROCEDURE — 2500000003 HC RX 250 WO HCPCS: Performed by: NURSE ANESTHETIST, CERTIFIED REGISTERED

## 2024-12-23 PROCEDURE — 6360000002 HC RX W HCPCS: Performed by: STUDENT IN AN ORGANIZED HEALTH CARE EDUCATION/TRAINING PROGRAM

## 2024-12-23 PROCEDURE — 96376 TX/PRO/DX INJ SAME DRUG ADON: CPT

## 2024-12-23 PROCEDURE — 2709999900 HC NON-CHARGEABLE SUPPLY: Performed by: COLON & RECTAL SURGERY

## 2024-12-23 PROCEDURE — 36415 COLL VENOUS BLD VENIPUNCTURE: CPT

## 2024-12-23 PROCEDURE — 87070 CULTURE OTHR SPECIMN AEROBIC: CPT

## 2024-12-23 PROCEDURE — 3700000000 HC ANESTHESIA ATTENDED CARE: Performed by: COLON & RECTAL SURGERY

## 2024-12-23 PROCEDURE — 2720000010 HC SURG SUPPLY STERILE: Performed by: COLON & RECTAL SURGERY

## 2024-12-23 PROCEDURE — 3600000014 HC SURGERY LEVEL 4 ADDTL 15MIN: Performed by: COLON & RECTAL SURGERY

## 2024-12-23 PROCEDURE — 99221 1ST HOSP IP/OBS SF/LOW 40: CPT | Performed by: COLON & RECTAL SURGERY

## 2024-12-23 RX ORDER — FENTANYL CITRATE 0.05 MG/ML
50 INJECTION, SOLUTION INTRAMUSCULAR; INTRAVENOUS EVERY 10 MIN PRN
Status: DISCONTINUED | OUTPATIENT
Start: 2024-12-23 | End: 2024-12-23 | Stop reason: HOSPADM

## 2024-12-23 RX ORDER — SODIUM CHLORIDE 9 MG/ML
INJECTION, SOLUTION INTRAVENOUS PRN
Status: DISCONTINUED | OUTPATIENT
Start: 2024-12-23 | End: 2024-12-28 | Stop reason: HOSPADM

## 2024-12-23 RX ORDER — NALOXONE HYDROCHLORIDE 0.4 MG/ML
INJECTION, SOLUTION INTRAMUSCULAR; INTRAVENOUS; SUBCUTANEOUS PRN
Status: DISCONTINUED | OUTPATIENT
Start: 2024-12-23 | End: 2024-12-23 | Stop reason: HOSPADM

## 2024-12-23 RX ORDER — MAGNESIUM HYDROXIDE 1200 MG/15ML
LIQUID ORAL CONTINUOUS PRN
Status: DISCONTINUED | OUTPATIENT
Start: 2024-12-23 | End: 2024-12-23 | Stop reason: HOSPADM

## 2024-12-23 RX ORDER — MAGNESIUM SULFATE IN WATER 40 MG/ML
2000 INJECTION, SOLUTION INTRAVENOUS PRN
Status: DISCONTINUED | OUTPATIENT
Start: 2024-12-23 | End: 2024-12-28 | Stop reason: HOSPADM

## 2024-12-23 RX ORDER — ACETAMINOPHEN 325 MG/1
650 TABLET ORAL EVERY 6 HOURS PRN
Status: DISCONTINUED | OUTPATIENT
Start: 2024-12-23 | End: 2024-12-28 | Stop reason: HOSPADM

## 2024-12-23 RX ORDER — SODIUM CHLORIDE 9 MG/ML
INJECTION, SOLUTION INTRAVENOUS PRN
Status: DISCONTINUED | OUTPATIENT
Start: 2024-12-23 | End: 2024-12-23 | Stop reason: HOSPADM

## 2024-12-23 RX ORDER — LIDOCAINE HYDROCHLORIDE 10 MG/ML
1 INJECTION, SOLUTION EPIDURAL; INFILTRATION; INTRACAUDAL; PERINEURAL
Status: DISCONTINUED | OUTPATIENT
Start: 2024-12-23 | End: 2024-12-23 | Stop reason: HOSPADM

## 2024-12-23 RX ORDER — SODIUM CHLORIDE 9 MG/ML
INJECTION, SOLUTION INTRAVENOUS PRN
Status: CANCELLED | OUTPATIENT
Start: 2024-12-23

## 2024-12-23 RX ORDER — METOCLOPRAMIDE HYDROCHLORIDE 5 MG/ML
10 INJECTION INTRAMUSCULAR; INTRAVENOUS
Status: DISCONTINUED | OUTPATIENT
Start: 2024-12-23 | End: 2024-12-23 | Stop reason: HOSPADM

## 2024-12-23 RX ORDER — ROPIVACAINE HYDROCHLORIDE 5 MG/ML
INJECTION, SOLUTION EPIDURAL; INFILTRATION; PERINEURAL
Status: COMPLETED | OUTPATIENT
Start: 2024-12-23 | End: 2024-12-23

## 2024-12-23 RX ORDER — SODIUM CHLORIDE 0.9 % (FLUSH) 0.9 %
5-40 SYRINGE (ML) INJECTION PRN
Status: DISCONTINUED | OUTPATIENT
Start: 2024-12-23 | End: 2024-12-23 | Stop reason: HOSPADM

## 2024-12-23 RX ORDER — 0.9 % SODIUM CHLORIDE 0.9 %
500 INTRAVENOUS SOLUTION INTRAVENOUS ONCE
Status: DISCONTINUED | OUTPATIENT
Start: 2024-12-23 | End: 2024-12-28 | Stop reason: HOSPADM

## 2024-12-23 RX ORDER — POLYETHYLENE GLYCOL 3350 17 G/17G
17 POWDER, FOR SOLUTION ORAL DAILY PRN
Status: DISCONTINUED | OUTPATIENT
Start: 2024-12-23 | End: 2024-12-28 | Stop reason: HOSPADM

## 2024-12-23 RX ORDER — FINASTERIDE 5 MG/1
5 TABLET, FILM COATED ORAL DAILY
Status: DISCONTINUED | OUTPATIENT
Start: 2024-12-23 | End: 2024-12-28 | Stop reason: HOSPADM

## 2024-12-23 RX ORDER — LIDOCAINE HYDROCHLORIDE 20 MG/ML
INJECTION, SOLUTION INTRAVENOUS
Status: DISCONTINUED | OUTPATIENT
Start: 2024-12-23 | End: 2024-12-23 | Stop reason: SDUPTHER

## 2024-12-23 RX ORDER — SODIUM CHLORIDE 0.9 % (FLUSH) 0.9 %
5-40 SYRINGE (ML) INJECTION EVERY 12 HOURS SCHEDULED
Status: DISCONTINUED | OUTPATIENT
Start: 2024-12-23 | End: 2024-12-28 | Stop reason: HOSPADM

## 2024-12-23 RX ORDER — PROPOFOL 10 MG/ML
INJECTION, EMULSION INTRAVENOUS
Status: DISCONTINUED | OUTPATIENT
Start: 2024-12-23 | End: 2024-12-23 | Stop reason: SDUPTHER

## 2024-12-23 RX ORDER — POTASSIUM CHLORIDE 1500 MG/1
40 TABLET, EXTENDED RELEASE ORAL PRN
Status: DISCONTINUED | OUTPATIENT
Start: 2024-12-23 | End: 2024-12-28 | Stop reason: HOSPADM

## 2024-12-23 RX ORDER — INSULIN LISPRO 100 [IU]/ML
0-8 INJECTION, SOLUTION INTRAVENOUS; SUBCUTANEOUS
Status: DISCONTINUED | OUTPATIENT
Start: 2024-12-23 | End: 2024-12-28 | Stop reason: HOSPADM

## 2024-12-23 RX ORDER — FENTANYL CITRATE 50 UG/ML
INJECTION, SOLUTION INTRAMUSCULAR; INTRAVENOUS
Status: DISCONTINUED | OUTPATIENT
Start: 2024-12-23 | End: 2024-12-23 | Stop reason: SDUPTHER

## 2024-12-23 RX ORDER — ATORVASTATIN CALCIUM 40 MG/1
40 TABLET, FILM COATED ORAL DAILY
Status: DISCONTINUED | OUTPATIENT
Start: 2024-12-23 | End: 2024-12-28 | Stop reason: HOSPADM

## 2024-12-23 RX ORDER — ENOXAPARIN SODIUM 100 MG/ML
40 INJECTION SUBCUTANEOUS DAILY
Status: DISCONTINUED | OUTPATIENT
Start: 2024-12-23 | End: 2024-12-28 | Stop reason: HOSPADM

## 2024-12-23 RX ORDER — SODIUM CHLORIDE 0.9 % (FLUSH) 0.9 %
5-40 SYRINGE (ML) INJECTION PRN
Status: DISCONTINUED | OUTPATIENT
Start: 2024-12-23 | End: 2024-12-28 | Stop reason: HOSPADM

## 2024-12-23 RX ORDER — POTASSIUM CHLORIDE 7.45 MG/ML
10 INJECTION INTRAVENOUS PRN
Status: DISCONTINUED | OUTPATIENT
Start: 2024-12-23 | End: 2024-12-28 | Stop reason: HOSPADM

## 2024-12-23 RX ORDER — GLUCAGON 1 MG/ML
1 KIT INJECTION PRN
Status: DISCONTINUED | OUTPATIENT
Start: 2024-12-23 | End: 2024-12-28 | Stop reason: HOSPADM

## 2024-12-23 RX ORDER — DEXTROSE MONOHYDRATE 100 MG/ML
INJECTION, SOLUTION INTRAVENOUS CONTINUOUS PRN
Status: DISCONTINUED | OUTPATIENT
Start: 2024-12-23 | End: 2024-12-28 | Stop reason: HOSPADM

## 2024-12-23 RX ORDER — SODIUM CHLORIDE 0.9 % (FLUSH) 0.9 %
5-40 SYRINGE (ML) INJECTION PRN
Status: CANCELLED | OUTPATIENT
Start: 2024-12-23

## 2024-12-23 RX ORDER — SODIUM CHLORIDE 0.9 % (FLUSH) 0.9 %
5-40 SYRINGE (ML) INJECTION EVERY 12 HOURS SCHEDULED
Status: DISCONTINUED | OUTPATIENT
Start: 2024-12-23 | End: 2024-12-23 | Stop reason: HOSPADM

## 2024-12-23 RX ORDER — ROCURONIUM BROMIDE 10 MG/ML
INJECTION, SOLUTION INTRAVENOUS
Status: DISCONTINUED | OUTPATIENT
Start: 2024-12-23 | End: 2024-12-23 | Stop reason: SDUPTHER

## 2024-12-23 RX ORDER — ACETAMINOPHEN 650 MG/1
650 SUPPOSITORY RECTAL EVERY 6 HOURS PRN
Status: DISCONTINUED | OUTPATIENT
Start: 2024-12-23 | End: 2024-12-28 | Stop reason: HOSPADM

## 2024-12-23 RX ORDER — METOPROLOL SUCCINATE 50 MG/1
50 TABLET, EXTENDED RELEASE ORAL DAILY
Status: DISCONTINUED | OUTPATIENT
Start: 2024-12-23 | End: 2024-12-28 | Stop reason: HOSPADM

## 2024-12-23 RX ORDER — SODIUM CHLORIDE 0.9 % (FLUSH) 0.9 %
5-40 SYRINGE (ML) INJECTION EVERY 12 HOURS SCHEDULED
Status: CANCELLED | OUTPATIENT
Start: 2024-12-23

## 2024-12-23 RX ORDER — TAMSULOSIN HYDROCHLORIDE 0.4 MG/1
0.4 CAPSULE ORAL DAILY
Status: DISCONTINUED | OUTPATIENT
Start: 2024-12-23 | End: 2024-12-28 | Stop reason: HOSPADM

## 2024-12-23 RX ORDER — ONDANSETRON 2 MG/ML
INJECTION INTRAMUSCULAR; INTRAVENOUS
Status: DISCONTINUED | OUTPATIENT
Start: 2024-12-23 | End: 2024-12-23 | Stop reason: SDUPTHER

## 2024-12-23 RX ORDER — DIPHENHYDRAMINE HYDROCHLORIDE 50 MG/ML
12.5 INJECTION INTRAMUSCULAR; INTRAVENOUS
Status: DISCONTINUED | OUTPATIENT
Start: 2024-12-23 | End: 2024-12-23 | Stop reason: HOSPADM

## 2024-12-23 RX ORDER — SUCCINYLCHOLINE/SOD CL,ISO/PF 100 MG/5ML
SYRINGE (ML) INTRAVENOUS
Status: DISCONTINUED | OUTPATIENT
Start: 2024-12-23 | End: 2024-12-23 | Stop reason: SDUPTHER

## 2024-12-23 RX ORDER — ONDANSETRON 4 MG/1
4 TABLET, ORALLY DISINTEGRATING ORAL EVERY 8 HOURS PRN
Status: DISCONTINUED | OUTPATIENT
Start: 2024-12-23 | End: 2024-12-28 | Stop reason: HOSPADM

## 2024-12-23 RX ORDER — OXYCODONE HYDROCHLORIDE 5 MG/1
5 TABLET ORAL
Status: DISCONTINUED | OUTPATIENT
Start: 2024-12-23 | End: 2024-12-23 | Stop reason: HOSPADM

## 2024-12-23 RX ORDER — SODIUM CHLORIDE 9 MG/ML
INJECTION, SOLUTION INTRAVENOUS CONTINUOUS
Status: DISCONTINUED | OUTPATIENT
Start: 2024-12-23 | End: 2024-12-26

## 2024-12-23 RX ORDER — HYDROMORPHONE HYDROCHLORIDE 1 MG/ML
1 INJECTION, SOLUTION INTRAMUSCULAR; INTRAVENOUS; SUBCUTANEOUS
Status: DISCONTINUED | OUTPATIENT
Start: 2024-12-23 | End: 2024-12-27

## 2024-12-23 RX ORDER — ONDANSETRON 2 MG/ML
4 INJECTION INTRAMUSCULAR; INTRAVENOUS
Status: DISCONTINUED | OUTPATIENT
Start: 2024-12-23 | End: 2024-12-23 | Stop reason: HOSPADM

## 2024-12-23 RX ORDER — ONDANSETRON 2 MG/ML
4 INJECTION INTRAMUSCULAR; INTRAVENOUS EVERY 6 HOURS PRN
Status: DISCONTINUED | OUTPATIENT
Start: 2024-12-23 | End: 2024-12-28 | Stop reason: HOSPADM

## 2024-12-23 RX ADMIN — Medication 100 MG: at 15:59

## 2024-12-23 RX ADMIN — METRONIDAZOLE 500 MG: 500 INJECTION, SOLUTION INTRAVENOUS at 00:00

## 2024-12-23 RX ADMIN — ENOXAPARIN SODIUM 40 MG: 100 INJECTION SUBCUTANEOUS at 09:10

## 2024-12-23 RX ADMIN — ROCURONIUM BROMIDE 50 MG: 10 INJECTION, SOLUTION INTRAVENOUS at 16:04

## 2024-12-23 RX ADMIN — FENTANYL CITRATE 50 MCG: 50 INJECTION, SOLUTION INTRAMUSCULAR; INTRAVENOUS at 17:39

## 2024-12-23 RX ADMIN — SODIUM CHLORIDE 1000 ML: 9 INJECTION, SOLUTION INTRAVENOUS at 18:10

## 2024-12-23 RX ADMIN — PROPOFOL 120 MG: 10 INJECTION, EMULSION INTRAVENOUS at 15:59

## 2024-12-23 RX ADMIN — SUGAMMADEX 200 MG: 100 INJECTION, SOLUTION INTRAVENOUS at 17:40

## 2024-12-23 RX ADMIN — ONDANSETRON 4 MG: 2 INJECTION, SOLUTION INTRAMUSCULAR; INTRAVENOUS at 17:29

## 2024-12-23 RX ADMIN — PHENYLEPHRINE HYDROCHLORIDE 100 MCG: 10 INJECTION INTRAVENOUS at 16:20

## 2024-12-23 RX ADMIN — METOPROLOL SUCCINATE 50 MG: 50 TABLET, EXTENDED RELEASE ORAL at 09:10

## 2024-12-23 RX ADMIN — Medication 10 ML: at 09:09

## 2024-12-23 RX ADMIN — ACETAMINOPHEN 650 MG: 325 TABLET ORAL at 07:32

## 2024-12-23 RX ADMIN — PHENYLEPHRINE HYDROCHLORIDE 100 MCG: 10 INJECTION INTRAVENOUS at 16:13

## 2024-12-23 RX ADMIN — PHENYLEPHRINE HYDROCHLORIDE 100 MCG: 10 INJECTION INTRAVENOUS at 16:08

## 2024-12-23 RX ADMIN — PHENYLEPHRINE HYDROCHLORIDE 100 MCG: 10 INJECTION INTRAVENOUS at 16:07

## 2024-12-23 RX ADMIN — FENTANYL CITRATE 50 MCG: 0.05 INJECTION, SOLUTION INTRAMUSCULAR; INTRAVENOUS at 18:08

## 2024-12-23 RX ADMIN — ROPIVACAINE HYDROCHLORIDE 30 ML: 5 INJECTION, SOLUTION EPIDURAL; INFILTRATION; PERINEURAL at 16:03

## 2024-12-23 RX ADMIN — PHENYLEPHRINE HYDROCHLORIDE 100 MCG: 10 INJECTION INTRAVENOUS at 16:15

## 2024-12-23 RX ADMIN — ATORVASTATIN CALCIUM 40 MG: 40 TABLET, FILM COATED ORAL at 09:10

## 2024-12-23 RX ADMIN — FENTANYL CITRATE 50 MCG: 50 INJECTION, SOLUTION INTRAMUSCULAR; INTRAVENOUS at 15:59

## 2024-12-23 RX ADMIN — Medication 10 ML: at 21:32

## 2024-12-23 RX ADMIN — MORPHINE SULFATE 4 MG: 4 INJECTION, SOLUTION INTRAMUSCULAR; INTRAVENOUS at 00:01

## 2024-12-23 RX ADMIN — HYDROMORPHONE HYDROCHLORIDE 1 MG: 1 INJECTION, SOLUTION INTRAMUSCULAR; INTRAVENOUS; SUBCUTANEOUS at 21:31

## 2024-12-23 RX ADMIN — PHENYLEPHRINE HYDROCHLORIDE 100 MCG: 10 INJECTION INTRAVENOUS at 16:10

## 2024-12-23 RX ADMIN — PHENYLEPHRINE HYDROCHLORIDE 100 MCG: 10 INJECTION INTRAVENOUS at 16:18

## 2024-12-23 RX ADMIN — ROCURONIUM BROMIDE 30 MG: 10 INJECTION, SOLUTION INTRAVENOUS at 16:56

## 2024-12-23 RX ADMIN — SODIUM CHLORIDE: 9 INJECTION, SOLUTION INTRAVENOUS at 16:32

## 2024-12-23 RX ADMIN — SODIUM CHLORIDE: 9 INJECTION, SOLUTION INTRAVENOUS at 15:52

## 2024-12-23 RX ADMIN — ONDANSETRON 4 MG: 2 INJECTION, SOLUTION INTRAMUSCULAR; INTRAVENOUS at 00:02

## 2024-12-23 RX ADMIN — SODIUM CHLORIDE: 9 INJECTION, SOLUTION INTRAVENOUS at 01:52

## 2024-12-23 RX ADMIN — HYDROMORPHONE HYDROCHLORIDE 0.5 MG: 1 INJECTION, SOLUTION INTRAMUSCULAR; INTRAVENOUS; SUBCUTANEOUS at 05:39

## 2024-12-23 RX ADMIN — PIPERACILLIN AND TAZOBACTAM 3375 MG: 3; .375 INJECTION, POWDER, LYOPHILIZED, FOR SOLUTION INTRAVENOUS at 01:53

## 2024-12-23 RX ADMIN — LIDOCAINE HYDROCHLORIDE 40 MG: 20 INJECTION, SOLUTION INTRAVENOUS at 15:59

## 2024-12-23 RX ADMIN — FINASTERIDE 5 MG: 5 TABLET, FILM COATED ORAL at 11:56

## 2024-12-23 RX ADMIN — TAMSULOSIN HYDROCHLORIDE 0.4 MG: 0.4 CAPSULE ORAL at 11:56

## 2024-12-23 RX ADMIN — SODIUM CHLORIDE: 9 INJECTION, SOLUTION INTRAVENOUS at 11:56

## 2024-12-23 RX ADMIN — PIPERACILLIN AND TAZOBACTAM 3375 MG: 3; .375 INJECTION, POWDER, LYOPHILIZED, FOR SOLUTION INTRAVENOUS at 18:16

## 2024-12-23 RX ADMIN — PIPERACILLIN AND TAZOBACTAM 3375 MG: 3; .375 INJECTION, POWDER, LYOPHILIZED, FOR SOLUTION INTRAVENOUS at 10:11

## 2024-12-23 ASSESSMENT — ENCOUNTER SYMPTOMS: APNEA: 0

## 2024-12-23 ASSESSMENT — PAIN DESCRIPTION - ORIENTATION
ORIENTATION: MID

## 2024-12-23 ASSESSMENT — PAIN DESCRIPTION - LOCATION
LOCATION: ABDOMEN

## 2024-12-23 ASSESSMENT — PAIN SCALES - GENERAL
PAINLEVEL_OUTOF10: 8
PAINLEVEL_OUTOF10: 2
PAINLEVEL_OUTOF10: 6
PAINLEVEL_OUTOF10: 9
PAINLEVEL_OUTOF10: 8
PAINLEVEL_OUTOF10: 3
PAINLEVEL_OUTOF10: 10
PAINLEVEL_OUTOF10: 4
PAINLEVEL_OUTOF10: 0
PAINLEVEL_OUTOF10: 3
PAINLEVEL_OUTOF10: 4

## 2024-12-23 ASSESSMENT — PAIN DESCRIPTION - DESCRIPTORS
DESCRIPTORS: STABBING
DESCRIPTORS: SORE
DESCRIPTORS: ACHING;STABBING
DESCRIPTORS: SORE
DESCRIPTORS: SORE

## 2024-12-23 ASSESSMENT — PAIN DESCRIPTION - ONSET
ONSET: ON-GOING
ONSET: GRADUAL
ONSET: GRADUAL

## 2024-12-23 ASSESSMENT — PAIN DESCRIPTION - PAIN TYPE
TYPE: SURGICAL PAIN

## 2024-12-23 NOTE — ANESTHESIA POSTPROCEDURE EVALUATION
Department of Anesthesiology  Postprocedure Note    Patient: Hector Bose  MRN: [id number]  YOB: 1950  Date of evaluation: 12/23/2024    Procedure Summary       Date: 12/23/24 Room / Location: [address]    Anesthesia Start: 1552 Anesthesia Stop:     Procedure: Exploratory laparotomy, ileocecectomy (Abdomen) Diagnosis:       Ileitis, terminal (HCC)      (Ileitis, terminal (HCC) [K50.00])    Surgeons: Cisco Mccray MD Responsible Provider: Richmond Sanchez DO    Anesthesia Type: general, regional ASA Status: 2 - Emergent            Anesthesia Type: GETA, Regional    Sigifredo Phase I: Sigifredo Score: 10    Sigifredo Phase II:      Anesthesia Post Evaluation    Patient location during evaluation: bedside  Patient participation: complete - patient participated  Level of consciousness: awake and awake and alert  Pain score: 0  Airway patency: patent  Nausea & Vomiting: no nausea and no vomiting  Cardiovascular status: blood pressure returned to baseline and hemodynamically stable  Respiratory status: acceptable  Hydration status: euvolemic  Pain management: adequate        No notable events documented.

## 2024-12-23 NOTE — PLAN OF CARE
See OT evaluation for all goals and OT POC. Electronically signed by SERVANDO Fitzgerald/L on 12/23/2024 at 11:47 AM

## 2024-12-23 NOTE — CARE COORDINATION
Case Management Assessment  Initial Evaluation    Date/Time of Evaluation: 12/23/2024 10:26 AM  Assessment Completed by: MICHELLE So    If patient is discharged prior to next notation, then this note serves as note for discharge by case management.    Patient Name: Hector Bose                   YOB: 1950  Diagnosis: Diverticulitis [K57.92]  Intractable abdominal pain [R10.9]  Acute diverticulitis [K57.92]  Intractable nausea [R11.0]                   Date / Time: 12/22/2024  9:28 PM    Patient Admission Status: Inpatient   Readmission Risk (Low < 19, Mod (19-27), High > 27): Readmission Risk Score: 16.1    Current PCP: Sean Levy MD  PCP verified by CM? Yes    Chart Reviewed: Yes      History Provided by: Patient  Patient Orientation: Alert and Oriented, Person, Place, Situation, Self    Patient Cognition: Alert    Hospitalization in the last 30 days (Readmission):  No    If yes, Readmission Assessment in CM Navigator will be completed.    Advance Directives:      Code Status: Full Code   Patient's Primary Decision Maker is: Named in Scanned ACP Document    Primary Decision Maker: Melody Mac - Spouse - [phone]    Secondary Decision Maker: Anat Otoole - Child - [phone]    Discharge Planning:    Patient lives with: Spouse/Significant Other Type of Home: House  Primary Care Giver: Self  Patient Support Systems include: Spouse/Significant Other, Children   Current Financial resources:    Current community resources:    Current services prior to admission: None            Current DME:              Type of Home Care services:  None    ADLS  Prior functional level: Independent in ADLs/IADLs  Current functional level: Other (see comment) (to be assessed)    PT AM-PAC:   /24  OT AM-PAC:   /24    Family can provide assistance at DC: Yes  Would you like Case Management to discuss the discharge plan with any other family members/significant others, and if so, who? Yes (wife  Freedom of choice list with basic dialogue that supports the patient's individualized plan of care/goals and shares the quality data associated with the providers was provided to:     Patient Representative Name:       The Patient and/or Patient Representative Agree with the Discharge Plan?      MICHELLE So  Case Management Department

## 2024-12-23 NOTE — CONSULTS
Department of General Surgery - Adult  Surgical Service general surgery  Attending Consult Note      Reason for Consult: Abnormal CAT scan    CHIEF COMPLAINT: Right lower quadrant abdominal pain    History Obtained From:  patient, electronic medical record    HISTORY OF PRESENT ILLNESS:                The patient is a 74 y.o. male who presents with right lower quadrant abdominal pain for 72 hours duration.  He was seen through the emergency department and had a CAT scan which showed what look like distal ileal diverticulitis.  He had a previous appendectomy.  His last colonoscopy was about 4 years ago and normal.    I reviewed his CAT scan with him.    He complains of constant right lower quadrant abdominal pain.  Blood work was reviewed as well.    Past Medical History:        Diagnosis Date    Acute idiopathic gout of right foot     Anemia 03/01/2018    iron transfusions x2    Arthritis     both knees    Bilateral carpal tunnel syndrome 1/12/2022    Chronic kidney disease     CLL (chronic lymphocytic leukemia) (HCC)     dx 5/2015    Colon polyps     Disease of blood and blood forming organ     Hyperlipidemia     dx since 1970's    Hypertension     meds  since 1970's    Lymphoma of gastrointestinal tract (HCC)     Movement disorder     Type II or unspecified type diabetes mellitus without mention of complication, not stated as uncontrolled     hx > 20 yrs    Urinary retention 12/23/2024     Past Surgical History:        Procedure Laterality Date    APPENDECTOMY      age 22s    CARPAL TUNNEL RELEASE Left 1/26/2022    LEFT CARPAL TUNNEL RELEASE performed by Rangel Saldivar MD at [facility]    CARPAL TUNNEL RELEASE Right 3/9/2022    CARPAL TUNNEL RELEASE RIGHT performed by Rangel Saldivar MD at [facility] OR    CHOLECYSTECTOMY N/A 07/07/2016    COLONOSCOPY  4/1/16    w/polypectomy     COLONOSCOPY N/A 6/15/2021    COLORECTAL CANCER SCREENING, HIGH RISK performed by Chang Caldwell MD at [facility]

## 2024-12-23 NOTE — CONSULTS
Urology Consult      Hector Bose  1950  [id number]    Date of Admission:  12/22/2024  9:28 PM  Date of Consultation:  12/23/2024    Consultant: Jorge Mcgrath PA-C  SupervisingPhysician: Dr. Connors  PCP:  Sean Levy MD       Reason for Consultation: urinary retention, left renal lesion     C/C:   Chief Complaint   Patient presents with    Abdominal Pain     Severe lower abdominal pain and unable to urinate       History of Present Illness: Urinary Retention  Patient complains of urinary retention. Onset of retention was a few days ago and was sudden in onset. Patient currently does have a urinary catheter in place.   ml of urine were drained when catheter was placed. Prior to this event voiding symptoms consisted of slow stream. Prior treatments include n/a. Recent medications that may have affected his voiding include none.    Allergies:  Allergies   Allergen Reactions    Dye [Barium-Containing Compounds] Hives    Iodides Hives    Iodinated Contrast Media Hives       PMH: Patient has a past medical history of Acute idiopathic gout of right foot, Anemia, Arthritis, Bilateral carpal tunnel syndrome, Chronic kidney disease, CLL (chronic lymphocytic leukemia) (HCC), Colon polyps, Disease of blood and blood forming organ, Hyperlipidemia, Hypertension, Lymphoma of gastrointestinal tract (HCC), Movement disorder, and Type II or unspecified type diabetes mellitus without mention of complication, not stated as uncontrolled.    PSH: Patient has a past surgical history that includes shoulder surgery (Right, 1979); Colonoscopy (4/1/16); other surgical history (Right, 06/08/16); Endoscopy, colon, diagnostic; pr arthrp kne condyle&platu medial&lat compartments (Right, 1/18/2018); pr manipulation knee joint under general anesthesia (Right, 3/15/2018); Upper gastrointestinal endoscopy (4/29/15); Upper gastrointestinal endoscopy (02/28/2018); pr arthrp kne condyle&platu medial&lat compartments (Left,

## 2024-12-23 NOTE — CARE COORDINATION
Met with pt and his brother at bedside. Reviewed IMM with pt, pt signed consent and copy provided. Pt plans to dc home and agreeable to [facility] as therapy recommending it.   Freedom of choice offered and pt would like [facility]. Referral sent to [facility]-pending acceptance. +  Referral faxed to [facility] as [facility] is booking out further at this time.   Call from [facility] and they can accept the pt.

## 2024-12-23 NOTE — ANESTHESIA PRE PROCEDURE
Department of Anesthesiology  Preprocedure Note       Name:  Hector Bose   Age:  74 y.o.  :  1950                                          MRN:  [id number]         Date:  2024      Surgeon: Surgeon(s):  Cisco Mccray MD    Procedure: Procedure(s):  Exploratory laparotomy, ileocecectomy   ROOM 276    Medications prior to admission:   Prior to Admission medications    Medication Sig Start Date End Date Taking? Authorizing Provider   loratadine (CLARITIN) 10 MG tablet Take 1 tablet by mouth daily as needed (allergies) 11/3/24  Yes Sean Levy MD   potassium chloride (KLOR-CON M20) 20 MEQ extended release tablet 1 TAB BY MOUTH TWICE A DAY . (CALL ME IF YOU ARE TAKING A DIFFERENT DOSE) 24  Yes Sandie Medina DO   allopurinol (ZYLOPRIM) 100 MG tablet TAKE 2 TABLETS BY MOUTH EVERY DAY 24  Yes Sean Levy MD   doxepin (SINEQUAN) 10 MG capsule TAKE 1 CAPSULE BY MOUTH EVERY NIGHT 24  Yes Sean Levy MD   Icosapent Ethyl (VASCEPA) 1 g CAPS capsule Take 2 capsules by mouth 2 times daily 24  Yes Sean Levy MD   metoprolol succinate (TOPROL XL) 50 MG extended release tablet TAKE 1 TABLET BY MOUTH EVERY DAY 3/15/24  Yes Sean Levy MD   benazepril (LOTENSIN) 20 MG tablet TAKE 1 TABLET BY MOUTH EVERY DAY 24  Yes Sean Levy MD   metFORMIN (GLUCOPHAGE) 500 MG tablet TAKE 1 TABLETS BY MOUTH TWICE A DAY WITH MEALS 24  Yes Sean Levy MD   atorvastatin (LIPITOR) 40 MG tablet Take 1 tablet by mouth daily 24  Yes Sean Levy MD   furosemide (LASIX) 40 MG tablet Take 1 tablet by mouth every other day 24  Yes Sean Levy MD   Magnesium Oxide 200 MG TABS TAKE 1 TABLET BY MOUTH EVERY DAY 23  Yes Sean Levy MD   Multiple Vitamins-Minerals (MULTIVITAMIN PO) Take 1 tablet by mouth daily.   Yes Provider, MD Dar   Handicap Placard MISC by Does not apply route Diagnosis: Osteoarthritis of the knees:

## 2024-12-23 NOTE — TELEPHONE ENCOUNTER
Andie from [facility] requesting Dr. Levy to follow for [facility]. Gave verbal consent as Patient is current and has future appt. -JOSE ALFREDO

## 2024-12-23 NOTE — PROGRESS NOTES
[facility] OCCUPATIONAL THERAPY EVALUATION - ACUTE     NAME: Hector Bose  : 1950 (74 y.o.)  MRN: [id number]  CODE STATUS: Full Code  Room: W276/W276-01    Date of Service: 2024    Patient Diagnosis(es): Diverticulitis [K57.92]  Intractable abdominal pain [R10.9]  Acute diverticulitis [K57.92]  Intractable nausea [R11.0]   Patient Active Problem List    Diagnosis Date Noted    Type 2 diabetes mellitus with chronic kidney disease 2022    Acute diverticulitis 2024    Urinary retention 2024    Spasmodic torticollis 2024    Cervical radiculopathy 2024    Stiff neck 2024    Sensation of lump in throat 2024    Iron deficiency 2024    Duodenitis 2023    Shoulder pain 2023    Stage 3b chronic kidney disease (HCC) 2023    Vocal cord disease 2023    Gastric intestinal metaplasia, unspecified 2023    Left carpal tunnel syndrome 2022    Bilateral carpal tunnel syndrome 2022    History of colon polyps     Chronic kidney disease     Acute idiopathic gout of right foot     Morbidly obese 2020    Essential hypertension 2018    Type 2 diabetes mellitus without complication, without long-term current use of insulin (HCC) 2017    Dyslipidemia 2017    Renal mass 2016    Arthritis, lumbar spine 2015    Cyst, epididymis 2011    Hydrocele 2011    Varicocele 2011    Stiffness of joint, not elsewhere classified, forearm 2009    Stiffness of joint, not elsewhere classified, hand 2009    Pain in joint, hand 2009    Other closed fractures of distal end of radius (alone) 2008        Past Medical History:   Diagnosis Date    Acute idiopathic gout of right foot     Anemia 2018    iron transfusions x2    Arthritis     both knees    Bilateral carpal tunnel syndrome 2022    Chronic kidney disease     CLL (chronic lymphocytic leukemia) (HCC)     dx 2015

## 2024-12-23 NOTE — ACP (ADVANCE CARE PLANNING)
Advance Care Planning   Healthcare Decision Maker:    Primary Decision Maker: Melody Mac - Spouse - [phone]    Secondary Decision Maker: Anat Otoole - Child - [phone]    Click here to complete Healthcare Decision Makers including selection of the Healthcare Decision Maker Relationship (ie \"Primary\").          Electronically signed by MICHELLE So on 12/23/24 at 10:36 AM EST

## 2024-12-23 NOTE — PLAN OF CARE
Problem: Chronic Conditions and Co-morbidities  Goal: Patient's chronic conditions and co-morbidity symptoms are monitored and maintained or improved  Outcome: Progressing     Problem: Discharge Planning  Goal: Discharge to home or other facility with appropriate resources  Outcome: Progressing     Problem: Pain  Goal: Verbalizes/displays adequate comfort level or baseline comfort level  Outcome: Progressing     Problem: Safety - Adult  Goal: Free from fall injury  Outcome: Progressing     Problem: Skin/Tissue Integrity - Adult  Goal: Skin integrity remains intact  Outcome: Progressing  Goal: Incisions, wounds, or drain sites healing without S/S of infection  Outcome: Progressing  Goal: Oral mucous membranes remain intact  Outcome: Progressing     Problem: Musculoskeletal - Adult  Goal: Return mobility to safest level of function  Outcome: Progressing  Goal: Maintain proper alignment of affected body part  Outcome: Progressing  Goal: Return ADL status to a safe level of function  Outcome: Progressing     Problem: Gastrointestinal - Adult  Goal: Minimal or absence of nausea and vomiting  Outcome: Progressing  Goal: Maintains or returns to baseline bowel function  Outcome: Progressing  Goal: Maintains adequate nutritional intake  Outcome: Progressing  Goal: Establish and maintain optimal ostomy function  Outcome: Progressing     Problem: Genitourinary - Adult  Goal: Absence of urinary retention  Outcome: Progressing  Goal: Urinary catheter remains patent  Outcome: Progressing

## 2024-12-23 NOTE — ANESTHESIA PROCEDURE NOTES
Peripheral Block    Patient location during procedure: OR  Reason for block: post-op pain management and at surgeon's request  Start time: 12/23/2024 4:03 PM  End time: 12/23/2024 4:10 PM  Staffing  Performed: anesthesiologist   Anesthesiologist: Richmond Sanchez DO  Performed by: Richmond Sanchez DO  Authorized by: Richmond Sanchez DO    Preanesthetic Checklist  Completed: patient identified, IV checked, site marked, risks and benefits discussed, surgical/procedural consents, equipment checked, pre-op evaluation, timeout performed, anesthesia consent given, oxygen available and monitors applied/VS acknowledged  Peripheral Block   Patient position: supine  Prep: ChloraPrep  Provider prep: mask and sterile gloves  Patient monitoring: cardiac monitor, continuous pulse ox, frequent blood pressure checks and IV access  Block type: TAP and Rectus sheath  Laterality: bilateral  Injection technique: single-shot  Guidance: ultrasound guided  Local infiltration: ropivacaine  Infiltration strength: 0.5 %  Local infiltration: ropivacaine  Dose: 30 mL    Needle   Needle type: combined needle/nerve stimulator   Needle gauge: 22 G  Needle localization: anatomical landmarks and ultrasound guidance  Needle length: 10 cm  Assessment   Injection assessment: negative aspiration for heme, no paresthesia on injection and local visualized surrounding nerve on ultrasound  Paresthesia pain: immediately resolved  Slow fractionated injection: yes  Hemodynamics: stable    Additional Notes  Ultrasound guidance used to view needle for placement    Ultrasound image stored, printed and saved in patient chart.    Sterile probe cover used    Ropivacaine 0.5% 30 ml + saline 20 ml    10 ml bilateral rectus sheath  15 ml bilateral TAP    Medications Administered  ropivacaine (NAROPIN) injection 0.5% - Perineural   30 mL - 12/23/2024 4:03:00 PM

## 2024-12-23 NOTE — H&P
Hospitalist Group   History and Physical      CHIEF COMPLAINT: Abdominal pain    History of Present Illness:  74 y.o. male with a history of hypertension, diabetes, CKD, CLL/duodenal lymphoma presents with abdominal pain.  Pain started yesterday.  Associated with nausea but no vomiting.  Unable to eat the whole day.  Had 1 episode of diarrhea.  No blood.  Abdominal pain mainly in the lower abdomen.  Denies shortness of breath, chest pain.  He has been having chills and feeling hot/cold.  Patient also mentioned that has been having urinary retention with inability to urinate for the past 24 hours.      REVIEW OF SYSTEMS:  Has fevers, chills, no cp, sob, has nausea but no vomiting, ha, vision/hearing changes, wt changes, hot/cold flashes, other open skin lesions, diarrhea, constipation, dysuria/hematuria unless noted in HPI. Complete ROS performed with the patient and is otherwise negative.      PMH:  Past Medical History:   Diagnosis Date    Acute idiopathic gout of right foot     Anemia 03/01/2018    iron transfusions x2    Arthritis     both knees    Bilateral carpal tunnel syndrome 1/12/2022    Chronic kidney disease     CLL (chronic lymphocytic leukemia) (HCC)     dx 5/2015    Colon polyps     Disease of blood and blood forming organ     Hyperlipidemia     dx since 1970's    Hypertension     meds  since 1970's    Lymphoma of gastrointestinal tract (HCC)     Movement disorder     Type II or unspecified type diabetes mellitus without mention of complication, not stated as uncontrolled     hx > 20 yrs       Surgical History:  Past Surgical History:   Procedure Laterality Date    APPENDECTOMY      age 22s    CARPAL TUNNEL RELEASE Left 1/26/2022    LEFT CARPAL TUNNEL RELEASE performed by Rangel Saldivar MD at [facility] OR    CARPAL TUNNEL RELEASE Right 3/9/2022    CARPAL TUNNEL RELEASE RIGHT performed by Rangel Saldivar MD at [facility] OR    CHOLECYSTECTOMY N/A 07/07/2016    COLONOSCOPY  4/1/16    w/polypectomy

## 2024-12-23 NOTE — BRIEF OP NOTE
Brief Postoperative Note      Patient: Hector Bose  YOB: 1950  MRN: [id number]    Date of Procedure: 12/23/2024    Pre-op diagnosis: Perforated distal ileal diverticulitis    Post-Op Diagnosis: Same       Procedure: Exploratory laparotomy next number right colectomy with primary anastomosis    Surgeon(s):  Cisco Mccray MD    Assistant:  First Assistant: Vijaya Rose    Anesthesia: General, bilateral tap block, bilateral rectus sheath block    Estimated Blood Loss (mL): 200    Complications: None    Specimens:   ID Type Source Tests Collected by Time Destination   1 : pelvic abscess culture Swab Abdomen CULTURE, SURGICAL Cisco Mccray MD 12/23/2024 1632    A : RIGHT COLON Tissue Colon SURGICAL PATHOLOGY Cisco Mccray MD 12/23/2024 1656        Implants:  * No implants in log *      Drains:   Closed/Suction Drain RLQ Bulb (Active)       NG/OG/NJ/NE Tube Nasogastric 16 fr Right nostril (Active)       Urinary Catheter 12/22/24 [place] (Active)   $ Urethral catheter insertion $ Not inserted for procedure 12/22/24 2244   Catheter Indications Urinary retention (acute or chronic), continuous bladder irrigation or bladder outlet obstruction 12/23/24 0913   Site Assessment No urethral drainage 12/23/24 0913   Urine Color Beth 12/23/24 1430   Urine Appearance Clear 12/23/24 1430   Collection Container Standard 12/23/24 0913   Securement Method Leg strap 12/23/24 0913   Catheter Care  Soap and water 12/23/24 0913   Catheter Best Practices  Drainage tube clipped to bed;Catheter secured to thigh;Tamper seal intact;Bag below bladder;Bag not on floor;Lack of dependent loop in tubing;Drainage bag less than half full 12/23/24 0913   Status Draining 12/23/24 0913   Output (mL) 900 mL 12/23/24 0641       Findings:  Infection Present At Time Of Surgery (PATOS) (choose all levels that have infection present):  - Organ Space infection (below fascia) present as evidenced by abscess, pus, and

## 2024-12-23 NOTE — PROGRESS NOTES
Physical Therapy Med Surg Initial Assessment  Facility/Department: [address] ORTHO TELE  Room: [facility]       NAME: Hector Bose  : 1950 (74 y.o.)  MRN: [id number]  CODE STATUS: Full Code    Date of Service: 2024    Patient Diagnosis(es): Diverticulitis [K57.92]  Intractable abdominal pain [R10.9]  Acute diverticulitis [K57.92]  Intractable nausea [R11.0]   Chief Complaint   Patient presents with    Abdominal Pain     Severe lower abdominal pain and unable to urinate     Patient Active Problem List    Diagnosis Date Noted    Type 2 diabetes mellitus with chronic kidney disease 2022    Diverticulitis 2024    Spasmodic torticollis 2024    Cervical radiculopathy 2024    Stiff neck 2024    Sensation of lump in throat 2024    Iron deficiency 2024    Duodenitis 2023    Shoulder pain 2023    Stage 3b chronic kidney disease (HCC) 2023    Vocal cord disease 2023    Gastric intestinal metaplasia, unspecified 2023    Left carpal tunnel syndrome 2022    Bilateral carpal tunnel syndrome 2022    History of colon polyps     Chronic kidney disease     Acute idiopathic gout of right foot     Morbidly obese 2020    Essential hypertension 2018    Type 2 diabetes mellitus without complication, without long-term current use of insulin (HCC) 2017    Dyslipidemia 2017    Arthritis, lumbar spine 2015    Cyst, epididymis 2011    Hydrocele 2011    Varicocele 2011    Stiffness of joint, not elsewhere classified, forearm 2009    Stiffness of joint, not elsewhere classified, hand 2009    Pain in joint, hand 2009    Other closed fractures of distal end of radius (alone) 2008        Past Medical History:   Diagnosis Date    Acute idiopathic gout of right foot     Anemia 2018    iron transfusions x2    Arthritis     both knees    Bilateral carpal tunnel syndrome  and effort due to abdominal pain    Ambulation  Surface: Level tile  Device: No Device  Assistance: Stand by assistance  Quality of Gait: unsteady  Gait Deviations: Slow Imani;Decreased step length;Decreased step height  Distance: 30ft    Stairs/Curb  Stairs?: No    Activity Tolerance  Activity Tolerance: Patient limited by pain    Patient Education  Education Given To: Patient  Education Provided: Role of Therapy;Plan of Care  Education Method: Verbal  Education Outcome: Verbalized understanding;Continued education needed     ASSESSMENT:   Body Structures, Functions, Activity Limitations Requiring Skilled Therapeutic Intervention: Decreased functional mobility ;Decreased strength;Decreased endurance;Decreased balance;Increased pain  Decision Making: Medium Complexity  History: high  Exam: med  Clinical Presentation: med    Therapy Prognosis: Good    DISCHARGE RECOMMENDATIONS:  Discharge Recommendations: Continue to assess pending progress    Assessment: Pt is 74 y.o. male to hospital due to abdominal pain.  Pt continues to have pain with mobility and decreased unsteadiness with amb which decreases risk for falls.  Continued PT is required to progress toward indep for safe d/c home.  Requires PT Follow-Up: Yes       PLAN OF CARE:  Physical Therapy Plan  General Plan: 1 time a day 3-6 times a week  Current Treatment Recommendations: Strengthening, ROM, Functional mobility training, Balance training, Transfer training, Endurance training, Gait training, Stair training, Neuromuscular re-education, Pain management, Home exercise program, Safety education & training, Patient/Caregiver education & training, Equipment evaluation, education, & procurement, Positioning, Therapeutic activities    Safety Devices  Type of Devices: All fall risk precautions in place, Call light within reach, Left in chair    Goals:  Long Term Goals  Long Term Goal 1: Pt will demonstrate bed mobility indep  Long Term Goal 2: Pt will demonstrate

## 2024-12-24 ENCOUNTER — APPOINTMENT (OUTPATIENT)
Dept: ULTRASOUND IMAGING | Age: 74
DRG: 329 | End: 2024-12-24
Payer: MEDICARE

## 2024-12-24 LAB
ALBUMIN SERPL-MCNC: 3.2 G/DL (ref 3.5–4.6)
ALP SERPL-CCNC: 66 U/L (ref 35–104)
ALT SERPL-CCNC: 13 U/L (ref 0–41)
ANION GAP SERPL CALCULATED.3IONS-SCNC: 11 MEQ/L (ref 9–15)
AST SERPL-CCNC: 12 U/L (ref 0–40)
BACTERIA UR CULT: NORMAL
BASOPHILS # BLD: 0 K/UL (ref 0–0.2)
BASOPHILS NFR BLD: 0.1 %
BILIRUB SERPL-MCNC: 0.9 MG/DL (ref 0.2–0.7)
BUN SERPL-MCNC: 28 MG/DL (ref 8–23)
CALCIUM SERPL-MCNC: 7.7 MG/DL (ref 8.5–9.9)
CHLORIDE SERPL-SCNC: 104 MEQ/L (ref 95–107)
CO2 SERPL-SCNC: 22 MEQ/L (ref 20–31)
CREAT SERPL-MCNC: 1.93 MG/DL (ref 0.7–1.2)
EOSINOPHIL # BLD: 0 K/UL (ref 0–0.7)
EOSINOPHIL NFR BLD: 0 %
ERYTHROCYTE [DISTWIDTH] IN BLOOD BY AUTOMATED COUNT: 14.6 % (ref 11.5–14.5)
GLOBULIN SER CALC-MCNC: 3.3 G/DL (ref 2.3–3.5)
GLUCOSE BLD-MCNC: 105 MG/DL (ref 70–99)
GLUCOSE BLD-MCNC: 107 MG/DL (ref 70–99)
GLUCOSE BLD-MCNC: 112 MG/DL (ref 70–99)
GLUCOSE BLD-MCNC: 123 MG/DL (ref 70–99)
GLUCOSE SERPL-MCNC: 122 MG/DL (ref 70–99)
HCT VFR BLD AUTO: 25.8 % (ref 42–52)
HGB BLD-MCNC: 8.6 G/DL (ref 14–18)
LYMPHOCYTES # BLD: 1.5 K/UL (ref 1–4.8)
LYMPHOCYTES NFR BLD: 13.9 %
MCH RBC QN AUTO: 32.1 PG (ref 27–31.3)
MCHC RBC AUTO-ENTMCNC: 33.3 % (ref 33–37)
MCV RBC AUTO: 96.3 FL (ref 79–92.2)
MONOCYTES # BLD: 0.6 K/UL (ref 0.2–0.8)
MONOCYTES NFR BLD: 5.9 %
NEUTROPHILS # BLD: 8.4 K/UL (ref 1.4–6.5)
NEUTS SEG NFR BLD: 79.2 %
PERFORMED ON: ABNORMAL
PLATELET # BLD AUTO: 133 K/UL (ref 130–400)
POTASSIUM SERPL-SCNC: 4.1 MEQ/L (ref 3.4–4.9)
PROT SERPL-MCNC: 6.5 G/DL (ref 6.3–8)
RBC # BLD AUTO: 2.68 M/UL (ref 4.7–6.1)
SODIUM SERPL-SCNC: 137 MEQ/L (ref 135–144)
WBC # BLD AUTO: 10.6 K/UL (ref 4.8–10.8)

## 2024-12-24 PROCEDURE — 76775 US EXAM ABDO BACK WALL LIM: CPT

## 2024-12-24 PROCEDURE — 99222 1ST HOSP IP/OBS MODERATE 55: CPT | Performed by: INTERNAL MEDICINE

## 2024-12-24 PROCEDURE — 97535 SELF CARE MNGMENT TRAINING: CPT

## 2024-12-24 PROCEDURE — 36415 COLL VENOUS BLD VENIPUNCTURE: CPT

## 2024-12-24 PROCEDURE — 99024 POSTOP FOLLOW-UP VISIT: CPT | Performed by: COLON & RECTAL SURGERY

## 2024-12-24 PROCEDURE — 0DTF0ZZ RESECTION OF RIGHT LARGE INTESTINE, OPEN APPROACH: ICD-10-PCS | Performed by: COLON & RECTAL SURGERY

## 2024-12-24 PROCEDURE — 1210000000 HC MED SURG R&B

## 2024-12-24 PROCEDURE — 2580000003 HC RX 258

## 2024-12-24 PROCEDURE — 6360000002 HC RX W HCPCS: Performed by: COLON & RECTAL SURGERY

## 2024-12-24 PROCEDURE — 2700000000 HC OXYGEN THERAPY PER DAY

## 2024-12-24 PROCEDURE — 2580000003 HC RX 258: Performed by: COLON & RECTAL SURGERY

## 2024-12-24 PROCEDURE — 80053 COMPREHEN METABOLIC PANEL: CPT

## 2024-12-24 PROCEDURE — 2500000003 HC RX 250 WO HCPCS: Performed by: COLON & RECTAL SURGERY

## 2024-12-24 PROCEDURE — 85025 COMPLETE CBC W/AUTO DIFF WBC: CPT

## 2024-12-24 RX ORDER — 0.9 % SODIUM CHLORIDE 0.9 %
1000 INTRAVENOUS SOLUTION INTRAVENOUS ONCE
Status: COMPLETED | OUTPATIENT
Start: 2024-12-24 | End: 2024-12-24

## 2024-12-24 RX ORDER — SODIUM CHLORIDE, SODIUM LACTATE, POTASSIUM CHLORIDE, AND CALCIUM CHLORIDE .6; .31; .03; .02 G/100ML; G/100ML; G/100ML; G/100ML
1000 INJECTION, SOLUTION INTRAVENOUS ONCE
Status: DISCONTINUED | OUTPATIENT
Start: 2024-12-24 | End: 2024-12-24

## 2024-12-24 RX ADMIN — HYDROMORPHONE HYDROCHLORIDE 1 MG: 1 INJECTION, SOLUTION INTRAMUSCULAR; INTRAVENOUS; SUBCUTANEOUS at 16:37

## 2024-12-24 RX ADMIN — PIPERACILLIN AND TAZOBACTAM 3375 MG: 3; .375 INJECTION, POWDER, LYOPHILIZED, FOR SOLUTION INTRAVENOUS at 10:44

## 2024-12-24 RX ADMIN — ENOXAPARIN SODIUM 40 MG: 100 INJECTION SUBCUTANEOUS at 10:33

## 2024-12-24 RX ADMIN — SODIUM CHLORIDE: 9 INJECTION, SOLUTION INTRAVENOUS at 12:32

## 2024-12-24 RX ADMIN — PIPERACILLIN AND TAZOBACTAM 3375 MG: 3; .375 INJECTION, POWDER, LYOPHILIZED, FOR SOLUTION INTRAVENOUS at 18:27

## 2024-12-24 RX ADMIN — HYDROMORPHONE HYDROCHLORIDE 1 MG: 1 INJECTION, SOLUTION INTRAMUSCULAR; INTRAVENOUS; SUBCUTANEOUS at 10:35

## 2024-12-24 RX ADMIN — SODIUM CHLORIDE 1000 ML: 9 INJECTION, SOLUTION INTRAVENOUS at 12:33

## 2024-12-24 RX ADMIN — PIPERACILLIN AND TAZOBACTAM 3375 MG: 3; .375 INJECTION, POWDER, LYOPHILIZED, FOR SOLUTION INTRAVENOUS at 02:13

## 2024-12-24 RX ADMIN — Medication 5 ML: at 22:24

## 2024-12-24 RX ADMIN — HYDROMORPHONE HYDROCHLORIDE 1 MG: 1 INJECTION, SOLUTION INTRAMUSCULAR; INTRAVENOUS; SUBCUTANEOUS at 06:59

## 2024-12-24 RX ADMIN — HYDROMORPHONE HYDROCHLORIDE 1 MG: 1 INJECTION, SOLUTION INTRAMUSCULAR; INTRAVENOUS; SUBCUTANEOUS at 02:13

## 2024-12-24 RX ADMIN — HYDROMORPHONE HYDROCHLORIDE 1 MG: 1 INJECTION, SOLUTION INTRAMUSCULAR; INTRAVENOUS; SUBCUTANEOUS at 22:44

## 2024-12-24 ASSESSMENT — PAIN SCALES - GENERAL
PAINLEVEL_OUTOF10: 2
PAINLEVEL_OUTOF10: 7
PAINLEVEL_OUTOF10: 9
PAINLEVEL_OUTOF10: 8
PAINLEVEL_OUTOF10: 7
PAINLEVEL_OUTOF10: 9
PAINLEVEL_OUTOF10: 5

## 2024-12-24 ASSESSMENT — ENCOUNTER SYMPTOMS
DIARRHEA: 0
NAUSEA: 1
ABDOMINAL PAIN: 1
ABDOMINAL DISTENTION: 1
EYES NEGATIVE: 1
VOMITING: 1
RESPIRATORY NEGATIVE: 1

## 2024-12-24 ASSESSMENT — PAIN DESCRIPTION - LOCATION
LOCATION: ABDOMEN

## 2024-12-24 NOTE — PLAN OF CARE
Nutrition Problem #1: Inadequate oral intake  Intervention: Food and/or Nutrient Delivery: Continue NPO, Start Oral Diet  Nutritional

## 2024-12-24 NOTE — CONSULTS
Infectious Disease     Patient Name: Hector Bose  Date: 12/24/2024  YOB: 1950  Medical Record Number: [id number]      Diverticulitis with perforation  Peritonitis    History of Present Illness:  Hypertension diabetes chronic renal insufficiency CLL duodenal lymphoma      12/23/2024  Patient presented with nausea vomiting loss of appetite some diarrhea  Abdominal pain right lower quadrant  No shortness of breath chest pain  Some fevers chills 102.7 Fahrenheit on admission    CT scan showed diverticulitis and evidence of perforation  Patient taken to surgery        12/23/2024   Perforated distal ileal diverticulitis.     PROCEDURES:    1. Exploratory laparotomy.  2. Lysis of adhesions.  3. Right colectomy with primary anastomosis.  When patient was explored pus was found on immediate opening of the abdomen  no free perforation, but there was purulence present around the distal ileum             Culture, Surgical [[id number]] Collected: 12/23/24 1632      Specimen: Swab from Abdomen Updated: 12/24/24 1148      Culture Surgical --      Direct Exam:  MODERATE NEUTROPHILS  Direct Exam:  NO BACTERIA SEEN  Culture, Surgical:  PENDING  Performed at [place] [id number] ([phone]                   Review of Systems   Constitutional:  Negative for chills, diaphoresis, fatigue and fever.   HENT: Negative.     Eyes: Negative.    Respiratory: Negative.     Cardiovascular: Negative.    Gastrointestinal:  Positive for abdominal distention, abdominal pain, nausea and vomiting. Negative for diarrhea.   Genitourinary: Negative.    Musculoskeletal: Negative.    Skin: Negative.        Review of Systems: All 14 review of systems negative other than as stated above    Social History     Tobacco Use    Smoking status: Never    Smokeless tobacco: Never   Vaping Use    Vaping status: Never Used   Substance Use Topics    Alcohol use: Yes     Comment: rare social    Drug use: No  11:11 AM    CALCIUM 7.7 12/24/2024 05:48 AM    LABGLOM 36.3 12/24/2024 05:48 AM    LABGLOM 46.5 09/01/2023 10:25 AM                ASSESSMENT:  Patient Active Problem List   Diagnosis    Arthritis, lumbar spine    Renal mass    Type 2 diabetes mellitus without complication, without long-term current use of insulin (HCC)    Dyslipidemia    Essential hypertension    Cyst, epididymis    Hydrocele    Other closed fractures of distal end of radius (alone)    Stiffness of joint, not elsewhere classified, forearm    Stiffness of joint, not elsewhere classified, hand    Varicocele    Pain in joint, hand    Morbidly obese    Acute idiopathic gout of right foot    Chronic kidney disease    History of colon polyps    Bilateral carpal tunnel syndrome    Left carpal tunnel syndrome    Type 2 diabetes mellitus with chronic kidney disease    Duodenitis    Gastric intestinal metaplasia, unspecified    Sensation of lump in throat    Iron deficiency    Shoulder pain    Stage 3b chronic kidney disease (HCC)    Vocal cord disease    Spasmodic torticollis    Cervical radiculopathy    Stiff neck    Acute diverticulitis    Urinary retention    Ileitis, terminal ([facility])         PLAN:    Diverticulitis with perforation  Peritonitis    Already on Zosyn  Will probably need 2 to 3 weeks

## 2024-12-24 NOTE — PROGRESS NOTES
Admission assessment complete, see flowsheets. Pt alert and oriented x 4. . All orders acknowledged, VSS. Dual skin assessment completed by this nurse and Zulema RN.  Pt has midline incision, breakthrough drainage noted, juma in place.  Pt oriented to room and call light. No further needs verbalized at this time. Pt left resting with call light within reach.

## 2024-12-24 NOTE — CARE COORDINATION
CM ENTERED ROOM TO DISCUSS DC PLANNING.  PT WILL DC WITH GILMA MISHRA, ORDER COMPLETE.  PT HAS NG TUBE PRESENT.  PENDING BM.  PT DENIES ALL OTHER NEEDS.

## 2024-12-24 NOTE — PROGRESS NOTES
[facility] OCCUPATIONAL THERAPY MED SURG TREATMENT NOTE     Date: 2024  Patient Name: Hector Bose        MRN: [id number]  Account: [id number]   : 1950  (74 y.o.)  Room: [place] [address]    Chart Review:    Restrictions  Restrictions/Precautions  Restrictions/Precautions: Fall Risk, NPO     Safety:  Safety Devices  Type of Devices: All fall risk precautions in place;Call light within reach;Left in bed;Bed alarm in place    Patient's birthday verified: Yes    Subjective:  Pt stated that he recently had colon surgery.    Pain at start of treatment: Yes: 5/10    Pain at end of treatment: Yes: 5/10    Location: Pt pointed along his waist/stomach area and stated the pain was where they performed the surgery.  Nursing notified: Yes  Intervention: Other: RN stated that she would provide pain medication.     Objective:    Pt agreeable to grooming tasks and washing upper body. Pt declined lower body bathing d/t nursing staff already washing his lower body earlier this morning.  ADL Status:  ADL  Grooming: Setup  Grooming Skilled Clinical Factors: Wash face and apply deodorant at bed level. Declined oral care.  UE Bathing: Minimal assistance  UE Bathing Skilled Clinical Factors: Min assist for thoroughness.  LE Bathing Skilled Clinical Factors: Declined  UE Dressing: Unable to assess (Comment)  UE Dressing Skilled Clinical Factors: Hospital gown only.  LE Dressing: Unable to assess (Comment)  LE Dressing Skilled Clinical Factors: Hospital gown only.  Putting On/Taking Off Footwear: None  Putting On/Taking Off Footwear Skilled Clinical Factors: Pt not wearing footwear upon entering room. Pt declined to don footwear.  Toileting Skilled Clinical Factors: Declined need.    Therapy key for assistance levels -   Independent/Mod I = Pt. is able to perform task with no assistance but may require a device   Stand by assistance = Pt. does not perform task at an independent level but does not need physical assistance,  to get home\"    Improve [place] with ADLs    Therapy Time:   Individual Group Co-Treat   Time In 1007       Time Out 1024         Minutes 17         ADL/IADL trainin minutes    Electronically signed by:    SAL Daniel    2024, 10:31 AM

## 2024-12-24 NOTE — PROGRESS NOTES
Physical Therapy Missed Treatment   Facility/Department: [facility] MED SURG W477/W477-01    NAME: Hector Bose    : 1950 (74 y.o.)  MRN: [id number]    Account: [id number]  Gender: male    Chart reviewed, attempted PT at 08:47. Patient unavailable 2° to:      [x] Pt declined after multiple attempts stating he just returned to his room from testing (US) and would like to rest. Will check back it time allows.       Will attempt PT treatment again at earliest convenience.      Electronically signed by DARWIN OVIEDO PTA on 24 at 10:24 AM EST

## 2024-12-24 NOTE — PROGRESS NOTES
Postop day #1 right colectomy for perforated distal ileal diverticulitis    Patient doing well.  NG tube in place.    SASHA with serosanguineous output.  Blood work reviewed.  Infectious disease consult placed    Abdomen soft, dressings dry.    Please call if any questions

## 2024-12-24 NOTE — PROGRESS NOTES
Patient assessment and vitals complete and per flowsheets. Patient ng in place, dark drainage noted. Tolerating diet well. Pain medication per emar, tolerating well.

## 2024-12-24 NOTE — PROGRESS NOTES
Comprehensive Nutrition Assessment    Type and Reason for Visit:  Initial, Positive nutrition screen (malnutrition screen)    Nutrition Recommendations/Plan:   Recommend Carb Control 4, low fiber diet when okay with surgeon       Malnutrition Assessment:  Malnutrition Status:  No malnutrition (12/24/24 0814)        Nutrition Assessment:    Nutritional status appeared adequate at this time, prior to admission,  Currently day  # 2 NPO following bowel surgery, monitor for ability to resume po diet versus need for nutrition support if prolonged NPO status anticipated    Nutrition Related Findings:    \"history includes: diabetes, CKD, CLL/duodenal lymphoma presents with abdominal pain, (12/23/24) s/p right colectomy for perforated distal ileal diverticulitis     Patient doing well.  NG tube in place.\" Wound Type: None       Current Nutrition Intake & Therapies:    Average Meal Intake: NPO  Average Supplements Intake: NPO  Diet NPO Exceptions are: Ice Chips, Popsicles    Anthropometric Measures:  Height: 167.6 cm (5' 6\")  Ideal Body Weight (IBW): 142 lbs (65 kg)    Admission Body Weight: 97.1 kg (214 lb) (stated; 218# ( 12/18/24))  Current Body Weight: 98.9 kg (218 lb), 153.5 % IBW.    Current BMI (kg/m2): 35.2  Usual Body Weight: 102.5 kg (226 lb) (( 9/2024), 226# ( 6/2024), 232# (12/2023)- office visist)     % Weight Change (Calculated): -3.5                    BMI Categories: Obese Class 1 (BMI 30.0-34.9)    Estimated Daily Nutrient Needs:  Energy Requirements Based On: Kcal/kg  Weight Used for Energy Requirements: Admission  Energy (kcal/day): ~6904-2965 kcals @ 16 kcal/kg  Weight Used for Protein Requirements: Ideal  Protein (g/day): ~ 78 g protein @ 1.2 g/kg  Method Used for Fluid Requirements: 1 ml/kcal  Fluid (ml/day): ~1600    Nutrition Diagnosis:   Inadequate oral intake related to altered GI function, Altered GI structure as evidenced by NPO or clear liquid status due to medical condition    Nutrition

## 2024-12-24 NOTE — PROGRESS NOTES
03/01/2018    iron transfusions x2    Arthritis     both knees    Bilateral carpal tunnel syndrome 1/12/2022    Chronic kidney disease     CLL (chronic lymphocytic leukemia) (HCC)     dx 5/2015    Colon polyps     Disease of blood and blood forming organ     Hyperlipidemia     dx since 1970's    Hypertension     meds  since 1970's    Lymphoma of gastrointestinal tract (HCC)     Movement disorder     Type II or unspecified type diabetes mellitus without mention of complication, not stated as uncontrolled     hx > 20 yrs    Urinary retention 12/23/2024     Past Surgical History:   Procedure Laterality Date    APPENDECTOMY      age 22s    CARPAL TUNNEL RELEASE Left 1/26/2022    LEFT CARPAL TUNNEL RELEASE performed by Rangel Saldivar [place] at [facility]    CARPAL TUNNEL RELEASE Right 3/9/2022    CARPAL TUNNEL RELEASE RIGHT performed by Rangel Saldivar MD at [facility]    CHOLECYSTECTOMY N/A 07/07/2016    COLONOSCOPY  4/1/16    w/polypectomy     COLONOSCOPY N/A 6/15/2021    COLORECTAL CANCER SCREENING, HIGH RISK performed by Chang Caldwell MD at [facility]    ENDOSCOPY, COLON, DIAGNOSTIC      JOINT REPLACEMENT Bilateral 2018    TKR    LAPAROTOMY N/A 12/23/2024    Exploratory laparotomy, ileocecectomy performed by Cisco Mccray MD at [facility]    OTHER SURGICAL HISTORY Right 06/08/16    I & D ABSCESS THIGH    WI ARTHRP KNE CONDYLE&PLATU MEDIAL&LAT COMPARTMENTS Right 1/18/2018    RIGHT KNEE TOTAL KNEE ARTHROPLASTY ELÍAS SPINAL,NERVE BLOCK performed by Osmar Marie MD at [facility]    WI ARTHRP KNE CONDYLE&PLATU MEDIAL&LAT COMPARTMENTS Left 5/17/2018    LEFT KNEE TOTAL KNEE ARTHROPLASTY, performed by Osmar Marie MD at [facility]    WI MANIPULATION KNEE JOINT UNDER GENERAL ANESTHESIA Right 3/15/2018    RIGHT KNEE MANIPULATION performed by Osmar Marie MD at [facility]    SHOULDER SURGERY Right 1979    due to displacement    UPPER GASTROINTESTINAL ENDOSCOPY  4/29/15    w/bx      UPPER GASTROINTESTINAL ENDOSCOPY  02/28/2018    Dr. baltazar Barahona       Chart Reviewed: Yes  Family/Caregiver Present: No    Restrictions:  Restrictions/Precautions: Fall Risk;NPO    SUBJECTIVE:   Subjective: \"I need to move at my pace.\"    Pain  Pain  Pre-Pain: 4  Post-Pain: 4  Pain Location: Abdomen (10/10 with movement, 4/10 at rest.)  Pain Descriptor: Sharp  Pain Interventions: Repositioning;Nurse notified    OBJECTIVE:        Bed mobility  Supine to Sit: Moderate assistance  Sit to Supine: Moderate assistance  Bed Mobility Comments: HOB elevated; increased time and effort and cues for technique due to increased abdominal pain    Transfers  Sit to Stand: Minimal Assistance  Stand to Sit: Minimal Assistance  Comment: WW, min A for lifting and stabilizing. good follow through of cues for hand placement and sequencing.    Ambulation  Comments: standing marching x 30\" d/t NG tube and room setup. pt fatigued with this task and unable to tolerate more. reports dizziness BP taken once back in supine WNL.                              Activity Tolerance  Activity Tolerance: Patient limited by pain;Patient limited by endurance;Patient limited by fatigue          ASSESSMENT   Assessment: increased time and effort for all tasks, limited by abdominal pain and endurance.     Discharge Recommendations:  Continue to assess pending progress         Goals  Long Term Goals  Long Term Goal 1: Pt will demonstrate bed mobility indep  Long Term Goal 2: Pt will demonstrate transfers indep  Long Term Goal 3: Pt will demonstrate amb >/= 100ft indep  Long Term Goal 4: Pt will demonstrate TUG </= 19 sec for decreased risk for falls    PLAN    General Plan: 1 time a day 3-6 times a week  Safety Devices  Type of Devices: All fall risk precautions in place, Call light within reach, Left in bed, Bed alarm in place     AMPAC (6 CLICK) BASIC MOBILITY  AM-PAC Inpatient Mobility Raw Score : 17      Therapy Time   Individual   Time In  1200   Time Out 1224   Minutes 24      BM/trsf: 24        Guy LevyARACELI, 12/24/24 at 12:27 PM         Definitions for assistance levels  Independent = pt does not require any physical supervision or assistance from another person for activity completion. Device may be needed.  Stand by assistance = pt requires verbal cues or instructions from another person, close to but not touching, to perform the activity  Minimal assistance= pt performs 75% or more of the activity; assistance is required to complete the activity  Moderate assistance= pt performs 50% of the activity; assistance is required to complete the activity  Maximal assistance = pt performs 25% of the activity; assistance is required to complete the activity  Dependent = pt requires total physical assistance to accomplish the task

## 2024-12-24 NOTE — PROGRESS NOTES
[facility] Hospitalist Progress Note    Admitting Date and Time: 12/22/2024  9:28 PM  Admit Dx: Diverticulitis [K57.92]  Intractable abdominal pain [R10.9]  Acute diverticulitis [K57.92]  Intractable nausea [R11.0]    Subjective:  Patient is being followed for Diverticulitis [K57.92]  Intractable abdominal pain [R10.9]  Acute diverticulitis [K57.92]  Intractable nausea [R11.0]   Pt feels much better right now.  He reports pain 2 out of 10 with movement.  He denies fever, chest pain, feeling lightheadedness, nausea, vomiting, leg pain or worsening leg swelling    Per RN: No acute events overnight    ROS: denies fever, chills, cp, sob, n/v, HA unless stated above.      sodium chloride  500 mL IntraVENous Once    atorvastatin  40 mg Oral Daily    metoprolol succinate  50 mg Oral Daily    sodium chloride flush  5-40 mL IntraVENous 2 times per day    enoxaparin  40 mg SubCUTAneous Daily    piperacillin-tazobactam  3,375 mg IntraVENous Q8H    insulin lispro  0-8 Units SubCUTAneous 4x Daily AC & HS    tamsulosin  0.4 mg Oral Daily    finasteride  5 mg Oral Daily     sodium chloride flush, 5-40 mL, PRN  sodium chloride, , PRN  potassium chloride, 40 mEq, PRN   Or  potassium alternative oral replacement, 40 mEq, PRN   Or  potassium chloride, 10 mEq, PRN  magnesium sulfate, 2,000 mg, PRN  ondansetron, 4 mg, Q8H PRN   Or  ondansetron, 4 mg, Q6H PRN  melatonin, 3 mg, Nightly PRN  polyethylene glycol, 17 g, Daily PRN  acetaminophen, 650 mg, Q6H PRN   Or  acetaminophen, 650 mg, Q6H PRN  glucose, 4 tablet, PRN  dextrose bolus, 125 mL, PRN   Or  dextrose bolus, 250 mL, PRN  glucagon (rDNA), 1 mg, PRN  dextrose, , Continuous PRN  HYDROmorphone, 0.5 mg, Q4H PRN  HYDROmorphone, 0.5 mg, Q3H PRN   Or  HYDROmorphone, 1 mg, Q3H PRN  lidocaine, , PRN         Objective:    /63   Pulse 78   Temp 99.3 °F (37.4 °C) (Axillary)   Resp 18   Ht 1.676 m (5' 6\")   Wt 97.1 kg (214 lb)   SpO2 100%   BMI 34.54 kg/m²     General

## 2024-12-24 NOTE — OP NOTE
[facility]                   [address] [place] [address]                            OPERATIVE REPORT      PATIENT NAME: BERENICE TEMPLETON            : 1950  MED REC NO: [id number]                        ROOM: W7  ACCOUNT NO: [id number]                       ADMIT DATE: 2024  PROVIDER: Cisco Mccray MD      DATE OF PROCEDURE:  2024    SURGEON:  Cisco Mccray MD    ASSISTANT:  Ms. Rose.    PREOPERATIVE DIAGNOSIS:  Perforated distal ileal diverticulitis.    POSTOPERATIVE DIAGNOSIS:  Perforated distal ileal diverticulitis.    PROCEDURES:    1. Exploratory laparotomy.  2. Lysis of adhesions.  3. Right colectomy with primary anastomosis.    ANESTHESIA:    1. General endotracheal anesthesia.  2. Bilateral TAP block.  3. Bilateral rectus sheath block.    ESTIMATED BLOOD LOSS:  200.    SPECIMENS:    1. Right colon.  2. Surgical cultures for right lower quadrant abscess.    COMPLICATIONS:  None.    INDICATIONS:  This is a 74-year-old male who came in with a 3-day history of intense right lower quadrant abdominal pain.  He was admitted after a CAT scan showed evidence of right lower quadrant inflammation consistent with ileal diverticulitis.  The patient had a previous appendectomy.  The remainder of the details can be found in the consultation and history and physical.    Risks and benefits of laparotomy with ileocecectomy, possible right colectomy were described at the bedside.  Risks of the surgery including infection, bleeding, damage to the surrounding intestine, postoperative pain, anastomotic leak, and reoperation were all addressed.  Despite the risks, he understood the benefits and wished to proceed.  Consent obtained.    DESCRIPTION OF PROCEDURE:  He was taken to the operating room, placed in a supine position.  General endotracheal anesthesia was administered.  The abdomen was prepped and draped with a ChloraPrep-containing solution.  He  needle, instrument, and towel counts were all correct.  I ran the small bowel from the anastomosis to the ligament of Treitz and there were no other abnormalities.  The remainder of the exam in all 4 quadrants was unremarkable.    Lap, needle, instrument, and towel counts were again correct.  A closing table was utilized given the contamination.  Gown and gloves were changed for all the OR participants.    The fascia was closed with a combination of 0 Vicryl and 0 Prolene suture.  The subcutaneous tissues were irrigated.  Skin was closed loosely with staples and marvin were placed in between the staples.  Lap, needle, instrument, and towel counts were again correct at the closing table as well as the original table.  Dressings were applied.  Abdominal binder was placed.    The patient was extubated and taken to Recovery for postop monitoring.          SERENA PANDA MD      D:  12/23/2024 18:47:30     T:  12/23/2024 23:52:28     [facility]/[place]  Job #:  [id number]     Doc#:  [id number]

## 2024-12-25 PROBLEM — K57.80 DIVERTICULITIS OF INTESTINE WITH PERFORATION WITHOUT BLEEDING: Status: ACTIVE | Noted: 2024-12-23

## 2024-12-25 PROBLEM — K63.1 BOWEL PERFORATION (HCC): Status: ACTIVE | Noted: 2024-12-25

## 2024-12-25 LAB
ALBUMIN SERPL-MCNC: 3 G/DL (ref 3.5–4.6)
ALP SERPL-CCNC: 73 U/L (ref 35–104)
ALT SERPL-CCNC: 15 U/L (ref 0–41)
ANION GAP SERPL CALCULATED.3IONS-SCNC: 12 MEQ/L (ref 9–15)
AST SERPL-CCNC: 16 U/L (ref 0–40)
BASOPHILS # BLD: 0 K/UL (ref 0–0.2)
BASOPHILS NFR BLD: 0.1 %
BILIRUB SERPL-MCNC: 0.9 MG/DL (ref 0.2–0.7)
BUN SERPL-MCNC: 23 MG/DL (ref 8–23)
CALCIUM SERPL-MCNC: 8.2 MG/DL (ref 8.5–9.9)
CHLORIDE SERPL-SCNC: 107 MEQ/L (ref 95–107)
CO2 SERPL-SCNC: 22 MEQ/L (ref 20–31)
CREAT SERPL-MCNC: 1.55 MG/DL (ref 0.7–1.2)
EOSINOPHIL # BLD: 0.1 K/UL (ref 0–0.7)
EOSINOPHIL NFR BLD: 0.5 %
ERYTHROCYTE [DISTWIDTH] IN BLOOD BY AUTOMATED COUNT: 14.7 % (ref 11.5–14.5)
GLOBULIN SER CALC-MCNC: 4.1 G/DL (ref 2.3–3.5)
GLUCOSE BLD-MCNC: 103 MG/DL (ref 70–99)
GLUCOSE BLD-MCNC: 116 MG/DL (ref 70–99)
GLUCOSE BLD-MCNC: 117 MG/DL (ref 70–99)
GLUCOSE BLD-MCNC: 94 MG/DL (ref 70–99)
GLUCOSE SERPL-MCNC: 106 MG/DL (ref 70–99)
HCT VFR BLD AUTO: 26.5 % (ref 42–52)
HGB BLD-MCNC: 8.7 G/DL (ref 14–18)
LYMPHOCYTES # BLD: 2.1 K/UL (ref 1–4.8)
LYMPHOCYTES NFR BLD: 20.2 %
MAGNESIUM SERPL-MCNC: 1.5 MG/DL (ref 1.7–2.4)
MCH RBC QN AUTO: 32 PG (ref 27–31.3)
MCHC RBC AUTO-ENTMCNC: 32.8 % (ref 33–37)
MCV RBC AUTO: 97.4 FL (ref 79–92.2)
MONOCYTES # BLD: 0.7 K/UL (ref 0.2–0.8)
MONOCYTES NFR BLD: 6.8 %
NEUTROPHILS # BLD: 7.6 K/UL (ref 1.4–6.5)
NEUTS SEG NFR BLD: 71.7 %
PERFORMED ON: ABNORMAL
PERFORMED ON: NORMAL
PHOSPHATE SERPL-MCNC: 3 MG/DL (ref 2.3–4.8)
PLATELET # BLD AUTO: 188 K/UL (ref 130–400)
POTASSIUM SERPL-SCNC: 4 MEQ/L (ref 3.4–4.9)
POTASSIUM SERPL-SCNC: 4 MEQ/L (ref 3.4–4.9)
PROT SERPL-MCNC: 7.1 G/DL (ref 6.3–8)
RBC # BLD AUTO: 2.72 M/UL (ref 4.7–6.1)
SODIUM SERPL-SCNC: 141 MEQ/L (ref 135–144)
WBC # BLD AUTO: 10.6 K/UL (ref 4.8–10.8)

## 2024-12-25 PROCEDURE — 2500000003 HC RX 250 WO HCPCS: Performed by: COLON & RECTAL SURGERY

## 2024-12-25 PROCEDURE — 1210000000 HC MED SURG R&B

## 2024-12-25 PROCEDURE — 6360000002 HC RX W HCPCS: Performed by: COLON & RECTAL SURGERY

## 2024-12-25 PROCEDURE — 36415 COLL VENOUS BLD VENIPUNCTURE: CPT

## 2024-12-25 PROCEDURE — 94150 VITAL CAPACITY TEST: CPT

## 2024-12-25 PROCEDURE — 2580000003 HC RX 258: Performed by: COLON & RECTAL SURGERY

## 2024-12-25 PROCEDURE — 2580000003 HC RX 258

## 2024-12-25 PROCEDURE — 99232 SBSQ HOSP IP/OBS MODERATE 35: CPT | Performed by: INTERNAL MEDICINE

## 2024-12-25 PROCEDURE — 80053 COMPREHEN METABOLIC PANEL: CPT

## 2024-12-25 PROCEDURE — 85025 COMPLETE CBC W/AUTO DIFF WBC: CPT

## 2024-12-25 PROCEDURE — 2700000000 HC OXYGEN THERAPY PER DAY

## 2024-12-25 PROCEDURE — 83735 ASSAY OF MAGNESIUM: CPT

## 2024-12-25 RX ORDER — 0.9 % SODIUM CHLORIDE 0.9 %
1000 INTRAVENOUS SOLUTION INTRAVENOUS ONCE
Status: COMPLETED | OUTPATIENT
Start: 2024-12-25 | End: 2024-12-25

## 2024-12-25 RX ADMIN — PIPERACILLIN AND TAZOBACTAM 3375 MG: 3; .375 INJECTION, POWDER, LYOPHILIZED, FOR SOLUTION INTRAVENOUS at 09:14

## 2024-12-25 RX ADMIN — SODIUM CHLORIDE 1000 ML: 9 INJECTION, SOLUTION INTRAVENOUS at 11:09

## 2024-12-25 RX ADMIN — Medication 10 ML: at 19:46

## 2024-12-25 RX ADMIN — HYDROMORPHONE HYDROCHLORIDE 1 MG: 1 INJECTION, SOLUTION INTRAMUSCULAR; INTRAVENOUS; SUBCUTANEOUS at 19:45

## 2024-12-25 RX ADMIN — PIPERACILLIN AND TAZOBACTAM 3375 MG: 3; .375 INJECTION, POWDER, LYOPHILIZED, FOR SOLUTION INTRAVENOUS at 18:30

## 2024-12-25 RX ADMIN — PIPERACILLIN AND TAZOBACTAM 3375 MG: 3; .375 INJECTION, POWDER, LYOPHILIZED, FOR SOLUTION INTRAVENOUS at 02:56

## 2024-12-25 RX ADMIN — ENOXAPARIN SODIUM 40 MG: 100 INJECTION SUBCUTANEOUS at 08:06

## 2024-12-25 RX ADMIN — SODIUM CHLORIDE: 9 INJECTION, SOLUTION INTRAVENOUS at 15:02

## 2024-12-25 RX ADMIN — HYDROMORPHONE HYDROCHLORIDE 1 MG: 1 INJECTION, SOLUTION INTRAMUSCULAR; INTRAVENOUS; SUBCUTANEOUS at 09:13

## 2024-12-25 RX ADMIN — HYDROMORPHONE HYDROCHLORIDE 1 MG: 1 INJECTION, SOLUTION INTRAMUSCULAR; INTRAVENOUS; SUBCUTANEOUS at 04:22

## 2024-12-25 ASSESSMENT — PAIN SCALES - GENERAL
PAINLEVEL_OUTOF10: 2
PAINLEVEL_OUTOF10: 4
PAINLEVEL_OUTOF10: 2
PAINLEVEL_OUTOF10: 9
PAINLEVEL_OUTOF10: 7
PAINLEVEL_OUTOF10: 7

## 2024-12-25 ASSESSMENT — ENCOUNTER SYMPTOMS
RESPIRATORY NEGATIVE: 1
EYES NEGATIVE: 1
ABDOMINAL PAIN: 1
PSYCHIATRIC NEGATIVE: 1
MUSCULOSKELETAL NEGATIVE: 1
HEMATOLOGIC/LYMPHATIC NEGATIVE: 1
NAUSEA: 0
VOMITING: 0
ABDOMINAL DISTENTION: 1
ENDOCRINE NEGATIVE: 1
GASTROINTESTINAL NEGATIVE: 1
DIARRHEA: 0
CARDIOVASCULAR NEGATIVE: 1
NEUROLOGICAL NEGATIVE: 1
RESPIRATORY NEGATIVE: 1
CONSTITUTIONAL NEGATIVE: 1

## 2024-12-25 ASSESSMENT — PAIN DESCRIPTION - DESCRIPTORS: DESCRIPTORS: DISCOMFORT;STABBING

## 2024-12-25 ASSESSMENT — PAIN DESCRIPTION - LOCATION
LOCATION: ABDOMEN

## 2024-12-25 ASSESSMENT — PAIN DESCRIPTION - ORIENTATION: ORIENTATION: MID;LOWER

## 2024-12-25 NOTE — PROGRESS NOTES
GENERAL SURGERY  PROGRESS NOTE    Pt Name: Hector Bose  MRN: [id number]  Date: 12/25/2024    Subjective  BALJIT overnight. Afebrile, VSS, satting well. Pt doing well, reports pain is controlled. No nausea/ vomiting. Flatus, BM. Ambulating.    Vitals  BP (!) 112/90   Pulse 82   Temp 99 °F (37.2 °C) (Oral)   Resp 18   Ht 1.676 m (5' 6\")   Wt 97.1 kg (214 lb)   SpO2 100%   BMI 34.54 kg/m²      Physical Exam  GEN: A&Ox3, NAD, cooperative   PULM: no increased work of breathing, satting well  CV: regular rate  ABD: Soft, NTND, incision clean, dry and intact  EXT: Warm, dry, no lower extremity edema    Intake/ Output    Intake/Output Summary (Last 24 hours) at 12/25/2024 1203  Last data filed at 12/25/2024 0844  Gross per 24 hour   Intake 0 ml   Output 4540 ml   Net -4540 ml     Date 12/25/24 0000 - 12/25/24 2359   Shift 0743-0190 3537-0502 9391-3937 24 Hour Total   INTAKE   P.O.(mL/kg/hr)  0  0   Shift Total(mL/kg)  0(0)  0(0)   OUTPUT   Urine(mL/kg/hr) 1800(2.3) 250  2050   Emesis/NG output(mL/kg) 450(4.6)   450(4.6)   Drains(mL/kg) 40(0.4) 30(0.3)  70(0.7)   Shift Total(mL/kg) 2290(23.6) 280(2.9)  2570(26.5)   Weight (kg) 97.1 97.1 97.1 97.1       Labs  Recent Labs     12/22/24  2251 12/23/24  0510 12/24/24  0548 12/25/24  0544   WBC  --  13.7* 10.6 10.6   HGB  --  10.0* 8.6* 8.7*   HCT  --  30.4* 25.8* 26.5*   PLT  --  158 133 188   NA  --  137 137 141   K  --  4.4 4.1 4.0  4.0   CL  --  103 104 107   CO2  --  25 22 22   BUN  --  29* 28* 23   CREATININE  --  1.70* 1.93* 1.55*   MG  --   --   --  1.5*   PHOS  --   --   --  3.0   CALCIUM  --  8.1* 7.7* 8.2*   AST  --  14 12 16   ALT  --  16 13 15   BILITOT  --  1.2* 0.9* 0.9*   NITRU Negative  --   --   --    COLORU Yellow  --   --   --          Assessment/ Plan  Hector Bose is a 74 y.o. male now POD 2 s/p  Exploratory laparotomy, Lysis of adhesions, Right colectomy with primary anastomosis.Recovering well.    Pain control: Pain controlled with po and IV

## 2024-12-25 NOTE — PROGRESS NOTES
0700 Assumed care of patient    0830 Shift assessment complete, see flowsheets. NG tube in place to R nare connected to LIWS. SASHA drain in place draining serosanguinous fluid. Dressing to abdomen with breakthrough drainage noted, abdominal binder in place. IV infusing without difficulty. Patient up to bedside commode at this time, small bowel movement. Patient now resting comfortably, denies pain/nausea at this time    1130 NG tube clamped per orders at this time. Patient educated to notify RN of increased nausea or pain    1500 NG residual ~50mL at this time. NG remains clamped      1900 NG residual !50mL. NG remains clamped    Electronically signed by Sunita Rosario RN on 12/25/2024 at 8:46 AM

## 2024-12-25 NOTE — PLAN OF CARE
Problem: Chronic Conditions and Co-morbidities  Goal: Patient's chronic conditions and co-morbidity symptoms are monitored and maintained or improved  12/25/2024 0844 by Sunita Rosario RN  Outcome: Progressing  12/25/2024 0403 by Chavez Fonseca RN  Outcome: Progressing     Problem: Discharge Planning  Goal: Discharge to home or other facility with appropriate resources  12/25/2024 0844 by Sunita Rosario RN  Outcome: Progressing  12/25/2024 0403 by Chavez Fonseca RN  Outcome: Progressing     Problem: Pain  Goal: Verbalizes/displays adequate comfort level or baseline comfort level  12/25/2024 0844 by Sunita Rosario RN  Outcome: Progressing  12/25/2024 0403 by Chavez Fonseca RN  Outcome: Progressing     Problem: Safety - Adult  Goal: Free from fall injury  12/25/2024 0844 by Sunita Rosario RN  Outcome: Progressing  12/25/2024 0403 by Chavez Fonseca RN  Outcome: Progressing     Problem: Skin/Tissue Integrity - Adult  Goal: Skin integrity remains intact  12/25/2024 0844 by Sunita Rosario RN  Outcome: Progressing  12/25/2024 0403 by Chavez Fonseca RN  Outcome: Progressing  Goal: Incisions, wounds, or drain sites healing without S/S of infection  12/25/2024 0844 by Sunita Rosario RN  Outcome: Progressing  12/25/2024 0403 by Chavez Fonseca RN  Outcome: Progressing  Goal: Oral mucous membranes remain intact  12/25/2024 0844 by Sunita Rosario RN  Outcome: Progressing  12/25/2024 0403 by Chavez Fonseca RN  Outcome: Progressing     Problem: Musculoskeletal - Adult  Goal: Return mobility to safest level of function  12/25/2024 0844 by Sunita Rosario RN  Outcome: Progressing  12/25/2024 0403 by Chavez Fonseca RN  Outcome: Progressing  Goal: Maintain proper alignment of affected body part  12/25/2024 0844 by Sunita Rosario RN  Outcome: Progressing  12/25/2024 0403 by Chavez Fonseca RN  Outcome: Progressing  Goal: Return ADL status to a safe level of  function  12/25/2024 0844 by Sunita Rosario RN  Outcome: Progressing  12/25/2024 0403 by Chavez Fonseca RN  Outcome: Progressing     Problem: Gastrointestinal - Adult  Goal: Minimal or absence of nausea and vomiting  12/25/2024 0844 by Sunita Rosario RN  Outcome: Progressing  12/25/2024 0403 by Chavez Fonseca RN  Outcome: Progressing  Goal: Maintains or returns to baseline bowel function  12/25/2024 0844 by Sunita Rosario RN  Outcome: Progressing  12/25/2024 0403 by Chavez Fonseca RN  Outcome: Progressing  Goal: Maintains adequate nutritional intake  12/25/2024 0844 by Sunita Rosario RN  Outcome: Progressing  12/25/2024 0403 by Chavez Fonseca RN  Outcome: Progressing  Goal: Establish and maintain optimal ostomy function  12/25/2024 0844 by Sunita Rosario RN  Outcome: Progressing  12/25/2024 0403 by Chavez Fonseca RN  Outcome: Progressing     Problem: Genitourinary - Adult  Goal: Absence of urinary retention  12/25/2024 0844 by Sunita Rosario RN  Outcome: Progressing  12/25/2024 0403 by Chavez Fonseca RN  Outcome: Progressing  Goal: Urinary catheter remains patent  12/25/2024 0844 by Sunita Rosario RN  Outcome: Progressing  12/25/2024 0403 by Chavez Fonseca RN  Outcome: Progressing     Problem: Nutrition Deficit:  Goal: Optimize nutritional status  12/25/2024 0844 by Sunita Rosario RN  Outcome: Progressing  12/25/2024 0403 by Chavez Fonseca RN  Outcome: Progressing     Problem: ABCDS Injury Assessment  Goal: Absence of physical injury  12/25/2024 0844 by Sunita Rosario RN  Outcome: Progressing  12/25/2024 0403 by Chavez Fonseca RN  Outcome: Progressing

## 2024-12-25 NOTE — PROGRESS NOTES
Infectious Disease     Patient Name: Hector Bose  Date: 12/25/2024  YOB: 1950  Medical Record Number: [id number]      Diverticulitis with perforation  Peritonitis      12/23/2024  Patient presented with nausea vomiting loss of appetite some diarrhea  Abdominal pain right lower quadrant  No shortness of breath chest pain  Some fevers chills 102.7 Fahrenheit on admission    CT scan showed diverticulitis and evidence of perforation  Patient taken to surgery        12/23/2024   Perforated distal ileal diverticulitis.     PROCEDURES:    1. Exploratory laparotomy.  2. Lysis of adhesions.  3. Right colectomy with primary anastomosis.  When patient was explored pus was found on immediate opening of the abdomen  no free perforation, but there was purulence present around the distal ileum             Culture, Surgical [[id number]] Collected: 12/23/24 1632      Specimen: Swab from Abdomen Updated: 12/24/24 1148      Culture Surgical --      Direct Exam:  MODERATE NEUTROPHILS  Direct Exam:  NO BACTERIA SEEN  Culture, Surgical:  PENDING  Performed at [address] [address] ([phone]                   Review of Systems   Constitutional:  Negative for fever.   Respiratory: Negative.     Cardiovascular: Negative.    Gastrointestinal:  Positive for abdominal distention and abdominal pain. Negative for diarrhea, nausea and vomiting.   Skin: Negative.              Physical Exam:      Physical Exam  Eyes:      Pupils: Pupils are equal, round, and reactive to light.   Cardiovascular:      Heart sounds: Normal heart sounds. No murmur heard.  Pulmonary:      Effort: Pulmonary effort is normal. No respiratory distress.      Breath sounds: Normal breath sounds. No wheezing, rhonchi or rales.   Abdominal:      General: There is distension.      Palpations: Abdomen is soft. There is no mass.      Tenderness: There is abdominal tenderness. There is no guarding.      Comments: Nasogastric  tube in place  Wound dressing in abdomen intact  SASHA drain serosanguineous bloody drainage         Blood pressure 125/62, pulse 84, temperature 97.3 °F (36.3 °C), temperature source Oral, resp. rate 18, height 1.676 m (5' 6\"), weight 97.1 kg (214 lb), SpO2 100%.      .   Lab Results   Component Value Date    WBC 10.6 12/25/2024    HGB 8.7 (L) 12/25/2024    HCT 26.5 (L) 12/25/2024    MCV 97.4 (H) 12/25/2024     12/25/2024     Lab Results   Component Value Date/Time     12/25/2024 05:44 AM    K 4.0 12/25/2024 05:44 AM    K 4.0 12/25/2024 05:44 AM     12/25/2024 05:44 AM    CO2 22 12/25/2024 05:44 AM    BUN 23 12/25/2024 05:44 AM    CREATININE 1.55 12/25/2024 05:44 AM    GLUCOSE 106 12/25/2024 05:44 AM    GLUCOSE 184 06/02/2023 11:11 AM    CALCIUM 8.2 12/25/2024 05:44 AM    LABGLOM 47.2 12/25/2024 05:44 AM    LABGLOM 46.5 09/01/2023 10:25 AM                ASSESSMENT:  Patient Active Problem List   Diagnosis    Arthritis, lumbar spine    Renal mass    Type 2 diabetes mellitus without complication, without long-term current use of insulin ([facility])    Dyslipidemia    Essential hypertension    Cyst, epididymis    Hydrocele    Other closed fractures of distal end of radius (alone)    Stiffness of joint, not elsewhere classified, forearm    Stiffness of joint, not elsewhere classified, hand    Varicocele    Pain in joint, hand    Morbidly obese    Acute idiopathic gout of right foot    Chronic kidney disease    History of colon polyps    Bilateral carpal tunnel syndrome    Left carpal tunnel syndrome    Type 2 diabetes mellitus with chronic kidney disease    Duodenitis    Gastric intestinal metaplasia, unspecified    Sensation of lump in throat    Iron deficiency    Shoulder pain    Stage 3b chronic kidney disease (HCC)    Vocal cord disease    Spasmodic torticollis    Cervical radiculopathy    Stiff neck    Acute diverticulitis    Urinary retention    Ileitis, terminal ([facility])         PLAN:    Diverticulitis

## 2024-12-25 NOTE — PROGRESS NOTES
[facility] Hospitalist Progress Note    Admitting Date and Time: 12/22/2024  9:28 PM  Admit Dx: Diverticulitis [K57.92]  Intractable abdominal pain [R10.9]  Acute diverticulitis [K57.92]  Intractable nausea [R11.0]    Subjective:  Patient is being followed for Diverticulitis [K57.92]  Intractable abdominal pain [R10.9]  Acute diverticulitis [K57.92]  Intractable nausea [R11.0]     Patient was seen and examined by me.  No acute events overnight.  He states he felt much better.  He denies fever, chest pain, abdominal pain, nausea, vomiting, leg pain, or worsening leg swelling  Per RN: No acute events overnight    ROS: denies fever, chills, cp, sob, n/v, HA unless stated above.    sodium chloride  500 mL IntraVENous Once    atorvastatin  40 mg Oral Daily    metoprolol succinate  50 mg Oral Daily    sodium chloride flush  5-40 mL IntraVENous 2 times per day    enoxaparin  40 mg SubCUTAneous Daily    piperacillin-tazobactam  3,375 mg IntraVENous Q8H    insulin lispro  0-8 Units SubCUTAneous 4x Daily AC & HS    tamsulosin  0.4 mg Oral Daily    finasteride  5 mg Oral Daily     sodium chloride flush, 5-40 mL, PRN  sodium chloride, , PRN  potassium chloride, 40 mEq, PRN   Or  potassium alternative oral replacement, 40 mEq, PRN   Or  potassium chloride, 10 mEq, PRN  magnesium sulfate, 2,000 mg, PRN  ondansetron, 4 mg, Q8H PRN   Or  ondansetron, 4 mg, Q6H PRN  melatonin, 3 mg, Nightly PRN  polyethylene glycol, 17 g, Daily PRN  acetaminophen, 650 mg, Q6H PRN   Or  acetaminophen, 650 mg, Q6H PRN  glucose, 4 tablet, PRN  dextrose bolus, 125 mL, PRN   Or  dextrose bolus, 250 mL, PRN  glucagon (rDNA), 1 mg, PRN  dextrose, , Continuous PRN  HYDROmorphone, 0.5 mg, Q4H PRN  HYDROmorphone, 0.5 mg, Q3H PRN   Or  HYDROmorphone, 1 mg, Q3H PRN  lidocaine, , PRN         Objective:    BP (!) 112/90   Pulse 82   Temp 99 °F (37.2 °C) (Oral)   Resp 18   Ht 1.676 m (5' 6\")   Wt 97.1 kg (214 lb)   SpO2 100%   BMI 34.54 kg/m²  Continue metoprolol 50 mg daily     DVT prophylaxis: Lovenox  CODE STATUS: Full     Disposition: Patient admitted for acute diverticulitis with perforation, acute urinary retention status post right colectomy.  Patient will be monitored for now    NOTE: This report was transcribed using voice recognition software. Every effort was made to ensure accuracy; however, inadvertent computerized transcription errors may be present.  Electronically signed by Darion Ku MD on 12/25/2024 at 1:44 PM

## 2024-12-26 ENCOUNTER — TELEPHONE (OUTPATIENT)
Dept: HEMATOLOGY/ONCOLOGY | Facility: CLINIC | Age: 74
End: 2024-12-26
Payer: MEDICARE

## 2024-12-26 ENCOUNTER — APPOINTMENT (OUTPATIENT)
Dept: INTERVENTIONAL RADIOLOGY/VASCULAR | Age: 74
DRG: 329 | End: 2024-12-26
Payer: MEDICARE

## 2024-12-26 ENCOUNTER — APPOINTMENT (OUTPATIENT)
Dept: HEMATOLOGY/ONCOLOGY | Facility: CLINIC | Age: 74
End: 2024-12-26
Payer: MEDICARE

## 2024-12-26 LAB
ALBUMIN SERPL-MCNC: 3 G/DL (ref 3.5–4.6)
ALP SERPL-CCNC: 74 U/L (ref 35–104)
ALT SERPL-CCNC: 17 U/L (ref 0–41)
ANION GAP SERPL CALCULATED.3IONS-SCNC: 12 MEQ/L (ref 9–15)
AST SERPL-CCNC: 18 U/L (ref 0–40)
BASOPHILS # BLD: 0 K/UL (ref 0–0.2)
BASOPHILS NFR BLD: 0.1 %
BILIRUB SERPL-MCNC: 0.7 MG/DL (ref 0.2–0.7)
BUN SERPL-MCNC: 24 MG/DL (ref 8–23)
CALCIUM SERPL-MCNC: 8.3 MG/DL (ref 8.5–9.9)
CHLORIDE SERPL-SCNC: 108 MEQ/L (ref 95–107)
CO2 SERPL-SCNC: 24 MEQ/L (ref 20–31)
CREAT SERPL-MCNC: 1.45 MG/DL (ref 0.7–1.2)
EOSINOPHIL # BLD: 0.2 K/UL (ref 0–0.7)
EOSINOPHIL NFR BLD: 2.4 %
ERYTHROCYTE [DISTWIDTH] IN BLOOD BY AUTOMATED COUNT: 14.6 % (ref 11.5–14.5)
GLOBULIN SER CALC-MCNC: 3.8 G/DL (ref 2.3–3.5)
GLUCOSE BLD-MCNC: 103 MG/DL (ref 70–99)
GLUCOSE BLD-MCNC: 106 MG/DL (ref 70–99)
GLUCOSE BLD-MCNC: 147 MG/DL (ref 70–99)
GLUCOSE BLD-MCNC: 182 MG/DL (ref 70–99)
GLUCOSE SERPL-MCNC: 102 MG/DL (ref 70–99)
HCT VFR BLD AUTO: 25.1 % (ref 42–52)
HGB BLD-MCNC: 8 G/DL (ref 14–18)
LYMPHOCYTES # BLD: 1.8 K/UL (ref 1–4.8)
LYMPHOCYTES NFR BLD: 22.2 %
MAGNESIUM SERPL-MCNC: 1.6 MG/DL (ref 1.7–2.4)
MCH RBC QN AUTO: 31.3 PG (ref 27–31.3)
MCHC RBC AUTO-ENTMCNC: 31.9 % (ref 33–37)
MCV RBC AUTO: 98 FL (ref 79–92.2)
MONOCYTES # BLD: 0.6 K/UL (ref 0.2–0.8)
MONOCYTES NFR BLD: 8 %
NEUTROPHILS # BLD: 5.3 K/UL (ref 1.4–6.5)
NEUTS SEG NFR BLD: 66.9 %
PERFORMED ON: ABNORMAL
PHOSPHATE SERPL-MCNC: 2.4 MG/DL (ref 2.3–4.8)
PLATELET # BLD AUTO: 190 K/UL (ref 130–400)
POTASSIUM SERPL-SCNC: 3.7 MEQ/L (ref 3.4–4.9)
POTASSIUM SERPL-SCNC: 3.7 MEQ/L (ref 3.4–4.9)
PROT SERPL-MCNC: 6.8 G/DL (ref 6.3–8)
RBC # BLD AUTO: 2.56 M/UL (ref 4.7–6.1)
SODIUM SERPL-SCNC: 144 MEQ/L (ref 135–144)
WBC # BLD AUTO: 7.9 K/UL (ref 4.8–10.8)

## 2024-12-26 PROCEDURE — 6370000000 HC RX 637 (ALT 250 FOR IP): Performed by: COLON & RECTAL SURGERY

## 2024-12-26 PROCEDURE — 99232 SBSQ HOSP IP/OBS MODERATE 35: CPT | Performed by: INTERNAL MEDICINE

## 2024-12-26 PROCEDURE — C1751 CATH, INF, PER/CENT/MIDLINE: HCPCS

## 2024-12-26 PROCEDURE — 1210000000 HC MED SURG R&B

## 2024-12-26 PROCEDURE — 83735 ASSAY OF MAGNESIUM: CPT

## 2024-12-26 PROCEDURE — 99024 POSTOP FOLLOW-UP VISIT: CPT | Performed by: COLON & RECTAL SURGERY

## 2024-12-26 PROCEDURE — 02HV33Z INSERTION OF INFUSION DEVICE INTO SUPERIOR VENA CAVA, PERCUTANEOUS APPROACH: ICD-10-PCS | Performed by: INTERNAL MEDICINE

## 2024-12-26 PROCEDURE — 6360000002 HC RX W HCPCS: Performed by: COLON & RECTAL SURGERY

## 2024-12-26 PROCEDURE — 36573 INSJ PICC RS&I 5 YR+: CPT

## 2024-12-26 PROCEDURE — 2580000003 HC RX 258: Performed by: COLON & RECTAL SURGERY

## 2024-12-26 PROCEDURE — 2700000000 HC OXYGEN THERAPY PER DAY

## 2024-12-26 PROCEDURE — C1769 GUIDE WIRE: HCPCS

## 2024-12-26 PROCEDURE — 85025 COMPLETE CBC W/AUTO DIFF WBC: CPT

## 2024-12-26 PROCEDURE — 36592 COLLECT BLOOD FROM PICC: CPT

## 2024-12-26 PROCEDURE — 2500000003 HC RX 250 WO HCPCS: Performed by: COLON & RECTAL SURGERY

## 2024-12-26 PROCEDURE — 80053 COMPREHEN METABOLIC PANEL: CPT

## 2024-12-26 PROCEDURE — 97535 SELF CARE MNGMENT TRAINING: CPT

## 2024-12-26 PROCEDURE — 36415 COLL VENOUS BLD VENIPUNCTURE: CPT

## 2024-12-26 RX ORDER — LIDOCAINE HYDROCHLORIDE 20 MG/ML
5 INJECTION, SOLUTION INFILTRATION; PERINEURAL ONCE
Status: COMPLETED | OUTPATIENT
Start: 2024-12-26 | End: 2024-12-26

## 2024-12-26 RX ADMIN — PIPERACILLIN AND TAZOBACTAM 3375 MG: 3; .375 INJECTION, POWDER, LYOPHILIZED, FOR SOLUTION INTRAVENOUS at 17:44

## 2024-12-26 RX ADMIN — PIPERACILLIN AND TAZOBACTAM 3375 MG: 3; .375 INJECTION, POWDER, LYOPHILIZED, FOR SOLUTION INTRAVENOUS at 02:18

## 2024-12-26 RX ADMIN — TAMSULOSIN HYDROCHLORIDE 0.4 MG: 0.4 CAPSULE ORAL at 10:10

## 2024-12-26 RX ADMIN — LIDOCAINE HYDROCHLORIDE 5 ML: 20 INJECTION, SOLUTION INFILTRATION; PERINEURAL at 12:12

## 2024-12-26 RX ADMIN — FINASTERIDE 5 MG: 5 TABLET, FILM COATED ORAL at 10:10

## 2024-12-26 RX ADMIN — ATORVASTATIN CALCIUM 40 MG: 40 TABLET, FILM COATED ORAL at 10:10

## 2024-12-26 RX ADMIN — Medication 10 ML: at 21:22

## 2024-12-26 RX ADMIN — METOPROLOL SUCCINATE 50 MG: 50 TABLET, EXTENDED RELEASE ORAL at 10:10

## 2024-12-26 RX ADMIN — MAGNESIUM SULFATE HEPTAHYDRATE 2000 MG: 40 INJECTION, SOLUTION INTRAVENOUS at 10:11

## 2024-12-26 RX ADMIN — INSULIN LISPRO 2 UNITS: 100 INJECTION, SOLUTION INTRAVENOUS; SUBCUTANEOUS at 21:20

## 2024-12-26 RX ADMIN — SODIUM CHLORIDE: 9 INJECTION, SOLUTION INTRAVENOUS at 05:08

## 2024-12-26 RX ADMIN — PIPERACILLIN AND TAZOBACTAM 3375 MG: 3; .375 INJECTION, POWDER, LYOPHILIZED, FOR SOLUTION INTRAVENOUS at 10:11

## 2024-12-26 ASSESSMENT — ENCOUNTER SYMPTOMS
NAUSEA: 0
ABDOMINAL PAIN: 1
VOMITING: 0
DIARRHEA: 0
ABDOMINAL DISTENTION: 1
RESPIRATORY NEGATIVE: 1

## 2024-12-26 ASSESSMENT — PAIN SCALES - GENERAL: PAINLEVEL_OUTOF10: 3

## 2024-12-26 NOTE — PROGRESS NOTES
Physical Therapy Med Surg Daily Treatment Note  Facility/Department: [address] MED SURG UNIT  Room: [place] [address]       NAME: Hector Bose  : 1950 (74 y.o.)  MRN: [id number]  CODE STATUS: Full Code    Date of Service: 2024    Patient Diagnosis(es): Diverticulitis [K57.92]  Intractable abdominal pain [R10.9]  Acute diverticulitis [K57.92]  Intractable nausea [R11.0]   Chief Complaint   Patient presents with    Abdominal Pain     Severe lower abdominal pain and unable to urinate     Patient Active Problem List    Diagnosis Date Noted    Type 2 diabetes mellitus with chronic kidney disease 2022    Bowel perforation (HCC) 2024    Diverticulitis of intestine with perforation without bleeding 2024    Urinary retention 2024    Ileitis, terminal (HCC) 2024    Spasmodic torticollis 2024    Cervical radiculopathy 2024    Stiff neck 2024    Sensation of lump in throat 2024    Iron deficiency 2024    Duodenitis 2023    Shoulder pain 2023    Stage 3b chronic kidney disease (HCC) 2023    Vocal cord disease 2023    Gastric intestinal metaplasia, unspecified 2023    Left carpal tunnel syndrome 2022    Bilateral carpal tunnel syndrome 2022    History of colon polyps     Chronic kidney disease     Acute idiopathic gout of right foot     Morbidly obese 2020    Essential hypertension 2018    Type 2 diabetes mellitus without complication, without long-term current use of insulin (HCC) 2017    Dyslipidemia 2017    Renal mass 2016    Arthritis, lumbar spine 2015    Cyst, epididymis 2011    Hydrocele 2011    Varicocele 2011    Stiffness of joint, not elsewhere classified, forearm 2009    Stiffness of joint, not elsewhere classified, hand 2009    Pain in joint, hand 2009    Other closed fractures of distal end of radius (alone) 2008        Past  Medical History:   Diagnosis Date    Acute idiopathic gout of right foot     Anemia 03/01/2018    iron transfusions x2    Arthritis     both knees    Bilateral carpal tunnel syndrome 1/12/2022    Chronic kidney disease     CLL (chronic lymphocytic leukemia) (HCC)     dx 5/2015    Colon polyps     Disease of blood and blood forming organ     Hyperlipidemia     dx since 1970's    Hypertension     meds  since 1970's    Lymphoma of gastrointestinal tract (HCC)     Movement disorder     Type II or unspecified type diabetes mellitus without mention of complication, not stated as uncontrolled     hx > 20 yrs    Urinary retention 12/23/2024     Past Surgical History:   Procedure Laterality Date    APPENDECTOMY      age 22s    CARPAL TUNNEL RELEASE Left 1/26/2022    LEFT CARPAL TUNNEL RELEASE performed by Rangel Saldivar MD at [facility] OR    CARPAL TUNNEL RELEASE Right 3/9/2022    CARPAL TUNNEL RELEASE RIGHT performed by Rangel Saldivar MD at [facility] OR    CHOLECYSTECTOMY N/A 07/07/2016    COLONOSCOPY  4/1/16    w/polypectomy     COLONOSCOPY N/A 6/15/2021    COLORECTAL CANCER SCREENING, HIGH RISK performed by Chang Caldwell MD at [facility]    ENDOSCOPY, COLON, DIAGNOSTIC      JOINT REPLACEMENT Bilateral 2018    TKR    LAPAROTOMY N/A 12/23/2024    Exploratory laparotomy, ileocecectomy performed by Cisco Mccray MD at [facility] OR    OTHER SURGICAL HISTORY Right 06/08/16    I & D ABSCESS THIGH    HI ARTHRP KNE CONDYLE&PLATU MEDIAL&LAT COMPARTMENTS Right 1/18/2018    RIGHT KNEE TOTAL KNEE ARTHROPLASTY ELÍAS SPINAL,NERVE BLOCK performed by Osmar Marie MD at [facility] OR    HI ARTHRP KNE CONDYLE&PLATU MEDIAL&LAT COMPARTMENTS Left 5/17/2018    LEFT KNEE TOTAL KNEE ARTHROPLASTY, performed by Osmar Marie MD at [facility]    HI MANIPULATION KNEE JOINT UNDER GENERAL ANESTHESIA Right 3/15/2018    RIGHT KNEE MANIPULATION performed by Osmar Marie MD at [facility] OR    SHOULDER SURGERY Right  1979    due to displacement    UPPER GASTROINTESTINAL ENDOSCOPY  4/29/15    w/bx     UPPER GASTROINTESTINAL ENDOSCOPY  02/28/2018    Dr. baltazar Barahona       Chart Reviewed: Yes  Family/Caregiver Present: No    Restrictions:  Restrictions/Precautions: Fall Risk    SUBJECTIVE:   Subjective: \"The NG tube came out accidentally.\"    Pain  Pain  Pre-Pain: 4  Post-Pain: 4  Pain Location: Abdomen  Pain Interventions: Repositioning;Nurse notified    OBJECTIVE:        Bed mobility  Supine to Sit: Moderate assistance  Sit to Supine:  (DNT pt into bedside chair to promote OOB activity.)  Bed Mobility Comments: HOB elevated; increased time and effort and cues for technique due to increased abdominal pain    Transfers  Sit to Stand: Minimal Assistance  Stand to Sit: Minimal Assistance  Bed to Chair: Minimal assistance  Comment: WW, min A for lifting and stabilizing. good follow through of cues for hand placement and sequencing.    Ambulation  Surface: Level tile  Device: Rolling Walker  Assistance: Stand by assistance  Quality of Gait: step through pattern, decreased LE stability.  Gait Deviations: Slow Imani;Decreased step length;Decreased step height  Distance: 15'  Comments: pt able to make it around bed to bedside chair, reports fatigue with this distance, pleased to be in chair. pt tolerates RA, SpO2 100% post gait.            Activity Tolerance  Activity Tolerance: Patient tolerated treatment well;Patient limited by endurance          ASSESSMENT   Assessment: increased time and effort for all tasks, limited by abdominal pain and endurance. able to initate OOB activity in bedside chair this session.     Discharge Recommendations:  Continue to assess pending progress         Goals  Long Term Goals  Long Term Goal 1: Pt will demonstrate bed mobility indep  Long Term Goal 2: Pt will demonstrate transfers indep  Long Term Goal 3: Pt will demonstrate amb >/= 100ft indep  Long Term Goal 4: Pt will demonstrate TUG </=

## 2024-12-26 NOTE — CARE COORDINATION
IV BENEFIT REQUEST FORM    FAX FROM: [address] [address]    REQUESTED BY: Electronically signed by Lindsey Luna on 2024 at 1:58 PM                                               RN/C3: PHONE: [phone]    DATE:/TIME OF REQUEST: 24  TIME: 1:58 PM      TO: [facility]      FAX TO: [phone]    PHONE: [phone]     THIS PATIENT HAS BEEN IDENTIFIED TO POSSIBLY NEED LONG TERM IV'S.    PLEASE CHECK INSURANCE COVERAGE FOR THE FOLLOWING PT/DRUGS.    PATIENT'S NAME: BERENICE TEMPLETON                              ROOM: [place] [address]   PATIENT'S : 1950  PATIENT ADDRESS: [address] [place] [address] [place] [address]  SSN:    ( )     PAYOR NAME:  Payor: MEDICARE / Plan: MEDICARE PART A AND B / Product Type: *No Product type* /     DRUG: (ZOSYN)                DOSE: (3.375 MG)            FREQUENCY: Q (8) HR      __________ CHECK HERE IF PT HAS NO INSURANCE AND REQUESTING SELF PAY COST.    *IF [facility] IS NOT A PROVIDER FOR THIS PATIENT, PLEASE FORWARD INFO VIA FAX TO [facility] @ [phone],(PHONE NUMBER: [phone]) TO RUN BENEFIT VERIFICATION AND NOTIFY THE ABOVE C3 OF THIS PLAN.    (FAX FACE SHEET WITH DEMOGRAPHICS AND INSURANCE INFO WITH THIS FORM.)  PLEASE FAX BENEFIT INFO TO: THE [facility] -[phone]    This message is intended only for the use of the individual or entity to which it is addressed and may contain information that is privileged, confidential, and exempt from disclosure under applicable law. If the reader of the notice is not intended recipient of the employee/agent responsible for delivering the message to the intended recipient, you are hereby notified than any dissemination, distribution or copying of this communication is strictly prohibited. Please contact the sender for further instructions on handling the information.

## 2024-12-26 NOTE — PROGRESS NOTES
Postop day #3 from a right colectomy for perforated ileal diverticulitis    Patient had multiple bowel movements.  Nasogastric tube out    SASHA drain right paracolic gutter serosanguineous    Plan to start clear liquids today.    PICC line today or tomorrow for home antibiotics.    Patient doing well.  Physical therapy consulted.

## 2024-12-26 NOTE — CARE COORDINATION
MET WITH PATIENT AT BEDSIDE THIS AM. PATIENT SITTING UP IN RECLINER CHAIR. PATIENT DENIES NEEDS. DISCHARGE PLAN REMAINS HOME WITH [facility] WHEN MEDICALLY CLEARED.    1400 IV CHECK SENT TO [facility]. CONFIRMED WITH GLENN AT [facility] AND PATIENT WILL DISCHARGE HOME WITH [facility] WHEN MEDICALLY CLEARED.

## 2024-12-26 NOTE — PROGRESS NOTES
Infectious Disease     Patient Name: Hector Bose  Date: 12/26/2024  YOB: 1950  Medical Record Number: [id number]      Diverticulitis with perforation  Peritonitis      12/23/2024  Patient presented with nausea vomiting loss of appetite some diarrhea  Abdominal pain right lower quadrant  No shortness of breath chest pain  Some fevers chills 102.7 Fahrenheit on admission    CT scan showed diverticulitis and evidence of perforation  Patient taken to surgery        12/23/2024   Perforated distal ileal diverticulitis.     PROCEDURES:    1. Exploratory laparotomy.  2. Lysis of adhesions.  3. Right colectomy with primary anastomosis.  When patient was explored pus was found on immediate opening of the abdomen  no free perforation, but there was purulence present around the distal ileum             Culture, Surgical [[id number]] Collected: 12/23/24 1632      Specimen: Swab from Abdomen Updated: 12/24/24 1148      Culture Surgical --      Direct Exam:  MODERATE NEUTROPHILS  Direct Exam:  NO BACTERIA SEEN  Culture, Surgical:  PENDING  Performed at [address] [id number] ([phone]       bowel movements.     Nasogastric tube out             Review of Systems   Constitutional:  Negative for fever.   Respiratory: Negative.     Cardiovascular: Negative.    Gastrointestinal:  Positive for abdominal distention and abdominal pain. Negative for diarrhea, nausea and vomiting.   Skin: Negative.              Physical Exam:      Physical Exam  Eyes:      Pupils: Pupils are equal, round, and reactive to light.   Cardiovascular:      Heart sounds: Normal heart sounds. No murmur heard.  Pulmonary:      Effort: Pulmonary effort is normal. No respiratory distress.      Breath sounds: Normal breath sounds. No wheezing, rhonchi or rales.   Abdominal:      General: There is distension.      Palpations: Abdomen is soft. There is no mass.      Tenderness: There is abdominal tenderness. There is

## 2024-12-26 NOTE — PROGRESS NOTES
Patient assessment and vitals complete and per flowsheets. Patient up to chair with one assist, tolerated well. On room air, ng out, tolerating clears. Chapman in place and plan for picc today. Patient aware and understanding. No needs at this time.

## 2024-12-26 NOTE — PROGRESS NOTES
and reactive to light, extraocular eye movements intact, conjunctivae normal  Neck: neck supple and non tender without mass   Pulmonary/Chest: clear to auscultation bilaterally- no wheezes, rales or rhonchi, normal air movement, no respiratory distress  Cardiovascular: normal rate, normal S1 and S2 and no carotid bruits  Abdomen: soft, non-tender, non-distended, normal bowel sounds, no masses or organomegaly  Extremities: no cyanosis, no clubbing and no edema  Neurologic: no cranial nerve deficit and speech normal        Recent Labs     12/24/24  0548 12/25/24  0544 12/26/24  0552    141 144   K 4.1 4.0  4.0 3.7  3.7    107 108*   CO2 22 22 24   BUN 28* 23 24*   CREATININE 1.93* 1.55* 1.45*   GLUCOSE 122* 106* 102*   CALCIUM 7.7* 8.2* 8.3*       Recent Labs     12/24/24  0548 12/25/24  0544 12/26/24  0552   WBC 10.6 10.6 7.9   RBC 2.68* 2.72* 2.56*   HGB 8.6* 8.7* 8.0*   HCT 25.8* 26.5* 25.1*   MCV 96.3* 97.4* 98.0*   MCH 32.1* 32.0* 31.3   MCHC 33.3 32.8* 31.9*   RDW 14.6* 14.7* 14.6*    188 190           Assessment:    74-year-old male with past medical history of hypertension, diabetes, presents with abdominal pain found to have perforated diverticulitis and acute urinary retention     Acute medical issues  Acute diverticulitis   Acute urinary retention  JANY on CKD     Plan:     1.  Status post 1 L fluid  2.  Continue Zosyn  3.  Status post right colectomy for perforated distal ileal diverticulitis.  NG tube in place  4.  Chapman catheter in place for urinary retention  5.  Follow-up infectious disease, urology and surgery's recommendation  6.  On maintenance fluids at 100 mL/h  12/26 -continue current care, PICC line placed, planning for discharge possibly tomorrow     Chronic medical issue  1.  BPH: Continue finasteride and tamsulosin  2.  Hyperlipidemia: Continue atorvastatin 40 mg daily  3.  Hypertension: Continue metoprolol 50 mg daily     DVT prophylaxis: Lovenox  CODE STATUS: Full      Disposition: Patient admitted for acute diverticulitis with perforation, acute urinary retention status post right colectomy.  Patient will be monitored for now    NOTE: This report was transcribed using voice recognition software. Every effort was made to ensure accuracy; however, inadvertent computerized transcription errors may be present.  Electronically signed by MANJU KAN MD on 12/26/2024 at 3:27 PM

## 2024-12-26 NOTE — PROGRESS NOTES
Assisted pt to bedside commode and NG tube slipped out of patients nose. No residual volume over night shift. NG has been clamped over night. NP notified and said it's okay to leave out for now.

## 2024-12-27 DIAGNOSIS — K57.92 ACUTE DIVERTICULITIS: Primary | ICD-10-CM

## 2024-12-27 LAB
ALBUMIN SERPL-MCNC: 2.8 G/DL (ref 3.5–4.6)
ALP SERPL-CCNC: 69 U/L (ref 35–104)
ALT SERPL-CCNC: 24 U/L (ref 0–41)
ANION GAP SERPL CALCULATED.3IONS-SCNC: 9 MEQ/L (ref 9–15)
AST SERPL-CCNC: 25 U/L (ref 0–40)
BASOPHILS # BLD: 0 K/UL (ref 0–0.2)
BASOPHILS NFR BLD: 0.3 %
BILIRUB SERPL-MCNC: 0.7 MG/DL (ref 0.2–0.7)
BUN SERPL-MCNC: 21 MG/DL (ref 8–23)
CALCIUM SERPL-MCNC: 8 MG/DL (ref 8.5–9.9)
CHLORIDE SERPL-SCNC: 104 MEQ/L (ref 95–107)
CO2 SERPL-SCNC: 25 MEQ/L (ref 20–31)
CREAT SERPL-MCNC: 1.26 MG/DL (ref 0.7–1.2)
EOSINOPHIL # BLD: 0.3 K/UL (ref 0–0.7)
EOSINOPHIL NFR BLD: 4.3 %
ERYTHROCYTE [DISTWIDTH] IN BLOOD BY AUTOMATED COUNT: 14.2 % (ref 11.5–14.5)
GLOBULIN SER CALC-MCNC: 3.6 G/DL (ref 2.3–3.5)
GLUCOSE BLD-MCNC: 111 MG/DL (ref 70–99)
GLUCOSE BLD-MCNC: 124 MG/DL (ref 70–99)
GLUCOSE BLD-MCNC: 149 MG/DL (ref 70–99)
GLUCOSE BLD-MCNC: 217 MG/DL (ref 70–99)
GLUCOSE SERPL-MCNC: 97 MG/DL (ref 70–99)
HCT VFR BLD AUTO: 22.3 % (ref 42–52)
HGB BLD-MCNC: 7.4 G/DL (ref 14–18)
LYMPHOCYTES # BLD: 1.6 K/UL (ref 1–4.8)
LYMPHOCYTES NFR BLD: 27.3 %
MAGNESIUM SERPL-MCNC: 1.6 MG/DL (ref 1.7–2.4)
MCH RBC QN AUTO: 32 PG (ref 27–31.3)
MCHC RBC AUTO-ENTMCNC: 33.2 % (ref 33–37)
MCV RBC AUTO: 96.5 FL (ref 79–92.2)
MONOCYTES # BLD: 0.6 K/UL (ref 0.2–0.8)
MONOCYTES NFR BLD: 10.4 %
NEUTROPHILS # BLD: 3.3 K/UL (ref 1.4–6.5)
NEUTS SEG NFR BLD: 57 %
PERFORMED ON: ABNORMAL
PLATELET # BLD AUTO: 164 K/UL (ref 130–400)
POTASSIUM SERPL-SCNC: 3.4 MEQ/L (ref 3.4–4.9)
PROT SERPL-MCNC: 6.4 G/DL (ref 6.3–8)
RBC # BLD AUTO: 2.31 M/UL (ref 4.7–6.1)
SODIUM SERPL-SCNC: 138 MEQ/L (ref 135–144)
WBC # BLD AUTO: 5.8 K/UL (ref 4.8–10.8)

## 2024-12-27 PROCEDURE — 1210000000 HC MED SURG R&B

## 2024-12-27 PROCEDURE — 6360000002 HC RX W HCPCS: Performed by: COLON & RECTAL SURGERY

## 2024-12-27 PROCEDURE — 2500000003 HC RX 250 WO HCPCS: Performed by: COLON & RECTAL SURGERY

## 2024-12-27 PROCEDURE — 6370000000 HC RX 637 (ALT 250 FOR IP): Performed by: COLON & RECTAL SURGERY

## 2024-12-27 PROCEDURE — 99232 SBSQ HOSP IP/OBS MODERATE 35: CPT | Performed by: UROLOGY

## 2024-12-27 PROCEDURE — 6370000000 HC RX 637 (ALT 250 FOR IP): Performed by: SURGERY

## 2024-12-27 PROCEDURE — 99232 SBSQ HOSP IP/OBS MODERATE 35: CPT | Performed by: INTERNAL MEDICINE

## 2024-12-27 PROCEDURE — 83735 ASSAY OF MAGNESIUM: CPT

## 2024-12-27 PROCEDURE — 99024 POSTOP FOLLOW-UP VISIT: CPT | Performed by: COLON & RECTAL SURGERY

## 2024-12-27 PROCEDURE — 97535 SELF CARE MNGMENT TRAINING: CPT

## 2024-12-27 PROCEDURE — 85025 COMPLETE CBC W/AUTO DIFF WBC: CPT

## 2024-12-27 PROCEDURE — 6360000002 HC RX W HCPCS: Performed by: INTERNAL MEDICINE

## 2024-12-27 PROCEDURE — 2580000003 HC RX 258: Performed by: INTERNAL MEDICINE

## 2024-12-27 PROCEDURE — 97116 GAIT TRAINING THERAPY: CPT

## 2024-12-27 PROCEDURE — 2580000003 HC RX 258: Performed by: COLON & RECTAL SURGERY

## 2024-12-27 PROCEDURE — 80053 COMPREHEN METABOLIC PANEL: CPT

## 2024-12-27 RX ORDER — DOCUSATE SODIUM 100 MG/1
100 CAPSULE, LIQUID FILLED ORAL DAILY
Status: DISCONTINUED | OUTPATIENT
Start: 2024-12-27 | End: 2024-12-28 | Stop reason: HOSPADM

## 2024-12-27 RX ADMIN — Medication 10 ML: at 21:09

## 2024-12-27 RX ADMIN — PIPERACILLIN AND TAZOBACTAM 3375 MG: 3; .375 INJECTION, POWDER, LYOPHILIZED, FOR SOLUTION INTRAVENOUS at 09:42

## 2024-12-27 RX ADMIN — TAMSULOSIN HYDROCHLORIDE 0.4 MG: 0.4 CAPSULE ORAL at 09:32

## 2024-12-27 RX ADMIN — MAGNESIUM SULFATE HEPTAHYDRATE 2000 MG: 40 INJECTION, SOLUTION INTRAVENOUS at 09:42

## 2024-12-27 RX ADMIN — INSULIN LISPRO 2 UNITS: 100 INJECTION, SOLUTION INTRAVENOUS; SUBCUTANEOUS at 21:09

## 2024-12-27 RX ADMIN — FINASTERIDE 5 MG: 5 TABLET, FILM COATED ORAL at 09:33

## 2024-12-27 RX ADMIN — SODIUM CHLORIDE, PRESERVATIVE FREE 10 ML: 5 INJECTION INTRAVENOUS at 21:10

## 2024-12-27 RX ADMIN — ATORVASTATIN CALCIUM 40 MG: 40 TABLET, FILM COATED ORAL at 09:32

## 2024-12-27 RX ADMIN — PIPERACILLIN AND TAZOBACTAM 3375 MG: 3; .375 INJECTION, POWDER, LYOPHILIZED, FOR SOLUTION INTRAVENOUS at 02:33

## 2024-12-27 RX ADMIN — DOCUSATE SODIUM 100 MG: 100 CAPSULE, LIQUID FILLED ORAL at 18:11

## 2024-12-27 RX ADMIN — METOPROLOL SUCCINATE 50 MG: 50 TABLET, EXTENDED RELEASE ORAL at 09:32

## 2024-12-27 RX ADMIN — Medication 10 ML: at 09:33

## 2024-12-27 RX ADMIN — ERTAPENEM SODIUM 1000 MG: 1 INJECTION INTRAMUSCULAR; INTRAVENOUS at 15:36

## 2024-12-27 RX ADMIN — ENOXAPARIN SODIUM 40 MG: 100 INJECTION SUBCUTANEOUS at 09:33

## 2024-12-27 ASSESSMENT — ENCOUNTER SYMPTOMS
RESPIRATORY NEGATIVE: 1
VOMITING: 0
NAUSEA: 0
ABDOMINAL PAIN: 0
ABDOMINAL DISTENTION: 1
DIARRHEA: 0
ABDOMINAL DISTENTION: 0
ABDOMINAL PAIN: 1

## 2024-12-27 ASSESSMENT — PAIN SCALES - GENERAL: PAINLEVEL_OUTOF10: 1

## 2024-12-27 NOTE — PROGRESS NOTES
[facility]  Occupational Therapy        NAME:  Hector Bose  ROOM: W477/W477[address]01  :  1950  DATE: 2024    Attempted to see Hector Bose at 9:32 AM on this date for:   []  Initial Evaluation   [x]  Treatment       Patient was unable to be seen due to:   [] Off unit for testing/procedure    [] Patient refused, stating \"    [] Therapy on hold due to     [] Nursing deferred due to    [x] Other:  Rn currently in room to remove pt IV. Pt agreeable to therapist attempting again later on in the day. Will attempt again when next able.      Electronically signed by SAL Daniel on 24 at 9:34 AM EST

## 2024-12-27 NOTE — PROGRESS NOTES
Infectious Disease     Patient Name: Hector Bose  Date: 12/27/2024  YOB: 1950  Medical Record Number: [id number]      Diverticulitis with perforation  Peritonitis      12/23/2024  Patient presented with nausea vomiting loss of appetite some diarrhea  Abdominal pain right lower quadrant  No shortness of breath chest pain  Some fevers chills 102.7 Fahrenheit on admission    CT scan showed diverticulitis and evidence of perforation  Patient taken to surgery        12/23/2024   Perforated distal ileal diverticulitis.     PROCEDURES:    1. Exploratory laparotomy.  2. Lysis of adhesions.  3. Right colectomy with primary anastomosis.  When patient was explored pus was found on immediate opening of the abdomen  no free perforation, but there was purulence present around the distal ileum             Culture, Surgical [[id number]] Collected: 12/23/24 1632      Specimen: Swab from Abdomen Updated: 12/24/24 1148      Culture Surgical --      Direct Exam:  MODERATE NEUTROPHILS  Direct Exam:  NO BACTERIA SEEN  Culture, Surgical:  PENDING  Performed at [address] [id number] ([phone]       bowel movements.     Nasogastric tube out             Review of Systems   Constitutional:  Negative for fever.   Respiratory: Negative.     Cardiovascular: Negative.    Gastrointestinal:  Positive for abdominal distention and abdominal pain. Negative for diarrhea, nausea and vomiting.   Skin: Negative.              Physical Exam:      Physical Exam  Eyes:      Pupils: Pupils are equal, round, and reactive to light.   Cardiovascular:      Heart sounds: Normal heart sounds. No murmur heard.  Pulmonary:      Effort: Pulmonary effort is normal. No respiratory distress.      Breath sounds: Normal breath sounds. No wheezing, rhonchi or rales.   Abdominal:      General: There is distension.      Palpations: Abdomen is soft. There is no mass.      Tenderness: There is abdominal tenderness. There is  no guarding.         Blood pressure (!) 140/73, pulse 75, temperature 98.1 °F (36.7 °C), temperature source Oral, resp. rate 20, height 1.676 m (5' 6\"), weight 96.6 kg (213 lb), SpO2 100%.      .   Lab Results   Component Value Date    WBC 5.8 12/27/2024    HGB 7.4 (L) 12/27/2024    HCT 22.3 (L) 12/27/2024    MCV 96.5 (H) 12/27/2024     12/27/2024     Lab Results   Component Value Date/Time     12/27/2024 06:33 AM    K 3.4 12/27/2024 06:33 AM     12/27/2024 06:33 AM    CO2 25 12/27/2024 06:33 AM    BUN 21 12/27/2024 06:33 AM    CREATININE 1.26 12/27/2024 06:33 AM    GLUCOSE 97 12/27/2024 06:33 AM    GLUCOSE 184 06/02/2023 11:11 AM    CALCIUM 8.0 12/27/2024 06:33 AM    LABGLOM 60.5 12/27/2024 06:33 AM    LABGLOM 46.5 09/01/2023 10:25 AM                ASSESSMENT:  PLAN:    Diverticulitis with perforation  Peritonitis    Switch to Invanz  Will probably need  3 weeks

## 2024-12-27 NOTE — CARE COORDINATION
MET WITH PATIENT AT BEDSIDE TO DISCUSS DISCHARGE PLAN. PATIENT UPDATED ON OOP COST OF $721.37 PER WEEK FOR IV ZOSYN. DISCUSS WITH PATIENT OPTIONS OF SNF OR REHAB. PATIENT DECLINES AT THIS TIME. DISCHARGE PLAN TBD PEND FINAL ID PLAN.    1450  MET WITH PATIENT AT BEDSIDE TO UPDATE ON IV ANTIBIOTIC INVANZ 1000 MG ONCE A DAY X 4 WEEKS.  PATIENT IS AGREEABLE TO [facility].  SPOKE WITH SHALOM AT [facility] AND STATES TO FAX PRESCRIPTION AND LAB ORDERS ONCE AVAILABLE. PATIENT DISCHARGE PLAN HOME NO NEEDS WILL GO TO [facility] FOR IV ANTIBIOTIC.     1550 FAXED PRESCRIPTION AND LAB ORDERS TO [facility].

## 2024-12-27 NOTE — PLAN OF CARE
Nutrition Problem #1: Inadequate oral intake  Intervention: Food and/or Nutrient Delivery: Modify Current Diet    Problem: Nutrition Deficit:  Goal: Optimize nutritional status  12/27/2024 1216 by Sylvia Archuleta, MS, RD, LD  Flowsheets (Taken 12/24/2024 0817 by Ewa Gunderson RD, LD)  Nutrient intake appropriate for improving, restoring, or maintaining nutritional needs:   Monitor oral intake, labs, and treatment plans   Assess nutritional status and recommend course of action   Recommend appropriate diets, oral nutritional supplements, and vitamin/mineral supplements   Recommend, monitor, and adjust tube feedings and TPN/PPN based on assessed needs  12/27/2024 1052 by Rosalind Carvajal, RN  Outcome: Progressing

## 2024-12-27 NOTE — PROGRESS NOTES
Pt VS and assessment completed. Pt A&O x4. Calm and cooperative. Meds given per MAR, PRN Mag replaced per protocol. Pt tolerating reg diet. SASHA drain present to R side ABD, dark red output. ABD dressing with shadowing present. Chapman catheter removed this AM, Pt voided. Pt with no c/o chest pain or SOB. Bed locked and in low position. Call light.     SASHA drain removed. No complications noted.     Electronically signed by Rosalind Carvajal RN on 12/27/2024 at 12:51 PM

## 2024-12-27 NOTE — PROGRESS NOTES
Postop day #4 right colectomy for ileal diverticulitis with abscess    Tolerated liquids without problems    Diet advanced    SASHA drain serosanguineous    Anticipate discharge tomorrow with arrangements for antibiotics solid.    I will remove drain later today if doing well.    Patient is working with physical therapy.    Please call if any questions.

## 2024-12-27 NOTE — PROGRESS NOTES
Comprehensive Nutrition Assessment    Type and Reason for Visit:  Reassess    Nutrition Recommendations/Plan:   Continue Low Fiber diet  Add carb control 4 for DM dx  Monitor tolerance      Malnutrition Assessment:  Malnutrition Status:  No malnutrition (12/24/24 0814)      Nutrition Assessment:    Pt advanced to low fiber diet today s/p right colectomy for perforated distal ileal diverticulitis. Will monitor tolerance and adequacy of intakes with advancement.    Nutrition Related Findings:    \"history includes: diabetes, CKD, CLL/duodenal lymphoma presents with abdominal pain, (12/23/24) s/p right colectomy for perforated distal ileal diverticulitis Patient doing well. NG tube in place.\" Mg noted low (1.6). Wound Type: None       Current Nutrition Intake & Therapies:    Average Meal Intake: 0% (just advanced to low fiber)  Average Supplements Intake: None Ordered  ADULT DIET; Regular; Low Fiber    Anthropometric Measures:  Height: 167.6 cm (5' 6\")  Ideal Body Weight (IBW): 142 lbs (65 kg)    Admission Body Weight: 97.1 kg (214 lb) (stated; 218# ( 12/18/24))  Current Body Weight: 98.9 kg (218 lb), 153.5 % IBW.    Current BMI (kg/m2): 35.2  Usual Body Weight: 102.5 kg (226 lb) (( 9/2024), 226# ( 6/2024), 232# (12/2023)- office visist)     % Weight Change (Calculated): -3.5                    BMI Categories: Obese Class 1 (BMI 30.0-34.9)    Estimated Daily Nutrient Needs:  Energy Requirements Based On: Kcal/kg  Weight Used for Energy Requirements: Admission  Energy (kcal/day): ~6199-9656 kcals @ 16 kcal/kg  Weight Used for Protein Requirements: Ideal  Protein (g/day): ~ 78 g protein @ 1.2 g/kg  Method Used for Fluid Requirements: 1 ml/kcal  Fluid (ml/day): ~1600    Nutrition Diagnosis:   Inadequate oral intake related to altered GI function as evidenced by intake 0-25%    Nutrition Interventions:   Food and/or Nutrient Delivery: Continue Current Diet,   Nutrition Education/Counseling: Education/Counseling not  indicated  Coordination of Nutrition Care: Continue to monitor while inpatient       Goals:  Goals: PO intake 50% or greater          Nutrition Monitoring and Evaluation:   Behavioral-Environmental Outcomes: None Identified  Food/Nutrient Intake Outcomes: Food and Nutrient Intake,   Physical Signs/Symptoms Outcomes: Biochemical Data, GI Status, Weight, Meal Time Behavior    Discharge Planning:    Too soon to determine     Sylvia Archuleta MS, RD, LD

## 2024-12-27 NOTE — PROGRESS NOTES
[facility] Hospitalist Progress Note    Admitting Date and Time: 12/22/2024  9:28 PM  Admit Dx: Diverticulitis [K57.92]  Intractable abdominal pain [R10.9]  Acute diverticulitis [K57.92]  Intractable nausea [R11.0]    Subjective:  Patient is being followed for Diverticulitis [K57.92]  Intractable abdominal pain [R10.9]  Acute diverticulitis [K57.92]  Intractable nausea [R11.0]     Patient resting in bed, feels significantly improved.  No cp, sob.    ROS: denies fever, chills, cp, sob, n/v, HA unless stated above.    ertapenem (INVanz) IV  1,000 mg IntraVENous Q24H    sodium chloride  500 mL IntraVENous Once    atorvastatin  40 mg Oral Daily    metoprolol succinate  50 mg Oral Daily    sodium chloride flush  5-40 mL IntraVENous 2 times per day    enoxaparin  40 mg SubCUTAneous Daily    insulin lispro  0-8 Units SubCUTAneous 4x Daily AC & HS    tamsulosin  0.4 mg Oral Daily    finasteride  5 mg Oral Daily     sodium chloride flush, 5-40 mL, PRN  sodium chloride, , PRN  potassium chloride, 40 mEq, PRN   Or  potassium alternative oral replacement, 40 mEq, PRN   Or  potassium chloride, 10 mEq, PRN  magnesium sulfate, 2,000 mg, PRN  ondansetron, 4 mg, Q8H PRN   Or  ondansetron, 4 mg, Q6H PRN  melatonin, 3 mg, Nightly PRN  polyethylene glycol, 17 g, Daily PRN  acetaminophen, 650 mg, Q6H PRN   Or  acetaminophen, 650 mg, Q6H PRN  glucose, 4 tablet, PRN  dextrose bolus, 125 mL, PRN   Or  dextrose bolus, 250 mL, PRN  glucagon (rDNA), 1 mg, PRN  dextrose, , Continuous PRN  HYDROmorphone, 0.5 mg, Q3H PRN   Or  HYDROmorphone, 1 mg, Q3H PRN  lidocaine, , PRN         Objective:    BP (!) 140/73   Pulse 75   Temp 98.1 °F (36.7 °C) (Oral)   Resp 20   Ht 1.676 m (5' 6\")   Wt 96.6 kg (213 lb)   SpO2 100%   BMI 34.38 kg/m²     General Appearance: alert and oriented to person, place and time and in no acute distress  Skin: warm and dry  Head: normocephalic and atraumatic  Eyes: pupils equal, round, and reactive to light,

## 2024-12-27 NOTE — PLAN OF CARE
Problem: Chronic Conditions and Co-morbidities  Goal: Patient's chronic conditions and co-morbidity symptoms are monitored and maintained or improved  12/27/2024 1052 by Rosalind Carvajal RN  Outcome: Progressing  12/27/2024 0336 by Bing Palomo RN  Outcome: Progressing     Problem: Discharge Planning  Goal: Discharge to home or other facility with appropriate resources  12/27/2024 1052 by Rosalind Carvajal RN  Outcome: Progressing  12/27/2024 0336 by Bing Palomo RN  Outcome: Progressing     Problem: Pain  Goal: Verbalizes/displays adequate comfort level or baseline comfort level  12/27/2024 1052 by Rosalind Carvajal RN  Outcome: Progressing  12/27/2024 0336 by Bing Palomo RN  Outcome: Progressing     Problem: Safety - Adult  Goal: Free from fall injury  Outcome: Progressing     Problem: Skin/Tissue Integrity - Adult  Goal: Skin integrity remains intact  Outcome: Progressing  Goal: Incisions, wounds, or drain sites healing without S/S of infection  Outcome: Progressing  Goal: Oral mucous membranes remain intact  Outcome: Progressing     Problem: Musculoskeletal - Adult  Goal: Return mobility to safest level of function  Outcome: Progressing  Goal: Maintain proper alignment of affected body part  Outcome: Progressing  Goal: Return ADL status to a safe level of function  Outcome: Progressing     Problem: Gastrointestinal - Adult  Goal: Minimal or absence of nausea and vomiting  Outcome: Progressing  Goal: Maintains or returns to baseline bowel function  Outcome: Progressing  Goal: Maintains adequate nutritional intake  Outcome: Progressing  Goal: Establish and maintain optimal ostomy function  Outcome: Progressing     Problem: Genitourinary - Adult  Goal: Absence of urinary retention  Outcome: Progressing  Goal: Urinary catheter remains patent  Outcome: Progressing     Problem: Nutrition Deficit:  Goal: Optimize nutritional status  Outcome: Progressing     Problem: ABCDS Injury Assessment  Goal: Absence

## 2024-12-27 NOTE — PROGRESS NOTES
Patient doing well.  Tolerating diet.    Plan to DC SASHA drain    Expected discharge tomorrow.  I will see him in the office on Friday.    All questions answered.    Dr. Sprague to cover in my absence for the next week

## 2024-12-27 NOTE — PROGRESS NOTES
Physical Therapy Med Surg Daily Treatment Note  Facility/Department: [address] MED SURG UNIT  Room: [place] [address]       NAME: Hector Bose  : 1950 (74 y.o.)  MRN: [id number]  CODE STATUS: Full Code    Date of Service: 2024    Patient Diagnosis(es): Diverticulitis [K57.92]  Intractable abdominal pain [R10.9]  Acute diverticulitis [K57.92]  Intractable nausea [R11.0]   Chief Complaint   Patient presents with    Abdominal Pain     Severe lower abdominal pain and unable to urinate     Patient Active Problem List    Diagnosis Date Noted    Type 2 diabetes mellitus with chronic kidney disease 2022    Acute diverticulitis 2024    Bowel perforation (HCC) 2024    Diverticulitis of intestine with perforation without bleeding 2024    Urinary retention 2024    Ileitis, terminal (HCC) 2024    Spasmodic torticollis 2024    Cervical radiculopathy 2024    Stiff neck 2024    Sensation of lump in throat 2024    Iron deficiency 2024    Duodenitis 2023    Shoulder pain 2023    Stage 3b chronic kidney disease (HCC) 2023    Vocal cord disease 2023    Gastric intestinal metaplasia, unspecified 2023    Left carpal tunnel syndrome 2022    Bilateral carpal tunnel syndrome 2022    History of colon polyps     Chronic kidney disease     Acute idiopathic gout of right foot     Morbidly obese 2020    Essential hypertension 2018    Type 2 diabetes mellitus without complication, without long-term current use of insulin (HCC) 2017    Dyslipidemia 2017    Renal mass 2016    Arthritis, lumbar spine 2015    Cyst, epididymis 2011    Hydrocele 2011    Varicocele 2011    Stiffness of joint, not elsewhere classified, forearm 2009    Stiffness of joint, not elsewhere classified, hand 2009    Pain in joint, hand 2009    Other closed fractures of distal end of radius  OR    SHOULDER SURGERY Right 1979    due to displacement    UPPER GASTROINTESTINAL ENDOSCOPY  4/29/15    w/bx     UPPER GASTROINTESTINAL ENDOSCOPY  02/28/2018    Dr. baltazar Barahona       Chart Reviewed: Yes  Family/Caregiver Present: No    Restrictions:  Restrictions/Precautions: Fall Risk    SUBJECTIVE:   Subjective: \"I hope to leave tomorrow or Sunday.\"    Pain  Pain  Pre-Pain: 0  Post-Pain: 0    OBJECTIVE:        Bed mobility  Supine to Sit: Stand by assistance;Minimal assistance  Sit to Supine:  (NT - pt left in chair end of tx)  Scooting: Stand by assistance  Bed Mobility Comments: HOB slightly elevated with use of bed rails. Increase time and effort to complete.    Transfers  Sit to Stand: Stand by assistance  Stand to Sit: Stand by assistance  Bed to Chair: Stand by assistance  Comment: With WW. Pt displayed good safety and hand placement before standing and prior to sitting. Increase time to complete.    Ambulation  Surface: Level tile  Device: Rolling Walker  Assistance: Stand by assistance  Quality of Gait: step through pattern, decreased LE stability.  Gait Deviations: Slow Imani;Decreased step length;Decreased step height  Distance: 30ft x 2      Activity Tolerance  Activity Tolerance: Patient tolerated treatment well          ASSESSMENT   Assessment: Pt able to increase ambulation distance with good tolerance and mild fatigue noted post ambulation.  Less assistance needed to complete bed mobility and transfers with good safety.  End of tx pt left in chair to promote OOB activities.     Discharge Recommendations:  Continue to assess pending progress         Goals  Long Term Goals  Long Term Goal 1: Pt will demonstrate bed mobility indep  Long Term Goal 2: Pt will demonstrate transfers indep  Long Term Goal 3: Pt will demonstrate amb >/= 100ft indep  Long Term Goal 4: Pt will demonstrate TUG </= 19 sec for decreased risk for falls    PLAN    General Plan: 1 time a day 3-6 times a week  Safety  Devices  Type of Devices: Call light within reach, Chair alarm in place, Left in chair, All fall risk precautions in place     AMPAC (6 CLICK) BASIC MOBILITY  AM-PAC Inpatient Mobility Raw Score : 17     Therapy Time   Individual   Time In 0841   Time Out 0904   Minutes 23      BM/ Transfers: 15   Gait: 8       DARWIN PREET, PTA, 12/27/24 at 10:38 AM         Definitions for assistance levels  Independent = pt does not require any physical supervision or assistance from another person for activity completion. Device may be needed.  Stand by assistance = pt requires verbal cues or instructions from another person, close to but not touching, to perform the activity  Minimal assistance= pt performs 75% or more of the activity; assistance is required to complete the activity  Moderate assistance= pt performs 50% of the activity; assistance is required to complete the activity  Maximal assistance = pt performs 25% of the activity; assistance is required to complete the activity  Dependent = pt requires total physical assistance to accomplish the task

## 2024-12-27 NOTE — PROGRESS NOTES
Progress Note    12/27/2024   9:27 AM    Name:  Hector Bose  MRN:    [id number]     Acct:     [id number]   Room:  [address] Day: 4     Admit Date: 12/22/2024  9:28 PM  PCP: Sean Levy MD    Subjective:     C/C:   Chief Complaint   Patient presents with    Abdominal Pain     Severe lower abdominal pain and unable to urinate       Interval History: Status: This is a 73 yo male with H/O CKD, CLL, HTN, HLD, DM and developed post-op urinary retention after Rt colectomy. The Chapman has been removed and he is voiding currently without complaints and is on Flomax and Proscar. I reviewed the Renal U/S results and appears the renal lesion of concern on recent non-contrast CT are cysts by U/S.     Past Medical History:   Diagnosis Date    Acute idiopathic gout of right foot     Anemia 03/01/2018    iron transfusions x2    Arthritis     both knees    Bilateral carpal tunnel syndrome 1/12/2022    Chronic kidney disease     CLL (chronic lymphocytic leukemia) (HCC)     dx 5/2015    Colon polyps     Disease of blood and blood forming organ     Hyperlipidemia     dx since 1970's    Hypertension     meds  since 1970's    Lymphoma of gastrointestinal tract (HCC)     Movement disorder     Type II or unspecified type diabetes mellitus without mention of complication, not stated as uncontrolled     hx > 20 yrs    Urinary retention 12/23/2024       ROS:  Review of Systems   Gastrointestinal:  Negative for abdominal distention and abdominal pain.   Genitourinary:  Negative for difficulty urinating, dysuria, flank pain and hematuria.       Medications:     Allergies:   Allergies   Allergen Reactions    Dye [Barium-Containing Compounds] Hives    Iodides Hives    Iodinated Contrast Media Hives       Current Meds: sodium chloride 0.9 % bolus 500 mL, Once  atorvastatin (LIPITOR) tablet 40 mg, Daily  metoprolol succinate (TOPROL XL) extended release tablet 50 mg, Daily  sodium chloride flush 0.9 % injection 5-40 mL, 2   In the  mid lateral left kidney there is 0.9 x 0.8 x 0.7 cm cystic structure.  In the  superior pole there is a 2.3 x 2.4 x 2.8 cm cystic structure.     IMPRESSION:  Nonobstructing stone in the left kidney.  No hydronephrosis is seen.  Cysts  are seen in both kidneys.              Exam Ended: 12/24/24 08:24 EST Last Resulted:         Assessment:        Primary Problem    This is a 75 yo male with H/O CKD, CLL, HTN, HLD, DM and developed post-op urinary retention after Rt colectomy and has resolved on Flomax and Proscar. Renal lesions appears to be cysts by U/S.       Diverticulitis of intestine with perforation without bleeding     Active Hospital Problems    Diagnosis Date Noted    Bowel perforation (HCC) [K63.1] 12/25/2024    Diverticulitis of intestine with perforation without bleeding [K57.80] 12/23/2024    Urinary retention [R33.9] 12/23/2024    Renal mass [N28.89] 06/02/2016         Plan:        OK for D/C from [facility] standpoint and will arrange F/U in 1-2 weeks  D/C on Flomax and Proscar      Electronically signed by Eduardo Connors MD on 12/27/2024 at 9:27 AM

## 2024-12-28 VITALS
OXYGEN SATURATION: 96 % | DIASTOLIC BLOOD PRESSURE: 69 MMHG | TEMPERATURE: 98.8 F | BODY MASS INDEX: 34.01 KG/M2 | SYSTOLIC BLOOD PRESSURE: 131 MMHG | WEIGHT: 211.6 LBS | RESPIRATION RATE: 18 BRPM | HEIGHT: 66 IN | HEART RATE: 74 BPM

## 2024-12-28 LAB
ALBUMIN SERPL-MCNC: 3 G/DL (ref 3.5–4.6)
ALP SERPL-CCNC: 66 U/L (ref 35–104)
ALT SERPL-CCNC: 24 U/L (ref 0–41)
ANION GAP SERPL CALCULATED.3IONS-SCNC: 10 MEQ/L (ref 9–15)
AST SERPL-CCNC: 20 U/L (ref 0–40)
BASOPHILS # BLD: 0 K/UL (ref 0–0.2)
BASOPHILS NFR BLD: 0.2 %
BILIRUB SERPL-MCNC: 0.4 MG/DL (ref 0.2–0.7)
BUN SERPL-MCNC: 18 MG/DL (ref 8–23)
CALCIUM SERPL-MCNC: 7.9 MG/DL (ref 8.5–9.9)
CHLORIDE SERPL-SCNC: 102 MEQ/L (ref 95–107)
CO2 SERPL-SCNC: 25 MEQ/L (ref 20–31)
CREAT SERPL-MCNC: 1.27 MG/DL (ref 0.7–1.2)
EOSINOPHIL # BLD: 0.3 K/UL (ref 0–0.7)
EOSINOPHIL NFR BLD: 4.3 %
ERYTHROCYTE [DISTWIDTH] IN BLOOD BY AUTOMATED COUNT: 14.4 % (ref 11.5–14.5)
GLOBULIN SER CALC-MCNC: 3.5 G/DL (ref 2.3–3.5)
GLUCOSE BLD-MCNC: 128 MG/DL (ref 70–99)
GLUCOSE BLD-MCNC: 197 MG/DL (ref 70–99)
GLUCOSE SERPL-MCNC: 113 MG/DL (ref 70–99)
HCT VFR BLD AUTO: 22.7 % (ref 42–52)
HGB BLD-MCNC: 7.6 G/DL (ref 14–18)
LYMPHOCYTES # BLD: 1.6 K/UL (ref 1–4.8)
LYMPHOCYTES NFR BLD: 25.9 %
MAGNESIUM SERPL-MCNC: 1.6 MG/DL (ref 1.7–2.4)
MCH RBC QN AUTO: 32.1 PG (ref 27–31.3)
MCHC RBC AUTO-ENTMCNC: 33.5 % (ref 33–37)
MCV RBC AUTO: 95.8 FL (ref 79–92.2)
MONOCYTES # BLD: 0.6 K/UL (ref 0.2–0.8)
MONOCYTES NFR BLD: 10.6 %
NEUTROPHILS # BLD: 3.5 K/UL (ref 1.4–6.5)
NEUTS SEG NFR BLD: 58 %
PERFORMED ON: ABNORMAL
PERFORMED ON: ABNORMAL
PLATELET # BLD AUTO: 178 K/UL (ref 130–400)
POTASSIUM SERPL-SCNC: 3.2 MEQ/L (ref 3.4–4.9)
PROT SERPL-MCNC: 6.5 G/DL (ref 6.3–8)
RBC # BLD AUTO: 2.37 M/UL (ref 4.7–6.1)
SODIUM SERPL-SCNC: 137 MEQ/L (ref 135–144)
WBC # BLD AUTO: 6 K/UL (ref 4.8–10.8)

## 2024-12-28 PROCEDURE — 85025 COMPLETE CBC W/AUTO DIFF WBC: CPT

## 2024-12-28 PROCEDURE — 83735 ASSAY OF MAGNESIUM: CPT

## 2024-12-28 PROCEDURE — 6370000000 HC RX 637 (ALT 250 FOR IP): Performed by: COLON & RECTAL SURGERY

## 2024-12-28 PROCEDURE — 2580000003 HC RX 258: Performed by: INTERNAL MEDICINE

## 2024-12-28 PROCEDURE — 97116 GAIT TRAINING THERAPY: CPT

## 2024-12-28 PROCEDURE — 99232 SBSQ HOSP IP/OBS MODERATE 35: CPT | Performed by: INTERNAL MEDICINE

## 2024-12-28 PROCEDURE — 6360000002 HC RX W HCPCS: Performed by: INTERNAL MEDICINE

## 2024-12-28 PROCEDURE — 80053 COMPREHEN METABOLIC PANEL: CPT

## 2024-12-28 PROCEDURE — 6360000002 HC RX W HCPCS: Performed by: COLON & RECTAL SURGERY

## 2024-12-28 PROCEDURE — 6370000000 HC RX 637 (ALT 250 FOR IP): Performed by: SURGERY

## 2024-12-28 PROCEDURE — 2500000003 HC RX 250 WO HCPCS: Performed by: COLON & RECTAL SURGERY

## 2024-12-28 PROCEDURE — 99024 POSTOP FOLLOW-UP VISIT: CPT | Performed by: SURGERY

## 2024-12-28 RX ORDER — POTASSIUM CHLORIDE 750 MG/1
40 TABLET, EXTENDED RELEASE ORAL ONCE
Status: COMPLETED | OUTPATIENT
Start: 2024-12-28 | End: 2024-12-28

## 2024-12-28 RX ORDER — FINASTERIDE 5 MG/1
5 TABLET, FILM COATED ORAL DAILY
Qty: 30 TABLET | Refills: 3 | Status: SHIPPED | OUTPATIENT
Start: 2024-12-29 | End: 2024-12-30 | Stop reason: SDUPTHER

## 2024-12-28 RX ORDER — FINASTERIDE 5 MG/1
5 TABLET, FILM COATED ORAL DAILY
Qty: 30 TABLET | Refills: 3 | Status: SHIPPED | OUTPATIENT
Start: 2024-12-29 | End: 2024-12-28

## 2024-12-28 RX ORDER — LANOLIN ALCOHOL/MO/W.PET/CERES
400 CREAM (GRAM) TOPICAL ONCE
Status: COMPLETED | OUTPATIENT
Start: 2024-12-28 | End: 2024-12-28

## 2024-12-28 RX ORDER — TAMSULOSIN HYDROCHLORIDE 0.4 MG/1
0.4 CAPSULE ORAL DAILY
Qty: 30 CAPSULE | Refills: 3 | Status: SHIPPED | OUTPATIENT
Start: 2024-12-29 | End: 2024-12-28

## 2024-12-28 RX ORDER — TAMSULOSIN HYDROCHLORIDE 0.4 MG/1
0.4 CAPSULE ORAL DAILY
Qty: 30 CAPSULE | Refills: 3 | Status: SHIPPED | OUTPATIENT
Start: 2024-12-29 | End: 2024-12-30 | Stop reason: SDUPTHER

## 2024-12-28 RX ADMIN — Medication 400 MG: at 12:24

## 2024-12-28 RX ADMIN — DOCUSATE SODIUM 100 MG: 100 CAPSULE, LIQUID FILLED ORAL at 09:22

## 2024-12-28 RX ADMIN — Medication 400 MG: at 11:25

## 2024-12-28 RX ADMIN — FINASTERIDE 5 MG: 5 TABLET, FILM COATED ORAL at 09:22

## 2024-12-28 RX ADMIN — TAMSULOSIN HYDROCHLORIDE 0.4 MG: 0.4 CAPSULE ORAL at 09:22

## 2024-12-28 RX ADMIN — POTASSIUM CHLORIDE 40 MEQ: 750 TABLET, EXTENDED RELEASE ORAL at 11:25

## 2024-12-28 RX ADMIN — INSULIN LISPRO 2 UNITS: 100 INJECTION, SOLUTION INTRAVENOUS; SUBCUTANEOUS at 12:24

## 2024-12-28 RX ADMIN — Medication 10 ML: at 09:23

## 2024-12-28 RX ADMIN — ENOXAPARIN SODIUM 40 MG: 100 INJECTION SUBCUTANEOUS at 09:22

## 2024-12-28 RX ADMIN — METOPROLOL SUCCINATE 50 MG: 50 TABLET, EXTENDED RELEASE ORAL at 09:22

## 2024-12-28 RX ADMIN — ATORVASTATIN CALCIUM 40 MG: 40 TABLET, FILM COATED ORAL at 09:22

## 2024-12-28 RX ADMIN — ERTAPENEM SODIUM 1000 MG: 1 INJECTION INTRAMUSCULAR; INTRAVENOUS at 12:22

## 2024-12-28 ASSESSMENT — ENCOUNTER SYMPTOMS
VOMITING: 0
ABDOMINAL PAIN: 0
ABDOMINAL DISTENTION: 0
NAUSEA: 0
DIARRHEA: 0
RESPIRATORY NEGATIVE: 1

## 2024-12-28 NOTE — DISCHARGE INSTRUCTIONS
OUTPATIENT INFUSION INSTRUCTIONS      --- AFTER YOU ARE DISCHARGED, PLEASE CALL [phone] AND PRESS OPTION 2 TO REGISTER. YOU MUST REGISTER BEFORE BEING ABLE TO COME IN FOR YOUR INFUSIONS. ---      OUTPATIENT INFUSIONS MONDAY THROUGH FRIDAY 8AM  PM   CALL IN THE MORNING [phone] TO LET THEM KNOW WHEN YOU ARE COMING. COME IN THE OUTPATIENT ENTRANCE AND GO TO REGISTRATION. BE SURE TO HAVE YOUR PHOTO ID AND INSURANCE CARD IF YOU HAVE ONE.    FOR OUTPATIENTS COMING IN OVER THE WEEKEND, EVENINGS OR HOLIDAYS  PLEASE GO TO THE ER REGISTRATION AT LEAST A HALF HOUR BEFORE YOUR INFUSION START TIME. TO HAVE YOUR PHOTO ID AND INSURANCE CARD IF YOU HAVE ONE. ANY QUESTIONS, CALL [phone]. THEY WILL INSTRUCT YOU ON WHICH FLOOR YOU WILL RECEIVE YOUR INFUSION. IF YOU HAVE ANY ISSUES FINDING THE FLOOR, PLEASE CALL THE HUB [phone].       Operations performed while hospitalized:   Exploratory laparotomy, lysis of adhesions, right colectomy    Treatment/wound care: Keep incision clean and dry- they are covered with surgical glue (do not peel off the glue, it will fall off on its own when ready, usually in 1-2 weeks).  Ok to shower upon discharge.  Do not scrub wound vigorously. Pat incision(s) dry after showering.  Do not submerge your incision: No baths, jacuzzi/ hot tubes, or swimming for 3 weeks. BE SURE TO LOOK AT YOUR INCISION DAILY (use a mirror if you need to or have some look at it and take a photo if need be.) IF THERE ARE ANY ISSUES WITH YOUR INCISION(S) SUCH AS DRAINAGE, BULGING, OR REDNESS, PLEASE CALL THE OFFICE AND LET DR PANDA KNOW RIGHT AWAY. DO NOT MYCHART MESSAGE AS RESPONSES MAY BE DELAYED.    Pain Control:  Take tylenol or ibuprofen for pain relief (as long as you have no contraindications against them).  You may take prescription pain medication for severe pain.  You should not exceed more than 4000mg of tylenol/acetaminophen from all sources during a 24 hours period.  Do not drive

## 2024-12-28 NOTE — PROGRESS NOTES
GENERAL SURGERY  PROGRESS NOTE    Pt Name: Hector Bose  MRN: [id number]  Date: 12/28/2024    Subjective  BALJIT overnight. Afebrile, VSS, satting well on RA. Pt doing well, reports only having slight pain when coughing/ sneezing. Tolerating low fiber diet without nausea/ vomiting. Having Bms. Ambulating.     Vitals  /69   Pulse 74   Temp 98.8 °F (37.1 °C) (Oral)   Resp 18   Ht 1.676 m (5' 6\")   Wt 96 kg (211 lb 9.6 oz)   SpO2 96%   BMI 34.15 kg/m²      Physical Exam  GEN: A&Ox3, NAD, cooperative   PULM: no increased work of breathing, satting well on RA  CV: regular rate  ABD: Soft, minimally but appropriately TTP near midline incision that is c/d/I with staples, no surrounding erythema   EXT: Warm, dry    Intake/ Output    Intake/Output Summary (Last 24 hours) at 12/28/2024 1245  Last data filed at 12/27/2024 2110  Gross per 24 hour   Intake 20 ml   Output 50 ml   Net -30 ml     Labs  Recent Labs     12/26/24  0552 12/27/24  0633 12/28/24  0606   WBC 7.9 5.8 6.0   HGB 8.0* 7.4* 7.6*   HCT 25.1* 22.3* 22.7*    164 178    138 137   K 3.7  3.7 3.4 3.2*   * 104 102   CO2 24 25 25   BUN 24* 21 18   CREATININE 1.45* 1.26* 1.27*   MG 1.6* 1.6* 1.6*   PHOS 2.4  --   --    CALCIUM 8.3* 8.0* 7.9*   AST 18 25 20   ALT 17 24 24   BILITOT 0.7 0.7 0.4     Assessment/ Plan  Hector Bose is a 74 y.o. male now POD 5 s/p ex lap, LEISA, right colectomy for perforated ileal diverticulitis. Recovering well.    Pain control  Prn antiemetic  Continue low fiber diet, replete K/ Mg, bowel reg:   Strict I&Os  Invanz per ID (probably 3 weeks)  Encourage ambulation, OOB x3  SCDs, lovenox  Ok to d/c home from surgical perspective today. Follow up in clinic for post op visit with Dr Mccray (discharge instructions updated)    Татьяна Sprague MD   General surgeon  12/28/2024

## 2024-12-28 NOTE — CARE COORDINATION
DC PLAN REMAINS HOME WITH OUTPATIENT INFUSIONS. SPOKE WITH SHALOM IN OP INFUSION AND PT TO REPORT TO ER REGISTRATION TOMORROW FOR 930AM. RN UPDATED AND WILL INFORM PATIENT.

## 2024-12-28 NOTE — PROGRESS NOTES
Physical Therapy Med Surg Daily Treatment Note  Facility/Department: [address] MED SURG UNIT  Room: [place] [address]       NAME: Hector Bose  : 1950 (74 y.o.)  MRN: [id number]  CODE STATUS: Full Code    Date of Service: 2024    Patient Diagnosis(es): Diverticulitis [K57.92]  Intractable abdominal pain [R10.9]  Acute diverticulitis [K57.92]  Intractable nausea [R11.0]   Chief Complaint   Patient presents with    Abdominal Pain     Severe lower abdominal pain and unable to urinate     Patient Active Problem List    Diagnosis Date Noted    Type 2 diabetes mellitus with chronic kidney disease 2022    Acute diverticulitis 2024    Bowel perforation (HCC) 2024    Diverticulitis of intestine with perforation without bleeding 2024    Urinary retention 2024    Ileitis, terminal (HCC) 2024    Spasmodic torticollis 2024    Cervical radiculopathy 2024    Stiff neck 2024    Sensation of lump in throat 2024    Iron deficiency 2024    Duodenitis 2023    Shoulder pain 2023    Stage 3b chronic kidney disease (HCC) 2023    Vocal cord disease 2023    Gastric intestinal metaplasia, unspecified 2023    Left carpal tunnel syndrome 2022    Bilateral carpal tunnel syndrome 2022    History of colon polyps     Chronic kidney disease     Acute idiopathic gout of right foot     Morbidly obese 2020    Essential hypertension 2018    Type 2 diabetes mellitus without complication, without long-term current use of insulin (HCC) 2017    Dyslipidemia 2017    Renal mass 2016    Arthritis, lumbar spine 2015    Cyst, epididymis 2011    Hydrocele 2011    Varicocele 2011    Stiffness of joint, not elsewhere classified, forearm 2009    Stiffness of joint, not elsewhere classified, hand 2009    Pain in joint, hand 2009    Other closed fractures of distal end of radius

## 2024-12-28 NOTE — FLOWSHEET NOTE
Notified Dr. Garcia that patient prefers medication prescriptions to be sent to [facility] pharmacy on [address] in [place].

## 2024-12-28 NOTE — PROGRESS NOTES
Infectious Disease     Patient Name: Hector Bose  Date: 12/28/2024  YOB: 1950  Medical Record Number: [id number]      Diverticulitis with perforation  Peritonitis      12/23/2024  Patient presented with nausea vomiting loss of appetite some diarrhea  Abdominal pain right lower quadrant  No shortness of breath chest pain  Some fevers chills 102.7 Fahrenheit on admission    CT scan showed diverticulitis and evidence of perforation  Patient taken to surgery        12/23/2024   Perforated distal ileal diverticulitis.     PROCEDURES:    1. Exploratory laparotomy.  2. Lysis of adhesions.  3. Right colectomy with primary anastomosis.  When patient was explored pus was found on immediate opening of the abdomen  no free perforation, but there was purulence present around the distal ileum             Culture, Surgical [[id number]] Collected: 12/23/24 1632      Specimen: Swab from Abdomen Updated: 12/24/24 1148      Culture Surgical --      Direct Exam:  MODERATE NEUTROPHILS  Direct Exam:  NO BACTERIA SEEN  Culture, Surgical:  PENDING  Performed at [address] [id number] ([phone]       bowel movements.     Nasogastric tube out             Review of Systems   Constitutional:  Negative for fever.   Respiratory: Negative.     Cardiovascular: Negative.    Gastrointestinal:  Negative for abdominal distention, abdominal pain, diarrhea, nausea and vomiting.   Skin: Negative.              Physical Exam:      Physical Exam  Eyes:      Pupils: Pupils are equal, round, and reactive to light.   Cardiovascular:      Heart sounds: Normal heart sounds. No murmur heard.  Pulmonary:      Effort: Pulmonary effort is normal. No respiratory distress.      Breath sounds: Normal breath sounds. No wheezing, rhonchi or rales.   Abdominal:      General: There is no distension.      Palpations: Abdomen is soft. There is no mass.      Tenderness: There is no abdominal tenderness. There is no

## 2024-12-28 NOTE — DISCHARGE SUMMARY
Physician Discharge Summary     Patient ID:  Hector Bose  [id number]  74 y.o.  1950    Admit date: 12/22/2024    Discharge date : 12/28/24     Admitting Physician: Cathleen Nunez MD     Discharge Physician: MANJU KAN MD     Admission Diagnoses: Diverticulitis [K57.92]  Intractable abdominal pain [R10.9]  Acute diverticulitis [K57.92]  Intractable nausea [R11.0]    Discharge Diagnoses: Acute diverticulitis with perforation s/p colectomy, urinary retention, jany on ckd    Admission Condition: fair    Discharged Condition: good    Hospital Course:   74-year-old male with past medical history of hypertension, diabetes, presents with abdominal pain found to have perforated diverticulitis and acute urinary retention     Acute medical issues  Acute diverticulitis   Acute urinary retention  JANY on CKD     Plan:     1.  Status post 1 L fluid  2.  Continue Zosyn  3.  Status post right colectomy for perforated distal ileal diverticulitis.  NG tube in place  4.  Chapman catheter in place for urinary retention  5.  Follow-up infectious disease, urology and surgery's recommendation  6.  On maintenance fluids at 100 mL/h  12/26 -continue current care, PICC line placed, planning for discharge possibly tomorrow  12/27 - iv abx changed to invanz and plan to obtain at outpatient infusion, surg recs dc home tomorrow.  12/28 - dc home     Chronic medical issue  1.  BPH: Continue finasteride and tamsulosin  2.  Hyperlipidemia: Continue atorvastatin 40 mg daily  3.  Hypertension: Continue metoprolol 50 mg daily    Consults: ID and general surgery    Significant Diagnostic Studies: as below    Discharge Exam:  /69   Pulse 74   Temp 98.8 °F (37.1 °C) (Oral)   Resp 18   Ht 1.676 m (5' 6\")   Wt 96 kg (211 lb 9.6 oz)   SpO2 96%   BMI 34.15 kg/m²   General appearance: alert, appears stated age, and cooperative  Lungs: clear to auscultation bilaterally  Heart: regular rate and rhythm, S1, S2 normal, no murmur, click,  infusion Infuse 1,000 mg intravenously every 24 hours for 21 days Compound per protocol.  Qty: 21 g, Refills: 0           CONTINUE these medications which have NOT CHANGED    Details   loratadine (CLARITIN) 10 MG tablet Take 1 tablet by mouth daily as needed (allergies)  Qty: 90 tablet, Refills: 3    Associated Diagnoses: Non-seasonal allergic rhinitis, unspecified trigger      potassium chloride (KLOR-CON M20) 20 MEQ extended release tablet 1 TAB BY MOUTH TWICE A DAY . (CALL ME IF YOU ARE TAKING A DIFFERENT DOSE)  Qty: 180 tablet, Refills: 0    Associated Diagnoses: Essential hypertension      allopurinol (ZYLOPRIM) 100 MG tablet TAKE 2 TABLETS BY MOUTH EVERY DAY  Qty: 180 tablet, Refills: 1    Associated Diagnoses: Gout involving toe of right foot, unspecified cause, unspecified chronicity      doxepin (SINEQUAN) 10 MG capsule TAKE 1 CAPSULE BY MOUTH EVERY NIGHT  Qty: 90 capsule, Refills: 1    Associated Diagnoses: Psychophysiological insomnia      Icosapent Ethyl (VASCEPA) 1 g CAPS capsule Take 2 capsules by mouth 2 times daily  Qty: 360 capsule, Refills: 1      metoprolol succinate (TOPROL XL) 50 MG extended release tablet TAKE 1 TABLET BY MOUTH EVERY DAY  Qty: 90 tablet, Refills: 3      benazepril (LOTENSIN) 20 MG tablet TAKE 1 TABLET BY MOUTH EVERY DAY  Qty: 90 tablet, Refills: 3      metFORMIN (GLUCOPHAGE) 500 MG tablet TAKE 1 TABLETS BY MOUTH TWICE A DAY WITH MEALS  Qty: 180 tablet, Refills: 3      atorvastatin (LIPITOR) 40 MG tablet Take 1 tablet by mouth daily  Qty: 90 tablet, Refills: 3      furosemide (LASIX) 40 MG tablet Take 1 tablet by mouth every other day  Qty: 45 tablet, Refills: 3      Magnesium Oxide 200 MG TABS TAKE 1 TABLET BY MOUTH EVERY DAY  Qty: 90 tablet, Refills: 1      Multiple Vitamins-Minerals (MULTIVITAMIN PO) Take 1 tablet by mouth daily.      Handicap Placard MISC by Does not apply route Diagnosis: Osteoarthritis of the knees: Duration 6/6/2024-6/6/2029  Qty: 1 each, Refills: 0

## 2024-12-28 NOTE — PLAN OF CARE
Problem: Chronic Conditions and Co-morbidities  Goal: Patient's chronic conditions and co-morbidity symptoms are monitored and maintained or improved  Outcome: Progressing  Flowsheets (Taken 12/27/2024 2109)  Care Plan - Patient's Chronic Conditions and Co-Morbidity Symptoms are Monitored and Maintained or Improved: Monitor and assess patient's chronic conditions and comorbid symptoms for stability, deterioration, or improvement     Problem: Discharge Planning  Goal: Discharge to home or other facility with appropriate resources  Outcome: Progressing  Flowsheets (Taken 12/27/2024 2109)  Discharge to home or other facility with appropriate resources: Identify barriers to discharge with patient and caregiver     Problem: Safety - Adult  Goal: Free from fall injury  Outcome: Progressing     Problem: Skin/Tissue Integrity - Adult  Goal: Skin integrity remains intact  Outcome: Progressing  Flowsheets (Taken 12/28/2024 0235)  Skin Integrity Remains Intact: Monitor for areas of redness and/or skin breakdown     Problem: Musculoskeletal - Adult  Goal: Return mobility to safest level of function  Outcome: Progressing  Flowsheets (Taken 12/27/2024 2109)  Return Mobility to Safest Level of Function:   Assess patient stability and activity tolerance for standing, transferring and ambulating with or without assistive devices   Assist with transfers and ambulation using safe patient handling equipment as needed

## 2024-12-29 ENCOUNTER — HOSPITAL ENCOUNTER (OUTPATIENT)
Dept: INFUSION THERAPY | Age: 74
Setting detail: INFUSION SERIES
Discharge: HOME | End: 2024-12-29
Payer: MEDICARE

## 2024-12-29 VITALS
HEART RATE: 82 BPM | SYSTOLIC BLOOD PRESSURE: 116 MMHG | DIASTOLIC BLOOD PRESSURE: 77 MMHG | TEMPERATURE: 99.1 F | RESPIRATION RATE: 18 BRPM | OXYGEN SATURATION: 95 %

## 2024-12-29 DIAGNOSIS — K57.92 ACUTE DIVERTICULITIS: Primary | ICD-10-CM

## 2024-12-29 LAB
CULTURE SURGICAL: ABNORMAL
ORGANISM: ABNORMAL

## 2024-12-29 PROCEDURE — 2580000003 HC RX 258: Performed by: INTERNAL MEDICINE

## 2024-12-29 PROCEDURE — 6360000002 HC RX W HCPCS: Performed by: INTERNAL MEDICINE

## 2024-12-29 PROCEDURE — 96365 THER/PROPH/DIAG IV INF INIT: CPT

## 2024-12-29 RX ADMIN — ERTAPENEM SODIUM 1000 MG: 1 INJECTION INTRAMUSCULAR; INTRAVENOUS at 09:33

## 2024-12-29 ASSESSMENT — PAIN DESCRIPTION - DESCRIPTORS: DESCRIPTORS: DISCOMFORT

## 2024-12-29 ASSESSMENT — PAIN DESCRIPTION - LOCATION: LOCATION: ABDOMEN

## 2024-12-29 ASSESSMENT — PAIN DESCRIPTION - ORIENTATION: ORIENTATION: MID;OTHER (COMMENT)

## 2024-12-29 ASSESSMENT — PAIN SCALES - GENERAL: PAINLEVEL_OUTOF10: 1

## 2024-12-29 NOTE — PROGRESS NOTES
Pt arrives to [facility] ambulatory for infusion.  Is made comfortable in chair.  Pt states no change in info or meds this am. Pt took morning meds. Infusion initiated.  Pt states no c/o. Call light in reach.

## 2024-12-29 NOTE — PROGRESS NOTES
Physical Therapy  Facility/Department: [facility] MED SURG W477/W477-01  Physical Therapy Discharge      NAME: Hector Bose    : 1950 (74 y.o.)  MRN: [id number]    Account: [id number]  Gender: male      Patient has been discharged from acute care hospital. DC patient from current PT program.      Electronically signed by Susan Hall PT on 24 at 3:07 PM EST

## 2024-12-30 ENCOUNTER — CARE COORDINATION (OUTPATIENT)
Dept: CARE COORDINATION | Age: 74
End: 2024-12-30

## 2024-12-30 ENCOUNTER — HOSPITAL ENCOUNTER (OUTPATIENT)
Dept: INFUSION THERAPY | Age: 74
Setting detail: INFUSION SERIES
Discharge: HOME | End: 2024-12-30
Payer: MEDICARE

## 2024-12-30 VITALS
SYSTOLIC BLOOD PRESSURE: 122 MMHG | OXYGEN SATURATION: 96 % | DIASTOLIC BLOOD PRESSURE: 66 MMHG | TEMPERATURE: 96.7 F | HEART RATE: 90 BPM

## 2024-12-30 DIAGNOSIS — K57.92 ACUTE DIVERTICULITIS: Primary | ICD-10-CM

## 2024-12-30 PROCEDURE — 2580000003 HC RX 258: Performed by: INTERNAL MEDICINE

## 2024-12-30 PROCEDURE — 96365 THER/PROPH/DIAG IV INF INIT: CPT

## 2024-12-30 PROCEDURE — 6360000002 HC RX W HCPCS: Performed by: INTERNAL MEDICINE

## 2024-12-30 RX ORDER — FINASTERIDE 5 MG/1
5 TABLET, FILM COATED ORAL DAILY
Qty: 30 TABLET | Refills: 3 | Status: ON HOLD | OUTPATIENT
Start: 2024-12-30

## 2024-12-30 RX ORDER — TAMSULOSIN HYDROCHLORIDE 0.4 MG/1
0.4 CAPSULE ORAL DAILY
Qty: 30 CAPSULE | Refills: 3 | Status: ON HOLD | OUTPATIENT
Start: 2024-12-30

## 2024-12-30 RX ADMIN — ERTAPENEM SODIUM 1000 MG: 1 INJECTION INTRAMUSCULAR; INTRAVENOUS at 09:24

## 2024-12-30 NOTE — PROGRESS NOTES
Pt states he was discharged to the hospital without his prescriptions for proscar and flomax. Called Dr. Connors/ Jorge Mcgrath's office and spoke to Suad to notify that patient needs prescriptions and to send them to [facility] on [address]    Electronically signed by Magalys Thornton RN on 12/30/2024 at 9:40 AM

## 2024-12-30 NOTE — CARE COORDINATION
-CTN phoned patient to update, questions answered as needed.  Patient to call surgery office tomorrow to schedule appointment for a week.   Infusion completed - 60 minute post infusion monitoring in progress  No change in assessment

## 2024-12-30 NOTE — TELEPHONE ENCOUNTER
Eduardo Connors MD Elek, Kristen  Ok Flomax 0.4 mg po daily, #90 with 3 refills and Proscar 5mg po daily, #90 with 3 refills.          Previous Messages       ----- Message -----  From: Suad Potter  Sent: 12/30/2024   9:37 AM EST  To: Eduardo Connors MD  Subject: meds patient was supposed to get on discharg*    When he was in the hospital he was supposed to get Flomax and Proscar on discharge    He does have an appointment with Jorge on 1-15-25    Those meds were never sent to his pharmacy @ [facility]      ( Magalys from [facility] center called for him [phone] )

## 2024-12-30 NOTE — PROGRESS NOTES
Pt tolerated infusion. He left unit in wheelchair. All equipment used in care has been cleaned.    Electronically signed by Magalys Thornton RN on 12/30/2024 at 10:00 AM

## 2024-12-30 NOTE — PROGRESS NOTES
[facility] OUTPATIENT INFUSION CENTER     PT arrived via:  [] Ambulatory         [x] Wheelchair  []With transport                [] Other:     [x]PT oriented to room and call light in reach.   [x] Vital signs obtained and are stable.   [x]Medication education provided and reviewed with patient.             .

## 2024-12-30 NOTE — CARE COORDINATION
Care Transitions Note    Initial Call - Call within 2 business days of discharge: Yes    Patient Current Location:  Home: [address] [place] [address] [place] [address]    Care Transition Nurse contacted the patient by telephone to perform post hospital discharge assessment, verified name and  as identifiers. Provided introduction to self, and explanation of the Care Transition Nurse role.     Patient: Hector Bose    Patient : 1950   MRN: [id number]    Reason for Admission: Acute diverticulitis  Discharge Date: 24  RURS: Readmission Risk Score: 15.6      Last Discharge Facility       Date Complaint Diagnosis Description Type Department Provider    24 Abdominal Pain Acute diverticulitis ... ED to Hosp-Admission (Discharged) (ADMIT) [facility] Jin Garcia MD; IRENE Maki.            Was this an external facility discharge? No    Additional needs identified to be addressed with provider   No needs identified             Method of communication with provider: none.    Patients top risk factors for readmission: medical condition-post op, HTN, DM, CKD, multiple health system providers, and polypharmacy    Interventions to address risk factors:   Education: post op  Home Health: CTN to check if [facility] will be following patient.   Discussed with patient need for f/u with surgery (Dr Mccray) in one week.  Patient states he was told by provider f/u in 3 weeks for staple removal.  Discussed with patient weekly BMP/CBC lab as per AVS.    Care Summary Note:   -Patient admitted to [facility] on 24 with symptoms of abdominal pain, nausea, diarrhea x 1, chills, a feeling of being hot/cold, and urinary retention.  Found to have perforated diverticulitis.  Underwent Exploratory laparotomy, ileocecectomy on 24.  See hospital discharge summary for further details.   -Patient happy to be home, sleeping well, admits to very little appetite.  No further nausea or diarrhea.  No acute abdominal

## 2024-12-30 NOTE — CARE COORDINATION
-CTN phoned [facility] and spoke to Steffi who states HHC was cancelled as patient is going for OP IV infusions.

## 2024-12-30 NOTE — CARE COORDINATION
-CTN phoned surgery office (Dr Mccray) and spoke to Sandie who states top dressing can be removed by patient if he would like as there is surgical glue underneath.  Sandie states the top dressing is for only if any drainage should leak through.  The top dressing can be removed to cleanse area if patient would like and can then be left with just the surgical glue or he can apply another dressing/gauze if he would like to.  Sandie states the patient definitely needs to schedule for an appointment in a week.

## 2024-12-31 ENCOUNTER — HOSPITAL ENCOUNTER (OUTPATIENT)
Dept: INFUSION THERAPY | Age: 74
Setting detail: INFUSION SERIES
Discharge: HOME | End: 2024-12-31
Payer: MEDICARE

## 2024-12-31 VITALS
SYSTOLIC BLOOD PRESSURE: 92 MMHG | HEART RATE: 81 BPM | OXYGEN SATURATION: 98 % | TEMPERATURE: 96.7 F | DIASTOLIC BLOOD PRESSURE: 55 MMHG

## 2024-12-31 DIAGNOSIS — K57.92 ACUTE DIVERTICULITIS: Primary | ICD-10-CM

## 2024-12-31 PROCEDURE — 6360000002 HC RX W HCPCS: Performed by: INTERNAL MEDICINE

## 2024-12-31 PROCEDURE — 2580000003 HC RX 258: Performed by: INTERNAL MEDICINE

## 2024-12-31 PROCEDURE — 96365 THER/PROPH/DIAG IV INF INIT: CPT

## 2024-12-31 RX ADMIN — ERTAPENEM SODIUM 1000 MG: 1 INJECTION INTRAMUSCULAR; INTRAVENOUS at 09:35

## 2024-12-31 NOTE — PROGRESS NOTES
[facility] OUTPATIENT INFUSION CENTER     PT arrived via:  [] Ambulatory         [x] Wheelchair  []With transport                [] Other:     [x]PT oriented to room and call light in reach.   [x] Vital signs obtained and are stable.   [x]Medication education provided and reviewed with patient.             .

## 2024-12-31 NOTE — PROGRESS NOTES
Pt tolerated infusion. Left unit in wheelchair with his family member. All equipment used in care has been cleaned.    Electronically signed by Magalys Thornton RN on 12/31/2024 at 10:10 AM

## 2025-01-01 ENCOUNTER — HOSPITAL ENCOUNTER (OUTPATIENT)
Dept: INFUSION THERAPY | Age: 75
Setting detail: INFUSION SERIES
Discharge: HOME | End: 2025-01-01
Payer: MEDICARE

## 2025-01-01 VITALS
DIASTOLIC BLOOD PRESSURE: 53 MMHG | TEMPERATURE: 97.9 F | HEART RATE: 78 BPM | RESPIRATION RATE: 18 BRPM | SYSTOLIC BLOOD PRESSURE: 95 MMHG | OXYGEN SATURATION: 96 %

## 2025-01-01 DIAGNOSIS — K57.92 ACUTE DIVERTICULITIS: Primary | ICD-10-CM

## 2025-01-01 PROCEDURE — 96365 THER/PROPH/DIAG IV INF INIT: CPT

## 2025-01-01 PROCEDURE — 2580000003 HC RX 258: Performed by: INTERNAL MEDICINE

## 2025-01-01 PROCEDURE — 6360000002 HC RX W HCPCS: Performed by: INTERNAL MEDICINE

## 2025-01-01 RX ADMIN — ERTAPENEM SODIUM 1000 MG: 1 INJECTION INTRAMUSCULAR; INTRAVENOUS at 09:02

## 2025-01-01 ASSESSMENT — PAIN SCALES - GENERAL: PAINLEVEL_OUTOF10: 1

## 2025-01-01 ASSESSMENT — PAIN DESCRIPTION - LOCATION: LOCATION: ABDOMEN

## 2025-01-01 NOTE — FLOWSHEET NOTE
Infusion is complete. Tolerated well. Left the unit via wheelchair with his wife. All equipment used in the care for this patient has been cleaned.

## 2025-01-01 NOTE — FLOWSHEET NOTE
Patient to the floor via wheelchair for his infusion. Vital signs taken. No distress noted. Call light within reach.

## 2025-01-02 ENCOUNTER — HOSPITAL ENCOUNTER (OUTPATIENT)
Dept: INFUSION THERAPY | Age: 75
Setting detail: INFUSION SERIES
Discharge: HOME | End: 2025-01-02
Payer: MEDICARE

## 2025-01-02 ENCOUNTER — TELEPHONE (OUTPATIENT)
Dept: INFECTIOUS DISEASES | Age: 75
End: 2025-01-02

## 2025-01-02 ENCOUNTER — APPOINTMENT (OUTPATIENT)
Dept: GASTROENTEROLOGY | Facility: HOSPITAL | Age: 75
End: 2025-01-02
Payer: MEDICARE

## 2025-01-02 VITALS
DIASTOLIC BLOOD PRESSURE: 53 MMHG | RESPIRATION RATE: 18 BRPM | TEMPERATURE: 97 F | SYSTOLIC BLOOD PRESSURE: 98 MMHG | HEART RATE: 86 BPM | OXYGEN SATURATION: 99 %

## 2025-01-02 DIAGNOSIS — K57.92 ACUTE DIVERTICULITIS: Primary | ICD-10-CM

## 2025-01-02 LAB
ANION GAP SERPL CALCULATED.3IONS-SCNC: 12 MEQ/L (ref 9–15)
BUN SERPL-MCNC: 40 MG/DL (ref 8–23)
CALCIUM SERPL-MCNC: 9.1 MG/DL (ref 8.5–9.9)
CHLORIDE SERPL-SCNC: 103 MEQ/L (ref 95–107)
CO2 SERPL-SCNC: 18 MEQ/L (ref 20–31)
CREAT SERPL-MCNC: 1.92 MG/DL (ref 0.7–1.2)
ERYTHROCYTE [DISTWIDTH] IN BLOOD BY AUTOMATED COUNT: 14 % (ref 11.5–14.5)
GLUCOSE SERPL-MCNC: 114 MG/DL (ref 70–99)
HCT VFR BLD AUTO: 25.1 % (ref 42–52)
HGB BLD-MCNC: 8 G/DL (ref 14–18)
MCH RBC QN AUTO: 30.8 PG (ref 27–31.3)
MCHC RBC AUTO-ENTMCNC: 31.9 % (ref 33–37)
MCV RBC AUTO: 96.5 FL (ref 79–92.2)
PLATELET # BLD AUTO: 350 K/UL (ref 130–400)
POTASSIUM SERPL-SCNC: 4.9 MEQ/L (ref 3.4–4.9)
RBC # BLD AUTO: 2.6 M/UL (ref 4.7–6.1)
SODIUM SERPL-SCNC: 133 MEQ/L (ref 135–144)
WBC # BLD AUTO: 8.4 K/UL (ref 4.8–10.8)

## 2025-01-02 PROCEDURE — 6360000002 HC RX W HCPCS: Performed by: INTERNAL MEDICINE

## 2025-01-02 PROCEDURE — 80048 BASIC METABOLIC PNL TOTAL CA: CPT

## 2025-01-02 PROCEDURE — 36592 COLLECT BLOOD FROM PICC: CPT

## 2025-01-02 PROCEDURE — 85027 COMPLETE CBC AUTOMATED: CPT

## 2025-01-02 PROCEDURE — 96365 THER/PROPH/DIAG IV INF INIT: CPT

## 2025-01-02 PROCEDURE — 96375 TX/PRO/DX INJ NEW DRUG ADDON: CPT

## 2025-01-02 PROCEDURE — 2580000003 HC RX 258: Performed by: INTERNAL MEDICINE

## 2025-01-02 RX ADMIN — ERTAPENEM SODIUM 1000 MG: 1 INJECTION INTRAMUSCULAR; INTRAVENOUS at 09:22

## 2025-01-02 NOTE — PROGRESS NOTES
Pt tolerated infusion. He left unit in wheelchair with his spouse. All equipment used in care has been cleaned.    Electronically signed by Magalys Thornton RN on 1/2/2025 at 10:06 AM

## 2025-01-02 NOTE — PROGRESS NOTES
[facility] OUTPATIENT INFUSION CENTER     PT arrived via:  [] Ambulatory         [x] Wheelchair  []With transport                [] Other:     [x]PT oriented to room and call light in reach.   [x] Vital signs obtained and are stable.   [x]Medication education provided and reviewed with patient.             .

## 2025-01-02 NOTE — PROGRESS NOTES
Picc line dressing changed. Labs collected. Invanz infusing at this time.    Electronically signed by Magalys Thornton RN on 1/2/2025 at 9:24 AM

## 2025-01-03 ENCOUNTER — CARE COORDINATION (OUTPATIENT)
Dept: CARE COORDINATION | Age: 75
End: 2025-01-03

## 2025-01-03 ENCOUNTER — OFFICE VISIT (OUTPATIENT)
Age: 75
End: 2025-01-03
Payer: MEDICARE

## 2025-01-03 ENCOUNTER — HOSPITAL ENCOUNTER (INPATIENT)
Age: 75
LOS: 7 days | Discharge: HOME | DRG: 314 | End: 2025-01-10
Attending: INTERNAL MEDICINE | Admitting: INTERNAL MEDICINE
Payer: MEDICARE

## 2025-01-03 ENCOUNTER — HOSPITAL ENCOUNTER (OUTPATIENT)
Dept: INFUSION THERAPY | Age: 75
Setting detail: INFUSION SERIES
Discharge: HOME | End: 2025-01-03
Payer: MEDICARE

## 2025-01-03 VITALS
HEIGHT: 66 IN | HEART RATE: 51 BPM | RESPIRATION RATE: 12 BRPM | TEMPERATURE: 97.6 F | DIASTOLIC BLOOD PRESSURE: 50 MMHG | WEIGHT: 205 LBS | BODY MASS INDEX: 32.95 KG/M2 | SYSTOLIC BLOOD PRESSURE: 88 MMHG | OXYGEN SATURATION: 96 %

## 2025-01-03 VITALS
HEART RATE: 80 BPM | DIASTOLIC BLOOD PRESSURE: 58 MMHG | SYSTOLIC BLOOD PRESSURE: 90 MMHG | TEMPERATURE: 97.5 F | OXYGEN SATURATION: 100 % | RESPIRATION RATE: 17 BRPM

## 2025-01-03 DIAGNOSIS — E87.5 HYPERKALEMIA: ICD-10-CM

## 2025-01-03 DIAGNOSIS — R79.89 ELEVATED TROPONIN: ICD-10-CM

## 2025-01-03 DIAGNOSIS — R53.83 OTHER FATIGUE: ICD-10-CM

## 2025-01-03 DIAGNOSIS — N28.1 KIDNEY CYSTS: ICD-10-CM

## 2025-01-03 DIAGNOSIS — D64.9 ANEMIA: ICD-10-CM

## 2025-01-03 DIAGNOSIS — D64.9 CHRONIC ANEMIA: ICD-10-CM

## 2025-01-03 DIAGNOSIS — I95.9 HYPOTENSION, UNSPECIFIED HYPOTENSION TYPE: Primary | ICD-10-CM

## 2025-01-03 DIAGNOSIS — D64.9 ANEMIA, UNSPECIFIED TYPE: ICD-10-CM

## 2025-01-03 DIAGNOSIS — N17.9 AKI (ACUTE KIDNEY INJURY) (HCC): Primary | ICD-10-CM

## 2025-01-03 DIAGNOSIS — I95.9 HYPOTENSION, UNSPECIFIED HYPOTENSION TYPE: ICD-10-CM

## 2025-01-03 DIAGNOSIS — K50.00 TERMINAL ILEITIS WITHOUT COMPLICATION (HCC): ICD-10-CM

## 2025-01-03 DIAGNOSIS — K57.92 ACUTE DIVERTICULITIS: Primary | ICD-10-CM

## 2025-01-03 LAB
ALBUMIN SERPL-MCNC: 3.2 G/DL (ref 3.5–4.6)
ALP SERPL-CCNC: 85 U/L (ref 35–104)
ALT SERPL-CCNC: 28 U/L (ref 0–41)
ANION GAP SERPL CALCULATED.3IONS-SCNC: 12 MEQ/L (ref 9–15)
AST SERPL-CCNC: 28 U/L (ref 0–40)
BASOPHILS # BLD: 0 K/UL (ref 0–0.2)
BASOPHILS NFR BLD: 0.2 %
BILIRUB SERPL-MCNC: 0.3 MG/DL (ref 0.2–0.7)
BILIRUB UR QL STRIP: NEGATIVE
BUN SERPL-MCNC: 43 MG/DL (ref 8–23)
CALCIUM SERPL-MCNC: 8.5 MG/DL (ref 8.5–9.9)
CHLORIDE SERPL-SCNC: 103 MEQ/L (ref 95–107)
CLARITY UR: CLEAR
CO2 SERPL-SCNC: 17 MEQ/L (ref 20–31)
COLOR UR: YELLOW
CREAT SERPL-MCNC: 1.87 MG/DL (ref 0.7–1.2)
EOSINOPHIL # BLD: 0.1 K/UL (ref 0–0.7)
EOSINOPHIL NFR BLD: 1.7 %
ERYTHROCYTE [DISTWIDTH] IN BLOOD BY AUTOMATED COUNT: 14.1 % (ref 11.5–14.5)
GLOBULIN SER CALC-MCNC: 4.5 G/DL (ref 2.3–3.5)
GLUCOSE BLD-MCNC: 88 MG/DL (ref 70–99)
GLUCOSE SERPL-MCNC: 98 MG/DL (ref 70–99)
GLUCOSE UR STRIP-MCNC: NEGATIVE MG/DL
HCT VFR BLD AUTO: 22.9 % (ref 42–52)
HGB BLD-MCNC: 7.6 G/DL (ref 14–18)
HGB UR QL STRIP: NEGATIVE
KETONES UR STRIP-MCNC: NEGATIVE MG/DL
LACTATE BLDV-SCNC: 0.9 MMOL/L (ref 0.5–2.2)
LEUKOCYTE ESTERASE UR QL STRIP: NEGATIVE
LIPASE SERPL-CCNC: 92 U/L (ref 12–95)
LYMPHOCYTES # BLD: 2.9 K/UL (ref 1–4.8)
LYMPHOCYTES NFR BLD: 33.9 %
MAGNESIUM SERPL-MCNC: 1.7 MG/DL (ref 1.7–2.4)
MCH RBC QN AUTO: 32.2 PG (ref 27–31.3)
MCHC RBC AUTO-ENTMCNC: 33.2 % (ref 33–37)
MCV RBC AUTO: 97 FL (ref 79–92.2)
MONOCYTES # BLD: 0.5 K/UL (ref 0.2–0.8)
MONOCYTES NFR BLD: 6.3 %
NEUTROPHILS # BLD: 4.8 K/UL (ref 1.4–6.5)
NEUTS SEG NFR BLD: 56.5 %
NITRITE UR QL STRIP: NEGATIVE
PERFORMED ON: NORMAL
PH UR STRIP: 5 [PH] (ref 5–9)
PLATELET # BLD AUTO: 379 K/UL (ref 130–400)
POTASSIUM SERPL-SCNC: 5.6 MEQ/L (ref 3.4–4.9)
POTASSIUM SERPL-SCNC: 6 MEQ/L (ref 3.4–4.9)
PROT SERPL-MCNC: 7.7 G/DL (ref 6.3–8)
PROT UR STRIP-MCNC: NEGATIVE MG/DL
RBC # BLD AUTO: 2.36 M/UL (ref 4.7–6.1)
SODIUM SERPL-SCNC: 132 MEQ/L (ref 135–144)
SP GR UR STRIP: 1.01 (ref 1–1.03)
TROPONIN, HIGH SENSITIVITY: 38 NG/L (ref 0–19)
TROPONIN, HIGH SENSITIVITY: 49 NG/L (ref 0–19)
URINE REFLEX TO CULTURE: NORMAL
UROBILINOGEN UR STRIP-ACNC: 0.2 E.U./DL
WBC # BLD AUTO: 8.5 K/UL (ref 4.8–10.8)

## 2025-01-03 PROCEDURE — 99214 OFFICE O/P EST MOD 30 MIN: CPT | Performed by: STUDENT IN AN ORGANIZED HEALTH CARE EDUCATION/TRAINING PROGRAM

## 2025-01-03 PROCEDURE — 6370000000 HC RX 637 (ALT 250 FOR IP)

## 2025-01-03 PROCEDURE — 2500000003 HC RX 250 WO HCPCS: Performed by: INTERNAL MEDICINE

## 2025-01-03 PROCEDURE — 6360000002 HC RX W HCPCS: Performed by: INTERNAL MEDICINE

## 2025-01-03 PROCEDURE — 83735 ASSAY OF MAGNESIUM: CPT

## 2025-01-03 PROCEDURE — 84484 ASSAY OF TROPONIN QUANT: CPT

## 2025-01-03 PROCEDURE — 83605 ASSAY OF LACTIC ACID: CPT

## 2025-01-03 PROCEDURE — 6370000000 HC RX 637 (ALT 250 FOR IP): Performed by: INTERNAL MEDICINE

## 2025-01-03 PROCEDURE — 85025 COMPLETE CBC W/AUTO DIFF WBC: CPT

## 2025-01-03 PROCEDURE — 1210000000 HC MED SURG R&B

## 2025-01-03 PROCEDURE — 83690 ASSAY OF LIPASE: CPT

## 2025-01-03 PROCEDURE — 2580000003 HC RX 258: Performed by: INTERNAL MEDICINE

## 2025-01-03 PROCEDURE — 84132 ASSAY OF SERUM POTASSIUM: CPT

## 2025-01-03 PROCEDURE — 80053 COMPREHEN METABOLIC PANEL: CPT

## 2025-01-03 PROCEDURE — 81003 URINALYSIS AUTO W/O SCOPE: CPT

## 2025-01-03 PROCEDURE — 99285 EMERGENCY DEPT VISIT HI MDM: CPT

## 2025-01-03 PROCEDURE — 2580000003 HC RX 258

## 2025-01-03 PROCEDURE — 96365 THER/PROPH/DIAG IV INF INIT: CPT

## 2025-01-03 PROCEDURE — 36415 COLL VENOUS BLD VENIPUNCTURE: CPT

## 2025-01-03 PROCEDURE — 93005 ELECTROCARDIOGRAM TRACING: CPT

## 2025-01-03 RX ORDER — SODIUM CHLORIDE 0.9 % (FLUSH) 0.9 %
5-40 SYRINGE (ML) INJECTION PRN
Status: DISCONTINUED | OUTPATIENT
Start: 2025-01-03 | End: 2025-01-09

## 2025-01-03 RX ORDER — MAGNESIUM SULFATE IN WATER 40 MG/ML
2000 INJECTION, SOLUTION INTRAVENOUS PRN
Status: DISCONTINUED | OUTPATIENT
Start: 2025-01-03 | End: 2025-01-10 | Stop reason: HOSPADM

## 2025-01-03 RX ORDER — ENOXAPARIN SODIUM 100 MG/ML
40 INJECTION SUBCUTANEOUS DAILY
Status: DISCONTINUED | OUTPATIENT
Start: 2025-01-04 | End: 2025-01-10 | Stop reason: HOSPADM

## 2025-01-03 RX ORDER — 0.9 % SODIUM CHLORIDE 0.9 %
1000 INTRAVENOUS SOLUTION INTRAVENOUS ONCE
Status: COMPLETED | OUTPATIENT
Start: 2025-01-03 | End: 2025-01-03

## 2025-01-03 RX ORDER — SODIUM CHLORIDE 9 MG/ML
INJECTION, SOLUTION INTRAVENOUS PRN
Status: DISCONTINUED | OUTPATIENT
Start: 2025-01-03 | End: 2025-01-10 | Stop reason: HOSPADM

## 2025-01-03 RX ORDER — ONDANSETRON 2 MG/ML
4 INJECTION INTRAMUSCULAR; INTRAVENOUS EVERY 6 HOURS PRN
Status: DISCONTINUED | OUTPATIENT
Start: 2025-01-03 | End: 2025-01-10 | Stop reason: HOSPADM

## 2025-01-03 RX ORDER — CETIRIZINE HYDROCHLORIDE 10 MG/1
10 TABLET ORAL DAILY
Status: DISCONTINUED | OUTPATIENT
Start: 2025-01-04 | End: 2025-01-10 | Stop reason: HOSPADM

## 2025-01-03 RX ORDER — SODIUM CHLORIDE 9 MG/ML
INJECTION, SOLUTION INTRAVENOUS CONTINUOUS
Status: DISCONTINUED | OUTPATIENT
Start: 2025-01-03 | End: 2025-01-04

## 2025-01-03 RX ORDER — SODIUM CHLORIDE 0.9 % (FLUSH) 0.9 %
5-40 SYRINGE (ML) INJECTION EVERY 12 HOURS SCHEDULED
Status: DISCONTINUED | OUTPATIENT
Start: 2025-01-03 | End: 2025-01-10 | Stop reason: HOSPADM

## 2025-01-03 RX ORDER — ONDANSETRON 4 MG/1
4 TABLET, ORALLY DISINTEGRATING ORAL EVERY 8 HOURS PRN
Status: DISCONTINUED | OUTPATIENT
Start: 2025-01-03 | End: 2025-01-10 | Stop reason: HOSPADM

## 2025-01-03 RX ORDER — ACETAMINOPHEN 650 MG/1
650 SUPPOSITORY RECTAL EVERY 6 HOURS PRN
Status: DISCONTINUED | OUTPATIENT
Start: 2025-01-03 | End: 2025-01-10 | Stop reason: HOSPADM

## 2025-01-03 RX ORDER — POLYETHYLENE GLYCOL 3350 17 G/17G
17 POWDER, FOR SOLUTION ORAL DAILY PRN
Status: DISCONTINUED | OUTPATIENT
Start: 2025-01-03 | End: 2025-01-10 | Stop reason: HOSPADM

## 2025-01-03 RX ORDER — ACETAMINOPHEN 325 MG/1
650 TABLET ORAL EVERY 6 HOURS PRN
Status: DISCONTINUED | OUTPATIENT
Start: 2025-01-03 | End: 2025-01-10 | Stop reason: HOSPADM

## 2025-01-03 RX ORDER — DOXEPIN HYDROCHLORIDE 10 MG/1
10 CAPSULE ORAL NIGHTLY
Status: DISCONTINUED | OUTPATIENT
Start: 2025-01-03 | End: 2025-01-10 | Stop reason: HOSPADM

## 2025-01-03 RX ORDER — ATORVASTATIN CALCIUM 40 MG/1
40 TABLET, FILM COATED ORAL DAILY
Status: DISCONTINUED | OUTPATIENT
Start: 2025-01-04 | End: 2025-01-03

## 2025-01-03 RX ORDER — ATORVASTATIN CALCIUM 40 MG/1
40 TABLET, FILM COATED ORAL DAILY
Status: DISCONTINUED | OUTPATIENT
Start: 2025-01-03 | End: 2025-01-10 | Stop reason: HOSPADM

## 2025-01-03 RX ORDER — TAMSULOSIN HYDROCHLORIDE 0.4 MG/1
0.4 CAPSULE ORAL DAILY
Status: DISCONTINUED | OUTPATIENT
Start: 2025-01-04 | End: 2025-01-10 | Stop reason: HOSPADM

## 2025-01-03 RX ORDER — FINASTERIDE 5 MG/1
5 TABLET, FILM COATED ORAL DAILY
Status: DISCONTINUED | OUTPATIENT
Start: 2025-01-04 | End: 2025-01-10 | Stop reason: HOSPADM

## 2025-01-03 RX ORDER — ALLOPURINOL 100 MG/1
200 TABLET ORAL DAILY
Status: DISCONTINUED | OUTPATIENT
Start: 2025-01-04 | End: 2025-01-10 | Stop reason: HOSPADM

## 2025-01-03 RX ADMIN — ERTAPENEM SODIUM 1000 MG: 1 INJECTION INTRAMUSCULAR; INTRAVENOUS at 09:47

## 2025-01-03 RX ADMIN — Medication 10 ML: at 21:44

## 2025-01-03 RX ADMIN — SODIUM CHLORIDE 1000 ML: 9 INJECTION, SOLUTION INTRAVENOUS at 12:42

## 2025-01-03 RX ADMIN — DOXEPIN HYDROCHLORIDE 10 MG: 10 CAPSULE ORAL at 21:44

## 2025-01-03 RX ADMIN — ATORVASTATIN CALCIUM 40 MG: 40 TABLET, FILM COATED ORAL at 21:43

## 2025-01-03 RX ADMIN — SODIUM CHLORIDE: 9 INJECTION, SOLUTION INTRAVENOUS at 17:44

## 2025-01-03 ASSESSMENT — PATIENT HEALTH QUESTIONNAIRE - PHQ9
SUM OF ALL RESPONSES TO PHQ QUESTIONS 1-9: 0
1. LITTLE INTEREST OR PLEASURE IN DOING THINGS: NOT AT ALL
SUM OF ALL RESPONSES TO PHQ QUESTIONS 1-9: 0
2. FEELING DOWN, DEPRESSED OR HOPELESS: NOT AT ALL
SUM OF ALL RESPONSES TO PHQ9 QUESTIONS 1 & 2: 0

## 2025-01-03 ASSESSMENT — PAIN - FUNCTIONAL ASSESSMENT: PAIN_FUNCTIONAL_ASSESSMENT: 0-10

## 2025-01-03 ASSESSMENT — LIFESTYLE VARIABLES
HOW MANY STANDARD DRINKS CONTAINING ALCOHOL DO YOU HAVE ON A TYPICAL DAY: PATIENT DOES NOT DRINK
HOW OFTEN DO YOU HAVE A DRINK CONTAINING ALCOHOL: NEVER
HOW MANY STANDARD DRINKS CONTAINING ALCOHOL DO YOU HAVE ON A TYPICAL DAY: PATIENT DOES NOT DRINK
HOW OFTEN DO YOU HAVE A DRINK CONTAINING ALCOHOL: NEVER

## 2025-01-03 ASSESSMENT — PAIN SCALES - GENERAL: PAINLEVEL_OUTOF10: 0

## 2025-01-03 NOTE — ED PROVIDER NOTES
tunnel syndrome 1/12/2022    Chronic kidney disease     CLL (chronic lymphocytic leukemia) (HCC)     dx 5/2015    Colon polyps     Disease of blood and blood forming organ     Hyperlipidemia     dx since 1970's    Hypertension     meds  since 1970's    Lymphoma of gastrointestinal tract (HCC)     Movement disorder     Type II or unspecified type diabetes mellitus without mention of complication, not stated as uncontrolled     hx > 20 yrs    Urinary retention 12/23/2024         SURGICAL HISTORY       Past Surgical History:   Procedure Laterality Date    APPENDECTOMY      age 22s    CARPAL TUNNEL RELEASE Left 1/26/2022    LEFT CARPAL TUNNEL RELEASE performed by Rangel Saldivar MD at [facility] OR    CARPAL TUNNEL RELEASE Right 3/9/2022    CARPAL TUNNEL RELEASE RIGHT performed by Rangel Saldivar MD at [facility] OR    CHOLECYSTECTOMY N/A 07/07/2016    COLONOSCOPY  4/1/16    w/polypectomy     COLONOSCOPY N/A 6/15/2021    COLORECTAL CANCER SCREENING, HIGH RISK performed by Chang Caldwell MD at [facility] GASTRO [facility]ENTER    ENDOSCOPY, COLON, DIAGNOSTIC      JOINT REPLACEMENT Bilateral 2018    TKR    LAPAROTOMY N/A 12/23/2024    Exploratory laparotomy, ileocecectomy performed by Cisco Mccray MD at [facility] OR    OTHER SURGICAL HISTORY Right 06/08/16    I & D ABSCESS THIGH    RI ARTHRP KNE CONDYLE&PLATU MEDIAL&LAT COMPARTMENTS Right 1/18/2018    RIGHT KNEE TOTAL KNEE ARTHROPLASTY EÍLAS SPINAL,NERVE BLOCK performed by Osmar Marie MD at [facility] OR    RI ARTHRP KNE CONDYLE&PLATU MEDIAL&LAT COMPARTMENTS Left 5/17/2018    LEFT KNEE TOTAL KNEE ARTHROPLASTY, performed by Osmar Marie MD at [facility] OR    RI MANIPULATION KNEE JOINT UNDER GENERAL ANESTHESIA Right 3/15/2018    RIGHT KNEE MANIPULATION performed by Osmar Marie MD at [facility] OR    SHOULDER SURGERY Right 1979    due to displacement    UPPER GASTROINTESTINAL ENDOSCOPY  4/29/15    w/bx     UPPER GASTROINTESTINAL ENDOSCOPY  02/28/2018  Session: 30 min   Housing Stability: Low Risk  (1/3/2025)    Housing Stability Vital Sign     Unable to Pay for Housing in the Last Year: No     Number of Times Moved in the Last Year: 1     Homeless in the Last Year: No       SCREENINGS         Nicollet Coma Scale  Eye Opening: Spontaneous  Best Verbal Response: Oriented  Best Motor Response: Obeys commands  Nicollet Coma Scale Score: 15                     CIWA Assessment  BP: 101/66  Pulse: 69                 PHYSICAL EXAM    (up to 7 for level 4, 8 or more for level 5)     ED Triage Vitals [01/03/25 1154]   BP Systolic BP Percentile Diastolic BP Percentile Temp Temp Source Pulse Respirations SpO2   (!) 96/57 -- -- 98 °F (36.7 °C) Temporal 81 18 100 %      Height Weight - Scale         1.7 m (5' 6.93\") 93 kg (205 lb)             Physical Exam  Vitals and nursing note reviewed.   Constitutional:       General: He is not in acute distress.     Appearance: Normal appearance. He is normal weight. He is not ill-appearing or toxic-appearing.   HENT:      Head: Normocephalic and atraumatic.      Right Ear: External ear normal.      Left Ear: External ear normal.      Nose: Nose normal.      Mouth/Throat:      Mouth: Mucous membranes are moist.      Pharynx: Oropharynx is clear.   Eyes:      Extraocular Movements: Extraocular movements intact.      Pupils: Pupils are equal, round, and reactive to light.   Cardiovascular:      Rate and Rhythm: Normal rate and regular rhythm.      Pulses: Normal pulses.      Heart sounds: Normal heart sounds.   Pulmonary:      Effort: Pulmonary effort is normal.      Breath sounds: Normal breath sounds.   Abdominal:      General: Abdomen is flat. Bowel sounds are normal. There is no distension.      Palpations: Abdomen is soft. There is no mass.      Tenderness: There is no abdominal tenderness. There is no right CVA tenderness, left CVA tenderness, guarding or rebound.      Hernia: No hernia is present.   Musculoskeletal:         General:

## 2025-01-03 NOTE — PROGRESS NOTES
Subjective  Hector Bose, 74 y.o. male presents today with:  Chief Complaint   Patient presents with    Follow-Up from Hospital     Patient presents today for a hospital follow up for acute diverticulitis on 12/28/24 with some removal of his colon. He said that he is feeling terrible with fatigue and short walks, he is winded. He is getting infusion for the next 3 weeks.   A second BP not needed per pcp. His BP was low at his infusion this morning.     He is here for hospital follow-up of perforated diverticulitis.  He was admitted from 12/22/2024 to 12/28/2024.  He initially presented with abdominal pain, found to have perforated diverticulitis and acute urinary retention.  He was given fluids, Zosyn, and underwent an ex lap s/p right colectomy for perforated distal ileal diverticulitis.  An NG tube was placed.  Chapman catheter was also placed for urinary retention.  Infectious disease, urology, and surgery were consulted.  He had a PICC line placed for outpatient Invanz.  Patient clinically improved and was then discharged.    Today, the patient reports feeling fatigue since his surgery.  He is on Flomax and finasteride.  Will also be on Invanz for 3 weeks total (went this morning - BP was 75/45).  All of his home medications were continued except for Protonix and colchicine. He is not drinking many fluids and still taking his BP meds.     Review of Systems   Constitutional:  Positive for fatigue.       Past Medical History:   Diagnosis Date    Acute idiopathic gout of right foot     Anemia 03/01/2018    iron transfusions x2    Arthritis     both knees    Bilateral carpal tunnel syndrome 1/12/2022    Chronic kidney disease     CLL (chronic lymphocytic leukemia) (HCC)     dx 5/2015    Colon polyps     Disease of blood and blood forming organ     Hyperlipidemia     dx since 1970's    Hypertension     meds  since 1970's    Lymphoma of gastrointestinal tract (HCC)     Movement disorder     Type II or unspecified

## 2025-01-03 NOTE — FLOWSHEET NOTE
Infusion completed, pt tolerated well. Patient shows no signs of distress at this time. Patient states he is going directly to his doctors office and will discuss blood pressure with her. Patient left unit via wheelchair with family member. All equipment used in the care for this patient has been cleaned.

## 2025-01-03 NOTE — H&P
needed (allergies) 11/3/24  Yes Sean Levy MD   potassium chloride (KLOR-CON M20) 20 MEQ extended release tablet 1 TAB BY MOUTH TWICE A DAY . (CALL ME IF YOU ARE TAKING A DIFFERENT DOSE) 9/17/24  Yes Sandie Medina DO   allopurinol (ZYLOPRIM) 100 MG tablet TAKE 2 TABLETS BY MOUTH EVERY DAY 7/29/24  Yes Sean Levy MD   doxepin (SINEQUAN) 10 MG capsule TAKE 1 CAPSULE BY MOUTH EVERY NIGHT 7/26/24  Yes Sean Levy MD   Icosapent Ethyl (VASCEPA) 1 g CAPS capsule Take 2 capsules by mouth 2 times daily 7/12/24  Yes Sean Levy MD   metoprolol succinate (TOPROL XL) 50 MG extended release tablet TAKE 1 TABLET BY MOUTH EVERY DAY 3/15/24  Yes Sean Levy MD   benazepril (LOTENSIN) 20 MG tablet TAKE 1 TABLET BY MOUTH EVERY DAY 2/28/24  Yes Sean Levy MD   metFORMIN (GLUCOPHAGE) 500 MG tablet TAKE 1 TABLETS BY MOUTH TWICE A DAY WITH MEALS 2/28/24  Yes Sean Levy MD   atorvastatin (LIPITOR) 40 MG tablet Take 1 tablet by mouth daily 2/16/24  Yes Sean Levy MD   furosemide (LASIX) 40 MG tablet Take 1 tablet by mouth every other day 2/11/24  Yes Sean Levy MD   Magnesium Oxide 200 MG TABS TAKE 1 TABLET BY MOUTH EVERY DAY 2/11/23  Yes Sean Levy MD   Multiple Vitamins-Minerals (MULTIVITAMIN PO) Take 1 tablet by mouth daily.   Yes Provider, Historical, MD   Handicap Placard MISC by Does not apply route Diagnosis: Osteoarthritis of the knees: Duration 6/6/2024-6/6/2029 6/6/24   Sean Levy MD       Allergies:  Dye [barium-containing compounds], Iodides, and Iodinated contrast media    Social History:   TOBACCO:   reports that he has never smoked. He has never used smokeless tobacco.  ETOH:   reports current alcohol use.      Family History:       Problem Relation Age of Onset    Heart Disease Mother 72        CHF    Cancer Father 77        liver/ pancreatic    No Known Problems Sister     Cancer Brother         CLL    Arthritis Brother         Bilateral TKR     poor oral intake since recent surgery  - continue IVFs overnight  - hold Benazepril and K supplement  - monitor renal function and K level    Anemia  - Hb is near recent baseline  - follow H/H    Minimally elevated troponins  - in the setting of hypotension and JANY  - ECG was negative for significant changes  - monitor on telemetry    DM2  - ISS          JARED HUERTA MD, MD  Admitting Hospitalist

## 2025-01-03 NOTE — CARE COORDINATION
-Received [facility] ADT alert that patient readmitted today to [facility].  -CTN to close care transition program and sign off.

## 2025-01-03 NOTE — FLOWSHEET NOTE
Patient Is hypotensive, states the only symptom he has is that he has no energy. Patient denies lightheadedness or dizziness. He states that he took his blood pressure medications this morning, but did not check his blood pressure before taking the medications.Patient provided with oral hydration and is sitting in chair with his feet up. Family member is in room with him. Will recheck BP after patient has finished oral hydration.

## 2025-01-03 NOTE — FLOWSHEET NOTE
Patient arrived via wheelchair with family member for infusion, vitals completed, call light within reach

## 2025-01-03 NOTE — ED NOTES
Patient sent over by MD at infusion clinic due to low BP. Pt alert and oriented. Pt denies pain. Wife at bedside.

## 2025-01-03 NOTE — FLOWSHEET NOTE
Patient's blood pressure is improving. Patient states he has an appointment with Dr. Swain, who prescribes his medications, right after this appointment and he states he will address his blood pressures with her at the appointment. This RN educated patient on taking blood pressures at home. I also notified the patient that if he were to develop any other symptoms, he should go to the ER for evaluation. Patient states he wants to speak with Dr. Swain first.

## 2025-01-04 ENCOUNTER — HOSPITAL ENCOUNTER (OUTPATIENT)
Dept: INFUSION THERAPY | Age: 75
Setting detail: INFUSION SERIES
Discharge: HOME | End: 2025-01-04

## 2025-01-04 LAB
ABO/RH: NORMAL
ALBUMIN SERPL-MCNC: 2.9 G/DL (ref 3.5–4.6)
ALBUMIN SERPL-MCNC: 3.2 G/DL (ref 3.5–4.6)
ALP SERPL-CCNC: 80 U/L (ref 35–104)
ALT SERPL-CCNC: 26 U/L (ref 0–41)
ANION GAP SERPL CALCULATED.3IONS-SCNC: 10 MEQ/L (ref 9–15)
ANION GAP SERPL CALCULATED.3IONS-SCNC: 9 MEQ/L (ref 9–15)
ANTIBODY SCREEN: NORMAL
AST SERPL-CCNC: 19 U/L (ref 0–40)
BASOPHILS # BLD: 0 K/UL (ref 0–0.2)
BASOPHILS # BLD: 0 K/UL (ref 0–0.2)
BASOPHILS NFR BLD: 0.2 %
BASOPHILS NFR BLD: 1 %
BILIRUB SERPL-MCNC: 0.3 MG/DL (ref 0.2–0.7)
BLOOD BANK DISPENSE STATUS: NORMAL
BLOOD BANK PRODUCT CODE: NORMAL
BPU ID: NORMAL
BUN SERPL-MCNC: 40 MG/DL (ref 8–23)
BUN SERPL-MCNC: 40 MG/DL (ref 8–23)
CALCIUM SERPL-MCNC: 8 MG/DL (ref 8.5–9.9)
CALCIUM SERPL-MCNC: 8.4 MG/DL (ref 8.5–9.9)
CHLORIDE SERPL-SCNC: 107 MEQ/L (ref 95–107)
CHLORIDE SERPL-SCNC: 108 MEQ/L (ref 95–107)
CO2 SERPL-SCNC: 17 MEQ/L (ref 20–31)
CO2 SERPL-SCNC: 19 MEQ/L (ref 20–31)
CREAT SERPL-MCNC: 1.67 MG/DL (ref 0.7–1.2)
CREAT SERPL-MCNC: 1.85 MG/DL (ref 0.7–1.2)
DESCRIPTION BLOOD BANK: NORMAL
EOSINOPHIL # BLD: 0.1 K/UL (ref 0–0.7)
EOSINOPHIL # BLD: 0.1 K/UL (ref 0–0.7)
EOSINOPHIL NFR BLD: 2.3 %
EOSINOPHIL NFR BLD: 3 %
ERYTHROCYTE [DISTWIDTH] IN BLOOD BY AUTOMATED COUNT: 13.7 % (ref 11.5–14.5)
ERYTHROCYTE [DISTWIDTH] IN BLOOD BY AUTOMATED COUNT: 13.7 % (ref 11.5–14.5)
GLOBULIN SER CALC-MCNC: 3.9 G/DL (ref 2.3–3.5)
GLUCOSE BLD-MCNC: 110 MG/DL (ref 70–99)
GLUCOSE BLD-MCNC: 148 MG/DL (ref 70–99)
GLUCOSE SERPL-MCNC: 104 MG/DL (ref 70–99)
GLUCOSE SERPL-MCNC: 117 MG/DL (ref 70–99)
HCT VFR BLD AUTO: 20.8 % (ref 42–52)
HCT VFR BLD AUTO: 20.9 % (ref 42–52)
HGB BLD-MCNC: 6.8 G/DL (ref 14–18)
HGB BLD-MCNC: 6.9 G/DL (ref 14–18)
LYMPHOCYTES # BLD: 1.2 K/UL (ref 1–4.8)
LYMPHOCYTES # BLD: 1.8 K/UL (ref 1–4.8)
LYMPHOCYTES NFR BLD: 25 %
LYMPHOCYTES NFR BLD: 37.5 %
MACROCYTES BLD QL SMEAR: ABNORMAL
MACROCYTES BLD QL SMEAR: ABNORMAL
MAGNESIUM SERPL-MCNC: 1.6 MG/DL (ref 1.7–2.4)
MAGNESIUM SERPL-MCNC: 2 MG/DL (ref 1.7–2.4)
MCH RBC QN AUTO: 31.1 PG (ref 27–31.3)
MCH RBC QN AUTO: 31.5 PG (ref 27–31.3)
MCHC RBC AUTO-ENTMCNC: 32.7 % (ref 33–37)
MCHC RBC AUTO-ENTMCNC: 33 % (ref 33–37)
MCV RBC AUTO: 95 FL (ref 79–92.2)
MCV RBC AUTO: 95.4 FL (ref 79–92.2)
METAMYELOCYTES NFR BLD MANUAL: 1 %
MONOCYTES # BLD: 0.1 K/UL (ref 0.2–0.8)
MONOCYTES # BLD: 0.5 K/UL (ref 0.2–0.8)
MONOCYTES NFR BLD: 1.9 %
MONOCYTES NFR BLD: 9.2 %
NEUTROPHILS # BLD: 2.4 K/UL (ref 1.4–6.5)
NEUTROPHILS # BLD: 3.4 K/UL (ref 1.4–6.5)
NEUTS BAND NFR BLD MANUAL: 1 %
NEUTS SEG NFR BLD: 49.8 %
NEUTS SEG NFR BLD: 68 %
NRBC BLD-RTO: 1 /100 WBC
PERFORMED ON: ABNORMAL
PERFORMED ON: ABNORMAL
PHOSPHATE SERPL-MCNC: 4.4 MG/DL (ref 2.3–4.8)
PLATELET # BLD AUTO: 270 K/UL (ref 130–400)
PLATELET # BLD AUTO: 319 K/UL (ref 130–400)
PLATELET BLD QL SMEAR: ADEQUATE
PLATELET BLD QL SMEAR: ADEQUATE
POIKILOCYTOSIS BLD QL SMEAR: ABNORMAL
POTASSIUM SERPL-SCNC: 4.9 MEQ/L (ref 3.4–4.9)
POTASSIUM SERPL-SCNC: 5.2 MEQ/L (ref 3.4–4.9)
PROT SERPL-MCNC: 7.1 G/DL (ref 6.3–8)
RBC # BLD AUTO: 2.19 M/UL (ref 4.7–6.1)
RBC # BLD AUTO: 2.19 M/UL (ref 4.7–6.1)
SLIDE REVIEW: ABNORMAL
SODIUM SERPL-SCNC: 134 MEQ/L (ref 135–144)
SODIUM SERPL-SCNC: 136 MEQ/L (ref 135–144)
STOMATOCYTES BLD QL SMEAR: ABNORMAL
WBC # BLD AUTO: 4.8 K/UL (ref 4.8–10.8)
WBC # BLD AUTO: 4.9 K/UL (ref 4.8–10.8)

## 2025-01-04 PROCEDURE — 6360000002 HC RX W HCPCS: Performed by: INTERNAL MEDICINE

## 2025-01-04 PROCEDURE — 6370000000 HC RX 637 (ALT 250 FOR IP): Performed by: INTERNAL MEDICINE

## 2025-01-04 PROCEDURE — 86900 BLOOD TYPING SEROLOGIC ABO: CPT

## 2025-01-04 PROCEDURE — 86901 BLOOD TYPING SEROLOGIC RH(D): CPT

## 2025-01-04 PROCEDURE — 30233N1 TRANSFUSION OF NONAUTOLOGOUS RED BLOOD CELLS INTO PERIPHERAL VEIN, PERCUTANEOUS APPROACH: ICD-10-PCS | Performed by: STUDENT IN AN ORGANIZED HEALTH CARE EDUCATION/TRAINING PROGRAM

## 2025-01-04 PROCEDURE — 6370000000 HC RX 637 (ALT 250 FOR IP)

## 2025-01-04 PROCEDURE — 6360000002 HC RX W HCPCS

## 2025-01-04 PROCEDURE — 86850 RBC ANTIBODY SCREEN: CPT

## 2025-01-04 PROCEDURE — 80069 RENAL FUNCTION PANEL: CPT

## 2025-01-04 PROCEDURE — 99222 1ST HOSP IP/OBS MODERATE 55: CPT | Performed by: INTERNAL MEDICINE

## 2025-01-04 PROCEDURE — 83735 ASSAY OF MAGNESIUM: CPT

## 2025-01-04 PROCEDURE — 2500000003 HC RX 250 WO HCPCS: Performed by: INTERNAL MEDICINE

## 2025-01-04 PROCEDURE — 86880 COOMBS TEST DIRECT: CPT

## 2025-01-04 PROCEDURE — 2580000003 HC RX 258: Performed by: INTERNAL MEDICINE

## 2025-01-04 PROCEDURE — 1210000000 HC MED SURG R&B

## 2025-01-04 PROCEDURE — 36415 COLL VENOUS BLD VENIPUNCTURE: CPT

## 2025-01-04 PROCEDURE — 86923 COMPATIBILITY TEST ELECTRIC: CPT

## 2025-01-04 PROCEDURE — P9016 RBC LEUKOCYTES REDUCED: HCPCS

## 2025-01-04 PROCEDURE — 36430 TRANSFUSION BLD/BLD COMPNT: CPT

## 2025-01-04 PROCEDURE — 85025 COMPLETE CBC W/AUTO DIFF WBC: CPT

## 2025-01-04 RX ORDER — SODIUM CHLORIDE 9 MG/ML
INJECTION, SOLUTION INTRAVENOUS CONTINUOUS
Status: DISPENSED | OUTPATIENT
Start: 2025-01-04 | End: 2025-01-05

## 2025-01-04 RX ORDER — SODIUM CHLORIDE 9 MG/ML
INJECTION, SOLUTION INTRAVENOUS PRN
Status: DISCONTINUED | OUTPATIENT
Start: 2025-01-04 | End: 2025-01-09

## 2025-01-04 RX ADMIN — SODIUM CHLORIDE: 9 INJECTION, SOLUTION INTRAVENOUS at 13:34

## 2025-01-04 RX ADMIN — ENOXAPARIN SODIUM 40 MG: 100 INJECTION SUBCUTANEOUS at 08:39

## 2025-01-04 RX ADMIN — ALTEPLASE 2 MG: 2.2 INJECTION, POWDER, LYOPHILIZED, FOR SOLUTION INTRAVENOUS at 21:35

## 2025-01-04 RX ADMIN — Medication 10 ML: at 08:39

## 2025-01-04 RX ADMIN — DOXEPIN HYDROCHLORIDE 10 MG: 10 CAPSULE ORAL at 21:40

## 2025-01-04 RX ADMIN — ALLOPURINOL 200 MG: 100 TABLET ORAL at 08:38

## 2025-01-04 RX ADMIN — MAGNESIUM SULFATE HEPTAHYDRATE 2000 MG: 40 INJECTION, SOLUTION INTRAVENOUS at 02:51

## 2025-01-04 RX ADMIN — ATORVASTATIN CALCIUM 40 MG: 40 TABLET, FILM COATED ORAL at 21:40

## 2025-01-04 RX ADMIN — ALTEPLASE 1 MG: 2.2 INJECTION, POWDER, LYOPHILIZED, FOR SOLUTION INTRAVENOUS at 14:33

## 2025-01-04 RX ADMIN — TAMSULOSIN HYDROCHLORIDE 0.4 MG: 0.4 CAPSULE ORAL at 08:38

## 2025-01-04 RX ADMIN — CETIRIZINE HYDROCHLORIDE 10 MG: 10 TABLET, FILM COATED ORAL at 08:38

## 2025-01-04 RX ADMIN — FINASTERIDE 5 MG: 5 TABLET, FILM COATED ORAL at 08:38

## 2025-01-04 RX ADMIN — MEROPENEM 1000 MG: 1 INJECTION INTRAVENOUS at 15:10

## 2025-01-04 ASSESSMENT — ENCOUNTER SYMPTOMS
EYE PAIN: 0
COUGH: 0
NAUSEA: 1
SHORTNESS OF BREATH: 0
BACK PAIN: 0
EYE REDNESS: 0
VOMITING: 0
SINUS PAIN: 0
ABDOMINAL PAIN: 1
DIARRHEA: 1

## 2025-01-04 NOTE — PLAN OF CARE
Problem: Chronic Conditions and Co-morbidities  Goal: Patient's chronic conditions and co-morbidity symptoms are monitored and maintained or improved  Outcome: Progressing  Flowsheets (Taken 1/3/2025 1623 by Ines Chau RN)  Care Plan - Patient's Chronic Conditions and Co-Morbidity Symptoms are Monitored and Maintained or Improved:   Monitor and assess patient's chronic conditions and comorbid symptoms for stability, deterioration, or improvement   Collaborate with multidisciplinary team to address chronic and comorbid conditions and prevent exacerbation or deterioration   Update acute care plan with appropriate goals if chronic or comorbid symptoms are exacerbated and prevent overall improvement and discharge

## 2025-01-04 NOTE — CONSULTS
Infectious Diseases Inpatient Consult Note      Reason for Consult:   Antibiotics management  Requesting Physician:   Dr. Meraz  Primary Care Physician:  Sean Levy MD  History Obtained From:   Pt, EPIC    Admit Date: 1/3/2025  Hospital Day: 2      HISTORY OF PRESENT ILLNESS:  This is a 74 y.o. male with recent hospitalization for bacterial peritonitis secondary to perforated ileal diverticulitis status post right colectomy and primary anastomosis on December 23, discharged by Dr. Bazan on IV Invanz, well-tolerated.  Patient was admitted yesterday after he was found to be hypotensive when he came to his IV infusion.  Patient has been weak since recent discharge from the hospital.  He has occasional lower abdominal pain.  He denies any GI//pulmonary/cardiac symptoms  He denies any fevers or chills.   Abdominal incision without any drainage  Patient was found to have acute kidney injury with hyperkalemia and metabolic acidosis.  Was admitted and was started on IV fluid.  I was consulted for antibiotics management    Past medical surgical and social history were reviewed  Past Medical History:   Diagnosis Date    Acute idiopathic gout of right foot     Anemia 03/01/2018    iron transfusions x2    Arthritis     both knees    Bilateral carpal tunnel syndrome 1/12/2022    Chronic kidney disease     CLL (chronic lymphocytic leukemia) (HCC)     dx 5/2015    Colon polyps     Disease of blood and blood forming organ     Hyperlipidemia     dx since 1970's    Hypertension     meds  since 1970's    Lymphoma of gastrointestinal tract (HCC)     Movement disorder     Type II or unspecified type diabetes mellitus without mention of complication, not stated as uncontrolled     hx > 20 yrs    Urinary retention 12/23/2024       Past Surgical History:   Procedure Laterality Date    APPENDECTOMY      age 22s    CARPAL TUNNEL RELEASE Left 1/26/2022    LEFT CARPAL TUNNEL RELEASE performed by Rangel Saldivar MD at [facility] OR

## 2025-01-04 NOTE — PROGRESS NOTES
Hospitalist Progress Note      PCP: Sean Levy MD    Date of Admission: 1/3/2025    Chief Complaint:  no acute events, afebrile, SBP in the 90-100s, on RA,     Medications:  Reviewed    Infusion Medications    sodium chloride 100 mL/hr at 01/04/25 1334    sodium chloride       Scheduled Medications    meropenem  1,000 mg IntraVENous Q8H    ALTEplase  1 mg IntraCATHeter Once    sodium chloride flush  5-40 mL IntraVENous 2 times per day    enoxaparin  40 mg SubCUTAneous Daily    allopurinol  200 mg Oral Daily    doxepin  10 mg Oral Nightly    finasteride  5 mg Oral Daily    cetirizine  10 mg Oral Daily    tamsulosin  0.4 mg Oral Daily    atorvastatin  40 mg Oral Daily     PRN Meds: sodium chloride flush, sodium chloride, magnesium sulfate, ondansetron **OR** ondansetron, polyethylene glycol, acetaminophen **OR** acetaminophen      Intake/Output Summary (Last 24 hours) at 1/4/2025 1337  Last data filed at 1/3/2025 2144  Gross per 24 hour   Intake 250 ml   Output --   Net 250 ml       Exam:    /62   Pulse 72   Temp 97.3 °F (36.3 °C) (Oral)   Resp 16   Ht 1.7 m (5' 6.93\")   Wt 93 kg (205 lb)   SpO2 100%   BMI 32.18 kg/m²     General appearance: alert, cooperative  Lungs: clear to auscultation bilaterally  Heart: S1/S2, RRR  Abdomen: soft  Extremities: no edema      Labs:   Recent Labs     01/02/25  1000 01/03/25  1239   WBC 8.4 8.5   HGB 8.0* 7.6*   HCT 25.1* 22.9*    379     Recent Labs     01/03/25  1239 01/03/25  1359 01/03/25  2329 01/04/25  0621   *  --  136 134*   K 6.0* 5.6* 4.9 5.2*     --  107 108*   CO2 17*  --  19* 17*   BUN 43*  --  40* 40*   CREATININE 1.87*  --  1.85* 1.67*   CALCIUM 8.5  --  8.4* 8.0*   PHOS  --   --   --  4.4     Recent Labs     01/03/25  1239 01/03/25  2329   AST 28 19   ALT 28 26   BILITOT 0.3 0.3   ALKPHOS 85 80     No results for input(s): \"INR\" in the last 72 hours.  No results for input(s): \"CKTOTAL\", \"TROPONINI\" in the last 72

## 2025-01-04 NOTE — ED PROVIDER NOTES
Absolute 10.4 (*)     All other components within normal limits   COMPREHENSIVE METABOLIC PANEL W/ REFLEX TO MG FOR LOW K - Abnormal; Notable for the following components:    Glucose 122 (*)     BUN 28 (*)     Creatinine 1.93 (*)     Est, Glom Filt Rate 36.3 (*)     Calcium 7.7 (*)     Albumin 3.2 (*)     Total Bilirubin 0.9 (*)     All other components within normal limits   CBC WITH AUTO DIFFERENTIAL - Abnormal; Notable for the following components:    RBC 2.68 (*)     Hemoglobin 8.6 (*)     Hematocrit 25.8 (*)     MCV 96.3 (*)     MCH 32.1 (*)     RDW 14.6 (*)     Neutrophils Absolute 8.4 (*)     All other components within normal limits   COMPREHENSIVE METABOLIC PANEL W/ REFLEX TO MG FOR LOW K - Abnormal; Notable for the following components:    Glucose 106 (*)     Creatinine 1.55 (*)     Est, Glom Filt Rate 47.2 (*)     Calcium 8.2 (*)     Albumin 3.0 (*)     Total Bilirubin 0.9 (*)     Globulin 4.1 (*)     All other components within normal limits   CBC WITH AUTO DIFFERENTIAL - Abnormal; Notable for the following components:    RBC 2.72 (*)     Hemoglobin 8.7 (*)     Hematocrit 26.5 (*)     MCV 97.4 (*)     MCH 32.0 (*)     MCHC 32.8 (*)     RDW 14.7 (*)     Neutrophils Absolute 7.6 (*)     All other components within normal limits   MAGNESIUM - Abnormal; Notable for the following components:    Magnesium 1.5 (*)     All other components within normal limits   COMPREHENSIVE METABOLIC PANEL W/ REFLEX TO MG FOR LOW K - Abnormal; Notable for the following components:    Chloride 108 (*)     Glucose 102 (*)     BUN 24 (*)     Creatinine 1.45 (*)     Est, Glom Filt Rate 51.1 (*)     Calcium 8.3 (*)     Albumin 3.0 (*)     Globulin 3.8 (*)     All other components within normal limits   CBC WITH AUTO DIFFERENTIAL - Abnormal; Notable for the following components:    RBC 2.56 (*)     Hemoglobin 8.0 (*)     Hematocrit 25.1 (*)     MCV 98.0 (*)     MCHC 31.9 (*)     RDW 14.6 (*)     All other components within normal      CONSULTS:  IP CONSULT TO UROLOGY  IP CONSULT TO GENERAL SURGERY  IP CONSULT TO INFECTIOUS DISEASES  IP CONSULT TO HOME CARE NEEDS    PROCEDURES:  Unless otherwise noted below, none     Procedures      FINAL IMPRESSION      1. Acute diverticulitis    2. Intractable abdominal pain    3. Intractable nausea    4. Ileitis, terminal (HCC)    5. Bowel perforation (HCC)    6. Diverticulitis of large intestine with perforation without bleeding          DISPOSITION/PLAN   DISPOSITION Decision To Admit 12/23/2024 12:02:51 AM      PATIENT REFERRED TO:  Jorge Mcgrath PA  [address]  [address]  [place] [address]  [phone]    Go on 1/15/2025  1:45PM, for hospital follow up, Please bring photo id, insurance card, and copay  Will need a urine sample at office    OUTPATIENT INFUSION  [phone]  Follow up on 12/29/2024  ARRANGED FOR INFUSION  AM. FOR WEEKENDS AND HOLIDAYS REPORT TO ER REGISTRATION.    Sean Levy MD  [address] [place] [id number]  [phone]          Cisco Mccray MD  [address]  [address]  [place] [id number]  [phone]    Call  Call to schedule a follow up appointment with Dr Mccray in 1 week      DISCHARGE MEDICATIONS:  Discharge Medication List as of 12/28/2024 12:58 PM        START taking these medications    Details   ertapenem (INVANZ) infusion Infuse 1,000 mg intravenously every 24 hours for 21 days Compound per protocol., Disp-21 g, R-0Print      finasteride (PROSCAR) 5 MG tablet Take 1 tablet by mouth daily, Disp-30 tablet, R-3Print      tamsulosin (FLOMAX) 0.4 MG capsule Take 1 capsule by mouth daily, Disp-30 capsule, R-3Print           Controlled Substances Monitoring:          No data to display                (Please note that portions of this note were completed with a voice recognition program.  Efforts were made to edit the dictations but occasionally words are mis-transcribed.)    Shane Maki DO (electronically signed)  Attending Emergency

## 2025-01-04 NOTE — CONSENT
Informed Consent for Blood Component Transfusion Note    I have discussed with the patient the rationale for blood component transfusion; its benefits in treating or preventing fatigue, organ damage, or death; and its risk which includes mild transfusion reactions, rare risk of blood borne infection, or more serious but rare reactions. I have discussed the alternatives to transfusion, including the risk and consequences of not receiving transfusion. The patient had an opportunity to ask questions and had agreed to proceed with transfusion of blood components.    Electronically signed by JARED HUERTA MD on 1/4/25 at 4:42 PM EST

## 2025-01-04 NOTE — PROGRESS NOTES
Blood consent form obtained.    Electronically signed by WHITNEY BRAR RN on 1/4/25 at 6:44 PM EST

## 2025-01-04 NOTE — PROGRESS NOTES
Lab called with critical Hgb of 6.8.   Repeat Hgb 6.9.  Dr. Meraz made aware via [employer].    Pt's /55 (66), HR 81. Pt Asymptomatic.    Electronically signed by WHITNEY BRAR RN on 1/4/25 at 3:46 PM EST

## 2025-01-04 NOTE — PROGRESS NOTES
[facility]   Pharmacy Dose Adjustment Per Protocol:  Meropenem Extended Interval Interchange    Hector Bose is a 74 y.o. male.     The following ordered dose of Meropenem has been changed to optimize its pharmacodynamic profile per [facility] pharmacy policy approved by [facility].    Recent Labs     01/02/25  1000 01/03/25  1239 01/03/25  2329 01/04/25  0621   CREATININE 1.92* 1.87* 1.85* 1.67*   BUN 40* 43* 40* 40*   WBC 8.4 8.5  --   --      .  Height: 170 cm (5' 6.93\"), Weight - Scale: 93 kg (205 lb), Body mass index is 32.18 kg/m².  Estimated Creatinine Clearance: 43 mL/min (A) (based on SCr of 1.67 mg/dL (H)).    Ordered Dose    __ 500 mg IV every 8 hrs  (30 minute infusion)    __ 1 gm  IV every 8  hrs (30 minute infusion)    _X_ 2 gm IV every 8 hrs (30 minute infusion)    New Dose    Meropenem - Extended Infusion (3-hour infusion) - Preferred Dosing Strategy    Renal function (CrCl mL/min)  >= 50  26 - 49  10 - 25   < 10, HD, PD  CRRT    All indications - Loading dose of 2419-3000 milligrams x 1 over 30 minutes or via IV push (based on indication). Maintenance dose should begin at the next regularly scheduled dosing interval based on indication/renal function.    Maintenance dosing for all indications except as outlined below  1000mg q8h ¨ 1000mg q12h X 500 mg q12h ¨ 500 mg q24h ¨ 1000mg q12h^ ¨   CNS infections, Cystic fibrosis, MADHAV > 4  2000mg q8h ¨ 2000mg q12h ¨ 1000mg q12h ¨ 1000mg q24h ¨ 2000mg q12h† ¨    ^Consider 1000mg q8h for CRRT effluent rates > 3L/h   †Consider 2000mg q8h for CRRT effluent rates >= 3L/h       Pharmacists should be contacted for issues concerning drug compatibility with multiple IV medications.  All doses will be prepared using 100ml bag to be infused over 3-hours at a rate of 33.3 ml/hr.    Thank You,  Cam Vang [facility]  1/4/2025 1:59 PM

## 2025-01-04 NOTE — PROGRESS NOTES
Spoke with pharmacy staff who states that ID consult needs placed in order for pt to receive Invanz. Pharmacy is reaching out to Dr. Meraz.    Electronically signed by WHITNEY BRAR RN on 1/4/25 at 10:27 AM EST

## 2025-01-05 LAB
ALBUMIN SERPL-MCNC: 2.6 G/DL (ref 3.5–4.6)
ANION GAP SERPL CALCULATED.3IONS-SCNC: 8 MEQ/L (ref 9–15)
BASOPHILS # BLD: 0 K/UL (ref 0–0.2)
BASOPHILS NFR BLD: 0.4 %
BUN SERPL-MCNC: 37 MG/DL (ref 8–23)
CALCIUM SERPL-MCNC: 7.4 MG/DL (ref 8.5–9.9)
CHLORIDE SERPL-SCNC: 108 MEQ/L (ref 95–107)
CO2 SERPL-SCNC: 18 MEQ/L (ref 20–31)
CREAT SERPL-MCNC: 1.43 MG/DL (ref 0.7–1.2)
CRP SERPL HS-MCNC: 15.1 MG/L (ref 0–5)
EKG ATRIAL RATE: 74 BPM
EKG P AXIS: -7 DEGREES
EKG P-R INTERVAL: 212 MS
EKG Q-T INTERVAL: 388 MS
EKG QRS DURATION: 70 MS
EKG QTC CALCULATION (BAZETT): 430 MS
EKG R AXIS: -14 DEGREES
EKG T AXIS: -5 DEGREES
EKG VENTRICULAR RATE: 74 BPM
EOSINOPHIL # BLD: 0.1 K/UL (ref 0–0.7)
EOSINOPHIL NFR BLD: 2.6 %
ERYTHROCYTE [DISTWIDTH] IN BLOOD BY AUTOMATED COUNT: 14.4 % (ref 11.5–14.5)
GLUCOSE BLD-MCNC: 111 MG/DL (ref 70–99)
GLUCOSE BLD-MCNC: 115 MG/DL (ref 70–99)
GLUCOSE BLD-MCNC: 132 MG/DL (ref 70–99)
GLUCOSE SERPL-MCNC: 109 MG/DL (ref 70–99)
HCT VFR BLD AUTO: 20.8 % (ref 42–52)
HCT VFR BLD AUTO: 21.5 % (ref 42–52)
HGB BLD-MCNC: 7 G/DL (ref 14–18)
HGB BLD-MCNC: 7.1 G/DL (ref 14–18)
LYMPHOCYTES # BLD: 2.2 K/UL (ref 1–4.8)
LYMPHOCYTES NFR BLD: 39.7 %
MAGNESIUM SERPL-MCNC: 1.5 MG/DL (ref 1.7–2.4)
MCH RBC QN AUTO: 31.8 PG (ref 27–31.3)
MCHC RBC AUTO-ENTMCNC: 33.7 % (ref 33–37)
MCV RBC AUTO: 94.5 FL (ref 79–92.2)
MONOCYTES # BLD: 0.5 K/UL (ref 0.2–0.8)
MONOCYTES NFR BLD: 8.7 %
NEUTROPHILS # BLD: 2.6 K/UL (ref 1.4–6.5)
NEUTS SEG NFR BLD: 48 %
PERFORMED ON: ABNORMAL
PHOSPHATE SERPL-MCNC: 3.5 MG/DL (ref 2.3–4.8)
PLATELET # BLD AUTO: 314 K/UL (ref 130–400)
POTASSIUM SERPL-SCNC: 4.3 MEQ/L (ref 3.4–4.9)
RBC # BLD AUTO: 2.2 M/UL (ref 4.7–6.1)
SODIUM SERPL-SCNC: 134 MEQ/L (ref 135–144)
WBC # BLD AUTO: 5.4 K/UL (ref 4.8–10.8)

## 2025-01-05 PROCEDURE — 2580000003 HC RX 258: Performed by: INTERNAL MEDICINE

## 2025-01-05 PROCEDURE — 83735 ASSAY OF MAGNESIUM: CPT

## 2025-01-05 PROCEDURE — 85018 HEMOGLOBIN: CPT

## 2025-01-05 PROCEDURE — 6360000002 HC RX W HCPCS: Performed by: INTERNAL MEDICINE

## 2025-01-05 PROCEDURE — 2500000003 HC RX 250 WO HCPCS: Performed by: INTERNAL MEDICINE

## 2025-01-05 PROCEDURE — 86140 C-REACTIVE PROTEIN: CPT

## 2025-01-05 PROCEDURE — 1210000000 HC MED SURG R&B

## 2025-01-05 PROCEDURE — 6370000000 HC RX 637 (ALT 250 FOR IP): Performed by: INTERNAL MEDICINE

## 2025-01-05 PROCEDURE — 85025 COMPLETE CBC W/AUTO DIFF WBC: CPT

## 2025-01-05 PROCEDURE — 6370000000 HC RX 637 (ALT 250 FOR IP)

## 2025-01-05 PROCEDURE — 80069 RENAL FUNCTION PANEL: CPT

## 2025-01-05 PROCEDURE — 99232 SBSQ HOSP IP/OBS MODERATE 35: CPT | Performed by: INTERNAL MEDICINE

## 2025-01-05 PROCEDURE — 85014 HEMATOCRIT: CPT

## 2025-01-05 RX ADMIN — SODIUM CHLORIDE: 9 INJECTION, SOLUTION INTRAVENOUS at 00:52

## 2025-01-05 RX ADMIN — MAGNESIUM SULFATE HEPTAHYDRATE 2000 MG: 40 INJECTION, SOLUTION INTRAVENOUS at 10:32

## 2025-01-05 RX ADMIN — MEROPENEM 1000 MG: 1 INJECTION INTRAVENOUS at 02:42

## 2025-01-05 RX ADMIN — ALLOPURINOL 200 MG: 100 TABLET ORAL at 08:11

## 2025-01-05 RX ADMIN — MEROPENEM 1000 MG: 1 INJECTION INTRAVENOUS at 14:00

## 2025-01-05 RX ADMIN — DOXEPIN HYDROCHLORIDE 10 MG: 10 CAPSULE ORAL at 20:20

## 2025-01-05 RX ADMIN — ATORVASTATIN CALCIUM 40 MG: 40 TABLET, FILM COATED ORAL at 20:20

## 2025-01-05 RX ADMIN — Medication 10 ML: at 20:21

## 2025-01-05 RX ADMIN — TAMSULOSIN HYDROCHLORIDE 0.4 MG: 0.4 CAPSULE ORAL at 09:59

## 2025-01-05 RX ADMIN — CETIRIZINE HYDROCHLORIDE 10 MG: 10 TABLET, FILM COATED ORAL at 08:11

## 2025-01-05 RX ADMIN — FINASTERIDE 5 MG: 5 TABLET, FILM COATED ORAL at 08:11

## 2025-01-05 RX ADMIN — Medication 10 ML: at 08:12

## 2025-01-05 RX ADMIN — SODIUM CHLORIDE 125 MG: 9 INJECTION, SOLUTION INTRAVENOUS at 14:00

## 2025-01-05 RX ADMIN — ENOXAPARIN SODIUM 40 MG: 100 INJECTION SUBCUTANEOUS at 08:12

## 2025-01-05 ASSESSMENT — PAIN SCALES - GENERAL: PAINLEVEL_OUTOF10: 0

## 2025-01-05 NOTE — PROGRESS NOTES
Pt received 1 unit of PRB's last night. Hgb 7.1 post-transfusion.     Lab called with critical Hgb of 7.0 this morning. Dr. Meraz made aware via [employer].    0934: Mag 1.5. Replacement given per PRN protocol.     Electronically signed by WHITNEY BRAR RN on 1/5/25 at 10:34 AM EST

## 2025-01-05 NOTE — PROGRESS NOTES
Pt assessed and labs noted. Purple lumen occluded and Alteplase given. Lumen rechecked and return of blood noted. 5ml withdrawn and lumen flushed with 20CC    2300 blood transfusion started pt with c/o tiredness and cold. Education given of low Hbg and effects. BP >100 systolic. Pt denies pain at this time.

## 2025-01-05 NOTE — PROGRESS NOTES
Comprehensive Nutrition Assessment    Type and Reason for Visit:  Initial, Positive nutrition screen    Nutrition Recommendations/Plan:   Add No concentrated sweets to diet, monitor glucose  Trial clear oral supplement @ B, diabetic supplement @ D   Monitor weights     Malnutrition Assessment:  Malnutrition Status:  No malnutrition (01/05/25 1255)    Context:  Chronic Illness     Findings of the 6 clinical characteristics of malnutrition:  Energy Intake:  Mild decrease in energy intake  Weight Loss:  5% over 1 month     Body Fat Loss:  No body fat loss     Muscle Mass Loss:  No muscle mass loss    Fluid Accumulation:  No fluid accumulation     Strength:  Not Performed    Nutrition Assessment:    Pt with recent unintended weight loss ( ~ 6% x1 month) following GI surgery, Appetite improving PTA, noted hx of DM, glucose currently controlled will try an variety of oral nutrition supplements and monitor for acceptance, add liberal  carb control restrction to diet and continue to monitor glucose    Nutrition Related Findings:    Admitted wtih hypotension, \"Hx includes : DM2, CKD3, CLL/SLL s/p chemo/RT, duodenal GI bleed due to CLL, , admit 12/2-12/28 for perforated distal ileal diverticulitis s/p exploratory laparotomy with LEISA and right colectomy on 12/23 \" abnormal labs noted ( hyponatremia, mild BUN/creat elevation, gluc < 140), relevant meds reviewed, denies and GI &/or nutrition related concerns, give Ensure plus yesterday , did not like it Wound Type: Surgical Incision       Current Nutrition Intake & Therapies:    Average Meal Intake: 51-75%  Average Supplements Intake: 0%  ADULT DIET; Regular    Anthropometric Measures:  Height: 170.2 cm (5' 7\")  Ideal Body Weight (IBW): 148 lbs (67 kg)    Admission Body Weight: 93 kg (205 lb)  Current Body Weight: 93 kg (205 lb), 138.5 % IBW. Weight Source: Bed scale  Current BMI (kg/m2): 32.1  Usual Body Weight: 102.5 kg (226 lb) ((9/24)& ( 6/24) -OFFICE, 218# (

## 2025-01-05 NOTE — ACP (ADVANCE CARE PLANNING)
Advance Care Planning   Healthcare Decision Maker:    Primary Decision Maker: Melody Mac - Spouse - [phone]    Secondary Decision Maker: Anat Otoole - Child - [phone]    Click here to complete Healthcare Decision Makers including selection of the Healthcare Decision Maker Relationship (ie \"Primary\").  Today we documented Decision Maker(s) consistent with Legal Next of Kin hierarchy.

## 2025-01-05 NOTE — PROGRESS NOTES
Infectious Diseases Inpatient Progress Note          HISTORY OF PRESENT ILLNESS:  Follow up bacterial peritonitis secondary to perforated viscus on IV meropenem, well tolerated.  Patient was receiving IV Invanz at home.  Was admitted with hypotension, generalized weakness, anemia, acute kidney injury on chronic kidney disease.   Occasional lightheadedness.  Persistent severe generalized weakness and fatigue.   Reports good appetite.  Had 4 bowel movements yesterday.  Denies any bleeding.   No urinary symptoms.  No abdominal pain.  No nausea vomiting.  No fevers or chills.  Reports good appetite  Current Medications:     meropenem  1,000 mg IntraVENous Q12H    sodium chloride flush  5-40 mL IntraVENous 2 times per day    enoxaparin  40 mg SubCUTAneous Daily    allopurinol  200 mg Oral Daily    doxepin  10 mg Oral Nightly    finasteride  5 mg Oral Daily    cetirizine  10 mg Oral Daily    tamsulosin  0.4 mg Oral Daily    atorvastatin  40 mg Oral Daily       Allergies:  Dye [barium-containing compounds], Iodides, and Iodinated contrast media      Review of Systems  14 system review is negative other than HPI    Physical Exam  Vitals:    01/04/25 2317 01/04/25 2319 01/05/25 0159 01/05/25 0714   BP: (!) 107/54 (!) 107/54 114/62 (!) 108/58   Pulse: 82 87 85 82   Resp: 16  18 18   Temp: 98.3 °F (36.8 °C)  99 °F (37.2 °C) 98.2 °F (36.8 °C)   TempSrc: Oral  Oral Oral   SpO2: 99% 98% 98% 99%   Weight:       Height:         General Appearance: alert and oriented to person, place and time, well-developed and well-nourished, in no acute distress  On room air  Skin: warm and dry, no rash.   Head: normocephalic and atraumatic  Eyes: anicteric sclerae  ENT: oropharynx clear and moist with normal mucous membranes. No oral thrush  Intact right upper extremity PICC line  Lungs: normal respiratory effort, clear lungs  Heart normal S1-S2 no murmur  Abdomen: soft, no tenderness  No leg edema  No erythema, no tenderness      DATA:    Lab  Results   Component Value Date    WBC 5.4 01/05/2025    HGB 7.0 (LL) 01/05/2025    HCT 20.8 (LL) 01/05/2025    MCV 94.5 (H) 01/05/2025     01/05/2025     Lab Results   Component Value Date    CREATININE 1.43 (H) 01/05/2025    BUN 37 (H) 01/05/2025     (L) 01/05/2025    K 4.3 01/05/2025     (H) 01/05/2025    CO2 18 (L) 01/05/2025       Hepatic Function Panel:  Lab Results   Component Value Date/Time    ALKPHOS 80 01/03/2025 11:29 PM    ALT 26 01/03/2025 11:29 PM    AST 19 01/03/2025 11:29 PM    BILITOT 0.3 01/03/2025 11:29 PM     CRP of 15 discussed with the patient  IMPRESSION:    Volume depletion and severe anemia requiring blood transfusion in a patient with recent abdominal surgery  acute kidney injury/chronic kidney disease and hyperkalemia, improving  Need for continuation of IV antibiotics for recent bacterial peritonitis secondary to perforated distal ileal diverticulitis status post right colectomy and primary anastomosis on December 23          PLAN:  Continue IV meropenem for 1 more week  Follow-up CRP in 1 week  Follow-up CBC BMP  Follow-up with Dr. Meraz for severe anemia    Discussed with patient    Elza Ardon MD

## 2025-01-05 NOTE — PLAN OF CARE
Problem: Chronic Conditions and Co-morbidities  Goal: Patient's chronic conditions and co-morbidity symptoms are monitored and maintained or improved  1/4/2025 2310 by Kay Banuelos, RN  Outcome: Progressing  1/4/2025 1031 by Mel Fuller, RN  Outcome: Progressing     Problem: Discharge Planning  Goal: Discharge to home or other facility with appropriate resources  1/4/2025 1031 by Mel Fuller, RN  Outcome: Progressing     Problem: Pain  Goal: Verbalizes/displays adequate comfort level or baseline comfort level  1/4/2025 1031 by Mel Fuller, RN  Outcome: Progressing     Problem: Safety - Adult  Goal: Free from fall injury  1/4/2025 1031 by Mel Fuller, RN  Outcome: Progressing

## 2025-01-05 NOTE — PROGRESS NOTES
Hospitalist Progress Note      PCP: Sean Levy MD    Date of Admission: 1/3/2025    Chief Complaint:  no acute events, afebrile, stable HD, on RA, u/-2200 ml    Medications:  Reviewed    Infusion Medications    sodium chloride      sodium chloride       Scheduled Medications    meropenem  1,000 mg IntraVENous Q12H    sodium chloride flush  5-40 mL IntraVENous 2 times per day    enoxaparin  40 mg SubCUTAneous Daily    allopurinol  200 mg Oral Daily    doxepin  10 mg Oral Nightly    finasteride  5 mg Oral Daily    cetirizine  10 mg Oral Daily    tamsulosin  0.4 mg Oral Daily    atorvastatin  40 mg Oral Daily     PRN Meds: sodium chloride, sodium chloride flush, sodium chloride, magnesium sulfate, ondansetron **OR** ondansetron, polyethylene glycol, acetaminophen **OR** acetaminophen      Intake/Output Summary (Last 24 hours) at 1/5/2025 1243  Last data filed at 1/5/2025 1034  Gross per 24 hour   Intake 3803.06 ml   Output 2700 ml   Net 1103.06 ml       Exam:    BP (!) 108/58   Pulse 82   Temp 98.2 °F (36.8 °C) (Oral)   Resp 18   Ht 1.702 m (5' 7\")   Wt 93 kg (205 lb)   SpO2 99%   BMI 32.11 kg/m²     General appearance: alert, cooperative  Lungs: clear to auscultation bilaterally  Heart: S1/S2, RRR  Abdomen: soft  Extremities: no edema      Labs:   Recent Labs     01/04/25  1435 01/04/25  1515 01/05/25  0354 01/05/25  0915   WBC 4.9 4.8  --  5.4   HGB 6.8* 6.9* 7.1* 7.0*   HCT 20.8* 20.9* 21.5* 20.8*    319  --  314     Recent Labs     01/03/25  2329 01/04/25  0621 01/05/25  0915    134* 134*   K 4.9 5.2* 4.3    108* 108*   CO2 19* 17* 18*   BUN 40* 40* 37*   CREATININE 1.85* 1.67* 1.43*   CALCIUM 8.4* 8.0* 7.4*   PHOS  --  4.4 3.5     Recent Labs     01/03/25  1239 01/03/25  2329   AST 28 19   ALT 28 26   BILITOT 0.3 0.3   ALKPHOS 85 80     No results for input(s): \"INR\" in the last 72 hours.  No results for input(s): \"CKTOTAL\", \"TROPONINI\" in the last 72 hours.    Urinalysis:      Lab

## 2025-01-05 NOTE — PLAN OF CARE
Nutrition Problem #1: Unintended weight loss  Intervention: Food and/or Nutrient Delivery: Modify Current Diet, Modify Oral Nutrition Supplement  Nutritional

## 2025-01-05 NOTE — CARE COORDINATION
Case Management Assessment  Initial Evaluation    Date/Time of Evaluation: 1/5/2025 3:41 PM  Assessment Completed by: MICHELLE Mukherjee    If patient is discharged prior to next notation, then this note serves as note for discharge by case management.    Patient Name: Hector Bose                   YOB: 1950  Diagnosis: JANY (acute kidney injury) (HCC) [N17.9]                   Date / Time: 1/3/2025 11:54 AM    Patient Admission Status: Inpatient   Readmission Risk (Low < 19, Mod (19-27), High > 27): Readmission Risk Score: 19.7    Current PCP: Sean Levy MD  PCP verified by CM? Yes    Chart Reviewed: Yes      History Provided by: Patient  Patient Orientation: Alert and Oriented    Patient Cognition: Alert    Hospitalization in the last 30 days (Readmission):  No    If yes, Readmission Assessment in  Navigator will be completed.    Advance Directives:      Code Status: Full Code   Patient's Primary Decision Maker is: Legal Next of Kin    Primary Decision Maker: Melody Mac - Spouse - [phone]    Secondary Decision Maker: Anat Otoole - Child - [phone]    Discharge Planning:    Patient lives with: Spouse/Significant Other Type of Home: House  Primary Care Giver: Self  Patient Support Systems include: Spouse/Significant Other, Children, Family Members   Current Financial resources:    Current community resources:    Current services prior to admission: None            Current DME:              Type of Home Care services:  None    ADLS  Prior functional level: Independent in ADLs/IADLs  Current functional level: Independent in ADLs/IADLs    PT AM-PAC:   /24  OT AM-PAC:   /24    Family can provide assistance at DC: Yes  Would you like Case Management to discuss the discharge plan with any other family members/significant others, and if so, who? No  Plans to Return to Present Housing: Yes  Other Identified Issues/Barriers to RETURNING to current housing: none  Potential  Assistance needed at discharge: N/A            Potential DME:    Patient expects to discharge to: House  Plan for transportation at discharge:      Financial    Payor: MEDICARE / Plan: MEDICARE PART A AND B / Product Type: *No Product type* /     Does insurance require precert for SNF: No    Potential assistance Purchasing Medications: No  Meds-to-Beds request:        [id number] - [place], [place] - [address] - P [phone] - F [phone]  [address] [place] [address]  [facility] [address]  Phone: [phone] Fax: [phone]    [id number] - [place], [place] - [address] [facility] -  [phone] - F [phone]  [address] [place] [address]  Phone: [phone] Fax: [phone]      Notes:    Factors facilitating achievement of predicted outcomes: Family support, Motivated, Cooperative, and Pleasant    Barriers to discharge: Medical complications    Additional Case Management Notes: Patient reported he lives at home with his wife. He denied having VA, dialysis, or O2. Patient said he has a walker, cane, and shower chair at home. He was previously discharged with IV antibiotics that he was receiving at outpatient infusion. Patient said he plans to continue with this when discharged.     The Plan for Transition of Care is related to the following treatment goals of JANY (acute kidney injury) (HCC) [N17.9]    IF APPLICABLE: The Patient and/or patient representative Hector and his family were provided with a choice of provider and agrees with the discharge plan. Freedom of choice list with basic dialogue that supports the patient's individualized plan of care/goals and shares the quality data associated with the providers was provided to: Patient   Patient Representative Name:       The Patient and/or Patient Representative Agree with the Discharge Plan? Yes    MICHELLE Mukherjee  Case Management Department  Ph: [phone] Fax: [phone]

## 2025-01-06 ENCOUNTER — APPOINTMENT (OUTPATIENT)
Dept: CT IMAGING | Age: 75
DRG: 314 | End: 2025-01-06
Payer: MEDICARE

## 2025-01-06 ENCOUNTER — HOSPITAL ENCOUNTER (OUTPATIENT)
Dept: INFUSION THERAPY | Age: 75
Setting detail: INFUSION SERIES
End: 2025-01-06

## 2025-01-06 LAB
ALBUMIN SERPL-MCNC: 2.9 G/DL (ref 3.5–4.6)
ANION GAP SERPL CALCULATED.3IONS-SCNC: 9 MEQ/L (ref 9–15)
BUN SERPL-MCNC: 40 MG/DL (ref 8–23)
CALCIUM SERPL-MCNC: 7.8 MG/DL (ref 8.5–9.9)
CHLORIDE SERPL-SCNC: 107 MEQ/L (ref 95–107)
CO2 SERPL-SCNC: 19 MEQ/L (ref 20–31)
CREAT SERPL-MCNC: 1.58 MG/DL (ref 0.7–1.2)
GLUCOSE BLD-MCNC: 136 MG/DL (ref 70–99)
GLUCOSE BLD-MCNC: 141 MG/DL (ref 70–99)
GLUCOSE BLD-MCNC: 158 MG/DL (ref 70–99)
GLUCOSE SERPL-MCNC: 106 MG/DL (ref 70–99)
HCT VFR BLD AUTO: 24.7 % (ref 42–52)
HGB BLD-MCNC: 8.5 G/DL (ref 14–18)
MAGNESIUM SERPL-MCNC: 1.6 MG/DL (ref 1.7–2.4)
MAGNESIUM SERPL-MCNC: 1.8 MG/DL (ref 1.7–2.4)
PERFORMED ON: ABNORMAL
PHOSPHATE SERPL-MCNC: 3.4 MG/DL (ref 2.3–4.8)
POTASSIUM SERPL-SCNC: 4.2 MEQ/L (ref 3.4–4.9)
SODIUM SERPL-SCNC: 135 MEQ/L (ref 135–144)

## 2025-01-06 PROCEDURE — 6360000002 HC RX W HCPCS: Performed by: INTERNAL MEDICINE

## 2025-01-06 PROCEDURE — 6370000000 HC RX 637 (ALT 250 FOR IP)

## 2025-01-06 PROCEDURE — 2580000003 HC RX 258: Performed by: INTERNAL MEDICINE

## 2025-01-06 PROCEDURE — 6370000000 HC RX 637 (ALT 250 FOR IP): Performed by: INTERNAL MEDICINE

## 2025-01-06 PROCEDURE — 85025 COMPLETE CBC W/AUTO DIFF WBC: CPT

## 2025-01-06 PROCEDURE — 2500000003 HC RX 250 WO HCPCS: Performed by: SPECIALIST

## 2025-01-06 PROCEDURE — 36430 TRANSFUSION BLD/BLD COMPNT: CPT

## 2025-01-06 PROCEDURE — 80069 RENAL FUNCTION PANEL: CPT

## 2025-01-06 PROCEDURE — 74176 CT ABD & PELVIS W/O CONTRAST: CPT

## 2025-01-06 PROCEDURE — 99232 SBSQ HOSP IP/OBS MODERATE 35: CPT | Performed by: INTERNAL MEDICINE

## 2025-01-06 PROCEDURE — 85018 HEMOGLOBIN: CPT

## 2025-01-06 PROCEDURE — 2500000003 HC RX 250 WO HCPCS: Performed by: INTERNAL MEDICINE

## 2025-01-06 PROCEDURE — 99222 1ST HOSP IP/OBS MODERATE 55: CPT | Performed by: SPECIALIST

## 2025-01-06 PROCEDURE — 83735 ASSAY OF MAGNESIUM: CPT

## 2025-01-06 PROCEDURE — 85014 HEMATOCRIT: CPT

## 2025-01-06 PROCEDURE — 1210000000 HC MED SURG R&B

## 2025-01-06 RX ORDER — SODIUM CHLORIDE 0.9 % (FLUSH) 0.9 %
5-40 SYRINGE (ML) INJECTION EVERY 12 HOURS SCHEDULED
Status: DISCONTINUED | OUTPATIENT
Start: 2025-01-06 | End: 2025-01-07 | Stop reason: HOSPADM

## 2025-01-06 RX ORDER — SODIUM CHLORIDE 9 MG/ML
INJECTION, SOLUTION INTRAVENOUS PRN
Status: DISCONTINUED | OUTPATIENT
Start: 2025-01-06 | End: 2025-01-09

## 2025-01-06 RX ORDER — SODIUM CHLORIDE 9 MG/ML
25 INJECTION, SOLUTION INTRAVENOUS PRN
Status: DISCONTINUED | OUTPATIENT
Start: 2025-01-06 | End: 2025-01-07 | Stop reason: HOSPADM

## 2025-01-06 RX ORDER — SODIUM CHLORIDE 0.9 % (FLUSH) 0.9 %
5-40 SYRINGE (ML) INJECTION PRN
Status: DISCONTINUED | OUTPATIENT
Start: 2025-01-06 | End: 2025-01-07 | Stop reason: HOSPADM

## 2025-01-06 RX ADMIN — ALLOPURINOL 200 MG: 100 TABLET ORAL at 08:08

## 2025-01-06 RX ADMIN — FINASTERIDE 5 MG: 5 TABLET, FILM COATED ORAL at 08:08

## 2025-01-06 RX ADMIN — MAGNESIUM SULFATE HEPTAHYDRATE 2000 MG: 40 INJECTION, SOLUTION INTRAVENOUS at 19:23

## 2025-01-06 RX ADMIN — Medication 10 ML: at 21:55

## 2025-01-06 RX ADMIN — Medication 10 ML: at 08:09

## 2025-01-06 RX ADMIN — MEROPENEM 1000 MG: 1 INJECTION INTRAVENOUS at 15:58

## 2025-01-06 RX ADMIN — MEROPENEM 1000 MG: 1 INJECTION INTRAVENOUS at 02:34

## 2025-01-06 RX ADMIN — CETIRIZINE HYDROCHLORIDE 10 MG: 10 TABLET, FILM COATED ORAL at 08:08

## 2025-01-06 RX ADMIN — TAMSULOSIN HYDROCHLORIDE 0.4 MG: 0.4 CAPSULE ORAL at 08:08

## 2025-01-06 RX ADMIN — DOXEPIN HYDROCHLORIDE 10 MG: 10 CAPSULE ORAL at 21:54

## 2025-01-06 RX ADMIN — ATORVASTATIN CALCIUM 40 MG: 40 TABLET, FILM COATED ORAL at 21:54

## 2025-01-06 NOTE — CONSULTS
Consults    Patient Name: Hector Bose  Admit Date: 1/3/2025 11:54 AM  MR #: [id number]  : 1950    Attending Physician: Yesica Celis MD  Reason for consult: Anemia.    History of Presenting Illness:      Hector Bose is a 74 y.o. male on hospital day 3 with a history of anemia and hypotension, patient recently had a bowel resection for perforated diverticulitis and has been getting outpatient IV antibiotics.,  He was found to be hypotensive and has low blood count and patient was admitted for further evaluation.  He reports no abdominal pain no nausea no emesis.  Patient states that he was passing green stool.  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 History Obtained From:  patient      History:      Past Medical History:   Diagnosis Date    Acute idiopathic gout of right foot     Anemia 2018    iron transfusions x2    Arthritis     both knees    Bilateral carpal tunnel syndrome 2022    Chronic kidney disease     CLL (chronic lymphocytic leukemia) (HCC)     dx 2015    Colon polyps     Disease of blood and blood forming organ     Hyperlipidemia     dx since 's    Hypertension     meds  since 's    Lymphoma of gastrointestinal tract (HCC)     Movement disorder     Type II or unspecified type diabetes mellitus without mention of complication, not stated as uncontrolled     hx > 20 yrs    Urinary retention 2024     Past Surgical History:  follow.    Please call if questions or concerns arise.    Electronically signed by Patricia Ji MD on 1/6/2025 at 6:38 PM

## 2025-01-06 NOTE — PROGRESS NOTES
Hospitalist Progress Note      PCP: Sean Levy MD    Date of Admission: 1/3/2025    Chief Complaint:    Chief Complaint   Patient presents with    Hypotension     Subjective:  No acute events overnight. Pt resting comfortably in bed. No acute complaints at this time. Denies any signs of bleeding. Denies chest pain or shortness of breath.    Medications:  Reviewed    Infusion Medications    sodium chloride      sodium chloride      sodium chloride       Scheduled Medications    meropenem  1,000 mg IntraVENous Q12H    sodium chloride flush  5-40 mL IntraVENous 2 times per day    enoxaparin  40 mg SubCUTAneous Daily    allopurinol  200 mg Oral Daily    doxepin  10 mg Oral Nightly    finasteride  5 mg Oral Daily    cetirizine  10 mg Oral Daily    tamsulosin  0.4 mg Oral Daily    atorvastatin  40 mg Oral Daily     PRN Meds: sodium chloride, sodium chloride, sodium chloride flush, sodium chloride, magnesium sulfate, ondansetron **OR** ondansetron, polyethylene glycol, acetaminophen **OR** acetaminophen      Intake/Output Summary (Last 24 hours) at 1/6/2025 1726  Last data filed at 1/6/2025 1607  Gross per 24 hour   Intake 2161.61 ml   Output 1500 ml   Net 661.61 ml     Exam:  /66   Pulse 91   Temp 98.2 °F (36.8 °C) (Oral)   Resp 16   Ht 1.702 m (5' 7\")   Wt 93 kg (205 lb)   SpO2 100%   BMI 32.11 kg/m²   Physical Exam  Cardiovascular:      Rate and Rhythm: Normal rate and regular rhythm.   Pulmonary:      Effort: Pulmonary effort is normal. No respiratory distress.   Abdominal:      Palpations: Abdomen is soft.      Tenderness: There is no abdominal tenderness.   Neurological:      Mental Status: He is alert and oriented to person, place, and time.   Psychiatric:         Mood and Affect: Mood normal.         Behavior: Behavior normal.       Labs:   Recent Labs     01/04/25  1515 01/05/25  0354 01/05/25  0915 01/06/25  0700 01/06/25  1625   WBC 4.8  --  5.4 5.3  --    HGB 6.9*   < > 7.0* 6.1* 8.5*   HCT

## 2025-01-06 NOTE — PROGRESS NOTES
0735 Lab called critical hgb of 6.1. Result reported to Dr. Celis. Orders placed.     0800 Shift assessment complete. Pt is AxOx4. Meds given per mar with a sip of water. Dressing on abd is CDI, binder in place. Denies any pain. Denies any needs at this time. Call light within reach.     Electronically signed by Nani Menchaca RN on 1/6/2025 at 9:16 AM    1600 14 staples removed from pts midline incision per Dr. Mccray's instructions. Pt tolerated well, denies pain. PICC line dressing changed. Repeat H/H drawn and sent to lab post blood infusion. Pt transported to CT via wheelchair.     Electronically signed by Nani Menchaca RN on 1/6/2025 at 4:06 PM

## 2025-01-06 NOTE — PLAN OF CARE
Problem: Chronic Conditions and Co-morbidities  Goal: Patient's chronic conditions and co-morbidity symptoms are monitored and maintained or improved  1/6/2025 0914 by Nani Menchaca RN  Outcome: Progressing  1/6/2025 0411 by Gomez, Grisel, RN  Outcome: Progressing     Problem: Discharge Planning  Goal: Discharge to home or other facility with appropriate resources  1/6/2025 0914 by Nani Menchaca RN  Outcome: Progressing  1/6/2025 0411 by Gomez, Grisel, RN  Outcome: Progressing     Problem: Pain  Goal: Verbalizes/displays adequate comfort level or baseline comfort level  1/6/2025 0914 by Nani Menchaca RN  Outcome: Progressing  1/6/2025 0411 by Gomez, Grisel, RN  Outcome: Progressing     Problem: Safety - Adult  Goal: Free from fall injury  1/6/2025 0914 by Nani Menchaca RN  Outcome: Progressing  1/6/2025 0411 by Gomez, Grisel, RN  Outcome: Progressing     Problem: Nutrition Deficit:  Goal: Optimize nutritional status  1/6/2025 0914 by Nani Menchaca RN  Outcome: Progressing  1/6/2025 0411 by Gomez, Grisel, RN  Outcome: Progressing

## 2025-01-06 NOTE — CARE COORDINATION
MET WITH PATIENT THIS AM TO DISCUSS DISCHARGE PLAN. PATIENT DISCHARGE PLAN REMAINS HOME WITH WIFE AND CONTINUED OUTPATIENT INFUSION THERAPY.  SPOKE WITH SHALOM AT OUTPATIENT INFUSION AND UPDATED ON PATIENT ADMISSION. DISCHARGE PLAN HOME WITH WIFE AND RESUME OUTPATIENT INFUSION.

## 2025-01-06 NOTE — CONSULTS
Hematology/Oncology Consult  Encounter Date: 2025 4:45 PM    Mr. Hector Bose is a 74 y.o. male  : 1950  MRN: [id number]  Acct Number: [id number]  Requesting Provider: Yesica Celis MD    Reason for request: anemia, CLL.      CONSULTANT: Lex Man MD    HPI: Hector was admitted for weakness and hypotension. On broad spectrum antibiotics for a perforated diverticulitis in 2024. Has been having issues with anemia and had parenteral iron. Endoscopy from 2024 showed gastritis. 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 He has required blood transfusion for anemia. Last parenteral iron on 2024. At .     Patient Active Problem List   Diagnosis    Arthritis, lumbar spine    Renal mass    Type 2 diabetes mellitus without complication, without long-term current use of insulin (HCC)    Dyslipidemia    Essential hypertension    Cyst, epididymis    Hydrocele    Other closed fractures of distal end of radius (alone)    Stiffness of joint, not elsewhere classified, forearm    Stiffness of joint, not elsewhere classified, hand    Varicocele    Pain in joint, hand    Morbidly obese    Acute idiopathic gout of right foot    Chronic kidney disease    History of colon polyps    Bilateral carpal tunnel syndrome    Left carpal tunnel syndrome    Type 2 diabetes mellitus with chronic kidney disease    Duodenitis    Gastric intestinal metaplasia, unspecified    Sensation of lump in throat    Iron deficiency    Shoulder pain    Stage 3b chronic kidney disease (HCC)    Vocal cord disease    Spasmodic torticollis    Cervical radiculopathy    Stiff neck    Diverticulitis of intestine with perforation without bleeding    Urinary retention    Ileitis, terminal (HCC)    Bowel perforation (HCC)    Acute diverticulitis    JANY (acute kidney injury) (HCC)     Past Medical History:   Diagnosis Date    Acute idiopathic gout of right foot     Anemia 2018    iron transfusions x2     length and the left kidney measures 12.3 cm in length.  A nonobstructing stone is seen in the left kidney. Kidneys demonstrate normal cortical echogenicity.  No hydronephrosis is seen..  There are cystic structures seen in both kidneys.  The in the midpole of the right kidney there is a 0.5 x 0.5 x 0.4 cm cystic structure.  In the mid lateral left kidney there is 0.9 x 0.8 x 0.7 cm cystic structure.  In the superior pole there is a 2.3 x 2.4 x 2.8 cm cystic structure.     Nonobstructing stone in the left kidney.  No hydronephrosis is seen.  Cysts are seen in both kidneys.     CT ABDOMEN PELVIS WO CONTRAST Additional Contrast? None    Result Date: 12/22/2024  EXAMINATION: CT OF THE ABDOMEN AND PELVIS WITHOUT CONTRAST 12/22/2024 10:18 pm TECHNIQUE: CT of the abdomen and pelvis was performed without the administration of intravenous contrast. Multiplanar reformatted images are provided for review. Automated exposure control, iterative reconstruction, and/or weight based adjustment of the mA/kV was utilized to reduce the radiation dose to as low as reasonably achievable. COMPARISON: None. HISTORY: ORDERING SYSTEM PROVIDED HISTORY: Urinary retention TECHNOLOGIST PROVIDED HISTORY: Reason for exam:->Urinary retention Additional Contrast?->None Decision Support Exception - unselect if not a suspected or confirmed emergency medical condition->Emergency Medical Condition (MA) What reading provider will be dictating this exam?->CRC FINDINGS: Lower Chest: Visualized portion of the lower chest demonstrates no acute abnormality. Organs: The liver is normal in appearance without mass and without evidence of intrahepatic biliary ductal dilatation. The spleen and pancreas are unremarkable. The adrenal glands are unremarkable. There are surgical clips from cholecystectomy. There is no sign of biliary ductal dilatation. There is a nonobstructive 5 mm calculus in the lower pole of the left kidney. There is a nonobstructive 2 mm

## 2025-01-06 NOTE — PLAN OF CARE
Problem: Nutrition Deficit:  Goal: Optimize nutritional status  Outcome: Progressing     Problem: Safety - Adult  Goal: Free from fall injury  Outcome: Progressing     Problem: Pain  Goal: Verbalizes/displays adequate comfort level or baseline comfort level  Outcome: Progressing     Problem: Discharge Planning  Goal: Discharge to home or other facility with appropriate resources  Outcome: Progressing     Problem: Chronic Conditions and Co-morbidities  Goal: Patient's chronic conditions and co-morbidity symptoms are monitored and maintained or improved  Outcome: Progressing

## 2025-01-06 NOTE — PROGRESS NOTES
Infectious Diseases Inpatient Progress Note          HISTORY OF PRESENT ILLNESS:  Follow up bacterial peritonitis secondary to perforated viscus on IV meropenem, well tolerated.  Patient was receiving IV Invanz at home.  Was admitted with hypotension, generalized weakness, severe anemia, acute kidney injury on chronic kidney disease.   Occasional lightheadedness.  Persistent severe generalized weakness and fatigue.   1 tarry stool today  Requiring blood transfusions with severe anemia  Reports good appetite.    No urinary symptoms.  No abdominal pain.  No nausea vomiting.  No fevers or chills.  Reports good appetite  Current Medications:     meropenem  1,000 mg IntraVENous Q12H    sodium chloride flush  5-40 mL IntraVENous 2 times per day    enoxaparin  40 mg SubCUTAneous Daily    allopurinol  200 mg Oral Daily    doxepin  10 mg Oral Nightly    finasteride  5 mg Oral Daily    cetirizine  10 mg Oral Daily    tamsulosin  0.4 mg Oral Daily    atorvastatin  40 mg Oral Daily       Allergies:  Dye [barium-containing compounds], Iodides, and Iodinated contrast media      Review of Systems  14 system review is negative other than HPI    Physical Exam  Vitals:    01/06/25 0744 01/06/25 1055 01/06/25 1057 01/06/25 1119   BP: 126/70 (!) 109/59 (!) 109/59 113/64   Pulse: 84 94 95 97   Resp: 19 18 18 20   Temp: 97.7 °F (36.5 °C) 98.4 °F (36.9 °C) 98.4 °F (36.9 °C) 98.4 °F (36.9 °C)   TempSrc: Oral  Oral    SpO2: 99% 100% 100% 100%   Weight:       Height:         General Appearance: alert and oriented to person, place and time, well-developed and well-nourished, in no acute distress  On room air  Skin: warm and dry, no rash.   Head: normocephalic and atraumatic  Eyes: anicteric sclerae  ENT: oropharynx clear and moist with normal mucous membranes. No oral thrush  Intact right upper extremity PICC line  Lungs: normal respiratory effort, clear lungs  Heart normal S1-S2 no murmur  Abdomen: soft, no tenderness  No leg edema  No

## 2025-01-07 ENCOUNTER — HOSPITAL ENCOUNTER (OUTPATIENT)
Dept: INFUSION THERAPY | Age: 75
Setting detail: INFUSION SERIES
End: 2025-01-07

## 2025-01-07 ENCOUNTER — ANESTHESIA EVENT (OUTPATIENT)
Dept: ENDOSCOPY | Age: 75
End: 2025-01-07
Payer: MEDICARE

## 2025-01-07 ENCOUNTER — PREP FOR PROCEDURE (OUTPATIENT)
Dept: GASTROENTEROLOGY | Age: 75
End: 2025-01-07

## 2025-01-07 ENCOUNTER — ANESTHESIA (OUTPATIENT)
Dept: ENDOSCOPY | Age: 75
End: 2025-01-07
Payer: MEDICARE

## 2025-01-07 PROBLEM — N18.9 ACUTE KIDNEY INJURY SUPERIMPOSED ON CHRONIC KIDNEY DISEASE (HCC): Status: ACTIVE | Noted: 2025-01-03

## 2025-01-07 PROBLEM — K65.9 BACTERIAL PERITONITIS (HCC): Status: ACTIVE | Noted: 2025-01-07

## 2025-01-07 PROBLEM — D64.9 ANEMIA: Status: ACTIVE | Noted: 2025-01-03

## 2025-01-07 PROBLEM — E86.9 VOLUME DEPLETION: Status: ACTIVE | Noted: 2025-01-07

## 2025-01-07 LAB
ANION GAP SERPL CALCULATED.3IONS-SCNC: 7 MEQ/L (ref 9–15)
BASOPHILS # BLD: 0 K/UL (ref 0–0.2)
BASOPHILS # BLD: 0 K/UL (ref 0–0.2)
BASOPHILS NFR BLD: 0.3 %
BASOPHILS NFR BLD: 0.4 %
BLOOD BANK DISPENSE STATUS: NORMAL
BLOOD BANK DISPENSE STATUS: NORMAL
BLOOD BANK PRODUCT CODE: NORMAL
BLOOD BANK PRODUCT CODE: NORMAL
BPU ID: NORMAL
BPU ID: NORMAL
BUN SERPL-MCNC: 49 MG/DL (ref 8–23)
CALCIUM SERPL-MCNC: 7.6 MG/DL (ref 8.5–9.9)
CHLORIDE SERPL-SCNC: 108 MEQ/L (ref 95–107)
CO2 SERPL-SCNC: 20 MEQ/L (ref 20–31)
CREAT SERPL-MCNC: 1.49 MG/DL (ref 0.7–1.2)
DAT POLY-SP REAG RBC QL: NORMAL
DESCRIPTION BLOOD BANK: NORMAL
DESCRIPTION BLOOD BANK: NORMAL
EOSINOPHIL # BLD: 0.1 K/UL (ref 0–0.7)
EOSINOPHIL # BLD: 0.1 K/UL (ref 0–0.7)
EOSINOPHIL NFR BLD: 1.4 %
EOSINOPHIL NFR BLD: 2.6 %
ERYTHROCYTE [DISTWIDTH] IN BLOOD BY AUTOMATED COUNT: 14.4 % (ref 11.5–14.5)
ERYTHROCYTE [DISTWIDTH] IN BLOOD BY AUTOMATED COUNT: 14.6 % (ref 11.5–14.5)
FOLATE: 14.1 NG/ML (ref 4.8–24.2)
GLUCOSE BLD-MCNC: 125 MG/DL (ref 70–99)
GLUCOSE BLD-MCNC: 139 MG/DL (ref 70–99)
GLUCOSE BLD-MCNC: 179 MG/DL (ref 70–99)
GLUCOSE BLD-MCNC: 181 MG/DL (ref 70–99)
GLUCOSE SERPL-MCNC: 127 MG/DL (ref 70–99)
HCT VFR BLD AUTO: 18.8 % (ref 42–52)
HCT VFR BLD AUTO: 19.6 % (ref 42–52)
HCT VFR BLD AUTO: 20.3 % (ref 42–52)
HGB BLD-MCNC: 6.1 G/DL (ref 14–18)
HGB BLD-MCNC: 6.7 G/DL (ref 14–18)
HGB BLD-MCNC: 7.2 G/DL (ref 14–18)
IRON % SATURATION: 14 % (ref 20–55)
IRON: 28 UG/DL (ref 61–157)
LYMPHOCYTES # BLD: 1.9 K/UL (ref 1–4.8)
LYMPHOCYTES # BLD: 2.6 K/UL (ref 1–4.8)
LYMPHOCYTES NFR BLD: 32.7 %
LYMPHOCYTES NFR BLD: 36.1 %
MCH RBC QN AUTO: 30 PG (ref 27–31.3)
MCH RBC QN AUTO: 31.6 PG (ref 27–31.3)
MCHC RBC AUTO-ENTMCNC: 32.4 % (ref 33–37)
MCHC RBC AUTO-ENTMCNC: 34.2 % (ref 33–37)
MCV RBC AUTO: 92.5 FL (ref 79–92.2)
MCV RBC AUTO: 92.6 FL (ref 79–92.2)
MONOCYTES # BLD: 0.5 K/UL (ref 0.2–0.8)
MONOCYTES # BLD: 0.6 K/UL (ref 0.2–0.8)
MONOCYTES NFR BLD: 7.4 %
MONOCYTES NFR BLD: 9.8 %
NEUTROPHILS # BLD: 2.7 K/UL (ref 1.4–6.5)
NEUTROPHILS # BLD: 4.5 K/UL (ref 1.4–6.5)
NEUTS SEG NFR BLD: 50.5 %
NEUTS SEG NFR BLD: 57.3 %
OVALOCYTES BLD QL SMEAR: ABNORMAL
PERFORMED ON: ABNORMAL
PLATELET # BLD AUTO: 312 K/UL (ref 130–400)
PLATELET # BLD AUTO: 350 K/UL (ref 130–400)
PLATELET BLD QL SMEAR: ADEQUATE
POIKILOCYTOSIS BLD QL SMEAR: ABNORMAL
POTASSIUM SERPL-SCNC: 4 MEQ/L (ref 3.4–4.9)
RBC # BLD AUTO: 2.03 M/UL (ref 4.7–6.1)
RBC # BLD AUTO: 2.12 M/UL (ref 4.7–6.1)
REASON FOR REJECTION: NORMAL
REJECTED TEST: NORMAL
RETICS # AUTO: 0.07 M/CUMM (ref 0.02–0.11)
RETICS/RBC NFR: 3.1 % (ref 0.6–2.2)
SODIUM SERPL-SCNC: 135 MEQ/L (ref 135–144)
TOTAL IRON BINDING CAPACITY: 195 UG/DL (ref 250–450)
UNSATURATED IRON BINDING CAPACITY: 167 UG/DL (ref 112–347)
VITAMIN B-12: 390 PG/ML (ref 232–1245)
WBC # BLD AUTO: 5.3 K/UL (ref 4.8–10.8)
WBC # BLD AUTO: 7.9 K/UL (ref 4.8–10.8)

## 2025-01-07 PROCEDURE — 36415 COLL VENOUS BLD VENIPUNCTURE: CPT

## 2025-01-07 PROCEDURE — 6360000002 HC RX W HCPCS: Performed by: INTERNAL MEDICINE

## 2025-01-07 PROCEDURE — 85046 RETICYTE/HGB CONCENTRATE: CPT

## 2025-01-07 PROCEDURE — 2500000003 HC RX 250 WO HCPCS: Performed by: SPECIALIST

## 2025-01-07 PROCEDURE — 82607 VITAMIN B-12: CPT

## 2025-01-07 PROCEDURE — 99232 SBSQ HOSP IP/OBS MODERATE 35: CPT | Performed by: INTERNAL MEDICINE

## 2025-01-07 PROCEDURE — 36592 COLLECT BLOOD FROM PICC: CPT

## 2025-01-07 PROCEDURE — 83550 IRON BINDING TEST: CPT

## 2025-01-07 PROCEDURE — 3609017100 HC EGD: Performed by: SPECIALIST

## 2025-01-07 PROCEDURE — 0DB98ZX EXCISION OF DUODENUM, VIA NATURAL OR ARTIFICIAL OPENING ENDOSCOPIC, DIAGNOSTIC: ICD-10-PCS | Performed by: SPECIALIST

## 2025-01-07 PROCEDURE — 83540 ASSAY OF IRON: CPT

## 2025-01-07 PROCEDURE — 6370000000 HC RX 637 (ALT 250 FOR IP): Performed by: INTERNAL MEDICINE

## 2025-01-07 PROCEDURE — 6370000000 HC RX 637 (ALT 250 FOR IP)

## 2025-01-07 PROCEDURE — 43239 EGD BIOPSY SINGLE/MULTIPLE: CPT | Performed by: SPECIALIST

## 2025-01-07 PROCEDURE — 3700000001 HC ADD 15 MINUTES (ANESTHESIA): Performed by: SPECIALIST

## 2025-01-07 PROCEDURE — 82746 ASSAY OF FOLIC ACID SERUM: CPT

## 2025-01-07 PROCEDURE — 2580000003 HC RX 258: Performed by: INTERNAL MEDICINE

## 2025-01-07 PROCEDURE — 6360000002 HC RX W HCPCS: Performed by: NURSE ANESTHETIST, CERTIFIED REGISTERED

## 2025-01-07 PROCEDURE — 85018 HEMOGLOBIN: CPT

## 2025-01-07 PROCEDURE — 2709999900 HC NON-CHARGEABLE SUPPLY: Performed by: SPECIALIST

## 2025-01-07 PROCEDURE — 88342 IMHCHEM/IMCYTCHM 1ST ANTB: CPT

## 2025-01-07 PROCEDURE — 80048 BASIC METABOLIC PNL TOTAL CA: CPT

## 2025-01-07 PROCEDURE — 85025 COMPLETE CBC W/AUTO DIFF WBC: CPT

## 2025-01-07 PROCEDURE — 0W3P8ZZ CONTROL BLEEDING IN GASTROINTESTINAL TRACT, VIA NATURAL OR ARTIFICIAL OPENING ENDOSCOPIC: ICD-10-PCS | Performed by: SPECIALIST

## 2025-01-07 PROCEDURE — 7100000011 HC PHASE II RECOVERY - ADDTL 15 MIN: Performed by: SPECIALIST

## 2025-01-07 PROCEDURE — 2500000003 HC RX 250 WO HCPCS: Performed by: INTERNAL MEDICINE

## 2025-01-07 PROCEDURE — 1210000000 HC MED SURG R&B

## 2025-01-07 PROCEDURE — 88305 TISSUE EXAM BY PATHOLOGIST: CPT

## 2025-01-07 PROCEDURE — 36430 TRANSFUSION BLD/BLD COMPNT: CPT

## 2025-01-07 PROCEDURE — 0DB68ZX EXCISION OF STOMACH, VIA NATURAL OR ARTIFICIAL OPENING ENDOSCOPIC, DIAGNOSTIC: ICD-10-PCS | Performed by: SPECIALIST

## 2025-01-07 PROCEDURE — 7100000010 HC PHASE II RECOVERY - FIRST 15 MIN: Performed by: SPECIALIST

## 2025-01-07 PROCEDURE — 43255 EGD CONTROL BLEEDING ANY: CPT | Performed by: SPECIALIST

## 2025-01-07 PROCEDURE — 85014 HEMATOCRIT: CPT

## 2025-01-07 PROCEDURE — 3700000000 HC ANESTHESIA ATTENDED CARE: Performed by: SPECIALIST

## 2025-01-07 DEVICE — INSTINCT PLUS ENDOSCOPIC CLIPPING DEVICE
Type: IMPLANTABLE DEVICE | Site: STOMACH | Status: FUNCTIONAL
Brand: INSTINCT

## 2025-01-07 RX ORDER — SODIUM CHLORIDE 0.9 % (FLUSH) 0.9 %
5-40 SYRINGE (ML) INJECTION EVERY 12 HOURS SCHEDULED
Status: CANCELLED | OUTPATIENT
Start: 2025-01-07

## 2025-01-07 RX ORDER — SODIUM CHLORIDE 9 MG/ML
INJECTION, SOLUTION INTRAVENOUS CONTINUOUS
Status: CANCELLED | OUTPATIENT
Start: 2025-01-07

## 2025-01-07 RX ORDER — PROPOFOL 10 MG/ML
INJECTION, EMULSION INTRAVENOUS
Status: DISCONTINUED | OUTPATIENT
Start: 2025-01-07 | End: 2025-01-07 | Stop reason: SDUPTHER

## 2025-01-07 RX ORDER — SODIUM CHLORIDE 9 MG/ML
INJECTION, SOLUTION INTRAVENOUS PRN
Status: CANCELLED | OUTPATIENT
Start: 2025-01-07

## 2025-01-07 RX ORDER — SODIUM CHLORIDE 9 MG/ML
INJECTION, SOLUTION INTRAVENOUS PRN
Status: DISCONTINUED | OUTPATIENT
Start: 2025-01-07 | End: 2025-01-09

## 2025-01-07 RX ORDER — SODIUM CHLORIDE 9 MG/ML
INJECTION, SOLUTION INTRAVENOUS CONTINUOUS
Status: DISCONTINUED | OUTPATIENT
Start: 2025-01-07 | End: 2025-01-10

## 2025-01-07 RX ORDER — SODIUM CHLORIDE 0.9 % (FLUSH) 0.9 %
5-40 SYRINGE (ML) INJECTION PRN
Status: CANCELLED | OUTPATIENT
Start: 2025-01-07

## 2025-01-07 RX ORDER — SODIUM CHLORIDE 0.9 % (FLUSH) 0.9 %
5-40 SYRINGE (ML) INJECTION PRN
Status: DISCONTINUED | OUTPATIENT
Start: 2025-01-07 | End: 2025-01-10 | Stop reason: HOSPADM

## 2025-01-07 RX ORDER — SODIUM CHLORIDE 0.9 % (FLUSH) 0.9 %
5-40 SYRINGE (ML) INJECTION EVERY 12 HOURS SCHEDULED
Status: DISCONTINUED | OUTPATIENT
Start: 2025-01-07 | End: 2025-01-10 | Stop reason: HOSPADM

## 2025-01-07 RX ADMIN — SODIUM CHLORIDE, PRESERVATIVE FREE 10 ML: 5 INJECTION INTRAVENOUS at 21:13

## 2025-01-07 RX ADMIN — PROPOFOL 50 MG: 10 INJECTION, EMULSION INTRAVENOUS at 09:19

## 2025-01-07 RX ADMIN — MEROPENEM 1000 MG: 1 INJECTION INTRAVENOUS at 14:01

## 2025-01-07 RX ADMIN — PROPOFOL 50 MG: 10 INJECTION, EMULSION INTRAVENOUS at 09:21

## 2025-01-07 RX ADMIN — PROPOFOL 50 MG: 10 INJECTION, EMULSION INTRAVENOUS at 09:16

## 2025-01-07 RX ADMIN — ENOXAPARIN SODIUM 40 MG: 100 INJECTION SUBCUTANEOUS at 21:12

## 2025-01-07 RX ADMIN — Medication 10 ML: at 07:25

## 2025-01-07 RX ADMIN — PROPOFOL 50 MG: 10 INJECTION, EMULSION INTRAVENOUS at 09:27

## 2025-01-07 RX ADMIN — ATORVASTATIN CALCIUM 40 MG: 40 TABLET, FILM COATED ORAL at 21:12

## 2025-01-07 RX ADMIN — Medication 10 ML: at 21:13

## 2025-01-07 RX ADMIN — PROPOFOL 50 MG: 10 INJECTION, EMULSION INTRAVENOUS at 09:17

## 2025-01-07 RX ADMIN — MEROPENEM 1000 MG: 1 INJECTION INTRAVENOUS at 02:11

## 2025-01-07 RX ADMIN — PROPOFOL 50 MG: 10 INJECTION, EMULSION INTRAVENOUS at 09:24

## 2025-01-07 RX ADMIN — ALLOPURINOL 200 MG: 100 TABLET ORAL at 07:25

## 2025-01-07 RX ADMIN — DOXEPIN HYDROCHLORIDE 10 MG: 10 CAPSULE ORAL at 21:12

## 2025-01-07 ASSESSMENT — PAIN - FUNCTIONAL ASSESSMENT
PAIN_FUNCTIONAL_ASSESSMENT: 0-10
PAIN_FUNCTIONAL_ASSESSMENT: 0-10

## 2025-01-07 ASSESSMENT — PAIN DESCRIPTION - DESCRIPTORS
DESCRIPTORS: CRUSHING
DESCRIPTORS: OTHER (COMMENT)

## 2025-01-07 NOTE — CARE COORDINATION
MET WITH PATIENT AT BEDSIDE. PATIENT STATES HE JUST RETURNED FROM EGD.  PATIENT CURRENTLY DENIES NEEDS. DISCHARGE PLAN CURRENTLY REMAINS HOME WITH OUT PATIENT INFUSION.

## 2025-01-07 NOTE — PROGRESS NOTES
Infectious Diseases Inpatient Progress Note          HISTORY OF PRESENT ILLNESS:    Patient continues to be anemic with melena   Patient had EGD done today with positive stomach and duodenal abnormality that was biopsied patient is currently dizzy.  He just came back from EGD   Denies any abdominal pain   follow up bacterial peritonitis secondary to perforated viscus on IV meropenem, well tolerated.  Patient was receiving IV Invanz at home.  Was admitted with hypotension, generalized weakness, severe anemia, acute kidney injury on chronic kidney disease.   Occasional lightheadedness.  Persistent severe generalized weakness and fatigue.   Requiring blood transfusions with severe anemia  No urinary symptoms.  No abdominal pain.  No nausea vomiting.  No fevers or chills.  Reports good appetite  Current Medications:     sterile water for irrigation        sodium chloride flush  5-40 mL IntraVENous 2 times per day    meropenem  1,000 mg IntraVENous Q12H    sodium chloride flush  5-40 mL IntraVENous 2 times per day    enoxaparin  40 mg SubCUTAneous Daily    allopurinol  200 mg Oral Daily    doxepin  10 mg Oral Nightly    finasteride  5 mg Oral Daily    cetirizine  10 mg Oral Daily    tamsulosin  0.4 mg Oral Daily    atorvastatin  40 mg Oral Daily       Allergies:  Dye [barium-containing compounds], Iodides, and Iodinated contrast media      Review of Systems  14 system review is negative other than HPI    Physical Exam  Vitals:    01/07/25 0938 01/07/25 0940 01/07/25 0945 01/07/25 0950   BP: 101/63 (!) 104/56 (!) 95/58 117/67   Pulse: 96 94 90 91   Resp: 20 18 20 18   Temp:       TempSrc:       SpO2: 96% 95% 96% 98%   Weight:       Height:         General Appearance: alert and oriented to person, place and time, well-developed and well-nourished, in no acute distress  On room air  Skin: warm and dry, no rash.   Head: normocephalic and atraumatic  Eyes: anicteric sclerae  ENT: oropharynx clear and moist with normal mucous

## 2025-01-07 NOTE — ANESTHESIA PRE PROCEDURE
Department of Anesthesiology  Preprocedure Note       Name:  Hector Bose   Age:  74 y.o.  :  1950                                          MRN:  [id number]         Date:  2025      Surgeon: Surgeon(s):  Patricia Ji MD    Procedure: Procedure(s):  ESOPHAGOGASTRODUODENOSCOPY    Medications prior to admission:   Prior to Admission medications    Medication Sig Start Date End Date Taking? Authorizing Provider   finasteride (PROSCAR) 5 MG tablet Take 1 tablet by mouth daily 24  Yes Eduardo Connors MD   tamsulosin (FLOMAX) 0.4 MG capsule Take 1 capsule by mouth daily 24  Yes Eduardo Connors MD   ertapenem (INVANZ) infusion Infuse 1,000 mg intravenously every 24 hours for 21 days Compound per protocol. 24 Yes Robbin Bazan MD   loratadine (CLARITIN) 10 MG tablet Take 1 tablet by mouth daily as needed (allergies) 11/3/24  Yes Sean Levy MD   potassium chloride (KLOR-CON M20) 20 MEQ extended release tablet 1 TAB BY MOUTH TWICE A DAY . (CALL ME IF YOU ARE TAKING A DIFFERENT DOSE) 24  Yes Sandie Medina DO   allopurinol (ZYLOPRIM) 100 MG tablet TAKE 2 TABLETS BY MOUTH EVERY DAY 24  Yes Sean Levy MD   doxepin (SINEQUAN) 10 MG capsule TAKE 1 CAPSULE BY MOUTH EVERY NIGHT 24  Yes Sean Levy MD   Icosapent Ethyl (VASCEPA) 1 g CAPS capsule Take 2 capsules by mouth 2 times daily 24  Yes Sean Levy MD   metoprolol succinate (TOPROL XL) 50 MG extended release tablet TAKE 1 TABLET BY MOUTH EVERY DAY 3/15/24  Yes Sean Levy MD   benazepril (LOTENSIN) 20 MG tablet TAKE 1 TABLET BY MOUTH EVERY DAY 24  Yes Sean Levy MD   metFORMIN (GLUCOPHAGE) 500 MG tablet TAKE 1 TABLETS BY MOUTH TWICE A DAY WITH MEALS 24  Yes Sean Levy MD   atorvastatin (LIPITOR) 40 MG tablet Take 1 tablet by mouth daily 24  Yes Sean Levy MD   furosemide (LASIX) 40 MG tablet Take 1 tablet by mouth every other day

## 2025-01-07 NOTE — ANESTHESIA POSTPROCEDURE EVALUATION
Department of Anesthesiology  Postprocedure Note    Patient: Hector Bose  MRN: [id number]  YOB: 1950  Date of evaluation: 1/7/2025    Procedure Summary       Date: 01/07/25 Room / Location: [facility] OR 01 / [facility]    Anesthesia Start: 0912 Anesthesia Stop: 0938    Procedure: ESOPHAGOGASTRODUODENOSCOPY WITH BIOPSY & CLIP PLACEMENT Diagnosis:       Anemia      (Anemia [D64.9])    Surgeons: Patricia Ji MD Responsible Provider: Birgit Peterson APRN - CRNA    Anesthesia Type: MAC ASA Status: 3            Anesthesia Type: No value filed.    Sigifredo Phase I: Sigifredo Score: 10    Sigifredo Phase II: Sigifredo Score: 6    Anesthesia Post Evaluation    Patient location during evaluation: bedside  Patient participation: complete - patient participated  Level of consciousness: awake and awake and alert  Airway patency: patent  Nausea & Vomiting: no nausea and no vomiting  Cardiovascular status: blood pressure returned to baseline and hemodynamically stable  Respiratory status: acceptable  Hydration status: euvolemic  Pain management: adequate        No notable events documented.

## 2025-01-07 NOTE — PLAN OF CARE
Problem: Chronic Conditions and Co-morbidities  Goal: Patient's chronic conditions and co-morbidity symptoms are monitored and maintained or improved  Outcome: Progressing     Problem: Discharge Planning  Goal: Discharge to home or other facility with appropriate resources  Outcome: Progressing     Problem: Pain  Goal: Verbalizes/displays adequate comfort level or baseline comfort level  Outcome: Progressing     Problem: Safety - Adult  Goal: Free from fall injury  Outcome: Progressing     Problem: Nutrition Deficit:  Goal: Optimize nutritional status  Outcome: Progressing

## 2025-01-07 NOTE — PROGRESS NOTES
Physician Progress Note      PATIENT:               BERENICE TEMPLETON  [facility] #:                  [id number]  :                       1950  ADMIT DATE:       2024 9:28 PM  DISCH DATE:        2024 2:47 PM  RESPONDING  PROVIDER #:        Jin Garcia MD          QUERY TEXT:    Patient admitted with acute diverticulitis with perforation and peritonitis.   Noted within 24 hours of admission WBC 16.0  T 102.7.  If possible, please   document in the progress notes and discharge summary if you are evaluating and   /or treating any of the following:    The medical record reflects the following:  Risk Factors: male age 74  history of hypertension, diabetes, CKD3b,   CLL/duodenal lymphoma  Clinical Indicators: WBC 16.0  T 102.7   Brief Op Note: \"Organ Space infection (below fascia) present as   evidenced by abscess, pus, and purulent fluid  Other Findings: Perforated   distal ileal diverticulitis\"   Dr Bazan: Diverticulitis with perforation  Peritonitis\"   DS Dr Garcia:\"Acute diverticulitis with perforation s/p colectomy,   urinary retention, bianka on ckd\"  Treatment: CT  IV Zosyn  ID  right colectomy for perforated ileal   diverticulitis  Options provided:  -- This patient has sepsis which was present on admission.  -- This patient has peritonitis without sepsis.  -- Other - I will add my own diagnosis  -- Disagree - Not applicable / Not valid  -- Disagree - Clinically unable to determine / Unknown  -- Refer to Clinical Documentation Reviewer    PROVIDER RESPONSE TEXT:    This patient has sepsis which was present on admission.    Query created by: Nick Hagan on 2025 7:22 AM      Electronically signed by:  Jin Garcia MD 2025 1:11 PM

## 2025-01-07 NOTE — PLAN OF CARE
Problem: Chronic Conditions and Co-morbidities  Goal: Patient's chronic conditions and co-morbidity symptoms are monitored and maintained or improved  1/7/2025 0753 by Nani Menchaca RN  Outcome: Progressing  1/7/2025 0203 by Zulema Mandel RN  Outcome: Progressing     Problem: Discharge Planning  Goal: Discharge to home or other facility with appropriate resources  1/7/2025 0753 by Nani Menchaca RN  Outcome: Progressing  1/7/2025 0203 by Zulema Mandel RN  Outcome: Progressing     Problem: Pain  Goal: Verbalizes/displays adequate comfort level or baseline comfort level  1/7/2025 0753 by Nani Menchaca RN  Outcome: Progressing  1/7/2025 0203 by Zulema Manedl RN  Outcome: Progressing     Problem: Safety - Adult  Goal: Free from fall injury  1/7/2025 0753 by Nani Menchaca RN  Outcome: Progressing  1/7/2025 0203 by Zulema Mandel RN  Outcome: Progressing     Problem: Nutrition Deficit:  Goal: Optimize nutritional status  1/7/2025 0753 by Nani Menchaca RN  Outcome: Progressing  1/7/2025 0203 by Zulema Mandel RN  Outcome: Progressing

## 2025-01-07 NOTE — PROGRESS NOTES
Shift assessment complete. Pt is Axox4. Meds held for EGD. Pt denies any further needs at this time. Call light within reach.     Electronically signed by Nani Menchaca RN on 1/7/2025 at 7:54 AM    0937 Lab called a critical hgb of 6.7 and hct of 19.6. Pt down in GI center at this time, called place to GI center so they are aware. Dr. Celis also made aware. Call light within reach.     Electronically signed by Nani Menchaca RN on 1/7/2025 at 9:38 AM    1900 Lab called a critical hct of 20.3. Dr. Celis made aware.     Electronically signed by Nani Menchaca RN on 1/7/2025 at 7:03 PM

## 2025-01-08 ENCOUNTER — TELEPHONE (OUTPATIENT)
Dept: INFECTIOUS DISEASES | Age: 75
End: 2025-01-08

## 2025-01-08 ENCOUNTER — HOSPITAL ENCOUNTER (OUTPATIENT)
Dept: INFUSION THERAPY | Age: 75
Setting detail: INFUSION SERIES
End: 2025-01-08

## 2025-01-08 LAB
ABO/RH: NORMAL
ANION GAP SERPL CALCULATED.3IONS-SCNC: 10 MEQ/L (ref 9–15)
ANISOCYTOSIS BLD QL SMEAR: ABNORMAL
ANTIBODY SCREEN: NORMAL
BASOPHILS # BLD: 0.1 K/UL (ref 0–0.2)
BASOPHILS NFR BLD: 1 %
BLOOD BANK DISPENSE STATUS: NORMAL
BLOOD BANK PRODUCT CODE: NORMAL
BPU ID: NORMAL
BUN SERPL-MCNC: 37 MG/DL (ref 8–23)
CALCIUM SERPL-MCNC: 7.8 MG/DL (ref 8.5–9.9)
CHLORIDE SERPL-SCNC: 108 MEQ/L (ref 95–107)
CO2 SERPL-SCNC: 20 MEQ/L (ref 20–31)
CREAT SERPL-MCNC: 1.46 MG/DL (ref 0.7–1.2)
DESCRIPTION BLOOD BANK: NORMAL
EOSINOPHIL # BLD: 0.2 K/UL (ref 0–0.7)
EOSINOPHIL NFR BLD: 3 %
ERYTHROCYTE [DISTWIDTH] IN BLOOD BY AUTOMATED COUNT: 14.7 % (ref 11.5–14.5)
GLUCOSE SERPL-MCNC: 105 MG/DL (ref 70–99)
HCT VFR BLD AUTO: 18.9 % (ref 42–52)
HCT VFR BLD AUTO: 20.5 % (ref 42–52)
HGB BLD-MCNC: 6.4 G/DL (ref 14–18)
HGB BLD-MCNC: 7.2 G/DL (ref 14–18)
LYMPHOCYTES # BLD: 1.2 K/UL (ref 1–4.8)
LYMPHOCYTES NFR BLD: 20 %
MCH RBC QN AUTO: 31.2 PG (ref 27–31.3)
MCHC RBC AUTO-ENTMCNC: 33.9 % (ref 33–37)
MCV RBC AUTO: 92.2 FL (ref 79–92.2)
MONOCYTES # BLD: 0.4 K/UL (ref 0.2–0.8)
MONOCYTES NFR BLD: 6.7 %
NEUTROPHILS # BLD: 4 K/UL (ref 1.4–6.5)
NEUTS BAND NFR BLD MANUAL: 1 %
NEUTS SEG NFR BLD: 68 %
NRBC BLD-RTO: 1 /100 WBC
PLATELET # BLD AUTO: 264 K/UL (ref 130–400)
PLATELET BLD QL SMEAR: ADEQUATE
POIKILOCYTOSIS BLD QL SMEAR: ABNORMAL
POTASSIUM SERPL-SCNC: 3.8 MEQ/L (ref 3.4–4.9)
RBC # BLD AUTO: 2.05 M/UL (ref 4.7–6.1)
SODIUM SERPL-SCNC: 138 MEQ/L (ref 135–144)
WBC # BLD AUTO: 5.8 K/UL (ref 4.8–10.8)

## 2025-01-08 PROCEDURE — 86850 RBC ANTIBODY SCREEN: CPT

## 2025-01-08 PROCEDURE — 99232 SBSQ HOSP IP/OBS MODERATE 35: CPT | Performed by: SPECIALIST

## 2025-01-08 PROCEDURE — 86923 COMPATIBILITY TEST ELECTRIC: CPT

## 2025-01-08 PROCEDURE — 85018 HEMOGLOBIN: CPT

## 2025-01-08 PROCEDURE — 86900 BLOOD TYPING SEROLOGIC ABO: CPT

## 2025-01-08 PROCEDURE — 80048 BASIC METABOLIC PNL TOTAL CA: CPT

## 2025-01-08 PROCEDURE — 2580000003 HC RX 258: Performed by: STUDENT IN AN ORGANIZED HEALTH CARE EDUCATION/TRAINING PROGRAM

## 2025-01-08 PROCEDURE — 2500000003 HC RX 250 WO HCPCS: Performed by: INTERNAL MEDICINE

## 2025-01-08 PROCEDURE — P9016 RBC LEUKOCYTES REDUCED: HCPCS

## 2025-01-08 PROCEDURE — 85025 COMPLETE CBC W/AUTO DIFF WBC: CPT

## 2025-01-08 PROCEDURE — 6360000002 HC RX W HCPCS: Performed by: STUDENT IN AN ORGANIZED HEALTH CARE EDUCATION/TRAINING PROGRAM

## 2025-01-08 PROCEDURE — 86901 BLOOD TYPING SEROLOGIC RH(D): CPT

## 2025-01-08 PROCEDURE — 6360000002 HC RX W HCPCS: Performed by: INTERNAL MEDICINE

## 2025-01-08 PROCEDURE — 85014 HEMATOCRIT: CPT

## 2025-01-08 PROCEDURE — 36430 TRANSFUSION BLD/BLD COMPNT: CPT

## 2025-01-08 PROCEDURE — 36415 COLL VENOUS BLD VENIPUNCTURE: CPT

## 2025-01-08 PROCEDURE — 6370000000 HC RX 637 (ALT 250 FOR IP): Performed by: INTERNAL MEDICINE

## 2025-01-08 PROCEDURE — 36592 COLLECT BLOOD FROM PICC: CPT

## 2025-01-08 PROCEDURE — 6370000000 HC RX 637 (ALT 250 FOR IP)

## 2025-01-08 PROCEDURE — 1210000000 HC MED SURG R&B

## 2025-01-08 PROCEDURE — 2580000003 HC RX 258: Performed by: INTERNAL MEDICINE

## 2025-01-08 PROCEDURE — 2500000003 HC RX 250 WO HCPCS: Performed by: SPECIALIST

## 2025-01-08 RX ORDER — SODIUM CHLORIDE 9 MG/ML
INJECTION, SOLUTION INTRAVENOUS PRN
Status: DISCONTINUED | OUTPATIENT
Start: 2025-01-08 | End: 2025-01-10 | Stop reason: HOSPADM

## 2025-01-08 RX ADMIN — SODIUM CHLORIDE, PRESERVATIVE FREE 10 ML: 5 INJECTION INTRAVENOUS at 09:33

## 2025-01-08 RX ADMIN — MEROPENEM 1000 MG: 1 INJECTION INTRAVENOUS at 02:00

## 2025-01-08 RX ADMIN — ATORVASTATIN CALCIUM 40 MG: 40 TABLET, FILM COATED ORAL at 20:36

## 2025-01-08 RX ADMIN — ALLOPURINOL 200 MG: 100 TABLET ORAL at 09:34

## 2025-01-08 RX ADMIN — TAMSULOSIN HYDROCHLORIDE 0.4 MG: 0.4 CAPSULE ORAL at 09:34

## 2025-01-08 RX ADMIN — CETIRIZINE HYDROCHLORIDE 10 MG: 10 TABLET, FILM COATED ORAL at 09:35

## 2025-01-08 RX ADMIN — MEROPENEM 1000 MG: 1 INJECTION INTRAVENOUS at 14:59

## 2025-01-08 RX ADMIN — DOXEPIN HYDROCHLORIDE 10 MG: 10 CAPSULE ORAL at 20:36

## 2025-01-08 RX ADMIN — Medication 10 ML: at 13:39

## 2025-01-08 RX ADMIN — SODIUM CHLORIDE 125 MG: 9 INJECTION, SOLUTION INTRAVENOUS at 18:38

## 2025-01-08 RX ADMIN — SODIUM CHLORIDE, PRESERVATIVE FREE 10 ML: 5 INJECTION INTRAVENOUS at 20:36

## 2025-01-08 RX ADMIN — FINASTERIDE 5 MG: 5 TABLET, FILM COATED ORAL at 09:35

## 2025-01-08 RX ADMIN — ENOXAPARIN SODIUM 40 MG: 100 INJECTION SUBCUTANEOUS at 09:33

## 2025-01-08 RX ADMIN — Medication 10 ML: at 20:36

## 2025-01-08 ASSESSMENT — PAIN SCALES - GENERAL: PAINLEVEL_OUTOF10: 0

## 2025-01-08 NOTE — CARE COORDINATION
MET WITH PATIENT AT BEDSIDE TO DISCUSS DISCHARGE PLAN. DISCHARGE PLAN REMAINS HOME WITH OUTPATIENT INFUSION WHEN MEDICALLY CLEARED. SPOKE WITH IZABELLA IN OUT PATIENT INFUSION AND UPDATED PATIENT REMAINS INPATIENT.

## 2025-01-08 NOTE — PROGRESS NOTES
Progress Note  Date:2025       Room:[place] [address]  Patient Name:Hector Bose     YOB: 1950     Age:74 y.o.        Subjective    Subjective no abdominal pain or emesis, no melena tolerating diet well, hemoglobin is 7.2  Review of Systems  Objective         Vitals Last 24 Hours:  TEMPERATURE:  Temp  Av.2 °F (36.8 °C)  Min: 97.7 °F (36.5 °C)  Max: 98.6 °F (37 °C)  RESPIRATIONS RANGE: Resp  Av  Min: 16  Max: 22  PULSE OXIMETRY RANGE: SpO2  Av.4 %  Min: 98 %  Max: 100 %  PULSE RANGE: Pulse  Av.7  Min: 80  Max: 112  BLOOD PRESSURE RANGE: Systolic (24hrs), Av , Min:102 , Max:129   ; Diastolic (24hrs), Av, Min:62, Max:79    I/O (24Hr):    Intake/Output Summary (Last 24 hours) at 2025 1724  Last data filed at 2025 0900  Gross per 24 hour   Intake 1608.16 ml   Output --   Net 1608.16 ml     Objective  Labs/Imaging/Diagnostics    Labs:  CBC:  Recent Labs     25  0700 25  1625 25  0757 25  1821 25  0642 25  1503   WBC 5.3  --  7.9  --  5.8  --    RBC 2.03*  --  2.12*  --  2.05*  --    HGB 6.1*   < > 6.7* 7.2* 6.4* 7.2*   HCT 18.8*   < > 19.6* 20.3* 18.9* 20.5*   MCV 92.6*  --  92.5*  --  92.2  --    RDW 14.6*  --  14.4  --  14.7*  --      --  350  --  264  --     < > = values in this interval not displayed.     CHEMISTRIES:  Recent Labs     25  0700 25  2330 25  0646 25  0642     --  135 138   K 4.2  --  4.0 3.8     --  108* 108*   CO2 19*  --  20 20   BUN 40*  --  49* 37*   CREATININE 1.58*  --  1.49* 1.46*   GLUCOSE 106*  --  127* 105*   PHOS 3.4  --   --   --    MG 1.6* 1.8  --   --      PT/INR:No results for input(s): \"PROTIME\", \"INR\" in the last 72 hours.  APTT:No results for input(s): \"APTT\" in the last 72 hours.  LIVER PROFILE:No results for input(s): \"AST\", \"ALT\", \"BILIDIR\", \"BILITOT\", \"ALKPHOS\" in the last 72 hours.    Imaging Last 24 Hours:  EGD    Result Date: 2025  [facility]

## 2025-01-08 NOTE — PROGRESS NOTES
Hematology/Oncology   Progress Note        CHIEF COMPLAINT/HPI:  Follow up of anemia. Hemoglobin at 6.4. LU is negative. Retic is elevated. Suggest bleeding.     REVIEW OF SYSTEMS:    Unremarkable except for symptoms mentioned in HPI.    Current Inpatient Medications:    Current Facility-Administered Medications   Medication Dose Route Frequency Provider Last Rate Last Admin    0.9 % sodium chloride infusion   IntraVENous PRN Yesica Celis MD        ferric gluconate (FERRLECIT) 125 mg in sodium chloride 0.9 % 100 mL IVPB  125 mg IntraVENous Once Yesica Celis MD        0.9 % sodium chloride infusion   IntraVENous Continuous Patricia Ji MD        sodium chloride flush 0.9 % injection 5-40 mL  5-40 mL IntraVENous 2 times per day Patricia Ji MD   10 mL at 01/08/25 0933    sodium chloride flush 0.9 % injection 5-40 mL  5-40 mL IntraVENous PRN Patricia Ji MD        0.9 % sodium chloride infusion   IntraVENous PRN Patricia Ji MD        0.9 % sodium chloride infusion   IntraVENous PRN Yesica Celis MD        0.9 % sodium chloride infusion   IntraVENous PRN Yesica Celis MD        meropenem (MERREM) 1,000 mg in sodium chloride 0.9 % 100 mL IVPB (mini-bag)  1,000 mg IntraVENous Q12H Elza Ardon MD 33.3 mL/hr at 01/08/25 1459 1,000 mg at 01/08/25 1459    0.9 % sodium chloride infusion   IntraVENous PRN Kathia Meraz MD        sodium chloride flush 0.9 % injection 5-40 mL  5-40 mL IntraVENous 2 times per day Kathia Meraz MD   10 mL at 01/08/25 1339    sodium chloride flush 0.9 % injection 5-40 mL  5-40 mL IntraVENous PRN Kathia Meraz MD        0.9 % sodium chloride infusion   IntraVENous PRN Kathia Meraz MD        magnesium sulfate 2000 mg in 50 mL IVPB premix  2,000 mg IntraVENous PRN Kathia Meraz MD   Stopped at 01/06/25 2123    enoxaparin (LOVENOX) injection 40 mg  40 mg SubCUTAneous Daily Kathia Meraz MD   40 mg at 01/08/25 0933    ondansetron (ZOFRAN-ODT)  hepatosplenomegally    MUSCULOSKELETAL:  There is no redness, warmth, or swelling of the joints.  Full range of motion noted.  Motor strength is 5 out of 5 all extremities bilaterally.  Tone is normal.  EXTREMITIES:    NEURO:    DATA:      PT/INR:  No results found for: \"PTINR\"  PTT:    Lab Results   Component Value Date/Time    APTT 24.3 03/14/2018 01:57 PM     CMP:    Lab Results   Component Value Date/Time     01/08/2025 06:42 AM    K 3.8 01/08/2025 06:42 AM     01/08/2025 06:42 AM    CO2 20 01/08/2025 06:42 AM    BUN 37 01/08/2025 06:42 AM     Magnesium:    Lab Results   Component Value Date/Time    MG 1.8 01/06/2025 11:30 PM     Phosphorus:  No components found for: \"PO4\"  Calcium:  No components found for: \"CA\"  CBC:    Lab Results   Component Value Date/Time    WBC 5.8 01/08/2025 06:42 AM    RBC 2.05 01/08/2025 06:42 AM    HGB 7.2 01/08/2025 03:03 PM    HCT 20.5 01/08/2025 03:03 PM    MCV 92.2 01/08/2025 06:42 AM    RDW 14.7 01/08/2025 06:42 AM     01/08/2025 06:42 AM     DIFF:    Lab Results   Component Value Date/Time    MCV 92.2 01/08/2025 06:42 AM    RDW 14.7 01/08/2025 06:42 AM      LDH:    Lab Results   Component Value Date/Time    LDH 66 06/02/2023 11:11 AM     Uric Acid:  No components found for: \"URIC\"    EKG Reviewed  Appropriate Radiology Reviewed      Pathology: Reviewed where indicated      ASSESSMENT:  Principal Problem:    Acute kidney injury superimposed on chronic kidney disease (HCC)  Active Problems:    Anemia requiring transfusions    Volume depletion    Bacterial peritonitis (HCC)  Resolved Problems:    * No resolved hospital problems. *    Patient Active Problem List   Diagnosis    Arthritis, lumbar spine    Renal mass    Type 2 diabetes mellitus without complication, without long-term current use of insulin (HCC)    Dyslipidemia    Essential hypertension    Cyst, epididymis    Hydrocele    Other closed fractures of distal end of radius (alone)    Stiffness of joint,

## 2025-01-08 NOTE — PROGRESS NOTES
Dr. Yesica Celis made aware of Hgb of 6.4.     1 unit PRBC's administered to patient without issue or concern. Post vitals taken and pt is stable at this time with no complaints.     H&H drawn at 15:00 per post transfusion protocol.  Pt. Has no issues or concerns at this time. Call light and nightstand within reach.     Pt ambulating around room independently.

## 2025-01-08 NOTE — TELEPHONE ENCOUNTER
Nurse Sotomayor calls with an Update. Patient is currently an Admit to [facility] as of 1/3/25. Patient is off the Infusion schedule until further notice.

## 2025-01-08 NOTE — PROGRESS NOTES
Critical hemoglobin of 6.4 and hematocrit of 18.9 reported to Yesica Celis MD via perfect serve.     Electronically signed by Zulema Mandel RN on 1/8/2025 at 7:07 AM

## 2025-01-09 ENCOUNTER — HOSPITAL ENCOUNTER (OUTPATIENT)
Dept: INFUSION THERAPY | Age: 75
Setting detail: INFUSION SERIES
End: 2025-01-09

## 2025-01-09 LAB
ANION GAP SERPL CALCULATED.3IONS-SCNC: 9 MEQ/L (ref 9–15)
BASOPHILS # BLD: 0 K/UL (ref 0–0.2)
BASOPHILS NFR BLD: 0.4 %
BUN SERPL-MCNC: 23 MG/DL (ref 8–23)
CALCIUM SERPL-MCNC: 8 MG/DL (ref 8.5–9.9)
CHLORIDE SERPL-SCNC: 106 MEQ/L (ref 95–107)
CO2 SERPL-SCNC: 23 MEQ/L (ref 20–31)
CREAT SERPL-MCNC: 1.37 MG/DL (ref 0.7–1.2)
EOSINOPHIL # BLD: 0.2 K/UL (ref 0–0.7)
EOSINOPHIL NFR BLD: 3.3 %
ERYTHROCYTE [DISTWIDTH] IN BLOOD BY AUTOMATED COUNT: 16 % (ref 11.5–14.5)
GLUCOSE SERPL-MCNC: 103 MG/DL (ref 70–99)
HCT VFR BLD AUTO: 21.2 % (ref 42–52)
HGB BLD-MCNC: 7 G/DL (ref 14–18)
LYMPHOCYTES # BLD: 1.7 K/UL (ref 1–4.8)
LYMPHOCYTES NFR BLD: 37.5 %
MCH RBC QN AUTO: 30.3 PG (ref 27–31.3)
MCHC RBC AUTO-ENTMCNC: 33 % (ref 33–37)
MCV RBC AUTO: 91.8 FL (ref 79–92.2)
MONOCYTES # BLD: 0.6 K/UL (ref 0.2–0.8)
MONOCYTES NFR BLD: 12.9 %
NEUTROPHILS # BLD: 2.1 K/UL (ref 1.4–6.5)
NEUTS SEG NFR BLD: 45.2 %
PLATELET # BLD AUTO: 270 K/UL (ref 130–400)
POTASSIUM SERPL-SCNC: 3.8 MEQ/L (ref 3.4–4.9)
RBC # BLD AUTO: 2.31 M/UL (ref 4.7–6.1)
SODIUM SERPL-SCNC: 138 MEQ/L (ref 135–144)
WBC # BLD AUTO: 4.6 K/UL (ref 4.8–10.8)

## 2025-01-09 PROCEDURE — 6360000002 HC RX W HCPCS: Performed by: SPECIALIST

## 2025-01-09 PROCEDURE — 6360000002 HC RX W HCPCS: Performed by: STUDENT IN AN ORGANIZED HEALTH CARE EDUCATION/TRAINING PROGRAM

## 2025-01-09 PROCEDURE — 85025 COMPLETE CBC W/AUTO DIFF WBC: CPT

## 2025-01-09 PROCEDURE — 36592 COLLECT BLOOD FROM PICC: CPT

## 2025-01-09 PROCEDURE — 2580000003 HC RX 258: Performed by: STUDENT IN AN ORGANIZED HEALTH CARE EDUCATION/TRAINING PROGRAM

## 2025-01-09 PROCEDURE — 6370000000 HC RX 637 (ALT 250 FOR IP)

## 2025-01-09 PROCEDURE — 1210000000 HC MED SURG R&B

## 2025-01-09 PROCEDURE — 2580000003 HC RX 258: Performed by: INTERNAL MEDICINE

## 2025-01-09 PROCEDURE — 6360000002 HC RX W HCPCS: Performed by: INTERNAL MEDICINE

## 2025-01-09 PROCEDURE — 6370000000 HC RX 637 (ALT 250 FOR IP): Performed by: INTERNAL MEDICINE

## 2025-01-09 PROCEDURE — 80048 BASIC METABOLIC PNL TOTAL CA: CPT

## 2025-01-09 PROCEDURE — 2500000003 HC RX 250 WO HCPCS: Performed by: INTERNAL MEDICINE

## 2025-01-09 PROCEDURE — 2580000003 HC RX 258: Performed by: SPECIALIST

## 2025-01-09 PROCEDURE — 2500000003 HC RX 250 WO HCPCS: Performed by: SPECIALIST

## 2025-01-09 RX ADMIN — SODIUM CHLORIDE, PRESERVATIVE FREE 10 ML: 5 INJECTION INTRAVENOUS at 14:45

## 2025-01-09 RX ADMIN — CETIRIZINE HYDROCHLORIDE 10 MG: 10 TABLET, FILM COATED ORAL at 07:49

## 2025-01-09 RX ADMIN — Medication 10 ML: at 09:32

## 2025-01-09 RX ADMIN — MEROPENEM 1000 MG: 1 INJECTION INTRAVENOUS at 23:05

## 2025-01-09 RX ADMIN — DOXEPIN HYDROCHLORIDE 10 MG: 10 CAPSULE ORAL at 20:33

## 2025-01-09 RX ADMIN — MEROPENEM 1000 MG: 1 INJECTION INTRAVENOUS at 02:18

## 2025-01-09 RX ADMIN — MEROPENEM 1000 MG: 1 INJECTION INTRAVENOUS at 14:45

## 2025-01-09 RX ADMIN — SODIUM CHLORIDE, PRESERVATIVE FREE 10 ML: 5 INJECTION INTRAVENOUS at 07:50

## 2025-01-09 RX ADMIN — ENOXAPARIN SODIUM 40 MG: 100 INJECTION SUBCUTANEOUS at 07:49

## 2025-01-09 RX ADMIN — TAMSULOSIN HYDROCHLORIDE 0.4 MG: 0.4 CAPSULE ORAL at 07:49

## 2025-01-09 RX ADMIN — Medication 10 ML: at 20:34

## 2025-01-09 RX ADMIN — ATORVASTATIN CALCIUM 40 MG: 40 TABLET, FILM COATED ORAL at 20:33

## 2025-01-09 RX ADMIN — SODIUM CHLORIDE 40 MG: 900 INJECTION INTRAVENOUS at 07:58

## 2025-01-09 RX ADMIN — SODIUM CHLORIDE 125 MG: 9 INJECTION, SOLUTION INTRAVENOUS at 10:33

## 2025-01-09 RX ADMIN — SODIUM CHLORIDE, PRESERVATIVE FREE 10 ML: 5 INJECTION INTRAVENOUS at 20:33

## 2025-01-09 RX ADMIN — ALLOPURINOL 200 MG: 100 TABLET ORAL at 07:49

## 2025-01-09 RX ADMIN — FINASTERIDE 5 MG: 5 TABLET, FILM COATED ORAL at 07:50

## 2025-01-09 ASSESSMENT — PAIN SCALES - GENERAL
PAINLEVEL_OUTOF10: 9
PAINLEVEL_OUTOF10: 0

## 2025-01-09 NOTE — PROGRESS NOTES
Pt assessment complete. Pt is alert, oriented x4. Goals reviewed with patient. Pt has no complaints of pain or discomfort. Remains on full liquid diet. Independent in room.  Iron infusion to be infused due to low Hct. Call light and bedside table within reach.     Pt has not complaints or concerns at this time. Merrem antibiotic changed to q8.

## 2025-01-09 NOTE — PROGRESS NOTES
[facility]   Pharmacy Dose Adjustment Per Protocol:  Meropenem Extended Interval Interchange    Hector Bose is a 74 y.o. male.     The following ordered dose of Meropenem has been changed to optimize its pharmacodynamic profile per [facility] pharmacy policy approved by [facility].    Recent Labs     01/08/25  0642 01/09/25  0654   CREATININE 1.46* 1.37*   BUN 37* 23   WBC 5.8 4.6*     .  Height: 170 cm (5' 6.93\"), Weight - Scale: 93 kg (205 lb), Body mass index is 32.18 kg/m².  Estimated Creatinine Clearance: 53 mL/min (A) (based on SCr of 1.37 mg/dL (H)).      Meropenem - Extended Infusion (3-hour infusion) - Preferred Dosing Strategy    Renal function (CrCl mL/min)  >= 50  26 - 49  10 - 25   < 10, HD, PD  CRRT    All indications - Loading dose of 9661-2242 milligrams x 1 over 30 minutes or via IV push (based on indication). Maintenance dose should begin at the next regularly scheduled dosing interval based on indication/renal function.    Maintenance dosing for all indications except as outlined below  1000mg q8h ¨ 1000mg q12h X 500 mg q12h ¨ 500 mg q24h ¨ 1000mg q12h^ ¨   CNS infections, Cystic fibrosis, MADHAV > 4  2000mg q8h ¨ 2000mg q12h ¨ 1000mg q12h ¨ 1000mg q24h ¨ 2000mg q12h† ¨    ^Consider 1000mg q8h for CRRT effluent rates > 3L/h   †Consider 2000mg q8h for CRRT effluent rates >= 3L/h       Pharmacists should be contacted for issues concerning drug compatibility with multiple IV medications.  All doses will be prepared using 100ml bag to be infused over 3-hours at a rate of 33.3 ml/hr.    Thank You,  Nichol Guido, [facility] PharmD

## 2025-01-09 NOTE — PLAN OF CARE
Problem: Chronic Conditions and Co-morbidities  Goal: Patient's chronic conditions and co-morbidity symptoms are monitored and maintained or improved  1/8/2025 2048 by Bing Palomo RN  Outcome: Progressing  1/8/2025 1119 by Sabrina Fox RN  Outcome: Progressing     Problem: Discharge Planning  Goal: Discharge to home or other facility with appropriate resources  1/8/2025 2048 by Bing Palomo RN  Outcome: Progressing  1/8/2025 1119 by Sabrina Fox RN  Outcome: Progressing     Problem: Pain  Goal: Verbalizes/displays adequate comfort level or baseline comfort level  1/8/2025 2048 by Bing Palomo RN  Outcome: Progressing  1/8/2025 1119 by Sabrina Fox RN  Outcome: Progressing

## 2025-01-09 NOTE — CARE COORDINATION
MET WITH PATIENT AT BEDSIDE TO DISCUSS DISCHARGE PLAN. PATIENT RESTING IN BED. PATIENT CURRENTLY RECEIVING IV IRON INFUSION. PATIENT DENIES HOME GOING NEEDS. DISCHARGE PLAN REMAINS HOME WITH OUTPATIENT INFUSION.

## 2025-01-10 ENCOUNTER — APPOINTMENT (OUTPATIENT)
Dept: INFUSION THERAPY | Age: 75
End: 2025-01-10
Payer: MEDICARE

## 2025-01-10 VITALS
TEMPERATURE: 98.8 F | WEIGHT: 205 LBS | HEART RATE: 83 BPM | HEIGHT: 67 IN | RESPIRATION RATE: 17 BRPM | OXYGEN SATURATION: 99 % | DIASTOLIC BLOOD PRESSURE: 76 MMHG | SYSTOLIC BLOOD PRESSURE: 136 MMHG | BODY MASS INDEX: 32.18 KG/M2

## 2025-01-10 PROBLEM — N17.9 AKI (ACUTE KIDNEY INJURY) (HCC): Status: RESOLVED | Noted: 2025-01-03 | Resolved: 2025-01-10

## 2025-01-10 LAB
BASOPHILS # BLD: 0 K/UL (ref 0–0.2)
BASOPHILS NFR BLD: 0.2 %
EOSINOPHIL # BLD: 0.1 K/UL (ref 0–0.7)
EOSINOPHIL NFR BLD: 2.8 %
ERYTHROCYTE [DISTWIDTH] IN BLOOD BY AUTOMATED COUNT: 16 % (ref 11.5–14.5)
GLUCOSE BLD-MCNC: 107 MG/DL (ref 70–99)
HCT VFR BLD AUTO: 22.9 % (ref 42–52)
HGB BLD-MCNC: 7.7 G/DL (ref 14–18)
LYMPHOCYTES # BLD: 2 K/UL (ref 1–4.8)
LYMPHOCYTES NFR BLD: 46.4 %
MCH RBC QN AUTO: 31 PG (ref 27–31.3)
MCHC RBC AUTO-ENTMCNC: 33.6 % (ref 33–37)
MCV RBC AUTO: 92.3 FL (ref 79–92.2)
MONOCYTES # BLD: 0.5 K/UL (ref 0.2–0.8)
MONOCYTES NFR BLD: 11.5 %
NEUTROPHILS # BLD: 1.6 K/UL (ref 1.4–6.5)
NEUTS SEG NFR BLD: 38.2 %
PERFORMED ON: ABNORMAL
PLATELET # BLD AUTO: 248 K/UL (ref 130–400)
RBC # BLD AUTO: 2.48 M/UL (ref 4.7–6.1)
WBC # BLD AUTO: 4.3 K/UL (ref 4.8–10.8)

## 2025-01-10 PROCEDURE — 2580000003 HC RX 258: Performed by: INTERNAL MEDICINE

## 2025-01-10 PROCEDURE — 99232 SBSQ HOSP IP/OBS MODERATE 35: CPT | Performed by: INTERNAL MEDICINE

## 2025-01-10 PROCEDURE — 6370000000 HC RX 637 (ALT 250 FOR IP): Performed by: INTERNAL MEDICINE

## 2025-01-10 PROCEDURE — 85025 COMPLETE CBC W/AUTO DIFF WBC: CPT

## 2025-01-10 PROCEDURE — 6360000002 HC RX W HCPCS: Performed by: INTERNAL MEDICINE

## 2025-01-10 PROCEDURE — 2500000003 HC RX 250 WO HCPCS: Performed by: INTERNAL MEDICINE

## 2025-01-10 RX ORDER — PANTOPRAZOLE SODIUM 40 MG/1
40 TABLET, DELAYED RELEASE ORAL
Qty: 60 TABLET | Refills: 1 | Status: SHIPPED | OUTPATIENT
Start: 2025-01-10

## 2025-01-10 RX ADMIN — ALLOPURINOL 200 MG: 100 TABLET ORAL at 09:08

## 2025-01-10 RX ADMIN — FINASTERIDE 5 MG: 5 TABLET, FILM COATED ORAL at 09:08

## 2025-01-10 RX ADMIN — CETIRIZINE HYDROCHLORIDE 10 MG: 10 TABLET, FILM COATED ORAL at 09:08

## 2025-01-10 RX ADMIN — Medication 10 ML: at 09:08

## 2025-01-10 RX ADMIN — TAMSULOSIN HYDROCHLORIDE 0.4 MG: 0.4 CAPSULE ORAL at 09:08

## 2025-01-10 RX ADMIN — MEROPENEM 1000 MG: 1 INJECTION INTRAVENOUS at 06:58

## 2025-01-10 RX ADMIN — ENOXAPARIN SODIUM 40 MG: 100 INJECTION SUBCUTANEOUS at 09:08

## 2025-01-10 NOTE — PROGRESS NOTES
Patient assessment and vitals complete and per flowsheets. Abodminal incision is franky, clean dry and intact. No needs at this time. Advanced to regular diet. Potential plan for discharge this afternoon. No other needs.

## 2025-01-10 NOTE — PLAN OF CARE
Problem: Chronic Conditions and Co-morbidities  Goal: Patient's chronic conditions and co-morbidity symptoms are monitored and maintained or improved  1/9/2025 2046 by Bing Palomo RN  Outcome: Progressing  1/9/2025 1102 by Sabrina Fox RN  Outcome: Progressing     Problem: Discharge Planning  Goal: Discharge to home or other facility with appropriate resources  1/9/2025 2046 by Bing Palomo RN  Outcome: Progressing  1/9/2025 1102 by Sabrina Fox RN  Outcome: Progressing     Problem: Pain  Goal: Verbalizes/displays adequate comfort level or baseline comfort level  1/9/2025 2046 by Bing Palomo RN  Outcome: Progressing  1/9/2025 1102 by Sabrina Fox RN  Outcome: Progressing

## 2025-01-10 NOTE — DISCHARGE INSTRUCTIONS
Follow up with primary care physician in the next 7 days or sooner if needed. If you do not have a Primary care physician, please schedule an appointment with one. Please ask prior to discharge about a list of local providers. Your infectious disease doctor is planning to recheck your labs in 4 days. This is an appropriate timeline to recheck your blood counts.    Please return to ER or call 911 if you develop any significant signs or symptoms.    I may not have addressed all of your medical illnesses or the abnormal blood work or imaging therefore please ask your PCP to obtain [facility] record to follow up on all of the abnormal labs, imaging and findings that I have and have not addressed during your hospitalization.      Discharging you from the hospital does not mean that your medical care ends here and now. You may still need additional work up, investigation, monitoring, and treatment to be handled from this point on by outside providers including your PCP, Specialists and other healthcare providers.     For medication questions, contact your retail pharmacy and your PCP.    Your medical team at [facility] appreciates the opportunity to work with you to get well!    Yesica Celis MD  11:23 AM

## 2025-01-10 NOTE — CARE COORDINATION
SPOKE W/PT TO DISCUSS DISCHARGE PLAN WHICH REMAINS HOME W/OP INFUSION CENTER. SPOKE W/SHALOM AND SHE INFORMS THEY WILL NEED NEW ABX PRESCRIPTION. PER AUM, ID IS AWARE. SHALOM STATES PT TO KEEP HIS 0930 APPOINTMENT TIMES.   1209- FAXED INVANZ PRESCRIPTION TO [facility] INF[facility]ION [facility]. INVANZ FOR FIVE MORE DAYS PER ID.   1221- SPOKE W/GABRIEL FROM OP INFUSION.

## 2025-01-10 NOTE — PROGRESS NOTES
WBC 4.3 (L) 01/10/2025    HGB 7.7 (L) 01/10/2025    HCT 22.9 (L) 01/10/2025    MCV 92.3 (H) 01/10/2025     01/10/2025     Lab Results   Component Value Date    CREATININE 1.37 (H) 01/09/2025    BUN 23 01/09/2025     01/09/2025    K 3.8 01/09/2025     01/09/2025    CO2 23 01/09/2025       Hepatic Function Panel:  Lab Results   Component Value Date/Time    ALKPHOS 80 01/03/2025 11:29 PM    ALT 26 01/03/2025 11:29 PM    AST 19 01/03/2025 11:29 PM    BILITOT 0.3 01/03/2025 11:29 PM   Elevated reticulocytes were noted  CT of abdomen and pelvis was negative for abscess/negative for hematoma  IMPRESSION:    Volume depletion and severe anemia requiring blood transfusion in a patient with recent abdominal surgery.   acute kidney injury/chronic kidney disease and hyperkalemia, improving  Need for continuation of IV antibiotics for recent bacterial peritonitis secondary to perforated distal ileal diverticulitis status post right colectomy and primary anastomosis on December 23          PLAN:  May discharge on IV Invanz for 5 more days  DC PICC line in 5 days  Follow-up CBC BMP CRP in 4 days  Follow-up in 1 week    Discussed with patient     Elza Ardon MD

## 2025-01-10 NOTE — PROGRESS NOTES
Discharge instructions provided to patient. Verbalized understanding of follow up appointments, diet, activity, wound care, medications and reasons to return to ED/call physician. All questions answered. Copy of discharge instructions provided.

## 2025-01-11 ENCOUNTER — HOSPITAL ENCOUNTER (OUTPATIENT)
Dept: INFUSION THERAPY | Age: 75
Setting detail: INFUSION SERIES
End: 2025-01-11
Payer: MEDICARE

## 2025-01-11 ENCOUNTER — HOSPITAL ENCOUNTER (OUTPATIENT)
Dept: INFUSION THERAPY | Age: 75
Setting detail: INFUSION SERIES
Discharge: HOME | DRG: 314 | End: 2025-01-11
Payer: MEDICARE

## 2025-01-11 VITALS
SYSTOLIC BLOOD PRESSURE: 114 MMHG | RESPIRATION RATE: 18 BRPM | DIASTOLIC BLOOD PRESSURE: 73 MMHG | HEART RATE: 87 BPM | TEMPERATURE: 97.9 F

## 2025-01-11 DIAGNOSIS — K57.92 ACUTE DIVERTICULITIS: Primary | ICD-10-CM

## 2025-01-11 PROCEDURE — 96365 THER/PROPH/DIAG IV INF INIT: CPT

## 2025-01-11 PROCEDURE — 6360000002 HC RX W HCPCS: Performed by: INTERNAL MEDICINE

## 2025-01-11 PROCEDURE — 2580000003 HC RX 258: Performed by: INTERNAL MEDICINE

## 2025-01-11 RX ADMIN — ERTAPENEM SODIUM 1000 MG: 1 INJECTION INTRAMUSCULAR; INTRAVENOUS at 10:10

## 2025-01-11 NOTE — PROGRESS NOTES
Pt arrived to 1W room 193 for infusion therapy.  [facility] called pharmacy to request antibiotic be sent to floor, awaiting delivery.

## 2025-01-11 NOTE — PROGRESS NOTES
IV antibiotic therapy completed.  Lines flushed,caps replaced.  See VS in flowsheet.  Pt DC'd home with wife.

## 2025-01-12 ENCOUNTER — HOSPITAL ENCOUNTER (OUTPATIENT)
Dept: INFUSION THERAPY | Age: 75
Setting detail: INFUSION SERIES
Discharge: HOME | End: 2025-01-12
Payer: MEDICARE

## 2025-01-12 ENCOUNTER — APPOINTMENT (OUTPATIENT)
Dept: INFUSION THERAPY | Age: 75
End: 2025-01-12
Payer: MEDICARE

## 2025-01-12 VITALS
SYSTOLIC BLOOD PRESSURE: 99 MMHG | OXYGEN SATURATION: 99 % | TEMPERATURE: 97 F | HEART RATE: 77 BPM | DIASTOLIC BLOOD PRESSURE: 54 MMHG | RESPIRATION RATE: 16 BRPM

## 2025-01-12 DIAGNOSIS — K57.92 ACUTE DIVERTICULITIS: Primary | ICD-10-CM

## 2025-01-12 PROCEDURE — 2580000003 HC RX 258: Performed by: INTERNAL MEDICINE

## 2025-01-12 PROCEDURE — 96365 THER/PROPH/DIAG IV INF INIT: CPT

## 2025-01-12 PROCEDURE — 6360000002 HC RX W HCPCS: Performed by: INTERNAL MEDICINE

## 2025-01-12 RX ADMIN — ERTAPENEM SODIUM 1000 MG: 1 INJECTION INTRAMUSCULAR; INTRAVENOUS at 09:20

## 2025-01-12 NOTE — PROGRESS NOTES
[facility] OUTPATIENT INFUSION CENTER     PT arrived via:  [] Ambulatory         [x] Wheelchair  []With transport                [] Other:     [x]PT oriented to room and call light in reach.   [x] Vital signs obtained and are stable.   [x]Medication education provided and reviewed with patient.             .

## 2025-01-12 NOTE — PROGRESS NOTES
Pt tolerated infusion. Left unit in wheelchair. All equipment used in care has been cleaned.    Electronically signed by Magalys Thornton RN on 1/12/2025 at 9:53 AM

## 2025-01-13 ENCOUNTER — HOSPITAL ENCOUNTER (OUTPATIENT)
Dept: INFUSION THERAPY | Age: 75
Setting detail: INFUSION SERIES
Discharge: HOME | End: 2025-01-13
Payer: MEDICARE

## 2025-01-13 ENCOUNTER — APPOINTMENT (OUTPATIENT)
Dept: INFUSION THERAPY | Age: 75
End: 2025-01-13
Payer: MEDICARE

## 2025-01-13 VITALS
TEMPERATURE: 97.2 F | SYSTOLIC BLOOD PRESSURE: 92 MMHG | HEART RATE: 81 BPM | OXYGEN SATURATION: 97 % | DIASTOLIC BLOOD PRESSURE: 50 MMHG | RESPIRATION RATE: 17 BRPM

## 2025-01-13 DIAGNOSIS — K57.92 ACUTE DIVERTICULITIS: Primary | ICD-10-CM

## 2025-01-13 PROCEDURE — 6360000002 HC RX W HCPCS: Performed by: INTERNAL MEDICINE

## 2025-01-13 PROCEDURE — 2580000003 HC RX 258: Performed by: INTERNAL MEDICINE

## 2025-01-13 PROCEDURE — 96365 THER/PROPH/DIAG IV INF INIT: CPT

## 2025-01-13 RX ADMIN — ERTAPENEM SODIUM 1000 MG: 1 INJECTION INTRAMUSCULAR; INTRAVENOUS at 09:27

## 2025-01-13 NOTE — FLOWSHEET NOTE
Patient arrived via wheelchair with family member for infusion, vitals completed, call light within reach. Patient is hypotensive, but denies any complaints or concerns at this time. Patient states he feels great. Patient provided with water. Will recheck blood pressure.

## 2025-01-13 NOTE — PROGRESS NOTES
Pt tolerated infusion. He left unit in wheelchair. All equipment used in care has been cleaned.    Electronically signed by Magalys Thornton RN on 1/13/2025 at 10:14 AM

## 2025-01-14 ENCOUNTER — TELEPHONE (OUTPATIENT)
Dept: INFECTIOUS DISEASES | Age: 75
End: 2025-01-14

## 2025-01-14 ENCOUNTER — CARE COORDINATION (OUTPATIENT)
Dept: CARE COORDINATION | Age: 75
End: 2025-01-14

## 2025-01-14 ENCOUNTER — HOSPITAL ENCOUNTER (OUTPATIENT)
Dept: INFUSION THERAPY | Age: 75
Setting detail: INFUSION SERIES
Discharge: HOME | End: 2025-01-14
Payer: MEDICARE

## 2025-01-14 VITALS
RESPIRATION RATE: 16 BRPM | DIASTOLIC BLOOD PRESSURE: 54 MMHG | HEART RATE: 76 BPM | OXYGEN SATURATION: 99 % | TEMPERATURE: 97.4 F | SYSTOLIC BLOOD PRESSURE: 94 MMHG

## 2025-01-14 DIAGNOSIS — K57.92 ACUTE DIVERTICULITIS: Primary | ICD-10-CM

## 2025-01-14 DIAGNOSIS — K65.9 BACTERIAL PERITONITIS (HCC): Primary | ICD-10-CM

## 2025-01-14 LAB
ANION GAP SERPL CALCULATED.3IONS-SCNC: 9 MEQ/L (ref 9–15)
BUN SERPL-MCNC: 19 MG/DL (ref 8–23)
CALCIUM SERPL-MCNC: 8 MG/DL (ref 8.5–9.9)
CHLORIDE SERPL-SCNC: 108 MEQ/L (ref 95–107)
CO2 SERPL-SCNC: 21 MEQ/L (ref 20–31)
CREAT SERPL-MCNC: 1.85 MG/DL (ref 0.7–1.2)
ERYTHROCYTE [DISTWIDTH] IN BLOOD BY AUTOMATED COUNT: 16.1 % (ref 11.5–14.5)
GLUCOSE SERPL-MCNC: 115 MG/DL (ref 70–99)
HCT VFR BLD AUTO: 25.6 % (ref 42–52)
HGB BLD-MCNC: 7.9 G/DL (ref 14–18)
MCH RBC QN AUTO: 30.2 PG (ref 27–31.3)
MCHC RBC AUTO-ENTMCNC: 30.9 % (ref 33–37)
MCV RBC AUTO: 97.7 FL (ref 79–92.2)
PLATELET # BLD AUTO: 225 K/UL (ref 130–400)
POTASSIUM SERPL-SCNC: 5 MEQ/L (ref 3.4–4.9)
RBC # BLD AUTO: 2.62 M/UL (ref 4.7–6.1)
SODIUM SERPL-SCNC: 138 MEQ/L (ref 135–144)
WBC # BLD AUTO: 5.3 K/UL (ref 4.8–10.8)

## 2025-01-14 PROCEDURE — 85027 COMPLETE CBC AUTOMATED: CPT

## 2025-01-14 PROCEDURE — 96365 THER/PROPH/DIAG IV INF INIT: CPT

## 2025-01-14 PROCEDURE — 80048 BASIC METABOLIC PNL TOTAL CA: CPT

## 2025-01-14 PROCEDURE — 6360000002 HC RX W HCPCS: Performed by: INTERNAL MEDICINE

## 2025-01-14 PROCEDURE — 1111F DSCHRG MED/CURRENT MED MERGE: CPT | Performed by: INTERNAL MEDICINE

## 2025-01-14 PROCEDURE — 2580000003 HC RX 258: Performed by: INTERNAL MEDICINE

## 2025-01-14 PROCEDURE — 36592 COLLECT BLOOD FROM PICC: CPT

## 2025-01-14 RX ADMIN — ERTAPENEM SODIUM 1000 MG: 1 INJECTION INTRAMUSCULAR; INTRAVENOUS at 09:45

## 2025-01-14 NOTE — PROGRESS NOTES
[facility] OUTPATIENT INFUSION CENTER     PT arrived via:  [] Ambulatory         [] Wheelchair  [x]With transport                [] Other:     [x]PT oriented to room and call light in reach.   [x] Vital signs obtained and are stable.   [x]Medication education provided and reviewed with patient.  Labs drawn per order and sent to lab. 0945- Infusion started, call light is within reach, care continued. Electronically signed by Jill Corona RN on 1/14/2025 at 10:09 AM  Patient refused PICC line dressing change. Electronically signed by Jill Corona RN on 1/14/2025 at 12:06 PM

## 2025-01-14 NOTE — TELEPHONE ENCOUNTER
Per Dr. Ardon's request, called pt to discuss lab work that resulted today. Pt's K+ is elevated at 5.0 and Creatinine elevated at 1.85.    In addition, Dr. Ardon would like this LPN to fax lab results to PCP Dr. Levy [phone] and Nephrologist Dr. Whitfield [phone].

## 2025-01-14 NOTE — CARE COORDINATION
Care Transitions Note    Initial Call - Call within 2 business days of discharge: Yes    Patient Current Location:  Home: [address] [place] [address] [place] [address]    Care Transition Nurse contacted the patient by telephone to perform post hospital discharge assessment, verified name and  as identifiers. Provided introduction to self, and explanation of the Care Transition Nurse role.     Patient: Hector Bose      Patient : 1950   MRN: [id number]      Reason for Admission: 1/3/2025 - 1/10/2025 [facility] IP. JNAY, Bacterial peritonitis secondary to perforated viscus.  Discharge Date: 1/10/25    RURS: Readmission Risk Score: 18.8  CTN Start day 2025, End Date 2025    [facility] INF[facility]. INVANZ FOR FIVE MORE DAYS PER ID. DC PICC line in 5 days. CBC BMP CRP in 4 days.    Hosp FU Sabrina Morrow MD (Family Medicine) on 2025; @ 11:30.  Patricia Ji MD (Gastroenterology) on 2025; @ 2:30.  ID/Abbud  11:15  Post-op/Rincon  10:00  Uro/Dobritch 1/15 1:45    START taking:  pantoprazole (PROTONIX)  CHANGE how you take:  ertapenem (INVanz)    Last Discharge Facility       Date Complaint Diagnosis Description Type Department Provider    1/3/25 Hypotension JANY (acute kidney injury) (HCC) ... ED to Hosp-Admission (Discharged) (ADMITTED) Yesica Yu MD; Brooke Meraz...            Was this an external facility discharge? No    Additional needs identified to be addressed with provider   No needs identified             Method of communication with provider: none.    Patients top risk factors for readmission: JANY, Peritonitis    Interventions to address risk factors:   Return to ED for fevers, chills, night sweats, flu-like symptoms, increasing abd pain, N/V, blood in stools, difficulty urinating w/ normal fluid intake, increasing edema, sudden wt gains.    Care Summary Note: CTN spoke w/ Hector    Pt reports no acute abd pain/cramp, N/V, fevers, or GI

## 2025-01-14 NOTE — FLOWSHEET NOTE
Infusion is complete. Tolerated well. Left the unit via wheelchair. All equipment used in the care for this patient has been cleaned.

## 2025-01-15 ENCOUNTER — OFFICE VISIT (OUTPATIENT)
Dept: UROLOGY | Age: 75
End: 2025-01-15
Payer: MEDICARE

## 2025-01-15 ENCOUNTER — APPOINTMENT (OUTPATIENT)
Dept: INFUSION THERAPY | Age: 75
End: 2025-01-15
Payer: MEDICARE

## 2025-01-15 ENCOUNTER — TELEPHONE (OUTPATIENT)
Dept: INFECTIOUS DISEASES | Age: 75
End: 2025-01-15

## 2025-01-15 ENCOUNTER — HOSPITAL ENCOUNTER (OUTPATIENT)
Dept: INFUSION THERAPY | Age: 75
Setting detail: INFUSION SERIES
Discharge: HOME | End: 2025-01-15
Payer: MEDICARE

## 2025-01-15 VITALS
DIASTOLIC BLOOD PRESSURE: 62 MMHG | HEART RATE: 66 BPM | OXYGEN SATURATION: 98 % | WEIGHT: 205 LBS | HEIGHT: 67 IN | SYSTOLIC BLOOD PRESSURE: 108 MMHG | BODY MASS INDEX: 32.18 KG/M2

## 2025-01-15 VITALS
TEMPERATURE: 98.1 F | DIASTOLIC BLOOD PRESSURE: 54 MMHG | RESPIRATION RATE: 18 BRPM | HEART RATE: 83 BPM | SYSTOLIC BLOOD PRESSURE: 96 MMHG

## 2025-01-15 DIAGNOSIS — N28.89 RENAL MASS: Primary | ICD-10-CM

## 2025-01-15 DIAGNOSIS — R31.21 ASYMPTOMATIC MICROSCOPIC HEMATURIA: ICD-10-CM

## 2025-01-15 DIAGNOSIS — N18.9 CHRONIC KIDNEY DISEASE, UNSPECIFIED CKD STAGE: Primary | ICD-10-CM

## 2025-01-15 DIAGNOSIS — N40.1 BENIGN PROSTATIC HYPERPLASIA WITH URINARY OBSTRUCTION: ICD-10-CM

## 2025-01-15 DIAGNOSIS — N13.8 BENIGN PROSTATIC HYPERPLASIA WITH URINARY OBSTRUCTION: ICD-10-CM

## 2025-01-15 DIAGNOSIS — K57.92 ACUTE DIVERTICULITIS: Primary | ICD-10-CM

## 2025-01-15 LAB
ANION GAP SERPL CALCULATED.3IONS-SCNC: 9 MEQ/L (ref 9–15)
BILIRUB UR QL STRIP: NEGATIVE
BILIRUBIN, POC: ABNORMAL
BLOOD URINE, POC: ABNORMAL
BUN SERPL-MCNC: 17 MG/DL (ref 8–23)
CALCIUM SERPL-MCNC: 8.1 MG/DL (ref 8.5–9.9)
CHLORIDE SERPL-SCNC: 107 MEQ/L (ref 95–107)
CLARITY UR: CLEAR
CLARITY, POC: CLEAR
CO2 SERPL-SCNC: 22 MEQ/L (ref 20–31)
COLOR UR: YELLOW
COLOR, POC: YELLOW
CREAT SERPL-MCNC: 1.72 MG/DL (ref 0.7–1.2)
GLUCOSE SERPL-MCNC: 105 MG/DL (ref 70–99)
GLUCOSE UR STRIP-MCNC: NEGATIVE MG/DL
GLUCOSE URINE, POC: ABNORMAL MG/DL
HGB UR QL STRIP: NEGATIVE
KETONES UR STRIP-MCNC: NEGATIVE MG/DL
KETONES, POC: ABNORMAL MG/DL
LEUKOCYTE EST, POC: ABNORMAL
LEUKOCYTE ESTERASE UR QL STRIP: NEGATIVE
NITRITE UR QL STRIP: NEGATIVE
NITRITE, POC: ABNORMAL
PH UR STRIP: 5 [PH] (ref 5–9)
PH, POC: 5
POTASSIUM SERPL-SCNC: 5 MEQ/L (ref 3.4–4.9)
PROT UR STRIP-MCNC: ABNORMAL MG/DL
PROTEIN, POC: ABNORMAL MG/DL
SODIUM SERPL-SCNC: 138 MEQ/L (ref 135–144)
SP GR UR STRIP: 1.01 (ref 1–1.03)
SPECIFIC GRAVITY, POC: 1.02
UROBILINOGEN UR STRIP-ACNC: 0.2 E.U./DL
UROBILINOGEN, POC: 0.2 MG/DL

## 2025-01-15 PROCEDURE — 2580000003 HC RX 258: Performed by: INTERNAL MEDICINE

## 2025-01-15 PROCEDURE — 3074F SYST BP LT 130 MM HG: CPT | Performed by: PHYSICIAN ASSISTANT

## 2025-01-15 PROCEDURE — 1111F DSCHRG MED/CURRENT MED MERGE: CPT | Performed by: PHYSICIAN ASSISTANT

## 2025-01-15 PROCEDURE — 3078F DIAST BP <80 MM HG: CPT | Performed by: PHYSICIAN ASSISTANT

## 2025-01-15 PROCEDURE — G8427 DOCREV CUR MEDS BY ELIG CLIN: HCPCS | Performed by: PHYSICIAN ASSISTANT

## 2025-01-15 PROCEDURE — 96365 THER/PROPH/DIAG IV INF INIT: CPT

## 2025-01-15 PROCEDURE — 3017F COLORECTAL CA SCREEN DOC REV: CPT | Performed by: PHYSICIAN ASSISTANT

## 2025-01-15 PROCEDURE — 99203 OFFICE O/P NEW LOW 30 MIN: CPT | Performed by: PHYSICIAN ASSISTANT

## 2025-01-15 PROCEDURE — 1123F ACP DISCUSS/DSCN MKR DOCD: CPT | Performed by: PHYSICIAN ASSISTANT

## 2025-01-15 PROCEDURE — 51741 ELECTRO-UROFLOWMETRY FIRST: CPT | Performed by: PHYSICIAN ASSISTANT

## 2025-01-15 PROCEDURE — 81003 URINALYSIS AUTO W/O SCOPE: CPT | Performed by: PHYSICIAN ASSISTANT

## 2025-01-15 PROCEDURE — 36415 COLL VENOUS BLD VENIPUNCTURE: CPT

## 2025-01-15 PROCEDURE — G8417 CALC BMI ABV UP PARAM F/U: HCPCS | Performed by: PHYSICIAN ASSISTANT

## 2025-01-15 PROCEDURE — 6360000002 HC RX W HCPCS: Performed by: INTERNAL MEDICINE

## 2025-01-15 PROCEDURE — 80048 BASIC METABOLIC PNL TOTAL CA: CPT

## 2025-01-15 PROCEDURE — 51798 US URINE CAPACITY MEASURE: CPT | Performed by: PHYSICIAN ASSISTANT

## 2025-01-15 PROCEDURE — 1126F AMNT PAIN NOTED NONE PRSNT: CPT | Performed by: PHYSICIAN ASSISTANT

## 2025-01-15 PROCEDURE — 1159F MED LIST DOCD IN RCRD: CPT | Performed by: PHYSICIAN ASSISTANT

## 2025-01-15 PROCEDURE — 1036F TOBACCO NON-USER: CPT | Performed by: PHYSICIAN ASSISTANT

## 2025-01-15 RX ADMIN — ERTAPENEM SODIUM 1000 MG: 1 INJECTION INTRAMUSCULAR; INTRAVENOUS at 09:30

## 2025-01-15 ASSESSMENT — ENCOUNTER SYMPTOMS: APNEA: 0

## 2025-01-15 NOTE — FLOWSHEET NOTE
Infusion completed, pt tolerated well. Patient left unit via wheelchair with family member. All equipment used in the care for this patient has been cleaned.

## 2025-01-15 NOTE — PROGRESS NOTES
Subjective:      Patient ID: Hector Bose is a 74 y.o. male    HPI 74 year old male who had an incidental finding of having isodense nodules projecting from the left kidney, 2.1 cm nodule projecting superiorly from the upper pole and a 0.9 cm nodule projecting from the interpolar region on CT abd/pelvis on 1/6/25. These could represent solid masses or high density cysts. He underwent a a renal ultrasound on 12/24/24 which showed a nonobstructing stone in the left kidney, no hydro nephrosis. And cystic structures seen in the bilateral kidneys. He denies gross hematuria and dysuria. He is on flomax and proscar with good symptoms control. His creatinine is slightly elevated along with his potassium level. Max flow 6 ml/s, average flow 3 ml/s, PVR 0 cc    Past Medical History:   Diagnosis Date    Acute idiopathic gout of right foot     Anemia 03/01/2018    iron transfusions x2    Arthritis     both knees    Bilateral carpal tunnel syndrome 1/12/2022    Chronic kidney disease     CLL (chronic lymphocytic leukemia) (HCC)     dx 5/2015    Colon polyps     Disease of blood and blood forming organ     Hyperlipidemia     dx since 1970's    Hypertension     meds  since 1970's    Lymphoma of gastrointestinal tract (HCC)     Movement disorder     Type II or unspecified type diabetes mellitus without mention of complication, not stated as uncontrolled     hx > 20 yrs    Urinary retention 12/23/2024     Past Surgical History:   Procedure Laterality Date    APPENDECTOMY      age 22s    CARPAL TUNNEL RELEASE Left 1/26/2022    LEFT CARPAL TUNNEL RELEASE performed by Rangel Saldivar MD at [facility] OR    CARPAL TUNNEL RELEASE Right 3/9/2022    CARPAL TUNNEL RELEASE RIGHT performed by Rangel Saldivar MD at [facility] OR    CHOLECYSTECTOMY N/A 07/07/2016    COLONOSCOPY  4/1/16    w/polypectomy     COLONOSCOPY N/A 6/15/2021    COLORECTAL CANCER SCREENING, HIGH RISK performed by Chang Caldwell MD at [facility]

## 2025-01-15 NOTE — FLOWSHEET NOTE
Called and spoke to Melody at ID to discuss removal of patient's PICC line. She states to leave PICC line in until patient is seen tomorrow in Dr. Ardon's office.

## 2025-01-15 NOTE — TELEPHONE ENCOUNTER
Pt returned call to office with concerns of elevated lab values. This LPN informed pt that his K+ level was elevated at 5 and his creatinine level elevated at 1.85.  In addition, made pt aware that lab work is ordered, per Dr. Ardon and results sent to Dr. Levy and Dr. Whitfield. Per pt, he does not see Dr. Whitfield anymore, he sees NICOLAS Espinoza.    Pt states he has an appointment Jorge this afternoon at 145p. This LPN walked labs to office and asked for a review.

## 2025-01-16 ENCOUNTER — HOSPITAL ENCOUNTER (OUTPATIENT)
Dept: INFUSION THERAPY | Age: 75
Setting detail: INFUSION SERIES
Discharge: HOME | End: 2025-01-16
Payer: MEDICARE

## 2025-01-16 ENCOUNTER — OFFICE VISIT (OUTPATIENT)
Dept: INFECTIOUS DISEASES | Age: 75
End: 2025-01-16
Payer: MEDICARE

## 2025-01-16 VITALS
SYSTOLIC BLOOD PRESSURE: 99 MMHG | TEMPERATURE: 97.5 F | OXYGEN SATURATION: 98 % | DIASTOLIC BLOOD PRESSURE: 54 MMHG | RESPIRATION RATE: 18 BRPM | HEART RATE: 82 BPM

## 2025-01-16 VITALS
TEMPERATURE: 97.3 F | SYSTOLIC BLOOD PRESSURE: 100 MMHG | BODY MASS INDEX: 32.11 KG/M2 | WEIGHT: 204.6 LBS | RESPIRATION RATE: 16 BRPM | DIASTOLIC BLOOD PRESSURE: 63 MMHG | OXYGEN SATURATION: 98 % | HEART RATE: 85 BPM | HEIGHT: 67 IN

## 2025-01-16 DIAGNOSIS — K57.20 DIVERTICULITIS OF LARGE INTESTINE WITH PERFORATION WITHOUT BLEEDING: Primary | ICD-10-CM

## 2025-01-16 DIAGNOSIS — N17.9 ACUTE KIDNEY INJURY SUPERIMPOSED ON CKD (HCC): ICD-10-CM

## 2025-01-16 DIAGNOSIS — N18.9 ACUTE KIDNEY INJURY SUPERIMPOSED ON CKD (HCC): ICD-10-CM

## 2025-01-16 PROCEDURE — G8428 CUR MEDS NOT DOCUMENT: HCPCS | Performed by: INTERNAL MEDICINE

## 2025-01-16 PROCEDURE — 3017F COLORECTAL CA SCREEN DOC REV: CPT | Performed by: INTERNAL MEDICINE

## 2025-01-16 PROCEDURE — 1036F TOBACCO NON-USER: CPT | Performed by: INTERNAL MEDICINE

## 2025-01-16 PROCEDURE — 3078F DIAST BP <80 MM HG: CPT | Performed by: INTERNAL MEDICINE

## 2025-01-16 PROCEDURE — 1111F DSCHRG MED/CURRENT MED MERGE: CPT | Performed by: INTERNAL MEDICINE

## 2025-01-16 PROCEDURE — G8417 CALC BMI ABV UP PARAM F/U: HCPCS | Performed by: INTERNAL MEDICINE

## 2025-01-16 PROCEDURE — 1123F ACP DISCUSS/DSCN MKR DOCD: CPT | Performed by: INTERNAL MEDICINE

## 2025-01-16 PROCEDURE — 3074F SYST BP LT 130 MM HG: CPT | Performed by: INTERNAL MEDICINE

## 2025-01-16 PROCEDURE — 99213 OFFICE O/P EST LOW 20 MIN: CPT | Performed by: INTERNAL MEDICINE

## 2025-01-16 PROCEDURE — 99211 OFF/OP EST MAY X REQ PHY/QHP: CPT

## 2025-01-16 ASSESSMENT — PATIENT HEALTH QUESTIONNAIRE - PHQ9
1. LITTLE INTEREST OR PLEASURE IN DOING THINGS: NOT AT ALL
SUM OF ALL RESPONSES TO PHQ9 QUESTIONS 1 & 2: 0
2. FEELING DOWN, DEPRESSED OR HOPELESS: NOT AT ALL
SUM OF ALL RESPONSES TO PHQ QUESTIONS 1-9: 0

## 2025-01-16 NOTE — DISCHARGE INSTRUCTIONS
PICC removal care- keep the dressing on until tomorrow. Remove in the morning and shower. Cover with a bandaid until a scab forms. Watch for signs and symptoms of infection which include redness, swelling, increase pain, fever, or drainage. If any of these occur, please call infectious disease doctors.

## 2025-01-16 NOTE — PROGRESS NOTES
Hector Bose (:  1950) is a 74 y.o. male,Established patient, here for evaluation of the following chief complaint(s):  Follow-Up from Hospital and Diverticulitis ([facility] f/u for Diverticulitis with perforation)         Assessment & Plan  Diverticulitis of large intestine with perforation without bleeding   New, at goal (stable), DC IV Invanz and DC PICC line .  No indication for additional antibiotics at this point since infection looks cleared with negative repeat CT and normal CRP done during recent hospitalization         Acute kidney injury superimposed on CKD (HCC)   New, not at goal (unstable), I discussed with the patient about concern for giving him gadolinium with his current kidney condition.  Patient is scheduled to see primary care physician in a.m..  Recommend nephrology consult prior to doing MRI           Follow-up as needed       Subjective   HPI  Follow-up [facility] hospitalization for acute perforated diverticulitis on IV Invanz, well-tolerated.  Last dose was 2 days ago.   Patient has not had any evidence of continuous GI bleeding since recent hospitalization.   He denies any abdominal pain.   Decreased generalized weakness.  Improving appetite.   Saw urology for incidental finding of renal masses.  Patient is scheduled for MRI of the kidneys.   I discussed with the patient worsening renal function after he left the hospital.   He never saw a kidney specialist.  He is scheduled to see his primary care physician tomorrow  Review of Systems   All other systems reviewed and are negative.         Objective   Physical Exam     Vitals:    25 1105   BP: 100/63   Site: Left Upper Arm   Position: Sitting   Cuff Size: Medium Adult   Pulse: 85   Resp: 16   Temp: 97.3 °F (36.3 °C)   TempSrc: Temporal   SpO2: 98%   Weight: 92.8 kg (204 lb 9.6 oz)   Height: 1.689 m (5' 6.5\")     General Appearance: alert and oriented to person, place and time, well-developed and

## 2025-01-16 NOTE — ASSESSMENT & PLAN NOTE
New, at goal (stable), DC IV Invanz and DC PICC line.  No indication for additional antibiotics at this point since infection looks cleared with negative repeat CT and normal CRP done during recent hospitalization

## 2025-01-16 NOTE — PROGRESS NOTES
hematoma in the visualized lower chest, abdomen, or pelvis   to explain the patient's drop in hemoglobin.   2. Interval resection of the cecum and terminal ileum which had multiple   diverticuli. Widely patent ileocolic anastomosis with no sign of any   inflammatory process or fluid collection in the area of surgery.   3. Stable appearance of 2 high density cysts arising from the left kidney,   previously examined with ultrasound   4. Stable mild nonobstructive left nephrolithiasis.   5. Moderate-sized right inguinal hernia now contains a mild amount of ascites   with no sign of herniation of bowel.           Assessment/Plan:    Active Hospital Problems    Diagnosis Date Noted    Volume depletion [E86.9] 01/07/2025    Bacterial peritonitis (HCC) [K65.9] 01/07/2025    Anemia requiring transfusions [D64.9] 01/03/2025     74 y.o. male with PMH of HTN, DM2, CKD3b, CLL/SLL s/p chemo/RT, duodenal GI bleed due to CLL, obesity, gout, dyslipidemia, BPH, spasmodic torticollis, recently managed at [facility] from 12/2-12/28 for perforated distal ileal diverticulitis with peritonitis s/p exploratory laparotomy with LEISA and right colectomy on 12/23 and sent home on daily IV Ertapenem, who presented with:     Hypotension  - improved with IVFs  - holding home meds  - monitor BP closely     JANY / CKD3b with hyperkalemia  - in the setting of poor oral intake since recent surgery  - JANY improved with IVFs   - holding Benazepril and K supplement  - monitor renal function and K level     Acute blood loss anemia due to GI bleed  - hgb down to 6.9, baseline around 11-12, has been downtrending since recent surgery  - EGD with noted small bleeding duodenal ulcer as well as a non-bleeding gastric ulcer and gastritis  - hem/onc following given hx of CLL, workup consistent with blood loss  - s/p IV iron   - follow H/H, transfuse for hgb <7, additional unit pRBC given today for hgb 6.7     Minimally elevated troponins  - in the setting of

## 2025-01-16 NOTE — FLOWSHEET NOTE
Patient to the floor ambulatory for his PICC line removal. Vital signs taken. No distress noted. Call light within reach. Education given both verbal and handout regarding this and verbalized understanding. Signs and symptoms of infection discussed.

## 2025-01-16 NOTE — FLOWSHEET NOTE
No side effects noted. AVS printed and given to patient. Left the unit  ambulatory. All equipment used in the care for this patient has been cleaned.

## 2025-01-16 NOTE — FLOWSHEET NOTE
PICC line remove per order and hospital protocol. Cleansed before removal. Removed to the 37cm jc. Tolerated well. Pressure held and covered with DSD and coban. Observation started.

## 2025-01-17 ENCOUNTER — OFFICE VISIT (OUTPATIENT)
Age: 75
End: 2025-01-17
Payer: MEDICARE

## 2025-01-17 VITALS
TEMPERATURE: 97.4 F | RESPIRATION RATE: 21 BRPM | SYSTOLIC BLOOD PRESSURE: 128 MMHG | BODY MASS INDEX: 32.75 KG/M2 | OXYGEN SATURATION: 91 % | WEIGHT: 203.8 LBS | DIASTOLIC BLOOD PRESSURE: 60 MMHG | HEART RATE: 88 BPM | HEIGHT: 66 IN

## 2025-01-17 DIAGNOSIS — N18.32 STAGE 3B CHRONIC KIDNEY DISEASE (HCC): ICD-10-CM

## 2025-01-17 DIAGNOSIS — D64.9 ANEMIA, UNSPECIFIED TYPE: ICD-10-CM

## 2025-01-17 DIAGNOSIS — I95.9 HYPOTENSION, UNSPECIFIED HYPOTENSION TYPE: Primary | ICD-10-CM

## 2025-01-17 DIAGNOSIS — N28.1 KIDNEY CYSTS: ICD-10-CM

## 2025-01-17 DIAGNOSIS — R53.83 OTHER FATIGUE: ICD-10-CM

## 2025-01-17 DIAGNOSIS — I10 ESSENTIAL HYPERTENSION: ICD-10-CM

## 2025-01-17 DIAGNOSIS — E87.5 HYPERKALEMIA: ICD-10-CM

## 2025-01-17 LAB
ANION GAP SERPL CALCULATED.3IONS-SCNC: 10 MEQ/L (ref 9–15)
BASOPHILS # BLD: 0 K/UL (ref 0–0.2)
BASOPHILS NFR BLD: 0.2 %
BUN SERPL-MCNC: 19 MG/DL (ref 8–23)
CALCIUM SERPL-MCNC: 8.2 MG/DL (ref 8.5–9.9)
CHLORIDE SERPL-SCNC: 108 MEQ/L (ref 95–107)
CO2 SERPL-SCNC: 21 MEQ/L (ref 20–31)
CREAT SERPL-MCNC: 1.75 MG/DL (ref 0.7–1.2)
EOSINOPHIL # BLD: 0.2 K/UL (ref 0–0.7)
EOSINOPHIL NFR BLD: 3.3 %
ERYTHROCYTE [DISTWIDTH] IN BLOOD BY AUTOMATED COUNT: 15.9 % (ref 11.5–14.5)
GLUCOSE SERPL-MCNC: 120 MG/DL (ref 70–99)
HCT VFR BLD AUTO: 26.9 % (ref 42–52)
HGB BLD-MCNC: 8.3 G/DL (ref 14–18)
LYMPHOCYTES # BLD: 2.3 K/UL (ref 1–4.8)
LYMPHOCYTES NFR BLD: 46.9 %
MCH RBC QN AUTO: 30.3 PG (ref 27–31.3)
MCHC RBC AUTO-ENTMCNC: 30.9 % (ref 33–37)
MCV RBC AUTO: 98.2 FL (ref 79–92.2)
MONOCYTES # BLD: 0.5 K/UL (ref 0.2–0.8)
MONOCYTES NFR BLD: 9.4 %
NEUTROPHILS # BLD: 1.9 K/UL (ref 1.4–6.5)
NEUTS SEG NFR BLD: 39.8 %
PLATELET # BLD AUTO: 223 K/UL (ref 130–400)
POTASSIUM SERPL-SCNC: 5.2 MEQ/L (ref 3.4–4.9)
RBC # BLD AUTO: 2.74 M/UL (ref 4.7–6.1)
SODIUM SERPL-SCNC: 139 MEQ/L (ref 135–144)
WBC # BLD AUTO: 4.9 K/UL (ref 4.8–10.8)

## 2025-01-17 PROCEDURE — 3074F SYST BP LT 130 MM HG: CPT | Performed by: STUDENT IN AN ORGANIZED HEALTH CARE EDUCATION/TRAINING PROGRAM

## 2025-01-17 PROCEDURE — 99214 OFFICE O/P EST MOD 30 MIN: CPT | Performed by: STUDENT IN AN ORGANIZED HEALTH CARE EDUCATION/TRAINING PROGRAM

## 2025-01-17 PROCEDURE — 1036F TOBACCO NON-USER: CPT | Performed by: STUDENT IN AN ORGANIZED HEALTH CARE EDUCATION/TRAINING PROGRAM

## 2025-01-17 PROCEDURE — 1111F DSCHRG MED/CURRENT MED MERGE: CPT | Performed by: STUDENT IN AN ORGANIZED HEALTH CARE EDUCATION/TRAINING PROGRAM

## 2025-01-17 PROCEDURE — G8427 DOCREV CUR MEDS BY ELIG CLIN: HCPCS | Performed by: STUDENT IN AN ORGANIZED HEALTH CARE EDUCATION/TRAINING PROGRAM

## 2025-01-17 PROCEDURE — G8417 CALC BMI ABV UP PARAM F/U: HCPCS | Performed by: STUDENT IN AN ORGANIZED HEALTH CARE EDUCATION/TRAINING PROGRAM

## 2025-01-17 PROCEDURE — 3017F COLORECTAL CA SCREEN DOC REV: CPT | Performed by: STUDENT IN AN ORGANIZED HEALTH CARE EDUCATION/TRAINING PROGRAM

## 2025-01-17 PROCEDURE — 3078F DIAST BP <80 MM HG: CPT | Performed by: STUDENT IN AN ORGANIZED HEALTH CARE EDUCATION/TRAINING PROGRAM

## 2025-01-17 PROCEDURE — 1123F ACP DISCUSS/DSCN MKR DOCD: CPT | Performed by: STUDENT IN AN ORGANIZED HEALTH CARE EDUCATION/TRAINING PROGRAM

## 2025-01-17 PROCEDURE — 1159F MED LIST DOCD IN RCRD: CPT | Performed by: STUDENT IN AN ORGANIZED HEALTH CARE EDUCATION/TRAINING PROGRAM

## 2025-01-17 PROCEDURE — 99215 OFFICE O/P EST HI 40 MIN: CPT | Performed by: STUDENT IN AN ORGANIZED HEALTH CARE EDUCATION/TRAINING PROGRAM

## 2025-01-17 NOTE — PROGRESS NOTES
tablet 1 TAB BY MOUTH TWICE A DAY . (CALL ME IF YOU ARE TAKING A DIFFERENT DOSE) 180 tablet 0    allopurinol (ZYLOPRIM) 100 MG tablet TAKE 2 TABLETS BY MOUTH EVERY  tablet 1    doxepin (SINEQUAN) 10 MG capsule TAKE 1 CAPSULE BY MOUTH EVERY NIGHT 90 capsule 1    Icosapent Ethyl (VASCEPA) 1 g CAPS capsule Take 2 capsules by mouth 2 times daily 360 capsule 1    Handicap Placard MISC by Does not apply route Diagnosis: Osteoarthritis of the knees: Duration 6/6/2024-6/6/2029 1 each 0    metoprolol succinate (TOPROL XL) 50 MG extended release tablet TAKE 1 TABLET BY MOUTH EVERY DAY 90 tablet 3    benazepril (LOTENSIN) 20 MG tablet TAKE 1 TABLET BY MOUTH EVERY DAY 90 tablet 3    metFORMIN (GLUCOPHAGE) 500 MG tablet TAKE 1 TABLETS BY MOUTH TWICE A DAY WITH MEALS 180 tablet 3    atorvastatin (LIPITOR) 40 MG tablet Take 1 tablet by mouth daily 90 tablet 3    furosemide (LASIX) 40 MG tablet Take 1 tablet by mouth every other day 45 tablet 3    Magnesium Oxide 200 MG TABS TAKE 1 TABLET BY MOUTH EVERY DAY 90 tablet 1    Multiple Vitamins-Minerals (MULTIVITAMIN PO) Take 1 tablet by mouth daily.       No current facility-administered medications for this visit.     Allergies, PMH, Surgical Hx, Family Hx, and Social Hx reviewed and updated.    Objective    Vitals:    01/17/25 1104   BP: 128/60   Site: Left Upper Arm   Position: Sitting   Cuff Size: Medium Adult   Pulse: 88   Resp: 21   Temp: 97.4 °F (36.3 °C)   TempSrc: Temporal   SpO2: 91%   Weight: 92.4 kg (203 lb 12.8 oz)   Height: 1.689 m (5' 6.5\")       Physical Exam  Vitals reviewed.   Constitutional:       General: He is not in acute distress.     Appearance: He is obese.   HENT:      Head: Normocephalic.      Mouth/Throat:      Mouth: Mucous membranes are moist.      Pharynx: Oropharynx is clear.   Cardiovascular:      Rate and Rhythm: Normal rate and regular rhythm.   Pulmonary:      Effort: Pulmonary effort is normal. No respiratory distress.      Breath sounds:

## 2025-01-17 NOTE — PROGRESS NOTES
Hospitalist Progress Note      PCP: Sean Levy MD    Date of Admission: 1/3/2025    Chief Complaint:    Chief Complaint   Patient presents with    Hypotension     Subjective:  No acute events overnight. Pt resting comfortably in bed. No acute complaints at this time. Denies any signs of bleeding. Denies chest pain or shortness of breath.    Medications:  Reviewed    Exam:  /74   Pulse 82   Temp 98.6 °F (37.0 °C) (Oral)   Resp 16   Ht 1.702 m (5' 7\")   Wt 93 kg (205 lb)   SpO2 97%   BMI 32.11 kg/m²   Physical Exam  Cardiovascular:      Rate and Rhythm: Normal rate and regular rhythm.   Pulmonary:      Effort: Pulmonary effort is normal. No respiratory distress.   Abdominal:      Palpations: Abdomen is soft.      Tenderness: There is no abdominal tenderness.   Neurological:      Mental Status: He is alert and oriented to person, place, and time.   Psychiatric:         Mood and Affect: Mood normal.         Behavior: Behavior normal.       Labs:            Recent Labs     01/06/25  0700 01/06/25  1625 01/07/25  0757 01/07/25  1821 01/08/25  0642 01/08/25  1503   WBC 5.3  --  7.9  --  5.8  --    RBC 2.03*  --  2.12*  --  2.05*  --    HGB 6.1*   < > 6.7* 7.2* 6.4* 7.2*   HCT 18.8*   < > 19.6* 20.3* 18.9* 20.5*   MCV 92.6*  --  92.5*  --  92.2  --    RDW 14.6*  --  14.4  --  14.7*  --      --  350  --  264  --     < > = values in this interval not displayed.      CHEMISTRIES:         Recent Labs     01/06/25  0700 01/06/25  2330 01/07/25  0646 01/08/25  0642     --  135 138   K 4.2  --  4.0 3.8     --  108* 108*   CO2 19*  --  20 20   BUN 40*  --  49* 37*   CREATININE 1.58*  --  1.49* 1.46*   GLUCOSE 106*  --  127* 105*   PHOS 3.4  --   --   --    MG 1.6* 1.8  --   --      No results for input(s): \"AST\", \"ALT\", \"BILIDIR\", \"BILITOT\", \"ALKPHOS\" in the last 72 hours.    No results for input(s): \"INR\" in the last 72 hours.  No results for input(s): \"CKTOTAL\", \"TROPONINI\" in the last 72

## 2025-01-17 NOTE — DISCHARGE SUMMARY
Hospital Medicine Discharge Summary    Hector Bose  :  1950  MRN:  [id number]    Admit date:  1/3/2025  Discharge date:  1/10/2025    Admitting Physician:  Kathia Meraz MD  Primary Care Physician:  Sean Levy MD      Discharge Diagnoses:    As below    Chief Complaint   Patient presents with    Hypotension     Hospital Course:     74 y.o. male with PMH of HTN, DM2, CKD3b, CLL/SLL s/p chemo/RT, duodenal GI bleed due to CLL, obesity, gout, dyslipidemia, BPH, spasmodic torticollis, recently managed at [facility] from - for perforated distal ileal diverticulitis with peritonitis s/p exploratory laparotomy with LEISA and right colectomy on  and sent home on daily IV Ertapenem, who presented with:     Hypotension  - improved with IVFs  - BP improved, ok to continue home meds at discharge     JANY / CKD3b with hyperkalemia  - in the setting of poor oral intake since recent surgery  - JANY improved with IVFs      Acute blood loss anemia due to GI bleed  - hgb down to 6.9, baseline around , has been downtrending since recent surgery  - EGD with noted small bleeding duodenal ulcer as well as a non-bleeding gastric ulcer and gastritis  - hem/onc following given hx of CLL, workup consistent with blood loss  - s/p multiple doses of IV iron  - hgb stable at time of discharge     Minimally elevated troponins  - in the setting of hypotension and JANY  - ECG was negative for significant changes  - monitor on telemetry     DM2  - FSBG per protocol with SSI     Recent peritonitis s/p exploratory laparotomy, LEISA, right colectomy on    - on IV Ertapenem as outpatient  - switched to Meropenem as inpatient per ID  - continue IV Ertapenem on discharge to complete course per ID    Exam on discharge:   /76   Pulse 83   Temp 98.8 °F (37.1 °C) (Oral)   Resp 17   Ht 1.702 m (5' 7\")   Wt 93 kg (205 lb)   SpO2 99%   BMI 32.11 kg/m²   Physical Exam  Cardiovascular:      Rate and Rhythm: Normal

## 2025-01-17 NOTE — PROGRESS NOTES
Hospitalist Progress Note      PCP: Sean Levy MD    Date of Admission: 1/3/2025    Chief Complaint:    Chief Complaint   Patient presents with    Hypotension     Subjective:  No acute events overnight. Pt resting comfortably in bed. No acute complaints at this time. Denies any signs of bleeding. Denies chest pain or shortness of breath.    Medications:  Reviewed    Exam:  /79   Pulse 82   Temp 98.1 °F (36.7 °C) (Oral)   Resp 16   Ht 1.702 m (5' 7\")   Wt 93 kg (205 lb)   SpO2 100%   BMI 32.11 kg/m²   Physical Exam  Cardiovascular:      Rate and Rhythm: Normal rate and regular rhythm.   Pulmonary:      Effort: Pulmonary effort is normal. No respiratory distress.   Abdominal:      Palpations: Abdomen is soft.      Tenderness: There is no abdominal tenderness.   Neurological:      Mental Status: He is alert and oriented to person, place, and time.   Psychiatric:         Mood and Affect: Mood normal.         Behavior: Behavior normal.       Labs:            Recent Labs     01/06/25  0700 01/06/25  1625 01/07/25  0757 01/07/25  1821 01/08/25  0642 01/08/25  1503   WBC 5.3  --  7.9  --  5.8  --    RBC 2.03*  --  2.12*  --  2.05*  --    HGB 6.1*   < > 6.7* 7.2* 6.4* 7.2*   HCT 18.8*   < > 19.6* 20.3* 18.9* 20.5*   MCV 92.6*  --  92.5*  --  92.2  --    RDW 14.6*  --  14.4  --  14.7*  --      --  350  --  264  --     < > = values in this interval not displayed.      CHEMISTRIES:         Recent Labs     01/06/25  0700 01/06/25  2330 01/07/25  0646 01/08/25  0642     --  135 138   K 4.2  --  4.0 3.8     --  108* 108*   CO2 19*  --  20 20   BUN 40*  --  49* 37*   CREATININE 1.58*  --  1.49* 1.46*   GLUCOSE 106*  --  127* 105*   PHOS 3.4  --   --   --    MG 1.6* 1.8  --   --      No results for input(s): \"AST\", \"ALT\", \"BILIDIR\", \"BILITOT\", \"ALKPHOS\" in the last 72 hours.    No results for input(s): \"INR\" in the last 72 hours.  No results for input(s): \"CKTOTAL\", \"TROPONINI\" in the last 72

## 2025-01-21 ENCOUNTER — CARE COORDINATION (OUTPATIENT)
Dept: CARE COORDINATION | Age: 75
End: 2025-01-21

## 2025-01-21 ENCOUNTER — OFFICE VISIT (OUTPATIENT)
Dept: SURGERY | Age: 75
End: 2025-01-21

## 2025-01-21 VITALS
HEART RATE: 69 BPM | BODY MASS INDEX: 32.62 KG/M2 | OXYGEN SATURATION: 10 % | WEIGHT: 203 LBS | HEIGHT: 66 IN | TEMPERATURE: 97.9 F

## 2025-01-21 DIAGNOSIS — K57.00 DIVERTICULITIS OF SMALL INTESTINE WITH PERFORATION WITHOUT BLEEDING: Primary | ICD-10-CM

## 2025-01-21 PROCEDURE — 99024 POSTOP FOLLOW-UP VISIT: CPT | Performed by: COLON & RECTAL SURGERY

## 2025-01-21 NOTE — CARE COORDINATION
Care Transitions Note    Follow Up Call     Reason for Admission: 1/3/2025 - 1/10/2025 [facility] IP. JANY, Bacterial peritonitis secondary to perforated viscus.   End date: 2025      Patient Current Location:  Home: [address] [place] [address] [place] [address]    Care Transition Nurse contacted the patient by telephone. Verified name and  as identifiers.    Additional needs identified to be addressed with provider   No needs identified                 Method of communication with provider: none.    Care Summary Note: CTN spoke w/ Hector.    Pt reports feeling \"great\". He has no abd pain, N/V, or elimination concerns. No fevers.  Previous watery stools now formed and soft.  Completed IV Invanz and PICC removed.  Saw surgeon today for distal ileal resection with right colectomy secondary to perforated ileal diverticulitis. Reports no surgeon concerns for recovery.   Follow up labs on Friday. Holding potassium. K 5.2 on . Noting H&H improved to 8.3/26.9 25.  To follow up w/ nephrology. Creat 1.75 on 25.  Pending Abd MRI next wk.    Assessments:  Care Transitions Subsequent and Final Call    Subsequent and Final Calls  Do you have any ongoing symptoms?: No  Have your medications changed?: No  Do you have any questions related to your medications?: No  Do you have any needs or concerns that I can assist you with?: No  Identified Barriers: Lack of Education  Care Transitions Interventions  Other Interventions:              Follow Up Appointment:     Future Appointments         Provider Specialty Dept Phone    2025 2:30 PM Patricia Ji MD Gastroenterology [phone]    2025 7:30 AM (Arrive by 7:00 AM) [facility] ROOM 1 Radiology [phone]    3/3/2025 2:30 PM Prasanth Gurrola MD Neurology [phone]    3/4/2025 9:30 AM Sean Levy MD Family Medicine [phone]            Care Transition Nurse provided contact information.  Plan for follow-up call in 6-10 days

## 2025-01-21 NOTE — PROGRESS NOTES
Subjective:      Patient ID: Hector Bose is a 74 y.o. male who presents for:  Chief Complaint   Patient presents with    Post-Op Check       He returns to the office in follow-up.  He had a distal ileal resection with right colectomy secondary to perforated ileal diverticulitis    Patient's bowels are recovering well    He returned to the hospital for anemia as well as acute kidney injury which improved.    He is doing very well and has no complaints.    I reviewed his histology        Past Medical History:   Diagnosis Date    Acute idiopathic gout of right foot     Anemia 03/01/2018    iron transfusions x2    Arthritis     both knees    Bilateral carpal tunnel syndrome 1/12/2022    Chronic kidney disease     CLL (chronic lymphocytic leukemia) (HCC)     dx 5/2015    Colon polyps     Disease of blood and blood forming organ     Hyperlipidemia     dx since 1970's    Hypertension     meds  since 1970's    Lymphoma of gastrointestinal tract (HCC)     Movement disorder     Type II or unspecified type diabetes mellitus without mention of complication, not stated as uncontrolled     hx > 20 yrs    Urinary retention 12/23/2024     Past Surgical History:   Procedure Laterality Date    APPENDECTOMY      age 22s    CARPAL TUNNEL RELEASE Left 1/26/2022    LEFT CARPAL TUNNEL RELEASE performed by Rangel Saldivar MD at [facility] [place]    CARPAL TUNNEL RELEASE Right 3/9/2022    CARPAL TUNNEL RELEASE RIGHT performed by Rangel Saldivar MD at [facility] [place]    CHOLECYSTECTOMY N/A 07/07/2016    COLONOSCOPY  4/1/16    w/polypectomy     COLONOSCOPY N/A 6/15/2021    COLORECTAL CANCER SCREENING, HIGH RISK performed by Chang Caldwell MD at [facility]    ENDOSCOPY, COLON, DIAGNOSTIC      JOINT REPLACEMENT Bilateral 2018    TKR    LAPAROTOMY N/A 12/23/2024    Exploratory laparotomy, ileocecectomy performed by Cisco Mccray MD at [facility] [place]    OTHER SURGICAL HISTORY Right 06/08/16    I & D ABSCESS THIGH    IN ARTHRP  severe

## 2025-01-23 DIAGNOSIS — F51.04 PSYCHOPHYSIOLOGICAL INSOMNIA: ICD-10-CM

## 2025-01-23 DIAGNOSIS — M10.9 GOUT INVOLVING TOE OF RIGHT FOOT, UNSPECIFIED CAUSE, UNSPECIFIED CHRONICITY: ICD-10-CM

## 2025-01-23 RX ORDER — ALLOPURINOL 100 MG/1
200 TABLET ORAL DAILY
Qty: 180 TABLET | Refills: 1 | Status: SHIPPED | OUTPATIENT
Start: 2025-01-23

## 2025-01-23 RX ORDER — DOXEPIN HYDROCHLORIDE 10 MG/1
CAPSULE ORAL
Qty: 90 CAPSULE | Refills: 1 | Status: SHIPPED | OUTPATIENT
Start: 2025-01-23

## 2025-01-23 NOTE — TELEPHONE ENCOUNTER
Rx requested:  Requested Prescriptions     Pending Prescriptions Disp Refills    doxepin (SINEQUAN) 10 MG capsule [Pharmacy Med Name: DOXEPIN 10 MG CAPSULE] 90 capsule 1     Sig: TAKE 1 CAPSULE BY MOUTH EVERY DAY AT NIGHT    allopurinol (ZYLOPRIM) 100 MG tablet [Pharmacy Med Name: ALLOPURINOL 100 MG TABLET] 180 tablet 1     Sig: TAKE 2 TABLETS BY MOUTH EVERY DAY         Last Office Visit:   9/4/2024      Next Visit Date:  Future Appointments   Date Time Provider Department Center   1/27/2025  2:30 PM Patircia Ji MD [place] [facility]   1/28/2025  7:30 AM [facility] ROOM 1 [facility] RAD   3/3/2025  2:30 PM Prasanth Gurrola MD [place] [facility] Neurology -   3/4/2025  9:30 AM Sean Levy MD [facility] DEP

## 2025-01-24 DIAGNOSIS — E87.5 HYPERKALEMIA: ICD-10-CM

## 2025-01-24 LAB
ANION GAP SERPL CALCULATED.3IONS-SCNC: 10 MEQ/L (ref 9–15)
BUN SERPL-MCNC: 21 MG/DL (ref 8–23)
CALCIUM SERPL-MCNC: 8.9 MG/DL (ref 8.5–9.9)
CHLORIDE SERPL-SCNC: 107 MEQ/L (ref 95–107)
CO2 SERPL-SCNC: 20 MEQ/L (ref 20–31)
CREAT SERPL-MCNC: 1.75 MG/DL (ref 0.7–1.2)
GLUCOSE SERPL-MCNC: 157 MG/DL (ref 70–99)
POTASSIUM SERPL-SCNC: 4.6 MEQ/L (ref 3.4–4.9)
SODIUM SERPL-SCNC: 137 MEQ/L (ref 135–144)

## 2025-01-27 ENCOUNTER — OFFICE VISIT (OUTPATIENT)
Dept: GASTROENTEROLOGY | Age: 75
End: 2025-01-27
Payer: MEDICARE

## 2025-01-27 VITALS
HEART RATE: 80 BPM | SYSTOLIC BLOOD PRESSURE: 102 MMHG | BODY MASS INDEX: 32.8 KG/M2 | WEIGHT: 203.2 LBS | DIASTOLIC BLOOD PRESSURE: 58 MMHG | OXYGEN SATURATION: 99 %

## 2025-01-27 DIAGNOSIS — K25.7 CHRONIC GASTRIC ULCER WITHOUT HEMORRHAGE AND WITHOUT PERFORATION: Primary | ICD-10-CM

## 2025-01-27 DIAGNOSIS — N28.9 RENAL LESION: Primary | ICD-10-CM

## 2025-01-27 DIAGNOSIS — R19.7 DIARRHEA, UNSPECIFIED TYPE: ICD-10-CM

## 2025-01-27 DIAGNOSIS — F41.9 ANXIETY: ICD-10-CM

## 2025-01-27 DIAGNOSIS — K26.9 DUODENAL ULCER: ICD-10-CM

## 2025-01-27 PROCEDURE — 1036F TOBACCO NON-USER: CPT | Performed by: SPECIALIST

## 2025-01-27 PROCEDURE — 1123F ACP DISCUSS/DSCN MKR DOCD: CPT | Performed by: SPECIALIST

## 2025-01-27 PROCEDURE — 1159F MED LIST DOCD IN RCRD: CPT | Performed by: SPECIALIST

## 2025-01-27 PROCEDURE — 1111F DSCHRG MED/CURRENT MED MERGE: CPT | Performed by: SPECIALIST

## 2025-01-27 PROCEDURE — 3074F SYST BP LT 130 MM HG: CPT | Performed by: SPECIALIST

## 2025-01-27 PROCEDURE — 99213 OFFICE O/P EST LOW 20 MIN: CPT | Performed by: SPECIALIST

## 2025-01-27 PROCEDURE — 1126F AMNT PAIN NOTED NONE PRSNT: CPT | Performed by: SPECIALIST

## 2025-01-27 PROCEDURE — G8417 CALC BMI ABV UP PARAM F/U: HCPCS | Performed by: SPECIALIST

## 2025-01-27 PROCEDURE — 3078F DIAST BP <80 MM HG: CPT | Performed by: SPECIALIST

## 2025-01-27 PROCEDURE — 3017F COLORECTAL CA SCREEN DOC REV: CPT | Performed by: SPECIALIST

## 2025-01-27 PROCEDURE — G8427 DOCREV CUR MEDS BY ELIG CLIN: HCPCS | Performed by: SPECIALIST

## 2025-01-27 RX ORDER — DIAZEPAM 2 MG/1
2 TABLET ORAL ONCE
Qty: 1 TABLET | Refills: 0 | Status: SHIPPED | OUTPATIENT
Start: 2025-01-27 | End: 2025-01-27

## 2025-01-27 ASSESSMENT — ENCOUNTER SYMPTOMS
RECTAL PAIN: 0
ABDOMINAL PAIN: 0
VOMITING: 0
BLOOD IN STOOL: 0
CONSTIPATION: 0
NAUSEA: 0
DIARRHEA: 1
ANAL BLEEDING: 0
RESPIRATORY NEGATIVE: 1
EYES NEGATIVE: 1
ABDOMINAL DISTENTION: 0

## 2025-01-27 NOTE — PROGRESS NOTES
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  Patient underwent EGD which showed nonbleeding superficial gastric ulcer gastritis and another ulcer in the duodenum with an adherent blood clot which was treated with application of Endo Clip, also had a duodenal polyp and biopsies were taken.  Gastric biopsy showed reactive gastropathy and duodenal biopsy showed chronic duodenitis with erosion, no evidence of neoplasm.  Patient has been on pantoprazole 40 mg twice a day.  No further melena or hematochezia, had diarrhea since last 5 days.  Frequency of BM is 5-6 times a day and small volume stool, patient has been on metformin for  years.  No abdominal pain no nausea no emesis, CBC from January 10, 2025 showed hemoglobin of 7.7, repeat CBC on 1/17/2025 showed a hemoglobin of 8.3.  BMP showed a creatinine of 1.75    Review of Systems   Constitutional: Negative.    HENT: Negative.     Eyes: Negative.    Respiratory: Negative.     Gastrointestinal:  Positive for diarrhea. Negative for abdominal distention, abdominal pain, anal bleeding, blood in stool, constipation, nausea, rectal pain and vomiting.   Genitourinary: Negative.    Musculoskeletal: Negative.    Neurological: Negative.    Psychiatric/Behavioral: Negative.          Past medical history, past surgical history, medication list, social and familyhistory reviewed    Blood pressure (!) 102/58, pulse 80, weight 92.2 kg (203 lb 3.2 oz), SpO2 99%.    Physical Exam  Constitutional:       Appearance: He is well-developed.   HENT:      Head: Normocephalic.   Eyes:      Pupils: Pupils are equal, round, and  [Normal] : affect appropriate

## 2025-01-28 ENCOUNTER — HOSPITAL ENCOUNTER (OUTPATIENT)
Dept: MRI IMAGING | Age: 75
Discharge: HOME | End: 2025-01-30
Payer: MEDICARE

## 2025-01-28 DIAGNOSIS — N28.89 RENAL MASS: ICD-10-CM

## 2025-01-28 PROCEDURE — 6360000004 HC RX CONTRAST MEDICATION: Performed by: PHYSICIAN ASSISTANT

## 2025-01-28 PROCEDURE — 74183 MRI ABD W/O CNTR FLWD CNTR: CPT

## 2025-01-28 PROCEDURE — A9577 INJ MULTIHANCE: HCPCS | Performed by: PHYSICIAN ASSISTANT

## 2025-01-28 RX ADMIN — GADOBENATE DIMEGLUMINE 20 ML: 529 INJECTION, SOLUTION INTRAVENOUS at 07:59

## 2025-01-29 NOTE — TELEPHONE ENCOUNTER
requesting medication refill.      Rx requested:  Requested Prescriptions      No prescriptions requested or ordered in this encounter       Last Office Visit:   07/08/2021    Last Tox screen:        Last Medication contract:        Next Visit Date:  Future Appointments   Date Time Provider Vincenzo Contreras   8/25/2021 10:30 AM MD HERBER Barker [facility] EMERGENCY [facility] AT [place]   9/7/2021 11:45 AM MD HERBER Barker [facility] AT [place]   11/17/2021  9:00 AM Veronika Sanchez  [place], [address]     [facility] appointment 11/17/2021
2024

## 2025-01-31 ENCOUNTER — CARE COORDINATION (OUTPATIENT)
Dept: CARE COORDINATION | Age: 75
End: 2025-01-31

## 2025-01-31 NOTE — CARE COORDINATION
Care Transitions Note    Follow Up Call     Reason for Admission: 1/3/2025 - 1/10/2025 [facility] IP. JANY, Bacterial peritonitis secondary to perforated viscus.   End date: 2025    GI/Reginoack  appt Attended.    Abd MRI IMPRESSION:  1. Innumerable simple renal cysts bilaterally (Bosniak I).  2. 2 complex cysts in the left kidney with no suspicious features (Bosniak  II).  No follow-up recommended.  3. 2 cysts in the pancreatic tail. Recommend follow-up MRI in 2 years to  confirm stability.    Patient Current Location:  Home: [address] [place] [address] [place] [address]    Care Transition Nurse contacted the patient by telephone. Verified name and  as identifiers.    Additional needs identified to be addressed with provider   No needs identified                 Method of communication with provider: none.    Care Summary Note: CTN spoke w/ Hector.    Pt had Abd MRI and pending sample check for C-Diff. Pt reports multiple watery stools daily. Had recently started to form up but is back again. He has c/o hemorrhoid discomfort and using hemorrhoid wipes and finds beneficial. He does not note any blood in stool. No fevers, chills, N/V, or flu-like symptoms. Reports fair appetite.     Enc to avoid greasy, deep fried, or spicy foods in diet. Reviewed the importance of avoiding dehydration, v/u. He is drinking nutritional supplements. Declines need for RD referral.    Pt denies any abd pain symptoms, abd tenderness to touch, or increasing abd bloat. Enc to consider probiotic, v/u. Pt using Imodium.     Assessments:  Care Transitions Subsequent and Final Call    Subsequent and Final Calls  Do you have any ongoing symptoms?: Yes  Patient-reported symptoms: Other  Have your medications changed?: No  Do you have any questions related to your medications?: No  Do you currently have any active services?: No  Do you have any needs or concerns that I can assist you with?: No  Identified Barriers: Lack of

## 2025-02-02 RX ORDER — FUROSEMIDE 40 MG/1
40 TABLET ORAL EVERY OTHER DAY
Qty: 45 TABLET | Refills: 3 | Status: SHIPPED | OUTPATIENT
Start: 2025-02-02

## 2025-02-03 ENCOUNTER — APPOINTMENT (OUTPATIENT)
Dept: GENERAL RADIOLOGY | Age: 75
End: 2025-02-03
Payer: MEDICARE

## 2025-02-03 ENCOUNTER — HOSPITAL ENCOUNTER (OUTPATIENT)
Age: 75
Setting detail: OBSERVATION
Discharge: HOME | End: 2025-02-04
Attending: STUDENT IN AN ORGANIZED HEALTH CARE EDUCATION/TRAINING PROGRAM | Admitting: INTERNAL MEDICINE
Payer: MEDICARE

## 2025-02-03 DIAGNOSIS — N17.9 ACUTE KIDNEY INJURY SUPERIMPOSED ON CHRONIC KIDNEY DISEASE (HCC): ICD-10-CM

## 2025-02-03 DIAGNOSIS — E86.0 DEHYDRATION: ICD-10-CM

## 2025-02-03 DIAGNOSIS — I95.9 HYPOTENSION, UNSPECIFIED HYPOTENSION TYPE: Primary | ICD-10-CM

## 2025-02-03 DIAGNOSIS — I10 ESSENTIAL HYPERTENSION: ICD-10-CM

## 2025-02-03 DIAGNOSIS — N18.9 ACUTE KIDNEY INJURY SUPERIMPOSED ON CHRONIC KIDNEY DISEASE (HCC): ICD-10-CM

## 2025-02-03 LAB
ALBUMIN SERPL-MCNC: 3.9 G/DL (ref 3.5–4.6)
ALP SERPL-CCNC: 109 U/L (ref 35–104)
ALT SERPL-CCNC: 18 U/L (ref 0–41)
ANION GAP SERPL CALCULATED.3IONS-SCNC: 15 MEQ/L (ref 9–15)
APTT PPP: 30.7 SEC (ref 24.4–36.8)
AST SERPL-CCNC: 17 U/L (ref 0–40)
BASOPHILS # BLD: 0 K/UL (ref 0–0.2)
BASOPHILS NFR BLD: 0.1 %
BILIRUB SERPL-MCNC: 0.3 MG/DL (ref 0.2–0.7)
BILIRUB UR QL STRIP: NEGATIVE
BUN SERPL-MCNC: 43 MG/DL (ref 8–23)
CALCIUM SERPL-MCNC: 8.5 MG/DL (ref 8.5–9.9)
CHLORIDE SERPL-SCNC: 105 MEQ/L (ref 95–107)
CLARITY UR: CLEAR
CO2 SERPL-SCNC: 17 MEQ/L (ref 20–31)
COLOR UR: YELLOW
CREAT SERPL-MCNC: 2.28 MG/DL (ref 0.7–1.2)
EOSINOPHIL # BLD: 0.1 K/UL (ref 0–0.7)
EOSINOPHIL NFR BLD: 1.6 %
ERYTHROCYTE [DISTWIDTH] IN BLOOD BY AUTOMATED COUNT: 16 % (ref 11.5–14.5)
GLOBULIN SER CALC-MCNC: 4 G/DL (ref 2.3–3.5)
GLUCOSE BLD-MCNC: 95 MG/DL (ref 70–99)
GLUCOSE SERPL-MCNC: 169 MG/DL (ref 70–99)
GLUCOSE UR STRIP-MCNC: NEGATIVE MG/DL
HCT VFR BLD AUTO: 29.9 % (ref 42–52)
HGB BLD-MCNC: 10 G/DL (ref 14–18)
HGB UR QL STRIP: NEGATIVE
INR PPP: 1.1
KETONES UR STRIP-MCNC: NEGATIVE MG/DL
LACTATE BLDV-SCNC: 1.2 MMOL/L (ref 0.5–2.2)
LEUKOCYTE ESTERASE UR QL STRIP: NEGATIVE
LYMPHOCYTES # BLD: 2.4 K/UL (ref 1–4.8)
LYMPHOCYTES NFR BLD: 35.8 %
MCH RBC QN AUTO: 31.4 PG (ref 27–31.3)
MCHC RBC AUTO-ENTMCNC: 33.4 % (ref 33–37)
MCV RBC AUTO: 94 FL (ref 79–92.2)
MONOCYTES # BLD: 0.5 K/UL (ref 0.2–0.8)
MONOCYTES NFR BLD: 7.5 %
NEUTROPHILS # BLD: 3.6 K/UL (ref 1.4–6.5)
NEUTS SEG NFR BLD: 54.3 %
NITRITE UR QL STRIP: NEGATIVE
PERFORMED ON: NORMAL
PH UR STRIP: 5 [PH] (ref 5–9)
PLATELET # BLD AUTO: 248 K/UL (ref 130–400)
POTASSIUM SERPL-SCNC: 3.9 MEQ/L (ref 3.4–4.9)
PROCALCITONIN SERPL IA-MCNC: 0.14 NG/ML (ref 0–0.15)
PROT SERPL-MCNC: 7.9 G/DL (ref 6.3–8)
PROT UR STRIP-MCNC: NEGATIVE MG/DL
PROTHROMBIN TIME: 14.7 SEC (ref 12.3–14.9)
RBC # BLD AUTO: 3.18 M/UL (ref 4.7–6.1)
SODIUM SERPL-SCNC: 137 MEQ/L (ref 135–144)
SP GR UR STRIP: 1.01 (ref 1–1.03)
TSH REFLEX: 1.18 UIU/ML (ref 0.44–3.86)
URINE REFLEX TO CULTURE: NORMAL
UROBILINOGEN UR STRIP-ACNC: 0.2 E.U./DL
WBC # BLD AUTO: 6.7 K/UL (ref 4.8–10.8)

## 2025-02-03 PROCEDURE — 85610 PROTHROMBIN TIME: CPT

## 2025-02-03 PROCEDURE — 2580000003 HC RX 258: Performed by: INTERNAL MEDICINE

## 2025-02-03 PROCEDURE — 6370000000 HC RX 637 (ALT 250 FOR IP): Performed by: INTERNAL MEDICINE

## 2025-02-03 PROCEDURE — 96366 THER/PROPH/DIAG IV INF ADDON: CPT

## 2025-02-03 PROCEDURE — 2580000003 HC RX 258: Performed by: STUDENT IN AN ORGANIZED HEALTH CARE EDUCATION/TRAINING PROGRAM

## 2025-02-03 PROCEDURE — 87324 CLOSTRIDIUM AG IA: CPT

## 2025-02-03 PROCEDURE — G0378 HOSPITAL OBSERVATION PER HR: HCPCS

## 2025-02-03 PROCEDURE — 71045 X-RAY EXAM CHEST 1 VIEW: CPT

## 2025-02-03 PROCEDURE — 87449 NOS EACH ORGANISM AG IA: CPT

## 2025-02-03 PROCEDURE — 96365 THER/PROPH/DIAG IV INF INIT: CPT

## 2025-02-03 PROCEDURE — 93005 ELECTROCARDIOGRAM TRACING: CPT | Performed by: STUDENT IN AN ORGANIZED HEALTH CARE EDUCATION/TRAINING PROGRAM

## 2025-02-03 PROCEDURE — 84443 ASSAY THYROID STIM HORMONE: CPT

## 2025-02-03 PROCEDURE — 80053 COMPREHEN METABOLIC PANEL: CPT

## 2025-02-03 PROCEDURE — 96361 HYDRATE IV INFUSION ADD-ON: CPT

## 2025-02-03 PROCEDURE — 87507 IADNA-DNA/RNA PROBE TQ 12-25: CPT

## 2025-02-03 PROCEDURE — 36415 COLL VENOUS BLD VENIPUNCTURE: CPT

## 2025-02-03 PROCEDURE — 99285 EMERGENCY DEPT VISIT HI MDM: CPT

## 2025-02-03 PROCEDURE — 87040 BLOOD CULTURE FOR BACTERIA: CPT

## 2025-02-03 PROCEDURE — 85025 COMPLETE CBC W/AUTO DIFF WBC: CPT

## 2025-02-03 PROCEDURE — 81003 URINALYSIS AUTO W/O SCOPE: CPT

## 2025-02-03 PROCEDURE — 85730 THROMBOPLASTIN TIME PARTIAL: CPT

## 2025-02-03 PROCEDURE — 83605 ASSAY OF LACTIC ACID: CPT

## 2025-02-03 PROCEDURE — 84145 PROCALCITONIN (PCT): CPT

## 2025-02-03 RX ORDER — SODIUM CHLORIDE 9 MG/ML
INJECTION, SOLUTION INTRAVENOUS PRN
Status: DISCONTINUED | OUTPATIENT
Start: 2025-02-03 | End: 2025-02-04 | Stop reason: HOSPADM

## 2025-02-03 RX ORDER — INSULIN LISPRO 100 [IU]/ML
0-4 INJECTION, SOLUTION INTRAVENOUS; SUBCUTANEOUS
Status: DISCONTINUED | OUTPATIENT
Start: 2025-02-03 | End: 2025-02-04 | Stop reason: HOSPADM

## 2025-02-03 RX ORDER — 0.9 % SODIUM CHLORIDE 0.9 %
1000 INTRAVENOUS SOLUTION INTRAVENOUS ONCE
Status: COMPLETED | OUTPATIENT
Start: 2025-02-03 | End: 2025-02-03

## 2025-02-03 RX ORDER — POTASSIUM CHLORIDE 1500 MG/1
40 TABLET, EXTENDED RELEASE ORAL PRN
Status: DISCONTINUED | OUTPATIENT
Start: 2025-02-03 | End: 2025-02-04 | Stop reason: HOSPADM

## 2025-02-03 RX ORDER — PANTOPRAZOLE SODIUM 40 MG/1
40 TABLET, DELAYED RELEASE ORAL
Status: DISCONTINUED | OUTPATIENT
Start: 2025-02-04 | End: 2025-02-04 | Stop reason: HOSPADM

## 2025-02-03 RX ORDER — TAMSULOSIN HYDROCHLORIDE 0.4 MG/1
0.4 CAPSULE ORAL DAILY
Status: DISCONTINUED | OUTPATIENT
Start: 2025-02-04 | End: 2025-02-04 | Stop reason: HOSPADM

## 2025-02-03 RX ORDER — DOXEPIN HYDROCHLORIDE 10 MG/1
10 CAPSULE ORAL NIGHTLY
Status: DISCONTINUED | OUTPATIENT
Start: 2025-02-03 | End: 2025-02-04 | Stop reason: HOSPADM

## 2025-02-03 RX ORDER — SODIUM CHLORIDE, SODIUM LACTATE, POTASSIUM CHLORIDE, CALCIUM CHLORIDE 600; 310; 30; 20 MG/100ML; MG/100ML; MG/100ML; MG/100ML
INJECTION, SOLUTION INTRAVENOUS CONTINUOUS
Status: DISPENSED | OUTPATIENT
Start: 2025-02-03 | End: 2025-02-04

## 2025-02-03 RX ORDER — ONDANSETRON 2 MG/ML
4 INJECTION INTRAMUSCULAR; INTRAVENOUS EVERY 6 HOURS PRN
Status: DISCONTINUED | OUTPATIENT
Start: 2025-02-03 | End: 2025-02-04 | Stop reason: HOSPADM

## 2025-02-03 RX ORDER — ENOXAPARIN SODIUM 100 MG/ML
40 INJECTION SUBCUTANEOUS DAILY
Status: DISCONTINUED | OUTPATIENT
Start: 2025-02-03 | End: 2025-02-03

## 2025-02-03 RX ORDER — ONDANSETRON 4 MG/1
4 TABLET, ORALLY DISINTEGRATING ORAL EVERY 8 HOURS PRN
Status: DISCONTINUED | OUTPATIENT
Start: 2025-02-03 | End: 2025-02-04 | Stop reason: HOSPADM

## 2025-02-03 RX ORDER — MIDODRINE HYDROCHLORIDE 5 MG/1
5 TABLET ORAL
Status: DISCONTINUED | OUTPATIENT
Start: 2025-02-03 | End: 2025-02-04 | Stop reason: HOSPADM

## 2025-02-03 RX ORDER — SODIUM CHLORIDE 0.9 % (FLUSH) 0.9 %
5-40 SYRINGE (ML) INJECTION EVERY 12 HOURS SCHEDULED
Status: DISCONTINUED | OUTPATIENT
Start: 2025-02-03 | End: 2025-02-04 | Stop reason: HOSPADM

## 2025-02-03 RX ORDER — ALLOPURINOL 100 MG/1
200 TABLET ORAL DAILY
Status: DISCONTINUED | OUTPATIENT
Start: 2025-02-04 | End: 2025-02-04 | Stop reason: HOSPADM

## 2025-02-03 RX ORDER — GLUCAGON 1 MG/ML
1 KIT INJECTION PRN
Status: DISCONTINUED | OUTPATIENT
Start: 2025-02-03 | End: 2025-02-04 | Stop reason: HOSPADM

## 2025-02-03 RX ORDER — FINASTERIDE 5 MG/1
5 TABLET, FILM COATED ORAL DAILY
Status: DISCONTINUED | OUTPATIENT
Start: 2025-02-04 | End: 2025-02-04 | Stop reason: HOSPADM

## 2025-02-03 RX ORDER — SODIUM CHLORIDE 0.9 % (FLUSH) 0.9 %
5-40 SYRINGE (ML) INJECTION PRN
Status: DISCONTINUED | OUTPATIENT
Start: 2025-02-03 | End: 2025-02-04 | Stop reason: HOSPADM

## 2025-02-03 RX ORDER — ACETAMINOPHEN 650 MG/1
650 SUPPOSITORY RECTAL EVERY 6 HOURS PRN
Status: DISCONTINUED | OUTPATIENT
Start: 2025-02-03 | End: 2025-02-04 | Stop reason: HOSPADM

## 2025-02-03 RX ORDER — POTASSIUM CHLORIDE 7.45 MG/ML
10 INJECTION INTRAVENOUS PRN
Status: DISCONTINUED | OUTPATIENT
Start: 2025-02-03 | End: 2025-02-04 | Stop reason: HOSPADM

## 2025-02-03 RX ORDER — MAGNESIUM SULFATE IN WATER 40 MG/ML
2000 INJECTION, SOLUTION INTRAVENOUS PRN
Status: DISCONTINUED | OUTPATIENT
Start: 2025-02-03 | End: 2025-02-04 | Stop reason: HOSPADM

## 2025-02-03 RX ORDER — ICOSAPENT ETHYL 1 G/1
2 CAPSULE ORAL 2 TIMES DAILY
Qty: 360 CAPSULE | Refills: 1 | Status: SHIPPED | OUTPATIENT
Start: 2025-02-03

## 2025-02-03 RX ORDER — DEXTROSE MONOHYDRATE 100 MG/ML
INJECTION, SOLUTION INTRAVENOUS CONTINUOUS PRN
Status: DISCONTINUED | OUTPATIENT
Start: 2025-02-03 | End: 2025-02-04 | Stop reason: HOSPADM

## 2025-02-03 RX ORDER — METOPROLOL SUCCINATE 50 MG/1
50 TABLET, EXTENDED RELEASE ORAL DAILY
Status: DISCONTINUED | OUTPATIENT
Start: 2025-02-04 | End: 2025-02-04 | Stop reason: HOSPADM

## 2025-02-03 RX ORDER — POLYETHYLENE GLYCOL 3350 17 G/17G
17 POWDER, FOR SOLUTION ORAL DAILY PRN
Status: DISCONTINUED | OUTPATIENT
Start: 2025-02-03 | End: 2025-02-04 | Stop reason: HOSPADM

## 2025-02-03 RX ORDER — ACETAMINOPHEN 325 MG/1
650 TABLET ORAL EVERY 6 HOURS PRN
Status: DISCONTINUED | OUTPATIENT
Start: 2025-02-03 | End: 2025-02-04 | Stop reason: HOSPADM

## 2025-02-03 RX ORDER — ATORVASTATIN CALCIUM 40 MG/1
40 TABLET, FILM COATED ORAL DAILY
Status: DISCONTINUED | OUTPATIENT
Start: 2025-02-04 | End: 2025-02-04 | Stop reason: HOSPADM

## 2025-02-03 RX ADMIN — SODIUM CHLORIDE 1000 ML: 9 INJECTION, SOLUTION INTRAVENOUS at 14:40

## 2025-02-03 RX ADMIN — SODIUM CHLORIDE 1000 ML: 9 INJECTION, SOLUTION INTRAVENOUS at 15:53

## 2025-02-03 RX ADMIN — DOXEPIN HYDROCHLORIDE 10 MG: 10 CAPSULE ORAL at 22:04

## 2025-02-03 RX ADMIN — SODIUM CHLORIDE, POTASSIUM CHLORIDE, SODIUM LACTATE AND CALCIUM CHLORIDE: 600; 310; 30; 20 INJECTION, SOLUTION INTRAVENOUS at 19:33

## 2025-02-03 ASSESSMENT — PAIN SCALES - GENERAL: PAINLEVEL_OUTOF10: 0

## 2025-02-03 ASSESSMENT — LIFESTYLE VARIABLES
HOW MANY STANDARD DRINKS CONTAINING ALCOHOL DO YOU HAVE ON A TYPICAL DAY: PATIENT DOES NOT DRINK
HOW OFTEN DO YOU HAVE A DRINK CONTAINING ALCOHOL: NEVER

## 2025-02-03 NOTE — ED TRIAGE NOTES
Pt stated that his bp has been in the 70's at home. Pt stated that he was here recently and had to be given multiple units of blood because his levels were so low. Pt stated that he recently had a diverticulitis surgery

## 2025-02-03 NOTE — H&P
Hospital Medicine  History and Physical    Patient:  Hector Bose  MRN: [id number]    CHIEF COMPLAINT:    Chief Complaint   Patient presents with    Hypotension       History Obtained From:  Patient, EMR  Primary Care Physician: Sean Levy MD    HISTORY OF PRESENT ILLNESS:   Very nice 74-year-old gentleman with history of hypertension, diabetes, CKD 3, CLL/SLL, duodenal GI bleed, BPH, perforated diverticulitis requiring right colectomy (December 2024) presents to the ED with 2-day history of dizziness.  This occurs in the setting of 2-week history of watery diarrhea.  He tells me he has gone through 48 rolls of toilet paper in the last 2 weeks.  No blood in the stool.  Otherwise no fevers, chills, vomiting, abdominal pain.  He had been on IV ertapenem following his hospitalization for peritonitis/diverticulitis in December 2024.  He was rehospitalized in January for acute blood loss anemia secondary to GI bleed where he was found to have small bleeding duodenal ulcer-he was afterwards started on PPI twice daily.    Due to soft blood pressure in the ED 79/53 he was recommended hospitalization.    Past Medical History:      Diagnosis Date    Acute idiopathic gout of right foot     Anemia 03/01/2018    iron transfusions x2    Arthritis     both knees    Bilateral carpal tunnel syndrome 1/12/2022    Chronic kidney disease     CLL (chronic lymphocytic leukemia) (HCC)     dx 5/2015    Colon polyps     Disease of blood and blood forming organ     Hyperlipidemia     dx since 1970's    Hypertension     meds  since 1970's    Lymphoma of gastrointestinal tract (HCC)     Movement disorder     Type II or unspecified type diabetes mellitus without mention of complication, not stated as uncontrolled     hx > 20 yrs    Urinary retention 12/23/2024       Past Surgical History:      Procedure Laterality Date    APPENDECTOMY      age 22s    CARPAL TUNNEL RELEASE Left 1/26/2022    LEFT CARPAL TUNNEL RELEASE performed by

## 2025-02-03 NOTE — TELEPHONE ENCOUNTER
Future Appointments    Encounter Information   Provider Department Appt Notes   2/4/2025 Jorge Mcgrath PA [facility] Urology review MRI 1/28 in chart   2/25/2025 Patricia Ji MD [facility] Gastroenterology F/U 4 WEEKS  LABS   3/3/2025 Prasanth Gurrola MD [facility] Neurology 3 MOS FUP   3/4/2025 Sean Levy MD [facility] Primary and Specialty Care 6 month f/u     Past Visits    Date Provider Specialty Visit Type Primary Dx   01/27/2025 Patricia Ji MD Gastroenterology Office Visit Chronic gastric ulcer without hemorrhage and without perforation   01/21/2025 Cisco Mccray MD Colon and Rectal Surgery Office Visit Diverticulitis of small intestine with perforation without bleeding   01/17/2025 Sabrina Morrow MD Family Medicine Office Visit Hypotension, unspecified hypotension type

## 2025-02-03 NOTE — ED PROVIDER NOTES
IMPRESSION/MDM/ED COURSE:  74 y.o. male presented with acute hypotension  Differential: Dehydration, JANY, electro abnormality, hypovolemia, orthostatic hypotension, not appearing as sepsis or shock, uremia, no obvious bacterial infection  VS: Hypo-tensive, otherwise normal    External documentation reviewed: Reviewed discharge summary from hospitalist note from 1 month ago  Reviewed outpatient PCP note from 2 weeks ago  Patient was admitted for hypotension and found to be profoundly anemic at that time  Patient is status post colectomy with anastomosis due to diverticulitis from December of last year    Chest x-ray my interpretation shows no acute findings    Impression/Plan: Patient was hypotensive started on fluids remained hypotensive give another liter of fluids, transiently improved to 93/54 next  Labs show acute on chronic kidney disease creatinine 2.28 with a BUN of 43 a glucose of 169, urine clean coags are unremarkable lactic acid normal CBC shows anemia but 10.0 which is improved from his baseline  TSH and procalcitonin normal    ED Course as of 02/03/25 1835   Mon Feb 03, 2025   1713 EKG my interpretation shows sinus rhythm with first-degree AV block and PACs otherwise all intervals, left axis deviation, nonspecific T waves, no significant ST segment changes [SF]   1754 Consulted with Dr. Truong of hospitalist service who accepts patient for admission [SF]      ED Course User Index  [SF] Harley Mata,        RADIOLOGY:  XR CHEST PORTABLE   Final Result   NO ACUTE CARDIOPULMONARY PROCESS               EKG  See MDM for my interpretation     All EKG's are interpreted by the Emergency Department Physician who either signs or Co-signs this chart in the absence of a cardiologist.      PROCEDURES:  None    CONSULTS:  None    Critical Care Time:  none    FINAL IMPRESSION      1. Hypotension, unspecified hypotension type    2. Acute kidney injury superimposed on chronic kidney disease (HCC)    3.

## 2025-02-04 VITALS
SYSTOLIC BLOOD PRESSURE: 98 MMHG | TEMPERATURE: 97.5 F | HEIGHT: 66 IN | DIASTOLIC BLOOD PRESSURE: 53 MMHG | RESPIRATION RATE: 17 BRPM | OXYGEN SATURATION: 96 % | HEART RATE: 72 BPM | BODY MASS INDEX: 32.14 KG/M2 | WEIGHT: 200 LBS

## 2025-02-04 PROBLEM — A04.72 C. DIFFICILE COLITIS: Status: ACTIVE | Noted: 2025-02-04

## 2025-02-04 LAB
ADV 40+41 DNA STL QL NAA+NON-PROBE: NOT DETECTED
ANION GAP SERPL CALCULATED.3IONS-SCNC: 11 MEQ/L (ref 9–15)
BASOPHILS # BLD: 0 K/UL (ref 0–0.2)
BASOPHILS NFR BLD: 0.2 %
BUN SERPL-MCNC: 36 MG/DL (ref 8–23)
C CAYETANENSIS DNA STL QL NAA+NON-PROBE: NOT DETECTED
C COLI+JEJ+UPSA DNA STL QL NAA+NON-PROBE: NOT DETECTED
C DIFF TOX A+B STL QL IA: ABNORMAL
CALCIUM SERPL-MCNC: 7.9 MG/DL (ref 8.5–9.9)
CHLORIDE SERPL-SCNC: 109 MEQ/L (ref 95–107)
CO2 SERPL-SCNC: 18 MEQ/L (ref 20–31)
CREAT SERPL-MCNC: 1.68 MG/DL (ref 0.7–1.2)
CRYPTOSP DNA STL QL NAA+NON-PROBE: NOT DETECTED
E HISTOLYT DNA STL QL NAA+NON-PROBE: NOT DETECTED
EAEC PAA PLAS AGGR+AATA ST NAA+NON-PRB: NOT DETECTED
EC STX1+STX2 GENES STL QL NAA+NON-PROBE: NOT DETECTED
EKG ATRIAL RATE: 68 BPM
EKG P AXIS: 32 DEGREES
EKG P-R INTERVAL: 218 MS
EKG Q-T INTERVAL: 388 MS
EKG QRS DURATION: 74 MS
EKG QTC CALCULATION (BAZETT): 412 MS
EKG R AXIS: -5 DEGREES
EKG T AXIS: -4 DEGREES
EKG VENTRICULAR RATE: 68 BPM
EOSINOPHIL # BLD: 0.1 K/UL (ref 0–0.7)
EOSINOPHIL NFR BLD: 1.7 %
EPEC EAE GENE STL QL NAA+NON-PROBE: NOT DETECTED
ERYTHROCYTE [DISTWIDTH] IN BLOOD BY AUTOMATED COUNT: 15.9 % (ref 11.5–14.5)
ETEC LTA+ST1A+ST1B TOX ST NAA+NON-PROBE: NOT DETECTED
G LAMBLIA DNA STL QL NAA+NON-PROBE: NOT DETECTED
GI PATH DNA+RNA PNL STL NAA+NON-PROBE: NOT DETECTED
GLUCOSE BLD-MCNC: 142 MG/DL (ref 70–99)
GLUCOSE SERPL-MCNC: 99 MG/DL (ref 70–99)
HCT VFR BLD AUTO: 24.5 % (ref 42–52)
HGB BLD-MCNC: 8.2 G/DL (ref 14–18)
LYMPHOCYTES # BLD: 2 K/UL (ref 1–4.8)
LYMPHOCYTES NFR BLD: 38.5 %
MAGNESIUM SERPL-MCNC: 1.1 MG/DL (ref 1.7–2.4)
MCH RBC QN AUTO: 30.9 PG (ref 27–31.3)
MCHC RBC AUTO-ENTMCNC: 33.5 % (ref 33–37)
MCV RBC AUTO: 92.5 FL (ref 79–92.2)
MONOCYTES # BLD: 0.4 K/UL (ref 0.2–0.8)
MONOCYTES NFR BLD: 8.2 %
NEUTROPHILS # BLD: 2.7 K/UL (ref 1.4–6.5)
NEUTS SEG NFR BLD: 50.8 %
NOROVIRUS GI+II RNA STL QL NAA+NON-PROBE: NOT DETECTED
P SHIGELLOIDES DNA STL QL NAA+NON-PROBE: NOT DETECTED
PERFORMED ON: ABNORMAL
PHOSPHATE SERPL-MCNC: 3.4 MG/DL (ref 2.3–4.8)
PLATELET # BLD AUTO: 180 K/UL (ref 130–400)
POTASSIUM SERPL-SCNC: 3.5 MEQ/L (ref 3.4–4.9)
RBC # BLD AUTO: 2.65 M/UL (ref 4.7–6.1)
RVA RNA STL QL NAA+NON-PROBE: NOT DETECTED
S ENT+BONG DNA STL QL NAA+NON-PROBE: NOT DETECTED
SAPO I+II+IV+V RNA STL QL NAA+NON-PROBE: NOT DETECTED
SHIGELLA SP+EIEC IPAH ST NAA+NON-PROBE: NOT DETECTED
SODIUM SERPL-SCNC: 138 MEQ/L (ref 135–144)
V CHOL+PARA+VUL DNA STL QL NAA+NON-PROBE: NOT DETECTED
V CHOLERAE DNA STL QL NAA+NON-PROBE: NOT DETECTED
WBC # BLD AUTO: 5.2 K/UL (ref 4.8–10.8)
Y ENTEROCOL DNA STL QL NAA+NON-PROBE: NOT DETECTED

## 2025-02-04 PROCEDURE — 96361 HYDRATE IV INFUSION ADD-ON: CPT

## 2025-02-04 PROCEDURE — 84100 ASSAY OF PHOSPHORUS: CPT

## 2025-02-04 PROCEDURE — 36415 COLL VENOUS BLD VENIPUNCTURE: CPT

## 2025-02-04 PROCEDURE — G0378 HOSPITAL OBSERVATION PER HR: HCPCS

## 2025-02-04 PROCEDURE — 80048 BASIC METABOLIC PNL TOTAL CA: CPT

## 2025-02-04 PROCEDURE — 96366 THER/PROPH/DIAG IV INF ADDON: CPT

## 2025-02-04 PROCEDURE — 2500000003 HC RX 250 WO HCPCS: Performed by: INTERNAL MEDICINE

## 2025-02-04 PROCEDURE — 83735 ASSAY OF MAGNESIUM: CPT

## 2025-02-04 PROCEDURE — 85025 COMPLETE CBC W/AUTO DIFF WBC: CPT

## 2025-02-04 PROCEDURE — 6360000002 HC RX W HCPCS: Performed by: INTERNAL MEDICINE

## 2025-02-04 PROCEDURE — 96365 THER/PROPH/DIAG IV INF INIT: CPT

## 2025-02-04 PROCEDURE — 6370000000 HC RX 637 (ALT 250 FOR IP): Performed by: INTERNAL MEDICINE

## 2025-02-04 PROCEDURE — 93010 ELECTROCARDIOGRAM REPORT: CPT | Performed by: INTERNAL MEDICINE

## 2025-02-04 RX ORDER — MAGNESIUM SULFATE IN WATER 40 MG/ML
2000 INJECTION, SOLUTION INTRAVENOUS ONCE
Status: COMPLETED | OUTPATIENT
Start: 2025-02-04 | End: 2025-02-04

## 2025-02-04 RX ORDER — VANCOMYCIN HYDROCHLORIDE 125 MG/1
125 CAPSULE ORAL 4 TIMES DAILY
Qty: 40 CAPSULE | Refills: 0 | Status: SHIPPED | OUTPATIENT
Start: 2025-02-04 | End: 2025-02-14

## 2025-02-04 RX ORDER — POTASSIUM CHLORIDE 1500 MG/1
20 TABLET, EXTENDED RELEASE ORAL
COMMUNITY
Start: 2025-02-05

## 2025-02-04 RX ORDER — VANCOMYCIN HYDROCHLORIDE 125 MG/1
125 CAPSULE ORAL 4 TIMES DAILY
Status: DISCONTINUED | OUTPATIENT
Start: 2025-02-04 | End: 2025-02-04 | Stop reason: CLARIF

## 2025-02-04 RX ORDER — MIDODRINE HYDROCHLORIDE 5 MG/1
5 TABLET ORAL
Qty: 30 TABLET | Refills: 0 | Status: SHIPPED | OUTPATIENT
Start: 2025-02-04

## 2025-02-04 RX ORDER — HYDROCORTISONE 25 MG/G
CREAM TOPICAL 2 TIMES DAILY
Status: DISCONTINUED | OUTPATIENT
Start: 2025-02-04 | End: 2025-02-04 | Stop reason: HOSPADM

## 2025-02-04 RX ORDER — PANTOPRAZOLE SODIUM 40 MG/1
40 TABLET, DELAYED RELEASE ORAL
COMMUNITY
Start: 2025-02-04

## 2025-02-04 RX ADMIN — PANTOPRAZOLE SODIUM 40 MG: 40 TABLET, DELAYED RELEASE ORAL at 05:43

## 2025-02-04 RX ADMIN — ATORVASTATIN CALCIUM 40 MG: 40 TABLET, FILM COATED ORAL at 08:28

## 2025-02-04 RX ADMIN — HYDROCORTISONE: 25 CREAM TOPICAL at 13:38

## 2025-02-04 RX ADMIN — SODIUM CHLORIDE, PRESERVATIVE FREE 10 ML: 5 INJECTION INTRAVENOUS at 08:29

## 2025-02-04 RX ADMIN — MAGNESIUM SULFATE HEPTAHYDRATE 2000 MG: 2 INJECTION, SOLUTION INTRAVENOUS at 08:31

## 2025-02-04 RX ADMIN — MAGNESIUM SULFATE HEPTAHYDRATE 2000 MG: 2 INJECTION, SOLUTION INTRAVENOUS at 10:19

## 2025-02-04 RX ADMIN — FINASTERIDE 5 MG: 5 TABLET, FILM COATED ORAL at 08:29

## 2025-02-04 RX ADMIN — VANCOMYCIN HYDROCHLORIDE 125 MG: 125 CAPSULE ORAL at 13:37

## 2025-02-04 RX ADMIN — MIDODRINE HYDROCHLORIDE 5 MG: 5 TABLET ORAL at 13:37

## 2025-02-04 RX ADMIN — ALLOPURINOL 200 MG: 100 TABLET ORAL at 08:29

## 2025-02-04 RX ADMIN — POTASSIUM BICARBONATE 40 MEQ: 782 TABLET, EFFERVESCENT ORAL at 10:18

## 2025-02-04 RX ADMIN — TAMSULOSIN HYDROCHLORIDE 0.4 MG: 0.4 CAPSULE ORAL at 08:28

## 2025-02-04 RX ADMIN — METOPROLOL SUCCINATE 50 MG: 50 TABLET, EXTENDED RELEASE ORAL at 08:29

## 2025-02-04 NOTE — DISCHARGE INSTRUCTIONS
Hold metformin until your diarrhea has completely resolved    Take your blood pressure at home. Midodrine is a medication that helps boost your blood pressure- take this if your systolic blood pressure is below 110.     Please see your doctor in 7-10d

## 2025-02-04 NOTE — PROGRESS NOTES
Physical Therapy Missed Treatment   Facility/Department: [facility] MED SURG W481/W481-01    NAME: Hector Bose    : 1950 (74 y.o.)  MRN: [id number]    Account: [id number]  Gender: male      Per MD pt is amb and does not have PT needs.  No PT eval completed.  PT orders d/c.      Susan Hall, PT, 25 at 3:08 PM

## 2025-02-04 NOTE — PLAN OF CARE
Problem: Chronic Conditions and Co-morbidities  Goal: Patient's chronic conditions and co-morbidity symptoms are monitored and maintained or improved  2/4/2025 0841 by Nani Menchaca RN  Outcome: Progressing  2/3/2025 2300 by Claudia Field RN  Outcome: Progressing     Problem: Discharge Planning  Goal: Discharge to home or other facility with appropriate resources  2/4/2025 0841 by Nani Menchaca RN  Outcome: Progressing  2/3/2025 2300 by Claudia Field RN  Outcome: Progressing

## 2025-02-04 NOTE — ACP (ADVANCE CARE PLANNING)
Advance Care Planning   Healthcare Decision Maker:    Primary Decision Maker: Melody Mac - Spouse - [phone]    Secondary Decision Maker: Anat Otoole - Child - [phone]    Click here to complete Healthcare Decision Makers including selection of the Healthcare Decision Maker Relationship (ie \"Primary\").  Today we documented Decision Maker(s) consistent with Legal Next of Kin hierarchy.

## 2025-02-04 NOTE — DISCHARGE SUMMARY
[facility] Medicine Discharge Summary    Hector Bose  :  1950  MRN:  [id number]    Admit date:  2/3/2025  Discharge date:  2025    Admitting Physician:  Bossman Truong DO  Primary Care Physician:  Sean Levy MD    Discharge Diagnoses:    C. difficile colitis  Hypotension    Chief Complaint   Patient presents with    Hypotension     Condition: improved   Activity: no strenuous activity   Diet: diabetic  Disposition: home  Functional Status: ambulatory    Significant Findings:         Latest Ref Rng & Units 2025     6:04 AM 2/3/2025     2:39 PM 2025    12:01 PM 2025    11:51 AM 1/15/2025     9:47 AM   Labs Renal   BUN 8 - 23 mg/dL 36  43  21  19  17    Cr 0.70 - 1.20 mg/dL 1.68  2.28  1.75  1.75  1.72    K 3.4 - 4.9 mEq/L 3.5  3.9  4.6  5.2  5.0    Na 135 - 144 mEq/L 138  137  137  139  138        Hospital Course:   74-year-old male with well-controlled diabetes, CLL, hospitalization 2024 for perforated diverticulitis with peritonitis requiring right colectomy and discharge on IV antibiotics, hospitalization 2024 for GI bleed from duodenal ulcer presented with hypotension.    This was secondary to hypovolemia from ongoing diarrhea for the past 2 weeks.  He had also been taking his ACE inhibitor and metformin daily, as well as Lasix twice weekly.  He was found to have C. difficile colitis.    He will be treated with 10-day course of oral vancomycin.  We will stop his ACE inhibitor and diuretic.  He will check his blood pressure at home and will be discharged with midodrine which can likely be tapered off over the next week.  He will hold his metformin as long as he continues to have diarrhea.    He was offered to stay in the hospital to continue treatment but elected for discharge home on .  He is feeling much improved and able to ambulate around the room without issue.        Exam on Discharge:   BP (!) 98/53   Pulse 72   Temp 97.5

## 2025-02-04 NOTE — CARE COORDINATION
Case Management Assessment  Initial Evaluation    Date/Time of Evaluation: 2/4/2025 11:28 AM  Assessment Completed by: Lindsey Luna    If patient is discharged prior to next notation, then this note serves as note for discharge by case management.    Patient Name: Hector Bose                   YOB: 1950  Diagnosis: Dehydration [E86.0]  Hypotension [I95.9]  Hypotension, unspecified hypotension type [I95.9]  Acute kidney injury superimposed on chronic kidney disease (HCC) [N17.9, N18.9]                   Date / Time: 2/3/2025  2:23 PM    Patient Admission Status: Observation   Readmission Risk (Low < 19, Mod (19-27), High > 27): Readmission Risk Score: 18.8    Current PCP: Sean Levy MD  PCP verified by CM? Yes    Chart Reviewed: Yes      History Provided by: Patient  Patient Orientation: Alert and Oriented    Patient Cognition: Alert    Hospitalization in the last 30 days (Readmission):  Yes    If yes, Readmission Assessment in CM Navigator will be completed.    Advance Directives:      Code Status: Full Code   Patient's Primary Decision Maker is: Legal Next of Kin    Primary Decision Maker: Melody Mac - Spouse - [phone]    Secondary Decision Maker: Anat Otoole - Child - [phone]    Discharge Planning:    Patient lives with: Spouse/Significant Other Type of Home: House  Primary Care Giver: Self  Patient Support Systems include: Spouse/Significant Other   Current Financial resources:    Current community resources:    Current services prior to admission: None            Current DME:              Type of Home Care services:  None    ADLS  Prior functional level: Independent in ADLs/IADLs  Current functional level: Independent in ADLs/IADLs    PT AM-PAC:   /24  OT AM-PAC:   /24    Family can provide assistance at DC: Yes  Would you like Case Management to discuss the discharge plan with any other family members/significant others, and if so, who? No  Plans to Return to

## 2025-02-04 NOTE — PROGRESS NOTES
IV removed without complication. Pt tolerated well. Catheter intact, dressing applied. No drainage noted.     Discharge instructions provided to patient. Verbalized understanding of follow up appointments, medications and reasons to return to ED/call physician. All questions answered. Copy of discharge instructions provided. Refused wheelchair. Ambulated off of the unit.    Electronically signed by Nani Menchaca RN on 2/4/2025 at 4:52 PM

## 2025-02-04 NOTE — PROGRESS NOTES
Shift assessment complete. Pt is Axox4. Meds given per mar. Pt denies any needs at this time. Ambulating independently in the room with walker. Denies dizziness. Call light within reach.     Electronically signed by Nani Menchaca RN on 2/4/2025 at 8:42 AM

## 2025-02-05 ENCOUNTER — CARE COORDINATION (OUTPATIENT)
Dept: CARE COORDINATION | Age: 75
End: 2025-02-05

## 2025-02-05 DIAGNOSIS — I95.9 HYPOTENSION, UNSPECIFIED HYPOTENSION TYPE: ICD-10-CM

## 2025-02-05 DIAGNOSIS — I10 ESSENTIAL HYPERTENSION: Primary | ICD-10-CM

## 2025-02-05 DIAGNOSIS — A04.72 C. DIFFICILE COLITIS: Primary | ICD-10-CM

## 2025-02-05 PROCEDURE — 1111F DSCHRG MED/CURRENT MED MERGE: CPT | Performed by: INTERNAL MEDICINE

## 2025-02-05 NOTE — CARE COORDINATION
Care Transitions Note    Initial Call - Call within 2 business days of discharge: Yes    Patient Current Location:  Home: [address] [place] [address] [place] [address]    -CTN phoned patient and left message, patient returned call to CTN within moments.    Care Transition Nurse spoke with the patient by telephone to perform post hospital discharge assessment, verified name and  as identifiers. Provided reintroduction to self, and reexplanation of the Care Transition Nurse role as patient is known to CTN from previous outreaches.  Patient remembers CTN.     Patient: Hetcor Bose    Patient : 1950   MRN: [id number]    Reason for Admission: hypotension, c. diff colitis  Discharge Date: 25  RURS: Readmission Risk Score: 18.8      Last Discharge Facility       Date Complaint Diagnosis Description Type Department Provider    2/3/25 Hypotension Hypotension, unspecified hypotension type ... ED to Hosp-Admission (Discharged) (ADMITTED) [facility] Bossman Truong, DO; Nikos Mata...            Was this an external facility discharge? No    Additional needs identified to be addressed with provider   No needs identified             Method of communication with provider: none.    Patients top risk factors for readmission: medical condition-c. diff, hyper/hypotension, CKD, DM, diverticulitis, multiple health system providers, polypharmacy, and utilization of services    Interventions to address risk factors:   Education: c. diff,  blood pressure, DM.  CTN reviewed with patient page 8 of AVS (instructions.)   Review of patient management of conditions/medications.    Care Summary Note:   -Patient is a readmission.  Patient admitted under observation to [facility] on 2/3/25 with symptoms of dizziness and watery diarrhea.  B/P 79/53 in the ER.  Found to have C. Diff colitis.  See hospital discharge summary for further details.   -Patient pleasant in conversation, reports diarrhea continues but he is happy he has had no

## 2025-02-05 NOTE — PROGRESS NOTES
Remote Patient Monitoring Treatment Plan    Received request from [facility]/CTN Shena Inman RN   to order remote patient monitoring for in home monitoring of HTN; Condition managed by PCP.  Hypotension; Condition managed by PCP.  and order completed.     Patient will be monitoring blood pressure .     Please set low systolic BP alert for < 111. Order reads \"Systolic blood pressure parameter keep greater than 110 .\"     Patient will engage in Remote Patient Monitoring each day to develop the skills necessary for self management.       RPM Care Team Responsibilities:   Alerts will be reviewed daily and addressed within 2-4 hours during operational hours (Monday -Friday 9 am-4 pm)  Alert response and intervention documented in patient medical record  Alert response escalated to PCP per protocol and documented in patient medical record  Patient monitored over approximately  days  Discharge from program based on self-management readiness    See care coordination encounters for additional details.

## 2025-02-06 ENCOUNTER — CARE COORDINATION (OUTPATIENT)
Dept: PRIMARY CARE CLINIC | Age: 75
End: 2025-02-06

## 2025-02-06 NOTE — CARE COORDINATION
Remote Patient Kit Ordering Note      Date/Time:  2/6/2025 10:23 AM      [facility]/family today to notify of RPM kit order; patient/family was available; discussed the following topics below and all questions answered.    [x] [facility]  [x] Patient will receive package over the next 1-3 business days. Someone 21 years or older must be present to sign for UPS delivery.  [x] HRS will contact patient within 24 hours, an HRS  will call the patient directly: If the patient does not answer, HRS will follow up with the clinical team notifying them about the unsuccessful attempt to contact the patient. HRS will make three call attempts to the patient.Provide patient with [facility] Virtual install number is: [phone].  [x] The RPM Nurse will contact patient once equipment is active to welcome them to the program.                                                         [x] Hours of RPM monitoring - Monday-Friday [phone]; encourage patient to get vitals entered by Noon each day to have the alert addressed same day.  [x][facility] mailed RPM Patient flyer to patient.                      ACM made aware the RPM kit has been ordered.

## 2025-02-07 ENCOUNTER — OFFICE VISIT (OUTPATIENT)
Age: 75
End: 2025-02-07

## 2025-02-07 VITALS
OXYGEN SATURATION: 100 % | RESPIRATION RATE: 18 BRPM | DIASTOLIC BLOOD PRESSURE: 60 MMHG | WEIGHT: 204 LBS | TEMPERATURE: 97.8 F | HEART RATE: 73 BPM | BODY MASS INDEX: 32.78 KG/M2 | HEIGHT: 66 IN | SYSTOLIC BLOOD PRESSURE: 118 MMHG

## 2025-02-07 DIAGNOSIS — A04.72 C. DIFFICILE DIARRHEA: ICD-10-CM

## 2025-02-07 DIAGNOSIS — I95.9 HYPOTENSION, UNSPECIFIED HYPOTENSION TYPE: Primary | ICD-10-CM

## 2025-02-07 DIAGNOSIS — Z09 HOSPITAL DISCHARGE FOLLOW-UP: ICD-10-CM

## 2025-02-07 DIAGNOSIS — E11.22 TYPE 2 DIABETES MELLITUS WITH STAGE 3A CHRONIC KIDNEY DISEASE, WITHOUT LONG-TERM CURRENT USE OF INSULIN (HCC): ICD-10-CM

## 2025-02-07 DIAGNOSIS — N18.31 TYPE 2 DIABETES MELLITUS WITH STAGE 3A CHRONIC KIDNEY DISEASE, WITHOUT LONG-TERM CURRENT USE OF INSULIN (HCC): ICD-10-CM

## 2025-02-07 DIAGNOSIS — E87.6 HYPOKALEMIA: ICD-10-CM

## 2025-02-07 ASSESSMENT — ENCOUNTER SYMPTOMS: DIARRHEA: 1

## 2025-02-07 NOTE — PROGRESS NOTES
Subjective  Hector Bose, 74 y.o. male presents today with:  Chief Complaint   Patient presents with    Follow-Up from Hospital     Patient presents today for a hospital follow up on 2.3.25 for hypotension and is here for TCM. He said that he feels great.       He is here for ER follow-up of hypotension.  He was admitted from 2/3/2025 -2/4/25.  He was having ongoing diarrhea for the past 2 weeks, causing hypovolemia.  He was also taking his antihypertensives.  He was found to have C. difficile colitis.  He was started on a 10-day course of oral vancomycin.  His ACE inhibitor and diuretic were discontinued.  He was also instructed to hold his metformin as long as he is having diarrhea.  He was then discharged.    Today, the patient reports feeling well.  Diarrhea has improved - less frequent and becoming more solid now. He is compliant with his vancomycin.  Also taking midodrine 5 mg 3 times a day.  No longer on benazepril 20 mg daily or Lasix 40 mg daily. Off potassium for 3 days.     Review of Systems   Gastrointestinal:  Positive for diarrhea.       Past Medical History:   Diagnosis Date    Acute idiopathic gout of right foot     Anemia 03/01/2018    iron transfusions x2    Arthritis     both knees    Bilateral carpal tunnel syndrome 1/12/2022    Chronic kidney disease     CLL (chronic lymphocytic leukemia) (HCC)     dx 5/2015    Colon polyps     Disease of blood and blood forming organ     Hyperlipidemia     dx since 1970's    Hypertension     meds  since 1970's    Lymphoma of gastrointestinal tract (HCC)     Movement disorder     Type II or unspecified type diabetes mellitus without mention of complication, not stated as uncontrolled     hx > 20 yrs    Urinary retention 12/23/2024     Past Surgical History:   Procedure Laterality Date    APPENDECTOMY      age 22s    CARPAL TUNNEL RELEASE Left 1/26/2022    LEFT CARPAL TUNNEL RELEASE performed by Rangel Saldivar MD at [facility]    CARPAL TUNNEL RELEASE

## 2025-02-08 LAB
BACTERIA BLD CULT ORG #2: NORMAL
BACTERIA BLD CULT: NORMAL

## 2025-02-11 ENCOUNTER — CARE COORDINATION (OUTPATIENT)
Dept: CASE MANAGEMENT | Age: 75
End: 2025-02-11

## 2025-02-11 DIAGNOSIS — E87.6 HYPOKALEMIA: ICD-10-CM

## 2025-02-11 LAB
ANION GAP SERPL CALCULATED.3IONS-SCNC: 10 MEQ/L (ref 9–15)
BUN SERPL-MCNC: 22 MG/DL (ref 8–23)
CALCIUM SERPL-MCNC: 8.2 MG/DL (ref 8.5–9.9)
CHLORIDE SERPL-SCNC: 105 MEQ/L (ref 95–107)
CO2 SERPL-SCNC: 23 MEQ/L (ref 20–31)
CREAT SERPL-MCNC: 1.47 MG/DL (ref 0.7–1.2)
GLUCOSE SERPL-MCNC: 96 MG/DL (ref 70–99)
POTASSIUM SERPL-SCNC: 4.5 MEQ/L (ref 3.4–4.9)
SODIUM SERPL-SCNC: 138 MEQ/L (ref 135–144)

## 2025-02-11 NOTE — CARE COORDINATION
Remote Patient Monitoring Note      Date/Time:  2/11/2025 11:10 AM  Patient Current Location: [place]  LPN attempted to contacted patient & ER Contact, Wife Melody by telephone regarding  Welcome Call & BP questions    Background: Hypotension  Clinical Interventions:     Attempted to reach Patient and ER Contact Wife Melody for welcome call and BP questions. Left HIPAA Compliant messages on voice mail to recheck BP and to call this nurse back. Will Send ACM message.     Plan/Follow Up: Will continue to review, monitor and address alerts with follow up based on severity of symptoms and risk factors.     Raysa Padilla LPN    [phone]  [facility] Coordinator / RPM

## 2025-02-11 NOTE — CARE COORDINATION
Remote Patient Monitoring Note    Date/Time:  2/11/2025   12:19 PM  Patient Current Location: [place]  LPN attempted to contacted patient & ER Contact, Wife Melody by telephone regarding Welcome Call & BP questions   Background: Hypotension    Attempted to reach patient & ER Contact again for RPM Red Alert Call. Unable to reach patient. Patient called and left Voice mail on my phone earlier.   Left HIPAA Compliant messages on Voice Mail to call.  Phone number left on Voice Mail to call back.    Will continue to follow.     Raysa Padilla LPN    [phone]  [facility] Coordinator

## 2025-02-12 ENCOUNTER — CARE COORDINATION (OUTPATIENT)
Dept: CARE COORDINATION | Age: 75
End: 2025-02-12

## 2025-02-12 NOTE — CARE COORDINATION
-Remote Alert Monitoring Note      Date/Time:  2025 10:15 AM  Patient Current Location: Home: [address] [place] [address] [place] [address]  Verified patients name and  as identifiers.    Rpm alert to be reviewed by the provider   red alert  blood pressure reading (108/67)  Vitals Recheck blood pressure reading (131/67)  Additional needs to be addressed by provider: No                   ACM contacted patient by telephone regarding red alert received   Background: Hypotension, HTN  Refer to 911 immediately if:  Patient unresponsive or unable to provide history  Change in cognition or sudden confusion  Patient unable to respond in complete sentences  Intense chest pain/tightness  Any concern for any clinical emergency  Red Alert: Provider response time of 1 hr required for any red alert requiring intervention  Yellow Alert: Provider response time of 3hr required for any escalated yellow alert  Patient Chief Complaint:  Blood Pressure BP Triage  Are you having any Chest Pain? no   Are you having any Shortness of Breath? no   Do you have a headache or have any vision changes? no   Are you having any numbness or tingling? no   Are you having any other health concerns or issues? no  Patient/Caregiver educated on how to properly take a blood pressure. Patient/Caregiver verbalizes understanding.     Clinical Interventions: Reviewed metrics needed for daily management of chronic disease-RN outreached to patient to discuss BP reading of 108/67. Patient notes feeling \"great\" and \"doing things around the house\" patient noted he took his reading before breakfast this am. RN requested recheck and patient agreed. BP now within parameters. Patient noted he didn't \"take his pill for the low reading\". Noted he didn't want to take it unless he had to.  Patient encouraged to monitor for s/s of hypotension and take medication as prescribed for lows below 110 systolic. Patient VU.  Welcome call completed at this time.     Plan/Follow Up:

## 2025-02-12 NOTE — CARE COORDINATION
Care Transitions Note    Follow Up Call     Patient Current Location:  Home: [address] [place] [address] [place] [address]    Care Transition Nurse contacted the patient by telephone.     Additional needs identified to be addressed with provider   No needs identified                 Method of communication with provider: none.    Care Summary Note:   -Patient reports he has had \"great energy\" since last Thursday and has been able to accomplish things around the house that needed to be done.  Patient also going out for social outings and seeing friends he hasn't seen since before the holiday because he was so sick.   -Patient denies any return of dizziness and states his diarrhea has improved in that it has lessened and is not pure water now.  Reports four episodes yesterday and two episodes the day before.  Has three more days of the oral Vancomycin.  -CTN reviewed RPM metrics with patient.  RPM team requested repeat B/P today which was 131/67, HR 75.   Patient states his brother had a extra B/P cuff so his brother gave it to him for when he is completed with RPM program.  Patient states he has checked his B/P cuff against the RPM cuff and the readings have been pretty close to each other.  -Patient denies any needs at this time.  -CTN will continue to follow for care transitions.    Plan of care updates since last contact:  Education: blood pressure  Completed PCP TCM/HFU on 2/7/25-BMP lab ordered.  B/P 118/60, HR 73.  Completed BMP lab on 2/11/25.       Advance Care Planning:   Does patient have an Advance Directive: health care decision maker confirmed on a prior call.    Medication Review:  Full medication reconciliation completed during previous call.   Medications changed since last call, reviewed today-currently off potassium, K+ on 2/11/25 was 4.5    Remote Patient Monitoring:  Offered patient enrollment in the Remote Patient Monitoring (RPM) program for in-home monitoring: Yes, patient enrolled; current status is

## 2025-02-13 ENCOUNTER — OFFICE VISIT (OUTPATIENT)
Dept: UROLOGY | Age: 75
End: 2025-02-13
Payer: MEDICARE

## 2025-02-13 VITALS
WEIGHT: 203 LBS | SYSTOLIC BLOOD PRESSURE: 104 MMHG | BODY MASS INDEX: 32.62 KG/M2 | DIASTOLIC BLOOD PRESSURE: 58 MMHG | HEART RATE: 81 BPM | HEIGHT: 66 IN

## 2025-02-13 DIAGNOSIS — N28.1 RENAL CYST: Primary | ICD-10-CM

## 2025-02-13 PROCEDURE — G8417 CALC BMI ABV UP PARAM F/U: HCPCS | Performed by: PHYSICIAN ASSISTANT

## 2025-02-13 PROCEDURE — 1036F TOBACCO NON-USER: CPT | Performed by: PHYSICIAN ASSISTANT

## 2025-02-13 PROCEDURE — 3078F DIAST BP <80 MM HG: CPT | Performed by: PHYSICIAN ASSISTANT

## 2025-02-13 PROCEDURE — 3074F SYST BP LT 130 MM HG: CPT | Performed by: PHYSICIAN ASSISTANT

## 2025-02-13 PROCEDURE — 1123F ACP DISCUSS/DSCN MKR DOCD: CPT | Performed by: PHYSICIAN ASSISTANT

## 2025-02-13 PROCEDURE — 3017F COLORECTAL CA SCREEN DOC REV: CPT | Performed by: PHYSICIAN ASSISTANT

## 2025-02-13 PROCEDURE — 1159F MED LIST DOCD IN RCRD: CPT | Performed by: PHYSICIAN ASSISTANT

## 2025-02-13 PROCEDURE — G8427 DOCREV CUR MEDS BY ELIG CLIN: HCPCS | Performed by: PHYSICIAN ASSISTANT

## 2025-02-13 PROCEDURE — 99213 OFFICE O/P EST LOW 20 MIN: CPT | Performed by: PHYSICIAN ASSISTANT

## 2025-02-13 ASSESSMENT — ENCOUNTER SYMPTOMS: APNEA: 0

## 2025-02-13 NOTE — PROGRESS NOTES
bilateral kidneys. He denies gross hematuria and dysuria. He is on flomax and proscar with good symptoms control. MRI on 1/28/25 shows innumerable simple renal cysts bilaterally (Bosniak 1), 2 complex cysts in the left kidney with no suspicious features (Bosniak 2). No follow up recommended. 2 cysts in the pancreatic tail recommend follow up in 2 years. He voices no urological complaints. PSA on 3/6/24 was 2.22. continue flomax and proscar        Plan:      Continue flomax and proscar  The patient sees Dr. Levy in one month and will bring pancreatic cysts to his attention  One year follow up with flow pvr and u/a        Jorge Mcgrath PA-C

## 2025-02-14 ENCOUNTER — TELEPHONE (OUTPATIENT)
Age: 75
End: 2025-02-14

## 2025-02-14 ENCOUNTER — CARE COORDINATION (OUTPATIENT)
Dept: CASE MANAGEMENT | Age: 75
End: 2025-02-14

## 2025-02-14 NOTE — CARE COORDINATION
Remote Patient Monitoring Note      Date/Time:  2025 12:09 PM  Patient Current Location: Home: [address] [place] [address] [place] [address]  LPN contacted patient by telephone regarding red alert received for blood pressure reading (104/58). Verified patients name and  as identifiers.  Background: enrolled in [facility] for HTN AND HYPOTENSION  Clinical Interventions: Reviewed and followed up on alerts and treatments-VM left for this writer from Dr Levy's office.  Returned call. Spoke to Jolynn.  She will have the caller return the call after lunch.    Plan/Follow Up: Will continue to review, monitor and address alerts with follow up based on severity of symptoms and risk factors.

## 2025-02-14 NOTE — CARE COORDINATION
-Remote Alert Monitoring Note      Date/Time:  2025 9:41 AM  Patient Current Location: Home: [address] [place] [address] [place] [address]  Verified patients name and  as identifiers.    Rpm alert to be reviewed by the provider   red alert  blood pressure reading (106/59)  Vitals Recheck N/A  Additional needs to be addressed by provider: No                   LPN contacted patient by telephone regarding red alert received   Background: ENROLLED IN RPM FOR HTN AND HYPOTENSION   Refer to 911 immediately if:  Patient unresponsive or unable to provide history  Change in cognition or sudden confusion  Patient unable to respond in complete sentences  Intense chest pain/tightness  Any concern for any clinical emergency  Red Alert: Provider response time of 1 hr required for any red alert requiring intervention  Yellow Alert: Provider response time of 3hr required for any escalated yellow alert  Patient Chief Complaint:  Blood Pressure BP Triage  Are you having any Chest Pain? no   Are you having any Shortness of Breath? no   Do you have a headache or have any vision changes? no   Are you having any numbness or tingling? no   Are you having any other health concerns or issues? no  Patient/Caregiver educated on how to properly take a blood pressure. Patient/Caregiver verbalizes understanding.     Clinical Interventions: Reviewed and followed up on alerts and treatments-Spoke to Hector regarding rpm red alert for blood pressure. Pt denies chest pain, dyspnea. Dizziness or fatigue. He has not taken any of his BP medications today. Pt is currently not home or able to recheck BP. He inquired if he should take his medication today.     Call to Dr Cam MARTINEZ. Spoke to Nhung. She is sending a message to the  and will return this writers call.   Plan/Follow Up: Will continue to review, monitor and address alerts with follow up based on severity of symptoms and risk factors.  **For any new or worsening symptoms or you are concerned

## 2025-02-14 NOTE — TELEPHONE ENCOUNTER
I spoke to the patient and Dr. Levy regarding the patient's low bp and Dr. Levy said for the patient to hold bp meds and for him to come in and get a bp check in office this Monday and we will go from there. Patient stated he will be here.

## 2025-02-14 NOTE — CARE COORDINATION
Remote Patient Monitoring Note      Date/Time:  2025 3:46 PM  Patient Current Location: Home: [address] [place] [address] [place] [address]  LPN contacted patient by telephone regarding red alert received for blood pressure reading (104/). Verified patients name and  as identifiers.  Background: enrolled in [facility] FOR HTN AND HYPOTENSION  Clinical Interventions: Reviewed and followed up on alerts and treatments-reviewed Telephone encounter with PCP's OFFICE    pt to be seen in the office Monday for BP check  Message is as follows     Plan/Follow Up: Will continue to review, monitor and address alerts with follow up based on severity of symptoms and risk factors.

## 2025-02-14 NOTE — TELEPHONE ENCOUNTER
Anika called back and she said that the patients /58 and 106/59 and patient is unsure if he should be taking medication. We will need to call patient back asap to inform him.

## 2025-02-14 NOTE — CARE COORDINATION
Remote Patient Monitoring Note      Date/Time:  2025 11:03 AM  Patient Current Location: Home: [address] [place] [address] [place] [address]  LPN contacted patient by telephone regarding red alert received for blood pressure reading (106/59 /58). Verified patients name and  as identifiers.  Background: ENROLLED IN RPM FOR HTN AND HYPOTENSION  Clinical Interventions: Reviewed and followed up on alerts and treatments-MESSAGE noted from PCP     Message sent to PCP regarding red alert for diastolic BP below 60  Plan/Follow Up: Will continue to review, monitor and address alerts with follow up based on severity of symptoms and risk factors.

## 2025-02-14 NOTE — TELEPHONE ENCOUNTER
Anika from [facility] called with a red alert on patient PB. Patient asking if he needs to take blood pressure. Anika asks for clarification on parameters on when she needs to call to notify PCP of blood pressure. Patient blood pressure has been 106/59 108/67. Anika states these are not terrible and she wants to make sure when they need to call to tell PCP  Phone [phone]

## 2025-02-14 NOTE — CARE COORDINATION
Remote Patient Monitoring Note      Date/Time:  2025 1:54 PM  Patient Current Location: Home: [address] [place] [address] [place] [address]  LPN contacted patient by telephone regarding red alert received for blood pressure reading (104/58). Verified patients name and  as identifiers.  Background: ENROLLED IN RPM FOR HTN AND HYPOTENSION  Clinical Interventions: Reviewed and followed up on alerts and treatments-return call to PCP office. Spoke to Jolynn.   Spoke to Juana  . Juana will address with  and return this writers call or call pt and address  Plan/Follow Up: Will continue to review, monitor and address alerts with follow up based on severity of symptoms and risk factors.

## 2025-02-17 ENCOUNTER — NURSE ONLY (OUTPATIENT)
Age: 75
End: 2025-02-17

## 2025-02-17 VITALS — DIASTOLIC BLOOD PRESSURE: 62 MMHG | SYSTOLIC BLOOD PRESSURE: 136 MMHG

## 2025-02-19 ENCOUNTER — CARE COORDINATION (OUTPATIENT)
Dept: CARE COORDINATION | Age: 75
End: 2025-02-19

## 2025-02-19 NOTE — CARE COORDINATION
Care Transitions Note    Follow Up Call     Attempted to reach patient for transitions of care follow up.  Unable to reach patient.      Outreach Attempts:   HIPAA compliant voicemail left for patient, first attempt.    Care Summary Note:   -Noted in EMR patient completed urology appointment on 2/13/25.  -Completed B/P check at PCP office on 2/17/25, 136/62.  -Remains active in RPM:        Follow Up Appointment:   Future Appointments         Provider Specialty Dept Phone    2/25/2025 1:45 PM Patricia Ji MD Gastroenterology [phone]    3/3/2025 2:30 PM Prasanth Gurrola MD Neurology [phone]    3/4/2025 9:30 AM Sean Levy MD Family Medicine [phone]    2/13/2026 9:30 AM Jorge Mcgrath PA Urology [phone]            Plan for follow-up call in 6-10 days based on severity of symptoms and risk factors.   Plan for next call: symptom check-c diff   self management-review RPM metrics.  follow-up appointment-review any provider visits as applicable.   medication management-completed Vancomycin?        Shena Inman RN

## 2025-02-20 ENCOUNTER — CARE COORDINATION (OUTPATIENT)
Dept: CARE COORDINATION | Age: 75
End: 2025-02-20

## 2025-02-20 NOTE — CARE COORDINATION
-CTN received VM from patient in response to message left by CTN yesterday.  Patient stated his B/P today was 120/64 and he wanted to inform CTN he would not be available to talk the remainder of the day d/t planned activities he has.  -CTN will attempt to speak with patient at a later date.

## 2025-02-21 VITALS — SYSTOLIC BLOOD PRESSURE: 121 MMHG | HEART RATE: 70 BPM | DIASTOLIC BLOOD PRESSURE: 67 MMHG

## 2025-02-24 ENCOUNTER — CARE COORDINATION (OUTPATIENT)
Dept: CASE MANAGEMENT | Age: 75
End: 2025-02-24

## 2025-02-24 NOTE — CARE COORDINATION
-Remote Alert Monitoring Note  Date/Time:  2025 11:26 AM  Patient Current Location: [place]  Verified patients name and  as identifiers.    Rpm alert to be reviewed by the provider   red alert  blood pressure reading (110/64)  Vitals Recheck blood pressure reading (119/65)  Additional needs to be addressed by provider: No         Patient rechecked BP at 119/65.  All reported metrics are WNL of RPM Alert Parameters.    Raysa Padilla LPN    [phone]  [facility] Coordinator / RPM    
with follow up based on severity of symptoms and risk factors.  **For any new or worsening symptoms or you are concerned in anyway, please contact your Provider or report to the nearest Emergency Room.**

## 2025-02-25 ENCOUNTER — OFFICE VISIT (OUTPATIENT)
Dept: GASTROENTEROLOGY | Age: 75
End: 2025-02-25
Payer: MEDICARE

## 2025-02-25 VITALS
OXYGEN SATURATION: 99 % | SYSTOLIC BLOOD PRESSURE: 110 MMHG | DIASTOLIC BLOOD PRESSURE: 60 MMHG | BODY MASS INDEX: 33.43 KG/M2 | HEART RATE: 83 BPM | WEIGHT: 204 LBS

## 2025-02-25 DIAGNOSIS — R19.7 DIARRHEA, UNSPECIFIED TYPE: ICD-10-CM

## 2025-02-25 DIAGNOSIS — A04.72 C. DIFFICILE COLITIS: ICD-10-CM

## 2025-02-25 DIAGNOSIS — A04.72 C. DIFFICILE COLITIS: Primary | ICD-10-CM

## 2025-02-25 LAB
REASON FOR REJECTION: NORMAL
REJECTED TEST: NORMAL

## 2025-02-25 PROCEDURE — G8427 DOCREV CUR MEDS BY ELIG CLIN: HCPCS | Performed by: SPECIALIST

## 2025-02-25 PROCEDURE — G8417 CALC BMI ABV UP PARAM F/U: HCPCS | Performed by: SPECIALIST

## 2025-02-25 PROCEDURE — 99213 OFFICE O/P EST LOW 20 MIN: CPT | Performed by: SPECIALIST

## 2025-02-25 PROCEDURE — 3078F DIAST BP <80 MM HG: CPT | Performed by: SPECIALIST

## 2025-02-25 PROCEDURE — 1036F TOBACCO NON-USER: CPT | Performed by: SPECIALIST

## 2025-02-25 PROCEDURE — 1159F MED LIST DOCD IN RCRD: CPT | Performed by: SPECIALIST

## 2025-02-25 PROCEDURE — 3074F SYST BP LT 130 MM HG: CPT | Performed by: SPECIALIST

## 2025-02-25 PROCEDURE — 1123F ACP DISCUSS/DSCN MKR DOCD: CPT | Performed by: SPECIALIST

## 2025-02-25 PROCEDURE — 3017F COLORECTAL CA SCREEN DOC REV: CPT | Performed by: SPECIALIST

## 2025-02-25 RX ORDER — VANCOMYCIN HYDROCHLORIDE 125 MG/1
125 CAPSULE ORAL 4 TIMES DAILY
Qty: 56 CAPSULE | Refills: 0 | Status: SHIPPED | OUTPATIENT
Start: 2025-02-25 | End: 2025-03-11

## 2025-02-25 ASSESSMENT — ENCOUNTER SYMPTOMS
EYES NEGATIVE: 1
ABDOMINAL DISTENTION: 0
DIARRHEA: 1
VOMITING: 0
CONSTIPATION: 0
RESPIRATORY NEGATIVE: 1
NAUSEA: 0
ABDOMINAL PAIN: 0
RECTAL PAIN: 0
ANAL BLEEDING: 0
BLOOD IN STOOL: 0

## 2025-02-25 NOTE — PROGRESS NOTES
Gastroenterology Clinic Follow up Visit    Hector Bose  [id number]  Chief Complaint   Patient presents with    Follow-up     FU go over labs, feeling better       HPI and A/P at last visit summarized below:  Patient is here for follow-up, patient was admitted with diarrhea and hypotension in February 2025 and was diagnosed to have C. difficile colitis and was treated with p.o. vancomycin for 10-days, patient felt better after oral vancomycin however symptoms recurred few days after this was stopped, frequency of BM is about 8-10 times a day, no blood in the stool.  Stool volume varies, has noticed mucus in the stool, no abdominal pain no emesis and no fever, had bowel resection for complicated diverticulitis in December 2024.  Patient has history of gastric and duodenal ulcer and has been on Protonix 40 mg once a day.    Review of Systems   Constitutional: Negative.    HENT: Negative.     Eyes: Negative.    Respiratory: Negative.     Gastrointestinal:  Positive for diarrhea. Negative for abdominal distention, abdominal pain, anal bleeding, blood in stool, constipation, nausea, rectal pain and vomiting.   Genitourinary: Negative.    Musculoskeletal: Negative.    Neurological: Negative.    Psychiatric/Behavioral: Negative.          Past medical history, past surgical history, medication list, social and familyhistory reviewed    Blood pressure 110/60, pulse 83, weight 92.5 kg (204 lb), SpO2 99%.    Physical Exam  Constitutional:       Appearance: He is well-developed.   HENT:      Head: Normocephalic.   Eyes:      Pupils: Pupils are equal, round, and reactive to light.   Cardiovascular:      Rate and Rhythm: Normal rate and regular rhythm.      Heart sounds: Normal heart sounds.   Pulmonary:      Effort: Pulmonary effort is normal.      Breath sounds: Normal breath sounds.   Abdominal:      General: Bowel sounds are normal.      Palpations: Abdomen is soft.      Comments: Soft nontender no palpable mass surgical

## 2025-02-26 ENCOUNTER — CARE COORDINATION (OUTPATIENT)
Dept: CARE COORDINATION | Age: 75
End: 2025-02-26

## 2025-02-26 ENCOUNTER — TELEPHONE (OUTPATIENT)
Dept: GASTROENTEROLOGY | Age: 75
End: 2025-02-26

## 2025-02-26 DIAGNOSIS — A04.72 C. DIFFICILE COLITIS: Primary | ICD-10-CM

## 2025-02-26 LAB — C DIFF TOX A+B STL QL IA: ABNORMAL

## 2025-02-26 NOTE — CARE COORDINATION
Care Transitions Note    Follow Up Call     Patient Current Location:  Home: [address] [place] [address] [place] [address]    Care Transition Nurse contacted the patient by telephone.     Additional needs identified to be addressed with provider   No needs identified                 Method of communication with provider: none.    Care Summary Note:   -Patient pleasant in conversation, states he remains energetic.    -Reviewed RPM metrics with patient.  Last red alert occurred on 2/24/25 in which patient took a dose of Midodrine with resolution.  -Patient denies any needs at this time.  -CTN informed patient that next outreach will be final call and then he will transition to Mable Bey [facility] affiliated with PCP office who will continue to follow as he is active in RPM.  Patient verbalized understanding.    -CTN will staff message [facility] to inform.    Plan of care updates since last contact:  Completed urology appointment with PA on 2/13/25.  Completed GI appointment (Dr Ji)-stool for C-diff done.  Has appointment with nephrology (Dr Yoon) on 3/6/25.  Completed needed labs yesterday in preparation for appointment.       Advance Care Planning:   Does patient have an Advance Directive: health care decision maker confirmed on a prior call.     Medication Review:  Medications changed since last call, reviewed today-another round of oral Vancomycin ordered by GI yesterday.  Patient took first dose today.    Remote Patient Monitoring:  Offered patient enrollment in the Remote Patient Monitoring (RPM) program for in-home monitoring: Yes, patient enrolled; current status is activated and monitoring.          Assessments:  Care Transitions Subsequent and Final Call    Subsequent and Final Calls  Have your medications changed?: No  Do you have any questions related to your medications?: No  Do you currently have any active services?: No  Do you have any needs or concerns that I can assist you with?: No  Identified Barriers:

## 2025-03-03 ENCOUNTER — OFFICE VISIT (OUTPATIENT)
Dept: NEUROLOGY | Age: 75
End: 2025-03-03

## 2025-03-03 VITALS
BODY MASS INDEX: 33.92 KG/M2 | DIASTOLIC BLOOD PRESSURE: 68 MMHG | SYSTOLIC BLOOD PRESSURE: 134 MMHG | HEART RATE: 74 BPM | WEIGHT: 207 LBS

## 2025-03-03 DIAGNOSIS — G24.3 CERVICAL DYSTONIA: Primary | ICD-10-CM

## 2025-03-03 DIAGNOSIS — G56.03 BILATERAL CARPAL TUNNEL SYNDROME: ICD-10-CM

## 2025-03-03 DIAGNOSIS — M54.12 CERVICAL RADICULOPATHY: ICD-10-CM

## 2025-03-03 DIAGNOSIS — G24.3 SPASMODIC TORTICOLLIS: ICD-10-CM

## 2025-03-03 DIAGNOSIS — G44.86 CERVICOGENIC HEADACHE: ICD-10-CM

## 2025-03-03 RX ORDER — LORAZEPAM 1 MG/1
TABLET ORAL
Qty: 1 TABLET | Refills: 0 | Status: SHIPPED | OUTPATIENT
Start: 2025-03-03 | End: 2025-04-04

## 2025-03-03 ASSESSMENT — ENCOUNTER SYMPTOMS
BACK PAIN: 0
COLOR CHANGE: 0
TROUBLE SWALLOWING: 0
PHOTOPHOBIA: 0
NAUSEA: 0
VOMITING: 0
CHOKING: 0
SHORTNESS OF BREATH: 0

## 2025-03-03 NOTE — PROGRESS NOTES
know.    Prasanth Gurrola MD, VALARIE  Diplomate, American Board of Psychiatry & Neurology  Board Certified in Vascular Neurology  Board Certified in Neuromuscular Medicine  Certified in Neurorehabilitation         Plan:      Orders Placed This Encounter   Procedures    MRI CERVICAL SPINE WO CONTRAST     Standing Status:   Future     Standing Expiration Date:   3/3/2026     Order Specific Question:   Reason for exam:     Answer:   Cervical dystonia rule out myelopathy     Orders Placed This Encounter   Medications    LORazepam (ATIVAN) 1 MG tablet     Si to be taken half an hour before MRI     Dispense:  1 tablet     Refill:  0       Return in about 3 months (around 6/3/2025).      Prasanth Gurrola MD

## 2025-03-03 NOTE — PROGRESS NOTES
ileocecectomy performed by Cicso Mccray MD at [facility]    OTHER SURGICAL HISTORY Right 06/08/16    I & D ABSCESS THIGH    MD ARTHRP KNE CONDYLE&PLATU MEDIAL&LAT COMPARTMENTS Right 1/18/2018    RIGHT KNEE TOTAL KNEE ARTHROPLASTY ELÍAS SPINAL,NERVE BLOCK performed by Osmar Marie MD at [facility]    MD ARTHRP KNE CONDYLE&PLATU MEDIAL&LAT COMPARTMENTS Left 5/17/2018    LEFT KNEE TOTAL KNEE ARTHROPLASTY, performed by Osmar Marie MD at [facility]    MD MANIPULATION KNEE JOINT UNDER GENERAL ANESTHESIA Right 3/15/2018    RIGHT KNEE MANIPULATION performed by Osmar Marie MD at [facility]    SHOULDER SURGERY Right 1979    due to displacement    UPPER GASTROINTESTINAL ENDOSCOPY  4/29/15    w/rufino nj    UPPER GASTROINTESTINAL ENDOSCOPY  02/28/2018    Dr. baltazar Barahona    UPPER GASTROINTESTINAL ENDOSCOPY N/A 1/7/2025    ESOPHAGOGASTRODUODENOSCOPY WITH BIOPSY & CLIP PLACEMENT performed by Patricia Ji MD at [facility]     Social History     Socioeconomic History    Marital status:      Spouse name: Not on file    Number of children: Not on file    Years of education: Not on file    Highest education level: Not on file   Occupational History    Occupation: retired   Tobacco Use    Smoking status: Never    Smokeless tobacco: Never   Vaping Use    Vaping status: Never Used   Substance and Sexual Activity    Alcohol use: Yes     Comment: rare social    Drug use: No    Sexual activity: Yes     Partners: Female   Other Topics Concern    Not on file   Social History Narrative    Lives w wife     Social Determinants of Health     Financial Resource Strain: Low Risk  (6/3/2024)    Overall Financial Resource Strain (CARDIA)     Difficulty of Paying Living Expenses: Not very hard   Food Insecurity: Patient Declined (2/4/2025)    Hunger Vital Sign     Worried About Running Out of Food in the Last Year: Patient declined     Ran Out of Food in the Last Year: Patient declined   Transportation

## 2025-03-04 ENCOUNTER — OFFICE VISIT (OUTPATIENT)
Age: 75
End: 2025-03-04
Payer: MEDICARE

## 2025-03-04 VITALS
HEART RATE: 74 BPM | OXYGEN SATURATION: 98 % | HEIGHT: 66 IN | BODY MASS INDEX: 32.95 KG/M2 | RESPIRATION RATE: 14 BRPM | SYSTOLIC BLOOD PRESSURE: 138 MMHG | DIASTOLIC BLOOD PRESSURE: 80 MMHG | WEIGHT: 205 LBS

## 2025-03-04 DIAGNOSIS — I10 ESSENTIAL HYPERTENSION: ICD-10-CM

## 2025-03-04 DIAGNOSIS — N18.31 TYPE 2 DIABETES MELLITUS WITH STAGE 3A CHRONIC KIDNEY DISEASE, WITHOUT LONG-TERM CURRENT USE OF INSULIN (HCC): Primary | ICD-10-CM

## 2025-03-04 DIAGNOSIS — D64.9 ANEMIA, UNSPECIFIED TYPE: ICD-10-CM

## 2025-03-04 DIAGNOSIS — E11.22 TYPE 2 DIABETES MELLITUS WITH STAGE 3A CHRONIC KIDNEY DISEASE, WITHOUT LONG-TERM CURRENT USE OF INSULIN (HCC): Primary | ICD-10-CM

## 2025-03-04 LAB
BASOPHILS # BLD: 0 K/UL (ref 0–0.2)
BASOPHILS NFR BLD: 0.3 %
CHOLEST SERPL-MCNC: 92 MG/DL (ref 0–199)
EOSINOPHIL # BLD: 0.2 K/UL (ref 0–0.7)
EOSINOPHIL NFR BLD: 2.1 %
ERYTHROCYTE [DISTWIDTH] IN BLOOD BY AUTOMATED COUNT: 15.8 % (ref 11.5–14.5)
HBA1C MFR BLD: 5.4 %
HCT VFR BLD AUTO: 30.5 % (ref 42–52)
HDLC SERPL-MCNC: 23 MG/DL (ref 40–59)
HGB BLD-MCNC: 10.1 G/DL (ref 14–18)
LDL CHOLESTEROL: 40 MG/DL (ref 0–129)
LYMPHOCYTES # BLD: 3.5 K/UL (ref 1–4.8)
LYMPHOCYTES NFR BLD: 48.5 %
MCH RBC QN AUTO: 31.6 PG (ref 27–31.3)
MCHC RBC AUTO-ENTMCNC: 33.1 % (ref 33–37)
MCV RBC AUTO: 95.3 FL (ref 79–92.2)
MONOCYTES # BLD: 0.5 K/UL (ref 0.2–0.8)
MONOCYTES NFR BLD: 7.5 %
NEUTROPHILS # BLD: 3 K/UL (ref 1.4–6.5)
NEUTS SEG NFR BLD: 41.2 %
PLATELET # BLD AUTO: 207 K/UL (ref 130–400)
RBC # BLD AUTO: 3.2 M/UL (ref 4.7–6.1)
TRIGLYCERIDE, FASTING: 145 MG/DL (ref 0–150)
WBC # BLD AUTO: 7.2 K/UL (ref 4.8–10.8)

## 2025-03-04 PROCEDURE — 99214 OFFICE O/P EST MOD 30 MIN: CPT | Performed by: INTERNAL MEDICINE

## 2025-03-04 PROCEDURE — 3044F HG A1C LEVEL LT 7.0%: CPT | Performed by: INTERNAL MEDICINE

## 2025-03-04 PROCEDURE — 3075F SYST BP GE 130 - 139MM HG: CPT | Performed by: INTERNAL MEDICINE

## 2025-03-04 PROCEDURE — PBSHW POCT GLYCOSYLATED HEMOGLOBIN (HGB A1C): Performed by: INTERNAL MEDICINE

## 2025-03-04 PROCEDURE — 3079F DIAST BP 80-89 MM HG: CPT | Performed by: INTERNAL MEDICINE

## 2025-03-04 PROCEDURE — 1036F TOBACCO NON-USER: CPT | Performed by: INTERNAL MEDICINE

## 2025-03-04 PROCEDURE — 99213 OFFICE O/P EST LOW 20 MIN: CPT | Performed by: INTERNAL MEDICINE

## 2025-03-04 PROCEDURE — G8427 DOCREV CUR MEDS BY ELIG CLIN: HCPCS | Performed by: INTERNAL MEDICINE

## 2025-03-04 PROCEDURE — 83036 HEMOGLOBIN GLYCOSYLATED A1C: CPT | Performed by: INTERNAL MEDICINE

## 2025-03-04 PROCEDURE — 3017F COLORECTAL CA SCREEN DOC REV: CPT | Performed by: INTERNAL MEDICINE

## 2025-03-04 PROCEDURE — 1123F ACP DISCUSS/DSCN MKR DOCD: CPT | Performed by: INTERNAL MEDICINE

## 2025-03-04 PROCEDURE — 2022F DILAT RTA XM EVC RTNOPTHY: CPT | Performed by: INTERNAL MEDICINE

## 2025-03-04 PROCEDURE — G8417 CALC BMI ABV UP PARAM F/U: HCPCS | Performed by: INTERNAL MEDICINE

## 2025-03-04 PROCEDURE — 1159F MED LIST DOCD IN RCRD: CPT | Performed by: INTERNAL MEDICINE

## 2025-03-05 ENCOUNTER — TELEPHONE (OUTPATIENT)
Dept: NEUROLOGY | Age: 75
End: 2025-03-05

## 2025-03-05 DIAGNOSIS — G24.3 CERVICAL DYSTONIA: Primary | ICD-10-CM

## 2025-03-05 NOTE — TELEPHONE ENCOUNTER
Botox scheduled 04/10/25 830 am at [facility].  Patient aware of appt.

## 2025-03-07 ENCOUNTER — CARE COORDINATION (OUTPATIENT)
Dept: CARE COORDINATION | Age: 75
End: 2025-03-07

## 2025-03-07 NOTE — CARE COORDINATION
active services?: No  Do you have any needs or concerns that I can assist you with?: No  Identified Barriers: None  Care Transitions Interventions     Other Services: Completed (Comment: 2/5/25-RPM)     Registered Dietician: Declined    Other Interventions:              Upcoming Appointments:    Future Appointments         Provider Specialty Dept Phone    3/18/2025 2:45 PM Patricia Ji MD Gastroenterology [phone]    3/24/2025 7:30 AM (Arrive by 7:00 AM) [facility] ROOM 1 Radiology [phone]    4/10/2025 8:30 AM Prasanth Gurrola MD [phone]    6/3/2025 1:00 PM Prasanth Gurrola MD Neurology [phone]    6/16/2025 9:15 AM Sean Levy MD Family Medicine [phone]    2/13/2026 9:30 AM Jorge Mcgrath PA Urology [phone]            Patient has agreed to contact primary care provider and/or specialist for any further questions, concerns, or needs.    Shena Inman RN

## 2025-03-11 ENCOUNTER — CARE COORDINATION (OUTPATIENT)
Dept: CARE COORDINATION | Age: 75
End: 2025-03-11

## 2025-03-11 SDOH — ECONOMIC STABILITY: FOOD INSECURITY: WITHIN THE PAST 12 MONTHS, YOU WORRIED THAT YOUR FOOD WOULD RUN OUT BEFORE YOU GOT MONEY TO BUY MORE.: NEVER TRUE

## 2025-03-11 SDOH — ECONOMIC STABILITY: INCOME INSECURITY: IN THE LAST 12 MONTHS, WAS THERE A TIME WHEN YOU WERE NOT ABLE TO PAY THE MORTGAGE OR RENT ON TIME?: NO

## 2025-03-11 SDOH — ECONOMIC STABILITY: FOOD INSECURITY: WITHIN THE PAST 12 MONTHS, THE FOOD YOU BOUGHT JUST DIDN'T LAST AND YOU DIDN'T HAVE MONEY TO GET MORE.: NEVER TRUE

## 2025-03-11 NOTE — CARE COORDINATION
Ambulatory Care Coordination Note     3/11/2025 2:45 PM     Patient Current Location:  Home: [address] [place] [address] [place] [address]     This patient was received as a referral from Care Transition Nurse.    ACM contacted the patient by telephone. Verified name and  with patient as identifiers. Provided introduction to self, and explanation of the ACM role.   Patient accepted care management services at this time.          ACM: Mable Bey RN     Challenges to be reviewed by the provider   Additional needs identified to be addressed with provider No  none               Method of communication with provider: none.    Utilization: Initial Call - N/A    Care Summary Note: spoke with patient for care coordination enrollment for DM, HTN, neck pain, and Cdiff  Patient agrees to follow up calls and CC  ACM reviewed medications and updated CC protocol, goals, and education  Patient in hospital 2/3- for C-Diff and hypotension.  Patient advised to stop Benazepril, potassium, and lasix.  Patient still taking Metoprolol and takes Midodrine if SBP below 110.  Patient states he hasn't had to take the Midodrine in last week.  Denies dizziness, headaches, lightheadedness, or ringing in ears.    RPM active.  ACM reviewed last 5 days.  BP stable.  123/69, 118/71, 124/68, 120/69, 122/69; pulse 62-71.  Patient educated on importance of monitoring daily before noon, voiced understanding  Patient w/ C-diff.  Taking 2nd round of Vancomycin.  Has few days left.  States diarrhea is improving.  Still having loose stools 2-3 times daily.  States not as liquid and becoming more formed.  Reviewed diet of non fatty, non spicy, non acidic, low carb, and low sugar.  Patient voiced understanding and states he is following a bland healthier diet.  Weight down to 205#.  States had an approx 25# weight loss since Dec due to GI ulcer/bleed and now the C-Diff.  ACM encouraged patient to monitor weight.    Denies CP, palpitations, cough,

## 2025-03-18 ENCOUNTER — OFFICE VISIT (OUTPATIENT)
Dept: GASTROENTEROLOGY | Age: 75
End: 2025-03-18
Payer: MEDICARE

## 2025-03-18 VITALS — BODY MASS INDEX: 34.06 KG/M2 | OXYGEN SATURATION: 99 % | HEART RATE: 67 BPM | WEIGHT: 211 LBS

## 2025-03-18 DIAGNOSIS — A04.72 C. DIFFICILE COLITIS: Primary | ICD-10-CM

## 2025-03-18 PROCEDURE — G8417 CALC BMI ABV UP PARAM F/U: HCPCS | Performed by: SPECIALIST

## 2025-03-18 PROCEDURE — G8427 DOCREV CUR MEDS BY ELIG CLIN: HCPCS | Performed by: SPECIALIST

## 2025-03-18 PROCEDURE — 99213 OFFICE O/P EST LOW 20 MIN: CPT | Performed by: SPECIALIST

## 2025-03-18 PROCEDURE — 3017F COLORECTAL CA SCREEN DOC REV: CPT | Performed by: SPECIALIST

## 2025-03-18 PROCEDURE — 1123F ACP DISCUSS/DSCN MKR DOCD: CPT | Performed by: SPECIALIST

## 2025-03-18 PROCEDURE — 1159F MED LIST DOCD IN RCRD: CPT | Performed by: SPECIALIST

## 2025-03-18 PROCEDURE — 1036F TOBACCO NON-USER: CPT | Performed by: SPECIALIST

## 2025-03-18 RX ORDER — FIDAXOMICIN 200 MG/1
200 TABLET, FILM COATED ORAL 2 TIMES DAILY
Qty: 20 TABLET | Refills: 0 | Status: SHIPPED | OUTPATIENT
Start: 2025-03-18

## 2025-03-18 ASSESSMENT — ENCOUNTER SYMPTOMS
NAUSEA: 0
ABDOMINAL DISTENTION: 0
RECTAL PAIN: 0
DIARRHEA: 1
ABDOMINAL PAIN: 0
VOMITING: 0
RESPIRATORY NEGATIVE: 1
BLOOD IN STOOL: 0
EYES NEGATIVE: 1
CONSTIPATION: 0
ANAL BLEEDING: 0

## 2025-03-18 NOTE — PROGRESS NOTES
Gastroenterology Clinic Follow up Visit    Hector Bose  [id number]  Chief Complaint   Patient presents with    Follow-up     3 wk FU       HPI and A/P at last visit summarized below:  Patient is here for follow-up, stool study done on February 25, 2025 showed positive C. difficile toxin and was treated with p.o. vancomycin for 2 weeks, patient finished vancomycin about 5 days, patient had a temporary improvement in symptoms for about few days and it got worse, had 5 BM today..  No nausea no abdominal pain no fever, has excess flatulence, stool is watery    Review of Systems   Constitutional: Negative.    HENT: Negative.     Eyes: Negative.    Respiratory: Negative.     Gastrointestinal:  Positive for diarrhea. Negative for abdominal distention, abdominal pain, anal bleeding, blood in stool, constipation, nausea, rectal pain and vomiting.   Genitourinary: Negative.    Musculoskeletal: Negative.    Neurological: Negative.    Psychiatric/Behavioral: Negative.          Past medical history, past surgical history, medication list, social and familyhistory reviewed    Pulse 67, weight 95.7 kg (211 lb), SpO2 99%.    Physical Exam  Constitutional:       Appearance: He is well-developed.   HENT:      Head: Normocephalic.   Eyes:      Pupils: Pupils are equal, round, and reactive to light.   Cardiovascular:      Rate and Rhythm: Normal rate and regular rhythm.      Heart sounds: Normal heart sounds.   Pulmonary:      Effort: Pulmonary effort is normal.      Breath sounds: Normal breath sounds.   Abdominal:      General: Bowel sounds are normal.      Palpations: Abdomen is soft.   Skin:     General: Skin is warm and dry.   Neurological:      Mental Status: He is alert.         Laboratory, Pathology, Radiology reviewed in detail with relevantimportant investigations summarized below:    Recent Labs     03/04/25  1002 02/25/25  1015   WBC 7.2 6.3   HGB 10.1* 9.3*   HCT 30.5* 28.9*   MCV 95.3* 94.4*    205     Lab

## 2025-03-19 ENCOUNTER — TELEPHONE (OUTPATIENT)
Dept: GASTROENTEROLOGY | Age: 75
End: 2025-03-19

## 2025-03-19 NOTE — TELEPHONE ENCOUNTER
Patient called stating the medication you prescribed Dificid is over $5,000. Patient is not sure if a PA will help the cost or if you can change it to something cheaper.

## 2025-03-21 NOTE — TELEPHONE ENCOUNTER
Called pt and he is bringing his prescription card for the office to copy and try to do a PA to see if we can get cost down.

## 2025-03-24 ENCOUNTER — CARE COORDINATION (OUTPATIENT)
Dept: CASE MANAGEMENT | Age: 75
End: 2025-03-24

## 2025-03-24 ENCOUNTER — HOSPITAL ENCOUNTER (OUTPATIENT)
Dept: MRI IMAGING | Age: 75
Discharge: HOME | End: 2025-03-26
Attending: PSYCHIATRY & NEUROLOGY
Payer: MEDICARE

## 2025-03-24 DIAGNOSIS — G24.3 CERVICAL DYSTONIA: ICD-10-CM

## 2025-03-24 PROCEDURE — 72141 MRI NECK SPINE W/O DYE: CPT

## 2025-03-24 NOTE — CARE COORDINATION
-Remote Alert Monitoring Note  Date/Time:  3/24/2025   1:59 PM  Patient Current Location: [place]  Verified patients name and  as identifiers.    Rpm alert to be reviewed by the provider   red alert  blood pressure reading (106/69)  Vitals Recheck blood pressure reading   Additional needs to be addressed by provider: No         Attempted to reach patient to rechecked BP  Left HIPAA Compliant message on Voice Mail to recheck BP. Number left on Voice mail.   Will continue to follow.     Raysa Padilla LPN    [phone]  [facility] Coordinator    
-Remote Alert Monitoring Note  Date/Time:  3/24/2025   3:06 PM  Patient Current Location: [place]  Verified patients name and  as identifiers.    Rpm alert to be reviewed by the provider   red alert  blood pressure reading (106/69)  Vitals Recheck blood pressure reading   Additional needs to be addressed by provider: No         Attempted to reach patient X 2 to rechecked BP  Left HIPAA Compliant message on Voice Mail to recheck BP. Number left on Voice mail.   ACM Notified.   Will continue to follow.     Raysa Padilla LPN    [phone]  [facility] Coordinator    
-Remote Alert Monitoring Note  Date/Time:  3/24/2025 11:40 AM  Patient Current Location: [place]  Verified patients name and  as identifiers.    Rpm alert to be reviewed by the provider   red alert  blood pressure reading (106/69)  Vitals Recheck blood pressure reading ( )  Additional needs to be addressed by provider: No         LPN contacted patient by telephone regarding red alert received   Background: HTN, Hypotension  Refer to 911 immediately if:  Patient unresponsive or unable to provide history  Change in cognition or sudden confusion  Patient unable to respond in complete sentences  Intense chest pain/tightness  Any concern for any clinical emergency  Red Alert: Provider response time of 1 hr required for any red alert requiring intervention  Yellow Alert: Provider response time of 3hr required for any escalated yellow alert  Patient Chief Complaint:  Blood Pressure BP Triage  Are you having any Chest Pain? no   Are you having any Shortness of Breath? no   Do you have a headache or have any vision changes? no   Are you having any numbness or tingling? no   Are you having any other health concerns or issues? no  Patient/Caregiver educated on how to properly take a blood pressure. Patient/Caregiver verbalizes understanding.     Clinical Interventions: Reviewed and followed up on alerts and treatments-Patient has low BP of 106/69.  Patient states, \"I had an MRI this morning and I took Ativan 1 mg 30 minutes before I went so I could get into the tube. I feel my BP is low because of that. I am still feeling the affects of it\". Patient has not taken his Midodrine at this time. He will take this afternoon when the Ativan affects wear off. Patient will recheck BP at 1:30 PM.     For any new or worsening symptoms of Dizziness, Syncope, Extreme Fatigue, N&V, Blurred Vision, Weakness, Lethargic, Mental Confusion, Weak Rapid Pulse or you are concerned in anyway, please contact your Provider, Call 911 or report to the 
of symptoms and risk factors.  **For any new or worsening symptoms or you are concerned in anyway, please contact your Provider or report to the nearest Emergency Room.**

## 2025-03-24 NOTE — TELEPHONE ENCOUNTER
Called in Vancomycin 125 mg four times a day for ten days to [facility] on [address], per Dr Ji. Called patient and let them know

## 2025-03-26 ENCOUNTER — CARE COORDINATION (OUTPATIENT)
Dept: CARE COORDINATION | Age: 75
End: 2025-03-26

## 2025-03-26 NOTE — CARE COORDINATION
Ambulatory Care Coordination Note     3/26/2025 3:50 PM     Patient Current Location:  Home: [address] [place] [address] [place] [address]     ACM contacted the patient by telephone. Verified name and  with patient as identifiers.         ACM: Mable Bey RN     Challenges to be reviewed by the provider   Additional needs identified to be addressed with provider No  none               Method of communication with provider: none.    Utilization: Patient has not had any utilization since our last call.     Care Summary Note: spoke with patient for care coordination follow up for DM, HTN, C-Diff, and GI issues  Patient seen by gastro 3/18.  Still reports liquid diarrhea numerous times daily.  Denies abd pain, nausea, vomiting, or bleeding.  Reports increase gas and liquid stools.  Started 3rd round of vancomycin today.  Was prescribed Dificid but costs approx $2,000 with insurance.  Dr. Ji decided Vanc 1 more time.  Patient aware to finish as prescribed.    Discussed importance of bland diet.  Advised to eat broths, eggs, rice, vegetable, bananas, apples, chicken, rice, and water.  Advised can eat yogurt if greek with no sugars.  Advised to avoid dairy, sugar, spicy, acidic foods.  Patient voiced understanding.    RPM active.  129/73, 120/73, 116/62, 106/69, 128/73; pulse 64-72  States lost weight but does not have scale to monitor.  States clothes are fitting more loose.    DM stable.  A1C=5.4.  does not monitor glucose.    Denies CP or palpitations.  Denies dizziness.    Denies recent falls.  Denies unsteady gait.    Advised to reach out with questions or needs, voiced understanding    Offered patient enrollment in the Remote Patient Monitoring (RPM) program for in-home monitoring: Yes, patient enrolled; current status is activated and monitoring.     Assessments Completed:   Diabetes Assessment    Medic Alert ID: No  Meal Planning: Other, Avoidance of concentrated sweets   How often do you test your blood sugar?:

## 2025-03-27 RX ORDER — METOPROLOL SUCCINATE 50 MG/1
50 TABLET, EXTENDED RELEASE ORAL DAILY
Qty: 90 TABLET | Refills: 3 | Status: SHIPPED | OUTPATIENT
Start: 2025-03-27

## 2025-03-27 NOTE — TELEPHONE ENCOUNTER
Rx requested:  Requested Prescriptions     Pending Prescriptions Disp Refills    metoprolol succinate (TOPROL XL) 50 MG extended release tablet [Pharmacy Med Name: METOPROLOL SUCC ER 50 MG TAB] 90 tablet 3     Sig: TAKE 1 TABLET BY MOUTH EVERY DAY         Last Office Visit:   3/4/2025      Next Visit Date:  Future Appointments   Date Time Provider Department Center   4/8/2025  1:45 PM Patricia Ji MD [facility]   4/10/2025  8:30 AM [facility]   6/3/2025  1:00 PM Prasanth Gurrola MD [place] [facility] Neurology -   6/16/2025  9:15 AM Sean Levy MD [facility]   2/13/2026  9:30 AM Jorge Mcgrath PA [facility] [facility]

## 2025-04-08 ENCOUNTER — OFFICE VISIT (OUTPATIENT)
Dept: GASTROENTEROLOGY | Age: 75
End: 2025-04-08
Payer: MEDICARE

## 2025-04-08 VITALS — HEART RATE: 67 BPM | BODY MASS INDEX: 33.57 KG/M2 | WEIGHT: 208 LBS | OXYGEN SATURATION: 99 %

## 2025-04-08 DIAGNOSIS — A04.72 C. DIFFICILE COLITIS: Primary | ICD-10-CM

## 2025-04-08 PROCEDURE — 1159F MED LIST DOCD IN RCRD: CPT | Performed by: SPECIALIST

## 2025-04-08 PROCEDURE — 1036F TOBACCO NON-USER: CPT | Performed by: SPECIALIST

## 2025-04-08 PROCEDURE — G8427 DOCREV CUR MEDS BY ELIG CLIN: HCPCS | Performed by: SPECIALIST

## 2025-04-08 PROCEDURE — G8417 CALC BMI ABV UP PARAM F/U: HCPCS | Performed by: SPECIALIST

## 2025-04-08 PROCEDURE — 1123F ACP DISCUSS/DSCN MKR DOCD: CPT | Performed by: SPECIALIST

## 2025-04-08 PROCEDURE — 3017F COLORECTAL CA SCREEN DOC REV: CPT | Performed by: SPECIALIST

## 2025-04-08 PROCEDURE — 99213 OFFICE O/P EST LOW 20 MIN: CPT | Performed by: SPECIALIST

## 2025-04-08 RX ORDER — VANCOMYCIN HYDROCHLORIDE 125 MG/1
CAPSULE ORAL
COMMUNITY
Start: 2025-03-25

## 2025-04-08 ASSESSMENT — ENCOUNTER SYMPTOMS
BLOOD IN STOOL: 0
ANAL BLEEDING: 0
RECTAL PAIN: 0
ABDOMINAL PAIN: 0
GASTROINTESTINAL NEGATIVE: 1
RESPIRATORY NEGATIVE: 1
VOMITING: 0
NAUSEA: 0
DIARRHEA: 0
ABDOMINAL DISTENTION: 0
CONSTIPATION: 0
EYES NEGATIVE: 1

## 2025-04-08 NOTE — PROGRESS NOTES
Gastroenterology Clinic Follow up Visit    Hector Bose  [id number]  Chief Complaint   Patient presents with    Follow-up     FU feeling pretty good       HPI and A/P at last visit summarized below:  Patient is here for follow-up, he has history of C. difficile colitis with recurrent relapse and was treated with 2 courses of oral vancomycin, since symptoms recurred patient was prescribed Dificid however because of the extreme cost patient was not able to fill the prescription and he took 1 more course of oral vancomycin for about 10 days and symptoms seem to have improved, patient finished p.o. vancomycin about 3 days ago.  It seems frequency and consistency of the stool has improved    Review of Systems   Constitutional: Negative.    HENT: Negative.     Eyes: Negative.    Respiratory: Negative.     Gastrointestinal: Negative.  Negative for abdominal distention, abdominal pain, anal bleeding, blood in stool, constipation, diarrhea, nausea, rectal pain and vomiting.        GI symptoms has improved   Genitourinary: Negative.    Musculoskeletal: Negative.    Neurological: Negative.    Psychiatric/Behavioral: Negative.          Past medical history, past surgical history, medication list, social and familyhistory reviewed    Pulse 67, weight 94.3 kg (208 lb), SpO2 99%.    Physical Exam    Laboratory, Pathology, Radiology reviewed in detail with relevantimportant investigations summarized below:    No results for input(s): \"WBC\", \"HGB\", \"HCT\", \"MCV\", \"PLT\" in the last 720 hours.  Lab Results   Component Value Date    ALT 18 02/03/2025    AST 17 02/03/2025    ALKPHOS 109 (H) 02/03/2025    BILITOT 0.3 02/03/2025     MRI CERVICAL SPINE WO CONTRAST  Result Date: 3/24/2025  EXAMINATION: MRI OF THE CERVICAL SPINE WITHOUT CONTRAST 3/24/2025 6:34 am TECHNIQUE: Multiplanar multisequence MRI of the cervical spine was performed without the administration of intravenous contrast. COMPARISON: None. HISTORY: ORDERING SYSTEM

## 2025-04-10 ENCOUNTER — HOSPITAL ENCOUNTER (OUTPATIENT)
Dept: NEUROLOGY | Age: 75
Discharge: HOME | End: 2025-04-10
Payer: MEDICARE

## 2025-04-10 DIAGNOSIS — G24.3 CERVICAL DYSTONIA: ICD-10-CM

## 2025-04-10 PROCEDURE — 95874 GUIDE NERV DESTR NEEDLE EMG: CPT

## 2025-04-10 PROCEDURE — 64616 CHEMODENERV MUSC NECK DYSTON: CPT

## 2025-04-10 NOTE — PROCEDURES
[facility]                   [address] [place] [address]                             ELECTROMYOGRAM      PATIENT NAME: BERENICE TEMPLETON            : 1950  MED REC NO: [id number]                        ROOM:   ACCOUNT NO: [id number]                       ADMIT DATE: 04/10/2025  PROVIDER: Ganga Roe MD      REFERRING PHYSICIAN:  GANGA ROE    200 units of botulinum toxin injected with EMG guidance for cervical dystonia.  100 units each side without any complications.          GANGA ROE MD      D:  04/10/2025 08:46:10     T:  04/10/2025 08:52:39     HELENA/EMERSON  Job #:  [id number]     Doc#:  [id number]

## 2025-04-14 ENCOUNTER — CARE COORDINATION (OUTPATIENT)
Dept: CASE MANAGEMENT | Age: 75
End: 2025-04-14

## 2025-04-14 NOTE — CARE COORDINATION
-Remote Alert Monitoring Note  Date/Time:  2025 10:10 AM  Patient Current Location: [place]  Verified patients name and  as identifiers.    Rpm alert to be reviewed by the provider   red alert  blood pressure reading (106/68)  Vitals Recheck blood pressure reading (128/67)  Additional needs to be addressed by provider: No         Patient rechecked BP at 128/67.  All reported metrics are WNL of RPM Alert Parameters.    Raysa Padilla LPN    [phone]  [facility] Coordinator / RPM

## 2025-04-14 NOTE — CARE COORDINATION
-Remote Alert Monitoring Note  Date/Time:  2025 8:59 AM  Patient Current Location: [place]  Verified patients name and  as identifiers.    Rpm alert to be reviewed by the provider   red alert  blood pressure reading (106/68)  Vitals Recheck blood pressure reading ( )  Additional needs to be addressed by provider: No         LPN attempted to contacted patient by telephone regarding red alert received   Background: HTN, Hypotension  Refer to 911 immediately if:  Patient unresponsive or unable to provide history  Change in cognition or sudden confusion  Patient unable to respond in complete sentences  Intense chest pain/tightness  Any concern for any clinical emergency  Red Alert: Provider response time of 1 hr required for any red alert requiring intervention  Yellow Alert: Provider response time of 3hr required for any escalated yellow alert    Clinical Interventions: Reviewed and followed up on alerts and treatments-Patient has low Systolic BP of 106/68      Attempted to reach patient & ER Contact, Wife, Melody for RPM Red Alert Call. Unable to reach patient.  Left HIPAA Compliant message on Voice Mail to Call Back. Number left on Voice mail.   Will continue to follow.     Raysa Padilla LPN    [phone]  [facility] Coordinator    Plan/Follow Up: Will continue to review, monitor and address alerts with follow up based on severity of symptoms and risk factors.  **For any new or worsening symptoms or you are concerned in anyway, please contact your Provider or report to the nearest Emergency Room.**

## 2025-04-16 ENCOUNTER — CARE COORDINATION (OUTPATIENT)
Dept: CARE COORDINATION | Age: 75
End: 2025-04-16

## 2025-04-16 NOTE — CARE COORDINATION
Ambulatory Care Coordination Note     2025 3:30 PM     Patient Current Location:  Home: [address] [place] [address] [address] [place] [address]     ACM contacted the patient by telephone. Verified name and  with patient as identifiers.         ACM: Mable Bey RN     Challenges to be reviewed by the provider   Additional needs identified to be addressed with provider No  none               Method of communication with provider: none.    Utilization: Patient has not had any utilization since our last call.     Care Summary Note: spoke with patient for care coordination follow up for DM, HTN, and C-Diff  Patient seen by gastro on .  Still having occas loose liquid stools.  States goes 2-4 days with no issues then has day of liquid watery stools.  Started another round of vancomycin.  Using as a taper dose.  Encouraged patient to finish as prescribed  Discussed diet at great length.  Patient states avoiding greasy, fried, acidic foods, salads, caffeine, and sugars.  Patient understands diet and is attempting to eat clean bland and healthy.  Advised to drink water and stay hydrated.  Denies N/V or abd cramps or pain.      DM stable.  Does not monitor glucose.  Reviewed and denies hypo/hyperglycemia episodes.    Denies CP or palpitations.  BP stable.  145/72 today.  Patient worried that is a little elevated for him.  Encouraged to monitor tomorrow.  Wait for pattern of elevated BP.  Voiced understanding.  123/70, 128/67, 106/68, 121/79 last few days.    RPM active and ACM reviewed last 14 days of activity.    Denies dizziness, cough, congestion, sob.  Denies edema.    Advised patient to reach out with questions or needs, voiced understanding  Does not monitor weight at home.  Weight 208# last week at [facility].  Was around 220# in November.     Offered patient enrollment in the Remote Patient Monitoring (RPM) program for in-home monitoring: Yes, patient enrolled; current status is activated and monitoring.     Assessments

## 2025-04-17 ENCOUNTER — TELEPHONE (OUTPATIENT)
Age: 75
End: 2025-04-17

## 2025-04-17 DIAGNOSIS — G24.3 CERVICAL DYSTONIA: Primary | ICD-10-CM

## 2025-04-17 NOTE — TELEPHONE ENCOUNTER
Next Botox scheduled 07/17/25 830 am at [facility].  Patient aware of appt

## 2025-04-23 DIAGNOSIS — N28.89 RENAL MASS: Primary | ICD-10-CM

## 2025-04-23 DIAGNOSIS — N40.1 BENIGN PROSTATIC HYPERPLASIA WITH URINARY OBSTRUCTION: ICD-10-CM

## 2025-04-23 DIAGNOSIS — N13.8 BENIGN PROSTATIC HYPERPLASIA WITH URINARY OBSTRUCTION: ICD-10-CM

## 2025-04-23 RX ORDER — ATORVASTATIN CALCIUM 40 MG/1
40 TABLET, FILM COATED ORAL DAILY
Qty: 90 TABLET | Refills: 3 | Status: SHIPPED | OUTPATIENT
Start: 2025-04-23

## 2025-04-23 RX ORDER — TAMSULOSIN HYDROCHLORIDE 0.4 MG/1
CAPSULE ORAL DAILY
Qty: 90 CAPSULE | Refills: 3 | Status: SHIPPED | OUTPATIENT
Start: 2025-04-23

## 2025-04-29 ENCOUNTER — CARE COORDINATION (OUTPATIENT)
Dept: CARE COORDINATION | Age: 75
End: 2025-04-29

## 2025-04-29 RX ORDER — FINASTERIDE 5 MG/1
5 TABLET, FILM COATED ORAL DAILY
Qty: 30 TABLET | Refills: 3 | Status: SHIPPED | OUTPATIENT
Start: 2025-04-29

## 2025-04-29 NOTE — CARE COORDINATION
Ambulatory Care Coordination Note     2025 1:52 PM     Patient Current Location:  Home: [address] [place] [address] [address] [address]     Patient contacted the patient by telephone. Verified name and  with patient as identifiers.         ACM: Mable Bey RN     Challenges to be reviewed by the provider   Additional needs identified to be addressed with provider No  none               Method of communication with provider: none.    Utilization: Patient has not had any utilization since our last call.     Care Summary Note: spoke with patient for care coordination follow up for DM, Htn, and hx c-diff  Patient states he is doing well for this call.  Denies any questions or needs  Patient denies diarrhea, N/V, or abd pain.  States has occas loose stool but is able to go out in public and states is almost completely gone.  Down to Vancomycin 1 pill daily and starting Friday is 1 pill every other day until gone.  Strongly encouraged to reach out if symptoms worsen again.    DM stable.  Does not monitor glucose.  Denies hypo/hyperglycemia symptoms.  Understands diet and exercise and importance of taking Metformin as prescribed.    RPM active.  BP stable.  116/65, 123/72, 120/68, 124/74; pulse 65-75.  Denies CP or palpitations.  Denies dizziness or headaches.  Denies cough, congestion, sob, or edema.    Prepare to graduate if stable    Offered patient enrollment in the Remote Patient Monitoring (RPM) program for in-home monitoring: Yes, patient enrolled; current status is activated and monitoring.     Assessments Completed:   Diabetes Assessment    Medic Alert ID: No  Meal Planning: Other, Avoidance of concentrated sweets   How often do you test your blood sugar?: No Testing   Do you have barriers with adherence to non-pharmacologic self-management interventions? (Nutrition/Exercise/Self-Monitoring): No   Have you ever had to go to the ED for symptoms of low blood sugar?: No       Other symptoms causing concern   Do you

## 2025-05-05 RX ORDER — VANCOMYCIN HYDROCHLORIDE 125 MG/1
CAPSULE ORAL
Qty: 48 CAPSULE | Refills: 0 | Status: SHIPPED | OUTPATIENT
Start: 2025-05-05

## 2025-05-16 ENCOUNTER — CARE COORDINATION (OUTPATIENT)
Dept: CARE COORDINATION | Age: 75
End: 2025-05-16

## 2025-05-16 ENCOUNTER — CARE COORDINATION (OUTPATIENT)
Dept: PRIMARY CARE CLINIC | Age: 75
End: 2025-05-16

## 2025-05-16 DIAGNOSIS — I95.9 HYPOTENSION, UNSPECIFIED HYPOTENSION TYPE: ICD-10-CM

## 2025-05-16 DIAGNOSIS — I10 ESSENTIAL HYPERTENSION: Primary | ICD-10-CM

## 2025-05-16 NOTE — PROGRESS NOTES
Remote Patient Order Discontinued    Received request from Mable Bey RN   to discontinue order for remote patient monitoring of HTN with hypotension and order completed.

## 2025-05-16 NOTE — CARE COORDINATION
Remote Patient Monitoring Graduation      Date/Time:  5/16/2025 10:51 AM  Patient Current Location: Home: [address] [place] [address] [address] [place] [address]  Patient has graduated from the Remote Patient Monitoring program on 5/16/2025.   RPM goals have been met at this time.      Patient has been provided instruction on process to return RPM equipment and RPM has been deactivated.     Patient has ACM's contact information for any further questions, concerns, or needs.   
needs  Patient feels confident with managing health and wellness.  All questions answered  ACM will place Graduate order for RPM  Patient aware to box equipment and [employer] will .    Offered patient enrollment in the Remote Patient Monitoring (RPM) program for in-home monitoring: Patient being discharged from remote patient monitoring program today.     Assessments Completed:   Diabetes Assessment    Medic Alert ID: No  Meal Planning: Other, Avoidance of concentrated sweets   How often do you test your blood sugar?: No Testing   Do you have barriers with adherence to non-pharmacologic self-management interventions? (Nutrition/Exercise/Self-Monitoring): No   Have you ever had to go to the ED for symptoms of low blood sugar?: No       No patient-reported symptoms         ,   Hypertension - Encounter Level    Symptom course: stable (Comment: 112/63, 116/65, 122/66)      ,   General Assessment    Do you have any symptoms that are causing concern?: Yes  Progression since Onset: Resolved  Reported Symptoms: Other (Comment: C-Diff)          Medications Reviewed:   Patient denies any changes with medications and reports taking all medications as prescribed.    Advance Care Planning:   Reviewed and current     Care Planning:    Goals Addressed                   This Visit's Progress     COMPLETED: Conditions and Symptoms   On track     I will schedule office visits, as directed by my provider.  I will keep my appointment or reschedule if I have to cancel.  I will notify my provider of any barriers to my plan of care.  I will follow my Zone Management tool to seek urgent or emergent care.  I will notify my provider of any symptoms that indicate a worsening of my condition.    Barriers: overwhelmed by complexity of regimen  Plan for overcoming my barriers: will work with aCM towards goals and ACM will provide education    Confidence: 9/10  Anticipated Goal Completion Date: 7/11/25                 PCP/Specialist follow up:

## 2025-05-16 NOTE — CARE COORDINATION
Patient Hector Bose  05/16/25     Care Coordination  placed call to patient to arrange RPM kit  through [employer]. Left HIPAA Compliant Message     provided return and how to pack equipment in original packing via the patients voicemail if available and provided call back number should patient have questions.    Patient made aware [employer] will  equipment in 2-4 days.

## 2025-05-16 NOTE — CARE COORDINATION
Ambulatory Care Coordination Note     5/16/2025 9:41 AM     ACM outreach attempt by this ACM today to perform care management follow up . ACM was unable to reach the patient by telephone today;   left voice message requesting a return phone call to this ACM.     ACM: Mable Bey RN     Care Summary Note: unable to reach  ACM reviewed /63, 116/65, 122/66, 137/64; pulse 60s  Will graduate RPM and CC on follow up    PCP/Specialist follow up:   Future Appointments         Provider Specialty Dept Phone    6/10/2025 2:00 PM Prasanth Gurrola MD Neurology [phone]    6/16/2025 9:15 AM Sean Levy MD Family Medicine [phone]    6/18/2025 11:30 AM Patricia Ji MD Gastroenterology [phone]    7/17/2025 8:30 AM Prasanth Gurrola MD [phone]    2/13/2026 9:30 AM Jorge Mcgrath PA Urology [phone]            Follow Up:   Plan for next ACM outreach in approximately 1 week to complete:  - disease specific assessments  - goal progression  - education   - RPM.

## 2025-06-09 ENCOUNTER — LAB (OUTPATIENT)
Dept: LAB | Facility: CLINIC | Age: 75
End: 2025-06-09
Payer: MEDICARE

## 2025-06-09 DIAGNOSIS — C91.10 CLL (CHRONIC LYMPHOCYTIC LEUKEMIA) (MULTI): ICD-10-CM

## 2025-06-09 DIAGNOSIS — E61.1 IRON DEFICIENCY: ICD-10-CM

## 2025-06-09 LAB
ALBUMIN SERPL BCP-MCNC: 3.7 G/DL (ref 3.4–5)
ALP SERPL-CCNC: 66 U/L (ref 33–136)
ALT SERPL W P-5'-P-CCNC: 17 U/L (ref 10–52)
ANION GAP SERPL CALC-SCNC: 12 MMOL/L (ref 10–20)
AST SERPL W P-5'-P-CCNC: 12 U/L (ref 9–39)
BASOPHILS # BLD AUTO: 0.02 X10*3/UL (ref 0–0.1)
BASOPHILS NFR BLD AUTO: 0.3 %
BILIRUB SERPL-MCNC: 0.3 MG/DL (ref 0–1.2)
BUN SERPL-MCNC: 32 MG/DL (ref 6–23)
CALCIUM SERPL-MCNC: 8.3 MG/DL (ref 8.6–10.3)
CHLORIDE SERPL-SCNC: 109 MMOL/L (ref 98–107)
CO2 SERPL-SCNC: 20 MMOL/L (ref 21–32)
CREAT SERPL-MCNC: 1.74 MG/DL (ref 0.5–1.3)
EGFRCR SERPLBLD CKD-EPI 2021: 41 ML/MIN/1.73M*2
EOSINOPHIL # BLD AUTO: 0.1 X10*3/UL (ref 0–0.4)
EOSINOPHIL NFR BLD AUTO: 1.4 %
ERYTHROCYTE [DISTWIDTH] IN BLOOD BY AUTOMATED COUNT: 15.7 % (ref 11.5–14.5)
FERRITIN SERPL-MCNC: 111 NG/ML (ref 20–300)
GLUCOSE SERPL-MCNC: 125 MG/DL (ref 74–99)
HCT VFR BLD AUTO: 32.4 % (ref 41–52)
HGB BLD-MCNC: 10.7 G/DL (ref 13.5–17.5)
IMM GRANULOCYTES # BLD AUTO: 0.03 X10*3/UL (ref 0–0.5)
IMM GRANULOCYTES NFR BLD AUTO: 0.4 % (ref 0–0.9)
IRON SATN MFR SERPL: 12 % (ref 25–45)
IRON SERPL-MCNC: 29 UG/DL (ref 35–150)
LYMPHOCYTES # BLD AUTO: 3.32 X10*3/UL (ref 0.8–3)
LYMPHOCYTES NFR BLD AUTO: 48 %
MCH RBC QN AUTO: 30.1 PG (ref 26–34)
MCHC RBC AUTO-ENTMCNC: 33 G/DL (ref 32–36)
MCV RBC AUTO: 91 FL (ref 80–100)
MONOCYTES # BLD AUTO: 0.57 X10*3/UL (ref 0.05–0.8)
MONOCYTES NFR BLD AUTO: 8.2 %
NEUTROPHILS # BLD AUTO: 2.87 X10*3/UL (ref 1.6–5.5)
NEUTROPHILS NFR BLD AUTO: 41.7 %
PLATELET # BLD AUTO: 194 X10*3/UL (ref 150–450)
POTASSIUM SERPL-SCNC: 3.3 MMOL/L (ref 3.5–5.3)
PROT SERPL-MCNC: 7.2 G/DL (ref 6.4–8.2)
PROT SERPL-MCNC: 7.3 G/DL (ref 6.4–8.2)
RBC # BLD AUTO: 3.55 X10*6/UL (ref 4.5–5.9)
SODIUM SERPL-SCNC: 138 MMOL/L (ref 136–145)
TIBC SERPL-MCNC: 252 UG/DL (ref 240–445)
UIBC SERPL-MCNC: 223 UG/DL (ref 110–370)
WBC # BLD AUTO: 6.9 X10*3/UL (ref 4.4–11.3)

## 2025-06-09 PROCEDURE — 84165 PROTEIN E-PHORESIS SERUM: CPT

## 2025-06-09 PROCEDURE — 83540 ASSAY OF IRON: CPT

## 2025-06-09 PROCEDURE — 36415 COLL VENOUS BLD VENIPUNCTURE: CPT

## 2025-06-09 PROCEDURE — 85025 COMPLETE CBC W/AUTO DIFF WBC: CPT

## 2025-06-09 PROCEDURE — 84155 ASSAY OF PROTEIN SERUM: CPT

## 2025-06-09 PROCEDURE — 84165 PROTEIN E-PHORESIS SERUM: CPT | Performed by: STUDENT IN AN ORGANIZED HEALTH CARE EDUCATION/TRAINING PROGRAM

## 2025-06-09 PROCEDURE — 82728 ASSAY OF FERRITIN: CPT

## 2025-06-09 PROCEDURE — 84075 ASSAY ALKALINE PHOSPHATASE: CPT

## 2025-06-10 ENCOUNTER — OFFICE VISIT (OUTPATIENT)
Age: 75
End: 2025-06-10
Payer: MEDICARE

## 2025-06-10 VITALS
SYSTOLIC BLOOD PRESSURE: 114 MMHG | BODY MASS INDEX: 33.41 KG/M2 | WEIGHT: 207 LBS | HEART RATE: 65 BPM | DIASTOLIC BLOOD PRESSURE: 64 MMHG

## 2025-06-10 DIAGNOSIS — G56.03 BILATERAL CARPAL TUNNEL SYNDROME: ICD-10-CM

## 2025-06-10 DIAGNOSIS — M54.12 CERVICAL RADICULOPATHY: ICD-10-CM

## 2025-06-10 DIAGNOSIS — G24.3 CERVICAL DYSTONIA: Primary | ICD-10-CM

## 2025-06-10 LAB
ALBUMIN: 3.5 G/DL (ref 3.4–5)
ALPHA 1 GLOBULIN: 0.4 G/DL (ref 0.2–0.6)
ALPHA 2 GLOBULIN: 0.8 G/DL (ref 0.4–1.1)
BETA GLOBULIN: 0.6 G/DL (ref 0.5–1.2)
GAMMA GLOBULIN: 2 G/DL (ref 0.5–1.4)
M-PROTEIN 1: 0.5 G/DL (ref ?–0)
M-PROTEIN 2: 0.5 G/DL (ref ?–0)
PATH REVIEW-SERUM PROTEIN ELECTROPHORESIS: ABNORMAL
PROTEIN ELECTROPHORESIS COMMENT: ABNORMAL

## 2025-06-10 PROCEDURE — 1125F AMNT PAIN NOTED PAIN PRSNT: CPT | Performed by: PSYCHIATRY & NEUROLOGY

## 2025-06-10 PROCEDURE — 99212 OFFICE O/P EST SF 10 MIN: CPT | Performed by: PSYCHIATRY & NEUROLOGY

## 2025-06-10 PROCEDURE — G8417 CALC BMI ABV UP PARAM F/U: HCPCS | Performed by: PSYCHIATRY & NEUROLOGY

## 2025-06-10 PROCEDURE — G8427 DOCREV CUR MEDS BY ELIG CLIN: HCPCS | Performed by: PSYCHIATRY & NEUROLOGY

## 2025-06-10 PROCEDURE — 1123F ACP DISCUSS/DSCN MKR DOCD: CPT | Performed by: PSYCHIATRY & NEUROLOGY

## 2025-06-10 PROCEDURE — 3074F SYST BP LT 130 MM HG: CPT | Performed by: PSYCHIATRY & NEUROLOGY

## 2025-06-10 PROCEDURE — 3017F COLORECTAL CA SCREEN DOC REV: CPT | Performed by: PSYCHIATRY & NEUROLOGY

## 2025-06-10 PROCEDURE — 99213 OFFICE O/P EST LOW 20 MIN: CPT | Performed by: PSYCHIATRY & NEUROLOGY

## 2025-06-10 PROCEDURE — 3078F DIAST BP <80 MM HG: CPT | Performed by: PSYCHIATRY & NEUROLOGY

## 2025-06-10 PROCEDURE — 1159F MED LIST DOCD IN RCRD: CPT | Performed by: PSYCHIATRY & NEUROLOGY

## 2025-06-10 PROCEDURE — 1036F TOBACCO NON-USER: CPT | Performed by: PSYCHIATRY & NEUROLOGY

## 2025-06-10 NOTE — PROGRESS NOTES
COLON, DIAGNOSTIC      JOINT REPLACEMENT Bilateral 2018    TKR    LAPAROTOMY N/A 12/23/2024    Exploratory laparotomy, ileocecectomy performed by Cisco Mccray MD at [facility] [place]    OTHER SURGICAL HISTORY Right 06/08/16    I & D ABSCESS THIGH    AZ ARTHRP KNE CONDYLE&PLATU MEDIAL&LAT COMPARTMENTS Right 1/18/2018    RIGHT KNEE TOTAL KNEE ARTHROPLASTY ELÍAS SPINAL,NERVE BLOCK performed by Osmar Marie MD at [facility] [place]    AZ ARTHRP KNE CONDYLE&PLATU MEDIAL&LAT COMPARTMENTS Left 5/17/2018    LEFT KNEE TOTAL KNEE ARTHROPLASTY, performed by Osmar Marie MD at [facility] [place]    AZ MANIPULATION KNEE JOINT UNDER GENERAL ANESTHESIA Right 3/15/2018    RIGHT KNEE MANIPULATION performed by Osmar Marie MD at [facility] [place]    SHOULDER SURGERY Right 1979    due to displacement    UPPER GASTROINTESTINAL ENDOSCOPY  4/29/15    w/bx     UPPER GASTROINTESTINAL ENDOSCOPY  02/28/2018    Dr. baltazar Barahona    UPPER GASTROINTESTINAL ENDOSCOPY N/A 1/7/2025    ESOPHAGOGASTRODUODENOSCOPY WITH BIOPSY & CLIP PLACEMENT performed by Patricia Ji MD at [facility]     Social History     Socioeconomic History    Marital status:      Spouse name: Not on file    Number of children: Not on file    Years of education: Not on file    Highest education level: Not on file   Occupational History    Occupation: retired   Tobacco Use    Smoking status: Never    Smokeless tobacco: Never   Vaping Use    Vaping status: Never Used   Substance and Sexual Activity    Alcohol use: Yes     Comment: rare social    Drug use: No    Sexual activity: Yes     Partners: Female   Other Topics Concern    Not on file   Social History Narrative    Lives w wife     Social Drivers of Health     Financial Resource Strain: Low Risk  (6/3/2024)    Overall Financial Resource Strain (CARDIA)     Difficulty of Paying Living Expenses: Not very hard   Food Insecurity: No Food Insecurity (3/11/2025)    Hunger Vital Sign     Worried About Running

## 2025-06-13 ENCOUNTER — OFFICE VISIT (OUTPATIENT)
Dept: HEMATOLOGY/ONCOLOGY | Facility: CLINIC | Age: 75
End: 2025-06-13
Payer: MEDICARE

## 2025-06-13 VITALS
HEART RATE: 66 BPM | WEIGHT: 208.56 LBS | SYSTOLIC BLOOD PRESSURE: 108 MMHG | TEMPERATURE: 96.8 F | BODY MASS INDEX: 33.66 KG/M2 | OXYGEN SATURATION: 98 % | RESPIRATION RATE: 16 BRPM | DIASTOLIC BLOOD PRESSURE: 60 MMHG

## 2025-06-13 DIAGNOSIS — E78.5 DYSLIPIDEMIA: ICD-10-CM

## 2025-06-13 DIAGNOSIS — K26.4 GASTROINTESTINAL HEMORRHAGE ASSOCIATED WITH DUODENAL ULCER: ICD-10-CM

## 2025-06-13 DIAGNOSIS — E11.22 TYPE 2 DIABETES MELLITUS WITH STAGE 2 CHRONIC KIDNEY DISEASE, WITHOUT LONG-TERM CURRENT USE OF INSULIN (MULTI): ICD-10-CM

## 2025-06-13 DIAGNOSIS — E61.1 IRON DEFICIENCY: ICD-10-CM

## 2025-06-13 DIAGNOSIS — I10 ESSENTIAL HYPERTENSION: ICD-10-CM

## 2025-06-13 DIAGNOSIS — C91.10 CLL (CHRONIC LYMPHOCYTIC LEUKEMIA) (MULTI): Primary | ICD-10-CM

## 2025-06-13 DIAGNOSIS — E11.9 TYPE 2 DIABETES MELLITUS WITHOUT COMPLICATION, WITHOUT LONG-TERM CURRENT USE OF INSULIN: ICD-10-CM

## 2025-06-13 DIAGNOSIS — N18.2 TYPE 2 DIABETES MELLITUS WITH STAGE 2 CHRONIC KIDNEY DISEASE, WITHOUT LONG-TERM CURRENT USE OF INSULIN (MULTI): ICD-10-CM

## 2025-06-13 DIAGNOSIS — C85.93 LYMPHOMA OF GASTROINTESTINAL TRACT: ICD-10-CM

## 2025-06-13 PROCEDURE — 3074F SYST BP LT 130 MM HG: CPT | Performed by: INTERNAL MEDICINE

## 2025-06-13 PROCEDURE — 1160F RVW MEDS BY RX/DR IN RCRD: CPT | Performed by: INTERNAL MEDICINE

## 2025-06-13 PROCEDURE — 3078F DIAST BP <80 MM HG: CPT | Performed by: INTERNAL MEDICINE

## 2025-06-13 PROCEDURE — 99214 OFFICE O/P EST MOD 30 MIN: CPT | Performed by: INTERNAL MEDICINE

## 2025-06-13 PROCEDURE — 1126F AMNT PAIN NOTED NONE PRSNT: CPT | Performed by: INTERNAL MEDICINE

## 2025-06-13 PROCEDURE — 1159F MED LIST DOCD IN RCRD: CPT | Performed by: INTERNAL MEDICINE

## 2025-06-13 PROCEDURE — G2211 COMPLEX E/M VISIT ADD ON: HCPCS | Performed by: INTERNAL MEDICINE

## 2025-06-13 ASSESSMENT — PAIN SCALES - GENERAL: PAINLEVEL_OUTOF10: 0-NO PAIN

## 2025-06-13 NOTE — PROGRESS NOTES
Patient ID: Taurus Perez is a 74 y.o. male.  Referring Physician: Jac Molina MD  [address]  [place] [address]  Primary Care Provider: Pietro Bartlett MD  Visit Type: Follow Up      Subjective    HPI I had a bowel perforation in December due to diverticulitis    Review of Systems   Constitutional: Negative.    HENT:  Negative.     Eyes: Negative.    Respiratory: Negative.     Cardiovascular: Negative.    Gastrointestinal: Negative.    Endocrine: Negative.    Genitourinary: Negative.     Musculoskeletal: Negative.    Skin: Negative.    Neurological: Negative.    Hematological: Negative.    Psychiatric/Behavioral: Negative.          Objective   BSA: 2.1 meters squared  /60 (BP Location: Right arm, Patient Position: Sitting, BP Cuff Size: Adult)   Pulse 66   Temp 36 °C (96.8 °F) (Temporal)   Resp 16   Wt 94.6 kg (208 lb 8.9 oz) Comment: no shoes  SpO2 98%   BMI 33.66 kg/m²      has a past medical history of Hyperlipidemia, Hypertension, and Type 2 diabetes mellitus (Multi).   has a past surgical history that includes Other surgical history (04/22/2020); Other surgical history (04/22/2020); and Other surgical history (04/22/2020).  Family History[1]  Oncology History    No history exists.       Taurus Perez  reports that he has never smoked. He has never used smokeless tobacco.  He  reports that he does not currently use alcohol.  He  reports that he does not currently use drugs.    Physical Exam  Vitals reviewed.   Constitutional:       Appearance: Normal appearance.   HENT:      Head: Normocephalic.      Mouth/Throat:      Mouth: Mucous membranes are moist.   Eyes:      Extraocular Movements: Extraocular movements intact.      Pupils: Pupils are equal, round, and reactive to light.   Cardiovascular:      Rate and Rhythm: Normal rate and regular rhythm.      Pulses: Normal pulses.      Heart sounds: Normal heart sounds.   Pulmonary:      Effort: Pulmonary effort is  "normal.      Breath sounds: Normal breath sounds.   Abdominal:      General: Bowel sounds are normal.      Palpations: Abdomen is soft.   Musculoskeletal:         General: Normal range of motion.      Cervical back: Normal range of motion and neck supple.   Skin:     General: Skin is warm.   Neurological:      General: No focal deficit present.      Mental Status: He is alert and oriented to person, place, and time.   Psychiatric:         Mood and Affect: Mood normal.         Behavior: Behavior normal.         WBC   Date/Time Value Ref Range Status   06/09/2025 11:28 AM 6.9 4.4 - 11.3 x10*3/uL Final   12/03/2024 12:39 PM 7.1 4.4 - 11.3 x10*3/uL Final   06/10/2024 01:35 PM 5.1 4.4 - 11.3 x10*3/uL Final     No results found for: \"NRBC\"  RBC   Date Value Ref Range Status   06/09/2025 3.55 (L) 4.50 - 5.90 x10*6/uL Final   12/03/2024 3.33 (L) 4.50 - 5.90 x10*6/uL Final   06/10/2024 3.70 (L) 4.50 - 5.90 x10*6/uL Final     Hemoglobin   Date Value Ref Range Status   06/09/2025 10.7 (L) 13.5 - 17.5 g/dL Final   12/03/2024 10.6 (L) 13.5 - 17.5 g/dL Final   06/10/2024 11.9 (L) 13.5 - 17.5 g/dL Final     Hematocrit   Date Value Ref Range Status   06/09/2025 32.4 (L) 41.0 - 52.0 % Final   12/03/2024 32.2 (L) 41.0 - 52.0 % Final   06/10/2024 35.5 (L) 41.0 - 52.0 % Final     MCV   Date/Time Value Ref Range Status   06/09/2025 11:28 AM 91 80 - 100 fL Final   12/03/2024 12:39 PM 97 80 - 100 fL Final   06/10/2024 01:35 PM 96 80 - 100 fL Final     MCH   Date/Time Value Ref Range Status   06/09/2025 11:28 AM 30.1 26.0 - 34.0 pg Final   12/03/2024 12:39 PM 31.8 26.0 - 34.0 pg Final   06/10/2024 01:35 PM 32.2 26.0 - 34.0 pg Final     MCHC   Date/Time Value Ref Range Status   06/09/2025 11:28 AM 33.0 32.0 - 36.0 g/dL Final   12/03/2024 12:39 PM 32.9 32.0 - 36.0 g/dL Final   06/10/2024 01:35 PM 33.5 32.0 - 36.0 g/dL Final     RDW   Date/Time Value Ref Range Status   06/09/2025 11:28 AM 15.7 (H) 11.5 - 14.5 % Final   12/03/2024 12:39 PM " "13.7 11.5 - 14.5 % Final   06/10/2024 01:35 PM 14.1 11.5 - 14.5 % Final     Platelets   Date/Time Value Ref Range Status   06/09/2025 11:28  150 - 450 x10*3/uL Final   12/03/2024 12:39  150 - 450 x10*3/uL Final   06/10/2024 01:35  150 - 450 x10*3/uL Final     No results found for: \"MPV\"  Neutrophils %   Date/Time Value Ref Range Status   06/09/2025 11:28 AM 41.7 40.0 - 80.0 % Final   12/03/2024 12:39 PM 52.4 40.0 - 80.0 % Final   06/10/2024 01:35 PM 40.1 40.0 - 80.0 % Final     Immature Granulocytes %, Automated   Date/Time Value Ref Range Status   06/09/2025 11:28 AM 0.4 0.0 - 0.9 % Final     Comment:     Immature Granulocyte Count (IG) includes promyelocytes, myelocytes and metamyelocytes but does not include bands. Percent differential counts (%) should be interpreted in the context of the absolute cell counts (cells/UL).   12/03/2024 12:39 PM 0.1 0.0 - 0.9 % Final     Comment:     Immature Granulocyte Count (IG) includes promyelocytes, myelocytes and metamyelocytes but does not include bands. Percent differential counts (%) should be interpreted in the context of the absolute cell counts (cells/UL).   06/10/2024 01:35 PM 0.2 0.0 - 0.9 % Final     Comment:     Immature Granulocyte Count (IG) includes promyelocytes, myelocytes and metamyelocytes but does not include bands. Percent differential counts (%) should be interpreted in the context of the absolute cell counts (cells/UL).     Lymphocytes %   Date/Time Value Ref Range Status   06/09/2025 11:28 AM 48.0 13.0 - 44.0 % Final   12/03/2024 12:39 PM 35.5 13.0 - 44.0 % Final   06/10/2024 01:35 PM 48.5 13.0 - 44.0 % Final     Monocytes %   Date/Time Value Ref Range Status   06/09/2025 11:28 AM 8.2 2.0 - 10.0 % Final   12/03/2024 12:39 PM 10.3 2.0 - 10.0 % Final   06/10/2024 01:35 PM 9.0 2.0 - 10.0 % Final     Eosinophils %   Date/Time Value Ref Range Status   06/09/2025 11:28 AM 1.4 0.0 - 6.0 % Final   12/03/2024 12:39 PM 1.4 0.0 - 6.0 % Final "   06/10/2024 01:35 PM 2.0 0.0 - 6.0 % Final     Basophils %   Date/Time Value Ref Range Status   06/09/2025 11:28 AM 0.3 0.0 - 2.0 % Final   12/03/2024 12:39 PM 0.3 0.0 - 2.0 % Final   06/10/2024 01:35 PM 0.2 0.0 - 2.0 % Final     Neutrophils Absolute   Date/Time Value Ref Range Status   06/09/2025 11:28 AM 2.87 1.60 - 5.50 x10*3/uL Final     Comment:     Percent differential counts (%) should be interpreted in the context of the absolute cell counts (cells/uL).   12/03/2024 12:39 PM 3.72 1.60 - 5.50 x10*3/uL Final     Comment:     Percent differential counts (%) should be interpreted in the context of the absolute cell counts (cells/uL).   06/10/2024 01:35 PM 2.04 1.60 - 5.50 x10*3/uL Final     Comment:     Percent differential counts (%) should be interpreted in the context of the absolute cell counts (cells/uL).     Immature Granulocytes Absolute, Automated   Date/Time Value Ref Range Status   06/09/2025 11:28 AM 0.03 0.00 - 0.50 x10*3/uL Final   12/03/2024 12:39 PM 0.01 0.00 - 0.50 x10*3/uL Final   06/10/2024 01:35 PM 0.01 0.00 - 0.50 x10*3/uL Final     Lymphocytes Absolute   Date/Time Value Ref Range Status   06/09/2025 11:28 AM 3.32 (H) 0.80 - 3.00 x10*3/uL Final   12/03/2024 12:39 PM 2.52 0.80 - 3.00 x10*3/uL Final   06/10/2024 01:35 PM 2.47 0.80 - 3.00 x10*3/uL Final     Monocytes Absolute   Date/Time Value Ref Range Status   06/09/2025 11:28 AM 0.57 0.05 - 0.80 x10*3/uL Final   12/03/2024 12:39 PM 0.73 0.05 - 0.80 x10*3/uL Final   06/10/2024 01:35 PM 0.46 0.05 - 0.80 x10*3/uL Final     Eosinophils Absolute   Date/Time Value Ref Range Status   06/09/2025 11:28 AM 0.10 0.00 - 0.40 x10*3/uL Final   12/03/2024 12:39 PM 0.10 0.00 - 0.40 x10*3/uL Final   06/10/2024 01:35 PM 0.10 0.00 - 0.40 x10*3/uL Final     Basophils Absolute   Date/Time Value Ref Range Status   06/09/2025 11:28 AM 0.02 0.00 - 0.10 x10*3/uL Final   12/03/2024 12:39 PM 0.02 0.00 - 0.10 x10*3/uL Final   06/10/2024 01:35 PM 0.01 0.00 - 0.10  "x10*3/uL Final       No components found for: \"PT\"  No results found for: \"APTT\"  Medication Documentation Review Audit       Reviewed by Lea Ruiz MA (Medical Assistant) on 06/13/25 at 1402      Medication Order Taking? Sig Documenting Provider Last Dose Status   allopurinol (Zyloprim) 100 mg tablet [id number] Yes Take 2 tablets (200 mg) by mouth once daily. Trupti Schultz MD 7/29/2024 Active   atorvastatin (Lipitor) 40 mg tablet [id number] Yes Take 1 tablet (40 mg) by mouth once daily. Trupti Provider, MD 7/29/2024 Active   benazepril (Lotensin) 20 mg tablet [id number] No Take 1 tablet (20 mg) by mouth once daily. Trupti Provider, MD 7/29/2024 Active   captopril-hydrochlorothiazide (Capozide) 25-25 mg tablet [id number] No    Patient not taking: Reported on 6/13/2025    Historical Provider, MD Taking Active   doxepin (SINEquan) 10 mg capsule [id number] Yes Take 1 capsule (10 mg) by mouth once daily at bedtime. Trupti Provider, MD 7/29/2024 Active   furosemide (Lasix) 40 mg tablet [id number] No  Historical Provider, MD 7/29/2024 Active   icosapent ethyL (Vascepa) 1 gram capsule [id number] Yes Take 2 capsules (2 g) by mouth 2 times a day. Historical Provider, MD 7/29/2024 Active   Klor-Con M20 20 mEq ER tablet [id number] No  Historical Provider, MD 7/29/2024 Active   loratadine (Claritin) 10 mg tablet [id number] Yes Take 1 tablet (10 mg) by mouth. Historical Provider, MD 7/29/2024 Active   magnesium 200 mg tablet [id number] Yes once every 24 hours. Historical Provider, MD 7/29/2024 Active   metFORMIN (Glucophage) 500 mg tablet [id number] Yes TAKE 1 TABLETS BY MOUTH TWICE A DAY WITH MEALS Historical Provider, MD 7/29/2024 Active   metoprolol tartrate (Lopressor) 50 mg tablet [id number] Yes Take 1 tablet by mouth. Trupti Provider, MD 7/30/2024 0800 Active   mv-min/folic/vit K/lycop/coQ10 (DAILY MULTIVITAMIN ORAL) [id number] Yes Take 1 tablet by mouth once daily. Historical Provider, MD 7/29/2024 Active "                   Assessment/Plan    1) CLL/SLL  -He first received 2 radiation therapy fractions.  -He then completed 6 months of bendamustine + rituximab   -Currently, patient is being observed every 6 months  -remains asymptomatic, and is wondering when his next endoscopy will be   -he had an EGD done on 3/7/2023 by Dr Lucero--esophagus normal, patchy mild mucosal changes characterized by erythema and inflammation were found at the pylorus, biopsies were taken; patchy mild mucosal changes characterized by erythema and inflammation were found in the duodenal bulb and in the second portion of the duodenum  -path showed duodenal mucosa no significant pathological diagnosis, no morphologic evidence of lymphoma; duodenal bulb biopsy with minute fragments of duodenal mucosa with no significant pathological diagnosis and no morphologic evidence of lymphoma; stomach pylorus biopsy with gastric mucosa with intestinal metaplasia and mild chronic gastritis and no H pylori; antrum bx with gastric oxyntic mucosa with mild chronic gastritis and no H pylori; stomach body bx with gastric oxyntic mucosa with no significant pathological dx  -here for interval followup  -has no new complaints, but says he feels more fatigued than usual  -labs done on 12/3/2024 included CBC, COMP, SPEP/IF and iron panel + ferritin  -results reviewed-WBC 7.1, Hgb 10.6, platelets 186,000, ANC 3720, abs lymph 2520, creatinine 1.77, calcium 9.1, AST 14, ALT 16, ferritin 169, TIBC 256, iron saturation 9%, SPEP with IF showed Known monoclonal IgG kappa as two bands  in the gamma region totaling 0.9g (0.5 + 0.4)  -he is reporting more fatigue, hgb has trended down as has his iron indices  -he would benefit from another course of IV feraheme  -benefits, risks, potential morbidity related to IV feraheme were reviewed with Taurus and he provided informed consent to proceed  -he will receive feraheme 510 mg IV x 2 doses  -CBC + iron indices will be rechecked  1 month after completion  -will continue to see Taurus Q6 months  -after his last visit here, he was then hospitalized at [facility] for abdominal pain found to be due to perforated diverticulitis (distal ileum), s/p right colectomy by Dr Nunez; he was discharged 12/28/2024  -he then was re-admitted to [facility] on 1/3/2025 for hypotension secondary to C difficile colitis and GI bleed; Dr Bernard performed an EGD with finding of a non-bleeding superficial gastric ulcer with no stigmata of bleeding in proximal gastric body ; one superficial duodenal ulcer with adherent clot was found in third portion of duodenum; in 2nd part of the duodenum proximal to the ampulla there was a polypoid lesion measuring 18-20 mm  -path just showed chronic duodenitis with erosion, reactive gastropathy  -labs done on 6/9/2025 included CBC + COMP + iron panel + ferritin + SPEP/IF   -results reviewed--wbc 6.9, hgb 10.7, plt 194,000, creatinine 1.74, cacium 8.3, alk phos 66, AST 12, total bili 0.3, ALT 17, TIBC 252, sat 12%, ferritin 111, SPEP/IF with 2 x 0.5 gm IgG kappa bands  -when he was hospitalized at [facility] he said he was transfused 4 units PRBC and also received 3 doses of IV venofer  -will see him again in 6 months        2) history of GI bleeding   -secondary to CLL/SLL involving the duodenum  -last EGD was done by Dr Lucero 7/30/2024: cricopharynx, upper third of the esophagus, middle third of the esophagus, lower third of the esophagus and GE junction appeared normal; moderate edematous erythematous mucosa consistent with gastritis in the body of the stomach and antrum; duodenal bulb appeared normal; edematous nodular mucosa in the minor papilla; prominent minor ampulla with irregularity at the tip of the ampulla; major papilla appeared normal; will repeat EGD due 10/28/2024; may need EUS and EMR of minor ampulla  -path: duodenal bulb bx: small intestinal mucosa with gastric heterotopia; stomach antrum bx with reactive gastropathy;  stomach corpus bx with gastric mucosa with proton pump inhibitor effect; ampulla bx with ampullary mucosa with evidence of ulceration and reactive changes  -will let Dr Lucero know that patient was supposed to have had repeat EGD +/- EUS by now     3) diabetes  -on januvia  -on metforminm  -on victoza  -on glimepiride     4) hyperlipidemia  -on fenofibrate  -on zetia  -on atorvastatin     5) hypertension  -on toprol  -on lasix  -on captopril-hydrochlorothiazide  -on benazepril-hydrochlorothiazide     Problem List Items Addressed This Visit           ICD-10-CM    CLL (chronic lymphocytic leukemia) (Multi) C91.10    Relevant Orders    Clinic Appointment Request Follow Up; [facility]; [facility]    CBC and Auto Differential    Comprehensive metabolic panel    Iron and TIBC    Ferritin    Serum Protein Electrophoresis    [place]/Lambda Free Light Chain, Serum    Iron deficiency E61.1            Jac Frank MD                              [1] No family history on file.

## 2025-06-15 PROBLEM — E66.01 MORBIDLY OBESE (MULTI): Status: RESOLVED | Noted: 2020-02-20 | Resolved: 2025-06-15

## 2025-06-15 PROBLEM — K26.4 GASTROINTESTINAL HEMORRHAGE ASSOCIATED WITH DUODENAL ULCER: Status: ACTIVE | Noted: 2025-06-15

## 2025-06-15 PROBLEM — E11.22 TYPE 2 DIABETES MELLITUS WITH CHRONIC KIDNEY DISEASE: Status: RESOLVED | Noted: 2022-12-14 | Resolved: 2025-06-15

## 2025-06-15 PROBLEM — C85.90 LYMPHOMA: Status: RESOLVED | Noted: 2023-12-04 | Resolved: 2025-06-15

## 2025-06-15 ASSESSMENT — ENCOUNTER SYMPTOMS
NEUROLOGICAL NEGATIVE: 1
EYES NEGATIVE: 1
RESPIRATORY NEGATIVE: 1
CONSTITUTIONAL NEGATIVE: 1
HEMATOLOGIC/LYMPHATIC NEGATIVE: 1
ENDOCRINE NEGATIVE: 1
GASTROINTESTINAL NEGATIVE: 1
PSYCHIATRIC NEGATIVE: 1
MUSCULOSKELETAL NEGATIVE: 1
CARDIOVASCULAR NEGATIVE: 1

## 2025-06-16 ENCOUNTER — OFFICE VISIT (OUTPATIENT)
Age: 75
End: 2025-06-16
Payer: MEDICARE

## 2025-06-16 VITALS
HEIGHT: 66 IN | SYSTOLIC BLOOD PRESSURE: 110 MMHG | WEIGHT: 212 LBS | HEART RATE: 67 BPM | DIASTOLIC BLOOD PRESSURE: 60 MMHG | BODY MASS INDEX: 34.07 KG/M2 | OXYGEN SATURATION: 96 % | RESPIRATION RATE: 14 BRPM

## 2025-06-16 DIAGNOSIS — E11.22 TYPE 2 DIABETES MELLITUS WITH STAGE 3A CHRONIC KIDNEY DISEASE, WITHOUT LONG-TERM CURRENT USE OF INSULIN (HCC): ICD-10-CM

## 2025-06-16 DIAGNOSIS — I10 ESSENTIAL HYPERTENSION: Primary | ICD-10-CM

## 2025-06-16 DIAGNOSIS — N18.31 TYPE 2 DIABETES MELLITUS WITH STAGE 3A CHRONIC KIDNEY DISEASE, WITHOUT LONG-TERM CURRENT USE OF INSULIN (HCC): ICD-10-CM

## 2025-06-16 PROCEDURE — 3017F COLORECTAL CA SCREEN DOC REV: CPT | Performed by: INTERNAL MEDICINE

## 2025-06-16 PROCEDURE — G8417 CALC BMI ABV UP PARAM F/U: HCPCS | Performed by: INTERNAL MEDICINE

## 2025-06-16 PROCEDURE — 1036F TOBACCO NON-USER: CPT | Performed by: INTERNAL MEDICINE

## 2025-06-16 PROCEDURE — 3044F HG A1C LEVEL LT 7.0%: CPT | Performed by: INTERNAL MEDICINE

## 2025-06-16 PROCEDURE — G8428 CUR MEDS NOT DOCUMENT: HCPCS | Performed by: INTERNAL MEDICINE

## 2025-06-16 PROCEDURE — 1123F ACP DISCUSS/DSCN MKR DOCD: CPT | Performed by: INTERNAL MEDICINE

## 2025-06-16 PROCEDURE — 2022F DILAT RTA XM EVC RTNOPTHY: CPT | Performed by: INTERNAL MEDICINE

## 2025-06-16 PROCEDURE — 3074F SYST BP LT 130 MM HG: CPT | Performed by: INTERNAL MEDICINE

## 2025-06-16 PROCEDURE — 3078F DIAST BP <80 MM HG: CPT | Performed by: INTERNAL MEDICINE

## 2025-06-16 PROCEDURE — 99212 OFFICE O/P EST SF 10 MIN: CPT | Performed by: INTERNAL MEDICINE

## 2025-06-16 PROCEDURE — 99213 OFFICE O/P EST LOW 20 MIN: CPT | Performed by: INTERNAL MEDICINE

## 2025-06-16 RX ORDER — POTASSIUM CHLORIDE 750 MG/1
10 TABLET, EXTENDED RELEASE ORAL DAILY
Qty: 30 TABLET | Refills: 5 | Status: SHIPPED | OUTPATIENT
Start: 2025-06-16

## 2025-06-16 NOTE — PROGRESS NOTES
to these medications at this time.    Follow-up  The patient will follow up in 5 months.     No follow-ups on file.    The patient (or guardian, if applicable) and other individuals in attendance with the patient were advised that Artificial Intelligence will be utilized during this visit to record, process the conversation to generate a clinical note and to support improvement of the AI technology. The patient (or guardian, if applicable) and other individuals in attendance at the appointment consented to the use of AI, including the recording.      An electronic signature was used to authenticate this note.    --Sean Levy MD

## 2025-06-18 ENCOUNTER — OFFICE VISIT (OUTPATIENT)
Dept: GASTROENTEROLOGY | Age: 75
End: 2025-06-18
Payer: MEDICARE

## 2025-06-18 VITALS
HEART RATE: 68 BPM | BODY MASS INDEX: 33.89 KG/M2 | DIASTOLIC BLOOD PRESSURE: 58 MMHG | SYSTOLIC BLOOD PRESSURE: 108 MMHG | OXYGEN SATURATION: 98 % | WEIGHT: 210 LBS

## 2025-06-18 DIAGNOSIS — A04.72 C. DIFFICILE COLITIS: Primary | ICD-10-CM

## 2025-06-18 PROCEDURE — 3017F COLORECTAL CA SCREEN DOC REV: CPT | Performed by: SPECIALIST

## 2025-06-18 PROCEDURE — G8417 CALC BMI ABV UP PARAM F/U: HCPCS | Performed by: SPECIALIST

## 2025-06-18 PROCEDURE — 1036F TOBACCO NON-USER: CPT | Performed by: SPECIALIST

## 2025-06-18 PROCEDURE — 99212 OFFICE O/P EST SF 10 MIN: CPT | Performed by: SPECIALIST

## 2025-06-18 PROCEDURE — 3074F SYST BP LT 130 MM HG: CPT | Performed by: SPECIALIST

## 2025-06-18 PROCEDURE — G8427 DOCREV CUR MEDS BY ELIG CLIN: HCPCS | Performed by: SPECIALIST

## 2025-06-18 PROCEDURE — 3078F DIAST BP <80 MM HG: CPT | Performed by: SPECIALIST

## 2025-06-18 PROCEDURE — 1123F ACP DISCUSS/DSCN MKR DOCD: CPT | Performed by: SPECIALIST

## 2025-06-18 PROCEDURE — 1159F MED LIST DOCD IN RCRD: CPT | Performed by: SPECIALIST

## 2025-06-18 ASSESSMENT — ENCOUNTER SYMPTOMS
BLOOD IN STOOL: 0
ANAL BLEEDING: 0
ABDOMINAL PAIN: 0
DIARRHEA: 0
ABDOMINAL DISTENTION: 0
VOMITING: 0
GASTROINTESTINAL NEGATIVE: 1
NAUSEA: 0
EYES NEGATIVE: 1
RECTAL PAIN: 0
RESPIRATORY NEGATIVE: 1
CONSTIPATION: 0

## 2025-06-18 NOTE — PROGRESS NOTES
Gastroenterology Clinic Follow up Visit    Hector Bose  [id number]  Chief Complaint   Patient presents with    Follow-up     19 week FU       HPI and A/P at last visit summarized below:  Patient is here for follow-up, history of recurrent C. difficile colitis and was on p.o. vancomycin about 2 cycles and since symptoms recurred he was placed on a long-term tapering dose of oral vancomycin, patient finished vancomycin about 3 weeks ago, could not get Dificid because of high cost.  Patient had a bout of diarrhea about 2 weeks ago when he had flulike symptoms which improved after Imodium, now patient has about 2 BM a day and is formed, stool is not watery or mucousy, nausea no vomiting no pain    Review of Systems   Constitutional: Negative.    HENT: Negative.     Eyes: Negative.    Respiratory: Negative.     Gastrointestinal: Negative.  Negative for abdominal distention, abdominal pain, anal bleeding, blood in stool, constipation, diarrhea, nausea, rectal pain and vomiting.        Diarrhea is improving   Genitourinary: Negative.    Musculoskeletal: Negative.    Neurological: Negative.    Psychiatric/Behavioral: Negative.          Past medical history, past surgical history, medication list, social and familyhistory reviewed    Blood pressure (!) 108/58, pulse 68, weight 95.3 kg (210 lb), SpO2 98%.    Physical Exam  Constitutional:       Appearance: He is well-developed.   HENT:      Head: Normocephalic.   Eyes:      Pupils: Pupils are equal, round, and reactive to light.   Cardiovascular:      Rate and Rhythm: Normal rate and regular rhythm.      Heart sounds: Normal heart sounds.   Pulmonary:      Effort: Pulmonary effort is normal.      Breath sounds: Normal breath sounds.   Abdominal:      General: Bowel sounds are normal.      Palpations: Abdomen is soft.   Skin:     General: Skin is warm and dry.   Neurological:      Mental Status: He is alert.         Laboratory, Pathology, Radiology reviewed in detail with

## 2025-07-03 ENCOUNTER — CARE COORDINATION (OUTPATIENT)
Dept: CARE COORDINATION | Age: 75
End: 2025-07-03

## 2025-07-21 DIAGNOSIS — F51.04 PSYCHOPHYSIOLOGICAL INSOMNIA: ICD-10-CM

## 2025-07-21 RX ORDER — DOXEPIN HYDROCHLORIDE 10 MG/1
CAPSULE ORAL
Qty: 90 CAPSULE | Refills: 1 | Status: SHIPPED | OUTPATIENT
Start: 2025-07-21

## 2025-07-28 RX ORDER — FINASTERIDE 5 MG/1
5 TABLET, FILM COATED ORAL DAILY
Qty: 90 TABLET | Refills: 1 | Status: SHIPPED | OUTPATIENT
Start: 2025-07-28

## 2025-07-31 LAB
BASOPHILS # BLD: 0 K/UL (ref 0–0.2)
BASOPHILS NFR BLD: 0.3 %
EOSINOPHIL # BLD: 0.1 K/UL (ref 0–0.7)
EOSINOPHIL NFR BLD: 1.3 %
ERYTHROCYTE [DISTWIDTH] IN BLOOD BY AUTOMATED COUNT: 15 % (ref 11.5–14.5)
HCT VFR BLD AUTO: 32.9 % (ref 42–52)
HGB BLD-MCNC: 10.8 G/DL (ref 14–18)
LYMPHOCYTES # BLD: 2.8 K/UL (ref 1–4.8)
LYMPHOCYTES NFR BLD: 46.5 %
MCH RBC QN AUTO: 30.5 PG (ref 27–31.3)
MCHC RBC AUTO-ENTMCNC: 32.8 % (ref 33–37)
MCV RBC AUTO: 92.9 FL (ref 79–92.2)
MONOCYTES # BLD: 0.5 K/UL (ref 0.2–0.8)
MONOCYTES NFR BLD: 7.7 %
NEUTROPHILS # BLD: 2.6 K/UL (ref 1.4–6.5)
NEUTS SEG NFR BLD: 44 %
PLATELET # BLD AUTO: 163 K/UL (ref 130–400)
RBC # BLD AUTO: 3.54 M/UL (ref 4.7–6.1)
WBC # BLD AUTO: 6 K/UL (ref 4.8–10.8)

## 2025-08-07 RX ORDER — ICOSAPENT ETHYL 1 G/1
2 CAPSULE ORAL 2 TIMES DAILY
Qty: 360 CAPSULE | Refills: 1 | Status: SHIPPED | OUTPATIENT
Start: 2025-08-07

## 2025-08-26 ENCOUNTER — HOSPITAL ENCOUNTER (OUTPATIENT)
Dept: NEUROLOGY | Age: 75
Discharge: HOME | End: 2025-08-26
Attending: PSYCHIATRY & NEUROLOGY
Payer: MEDICARE

## 2025-08-26 DIAGNOSIS — G24.3 CERVICAL DYSTONIA: ICD-10-CM

## 2025-08-26 PROCEDURE — 64616 CHEMODENERV MUSC NECK DYSTON: CPT

## 2025-08-26 PROCEDURE — 95874 GUIDE NERV DESTR NEEDLE EMG: CPT

## 2025-09-04 ENCOUNTER — TELEPHONE (OUTPATIENT)
Age: 75
End: 2025-09-04

## 2025-09-04 DIAGNOSIS — G24.3 SPASMODIC TORTICOLLIS: ICD-10-CM

## 2025-09-04 DIAGNOSIS — G24.3 CERVICAL DYSTONIA: Primary | ICD-10-CM

## 2025-09-04 LAB — MAGNESIUM SERPL-MCNC: 1.3 MG/DL (ref 1.7–2.4)

## (undated) DEVICE — DRAIN SURG 19FR 0.25IN SIL RND W/ TRCR INDIC DOT RADPQ FULL

## (undated) DEVICE — 3M™ STERI-DRAPE™ U-DRAPE 1015: Brand: STERI-DRAPE™

## (undated) DEVICE — SUTURE PROL SZ 0 L30IN NONABSORBABLE BLU L36MM CT-1 1/2 CIR 8424H

## (undated) DEVICE — Z DISCONTINUED PER MEDLINE USE 2741944 DRESSING AQUACEL 12 IN SURG W9XL30CM SIL CVR WTRPRF VIR BACT BARR ANTIMIC

## (undated) DEVICE — CONMED SCOPE SAVER BITE BLOCK, 20X27 MM: Brand: SCOPE SAVER

## (undated) DEVICE — UNDERCAST PADDING: Brand: DEROYAL

## (undated) DEVICE — GLOVE ORANGE PI 7 1/2   MSG9075

## (undated) DEVICE — TUBE SET 96 MM 64 MM H2O PERISTALTIC STD AUX CHANNEL

## (undated) DEVICE — STAPLER INT CUT LN 40MM STPL 51MM GRN CRV HD B FRM

## (undated) DEVICE — GENERAL MAJOR: Brand: MEDLINE INDUSTRIES, INC.

## (undated) DEVICE — HAND II: Brand: MEDLINE INDUSTRIES, INC.

## (undated) DEVICE — BANDAGE COMPR W2INXL5YD WHT BGE POLY COT M E WRP WV HK AND

## (undated) DEVICE — GOWN,AURORA,NONREINFORCED,LARGE: Brand: MEDLINE

## (undated) DEVICE — SUTURE MCRYL + SZ 3-0 L36IN ABSRB UD CT-1 L36MM 1/2 CIR MCP944H

## (undated) DEVICE — PACK PROCEDURE SURG TOTAL KNEE PACK

## (undated) DEVICE — COVER LT HNDL BLU PLAS

## (undated) DEVICE — ELECTRODE PT RET AD L9FT HI MOIST COND ADH HYDRGEL CORDED

## (undated) DEVICE — IMMOBILIZER KNEE UNIV L19IN FOR 12-24IN THGH FOAM T BAR

## (undated) DEVICE — TUBING IRRIGATION 140/160/180/190 SER GI ENDOSCP SMARTCAP

## (undated) DEVICE — T4 HOOD

## (undated) DEVICE — SYRINGE IRRIG 60ML SFT PLIABLE BLB EZ TO GRP 1 HND USE W/

## (undated) DEVICE — ULTRAMIX BOWL (NEW) KNEE

## (undated) DEVICE — STERILE PATIENT PROTECTIVE PAD FOR IMP® KNEE POSITIONERS & COHESIVE WRAP (10 / CASE): Brand: DE MAYO KNEE POSITIONER®

## (undated) DEVICE — ZIMMER® STERILE DISPOSABLE TOURNIQUET CUFF, DUAL PORT, SINGLE BLADDER, 34 IN. (86 CM)

## (undated) DEVICE — ZIMMER® STERILE DISPOSABLE TOURNIQUET CUFF, DUAL PORT, SINGLE BLADDER, 18 IN. (46 CM)

## (undated) DEVICE — BRUSH ENDO CLN L90.5IN SHTH DIA1.7MM BRIST DIA5-7MM 2-6MM

## (undated) DEVICE — Device: Brand: STABLECUT®

## (undated) DEVICE — RELOAD STPL 40MM H1.5-4.7MM WIRE 0.2MM REG TISS GRN CRV

## (undated) DEVICE — APPLICATOR MEDICATED 26 CC SOLUTION HI LT ORNG CHLORAPREP

## (undated) DEVICE — HIGH FLOW TIP

## (undated) DEVICE — ELASTIC BANDAGE: Brand: DEROYAL

## (undated) DEVICE — 450 ML BOTTLE OF 0.05% CHLORHEXIDINE GLUCONATE IN 99.95% STERILE WATER FOR IRRIGATION, USP AND APPLICATOR.: Brand: IRRISEPT ANTIMICROBIAL WOUND LAVAGE

## (undated) DEVICE — BLADE RMR L35MM PAT PILOT H

## (undated) DEVICE — ADAPTER FLSH PMP FLD MGMT GI IRRIG OFP 2 DISPOSABLE

## (undated) DEVICE — SINGLE PORT MANIFOLD: Brand: NEPTUNE 2

## (undated) DEVICE — SPLINT KNEE UNIV FOR LESS THAN 36IN L20IN FOAM LAM E CNTCT

## (undated) DEVICE — LABEL MED MINI W/ MARKER

## (undated) DEVICE — TRAY CATH CATH OD16FR BG F HYDROCOATED

## (undated) DEVICE — ENDO CARRY-ON PROCEDURE KIT: Brand: ENDO CARRY-ON PROCEDURE KIT

## (undated) DEVICE — GLOVE ORANGE PI 8   MSG9080

## (undated) DEVICE — 3000CC GUARDIAN II: Brand: GUARDIAN

## (undated) DEVICE — SPONGE,LAP,18"X18",DLX,XR,ST,5/PK,40/PK: Brand: MEDLINE

## (undated) DEVICE — GLOVE ORANGE PI 8 1/2   MSG9085

## (undated) DEVICE — GAUZE,SPONGE,FLUFF,6"X6.75",STRL,10/TRAY: Brand: MEDLINE

## (undated) DEVICE — SUTURE MCRYL SZ 4-0 L27IN ABSRB UD L19MM PS-2 1/2 CIR PRIM Y426H

## (undated) DEVICE — SUTURE VICRYL SZ 0 L36IN ABSRB UD L36MM CT-1 1/2 CIR J946H

## (undated) DEVICE — FORCEPS BX L240CM JAW DIA2.8MM L CAP W/ NDL MIC MESH TOOTH

## (undated) DEVICE — SEALER ENDOSCP NANO COAT OPN DIV CRV L JAW LIGASURE IMPACT

## (undated) DEVICE — GLOVE ORTHO 8   MSG9480

## (undated) DEVICE — NEPTUNE E-SEP SMOKE EVACUATION PENCIL, COATED, 70MM BLADE, PUSH BUTTON SWITCH: Brand: NEPTUNE E-SEP

## (undated) DEVICE — COVER DUST PERIMETER HUMPREY

## (undated) DEVICE — BLADE SAW W125XL70MM THK064MM CUT THK094MM REPL RECIP DBL

## (undated) DEVICE — 1010 S-DRAPE TOWEL DRAPE 10/BX: Brand: STERI-DRAPE™

## (undated) DEVICE — Device: Brand: ENDO SMARTCAP

## (undated) DEVICE — BINDER ABD 2XL H12XL60 75IN UNISX STD E 4 PNL DISPOSABLE

## (undated) DEVICE — SIPS DUAL 2 MINUTE TIP

## (undated) DEVICE — GAUZE,PACKING STRIP,IODOFORM,1/2"X5YD,ST: Brand: CURAD

## (undated) DEVICE — COTTON UNDERCAST PADDING,REGULAR FINISH: Brand: WEBRIL

## (undated) DEVICE — HYPODERMIC SAFETY NEEDLE: Brand: MAGELLAN

## (undated) DEVICE — USAGE FEE F/OSTEOTOME

## (undated) DEVICE — CHLORAPREP 26ML ORANGE

## (undated) DEVICE — SYRINGE MED 50ML LUERLOCK TIP

## (undated) DEVICE — SPLINT CAST W3XL15IN GRN STRENGTH PLSTR OF PARIS FAST SET

## (undated) DEVICE — LABEL MED MED CHPOR

## (undated) DEVICE — TOWEL,OR,DSP,ST,GREEN,DLX,XR,4/PK,20PK/C: Brand: MEDLINE

## (undated) DEVICE — SUTURE VICRYL + SZ 0 L27IN ABSRB UD L36MM CT-1 1/2 CIR VCPP41D

## (undated) DEVICE — TIBURON SPLIT SHEET: Brand: CONVERTORS

## (undated) DEVICE — PADDING UNDERCAST W4INXL12FT RAYON POLY SYN NONADHESIVE

## (undated) DEVICE — DRESSING GZ W1XL8IN COT XRFRM N ADH OVERWRAP CURAD

## (undated) DEVICE — TIBURON TOP SHEET: Brand: CONVERTORS

## (undated) DEVICE — GAUZE,SPONGE,4"X4",16PLY,XRAY,STRL,LF: Brand: MEDLINE

## (undated) DEVICE — TUBING, SUCTION, 1/4" X 10', STRAIGHT: Brand: MEDLINE

## (undated) DEVICE — STAPLER INT L75MM CUT LN L73MM STPL LN L77MM BLU B FRM 8

## (undated) DEVICE — SUTURE NONABSORBABLE MONOFILAMENT 5-0 PS-2 18 IN BLK ETHILON 1666H

## (undated) DEVICE — RESERVOIR,SUCTION,100CC,SILICONE: Brand: MEDLINE

## (undated) DEVICE — SUTURE VCRL SZ 1 L36IN ABSRB UD L36MM CT-1 1/2 CIR J947H